# Patient Record
Sex: FEMALE | Race: OTHER | NOT HISPANIC OR LATINO | URBAN - METROPOLITAN AREA
[De-identification: names, ages, dates, MRNs, and addresses within clinical notes are randomized per-mention and may not be internally consistent; named-entity substitution may affect disease eponyms.]

---

## 2017-02-07 ENCOUNTER — OUTPATIENT (OUTPATIENT)
Dept: OUTPATIENT SERVICES | Facility: HOSPITAL | Age: 67
LOS: 1 days | End: 2017-02-07
Payer: COMMERCIAL

## 2017-02-07 VITALS
OXYGEN SATURATION: 98 % | HEART RATE: 61 BPM | SYSTOLIC BLOOD PRESSURE: 165 MMHG | TEMPERATURE: 98 F | DIASTOLIC BLOOD PRESSURE: 84 MMHG | RESPIRATION RATE: 18 BRPM | HEIGHT: 60 IN | WEIGHT: 115.96 LBS

## 2017-02-07 DIAGNOSIS — R94.39 ABNORMAL RESULT OF OTHER CARDIOVASCULAR FUNCTION STUDY: ICD-10-CM

## 2017-02-07 LAB
ALBUMIN SERPL ELPH-MCNC: 4.3 G/DL — SIGNIFICANT CHANGE UP (ref 3.3–5)
ALP SERPL-CCNC: 79 U/L — SIGNIFICANT CHANGE UP (ref 40–120)
ALT FLD-CCNC: 29 U/L RC — SIGNIFICANT CHANGE UP (ref 10–45)
ANION GAP SERPL CALC-SCNC: 13 MMOL/L — SIGNIFICANT CHANGE UP (ref 5–17)
AST SERPL-CCNC: 25 U/L — SIGNIFICANT CHANGE UP (ref 10–40)
BILIRUB SERPL-MCNC: 0.8 MG/DL — SIGNIFICANT CHANGE UP (ref 0.2–1.2)
BUN SERPL-MCNC: 19 MG/DL — SIGNIFICANT CHANGE UP (ref 7–23)
CALCIUM SERPL-MCNC: 9.6 MG/DL — SIGNIFICANT CHANGE UP (ref 8.4–10.5)
CHLORIDE SERPL-SCNC: 101 MMOL/L — SIGNIFICANT CHANGE UP (ref 96–108)
CO2 SERPL-SCNC: 23 MMOL/L — SIGNIFICANT CHANGE UP (ref 22–31)
CREAT SERPL-MCNC: 1.01 MG/DL — SIGNIFICANT CHANGE UP (ref 0.5–1.3)
GLUCOSE SERPL-MCNC: 299 MG/DL — HIGH (ref 70–99)
HCT VFR BLD CALC: 41.1 % — SIGNIFICANT CHANGE UP (ref 34.5–45)
HGB BLD-MCNC: 14.5 G/DL — SIGNIFICANT CHANGE UP (ref 11.5–15.5)
MCHC RBC-ENTMCNC: 31.2 PG — SIGNIFICANT CHANGE UP (ref 27–34)
MCHC RBC-ENTMCNC: 35.2 GM/DL — SIGNIFICANT CHANGE UP (ref 32–36)
MCV RBC AUTO: 88.7 FL — SIGNIFICANT CHANGE UP (ref 80–100)
PLATELET # BLD AUTO: 189 K/UL — SIGNIFICANT CHANGE UP (ref 150–400)
POTASSIUM SERPL-MCNC: 4.5 MMOL/L — SIGNIFICANT CHANGE UP (ref 3.5–5.3)
POTASSIUM SERPL-SCNC: 4.5 MMOL/L — SIGNIFICANT CHANGE UP (ref 3.5–5.3)
PROT SERPL-MCNC: 7.4 G/DL — SIGNIFICANT CHANGE UP (ref 6–8.3)
RBC # BLD: 4.64 M/UL — SIGNIFICANT CHANGE UP (ref 3.8–5.2)
RBC # FLD: 12.1 % — SIGNIFICANT CHANGE UP (ref 10.3–14.5)
SODIUM SERPL-SCNC: 137 MMOL/L — SIGNIFICANT CHANGE UP (ref 135–145)
WBC # BLD: 7.2 K/UL — SIGNIFICANT CHANGE UP (ref 3.8–10.5)
WBC # FLD AUTO: 7.2 K/UL — SIGNIFICANT CHANGE UP (ref 3.8–10.5)

## 2017-02-07 PROCEDURE — 80053 COMPREHEN METABOLIC PANEL: CPT

## 2017-02-07 PROCEDURE — 93458 L HRT ARTERY/VENTRICLE ANGIO: CPT | Mod: 26,GC

## 2017-02-07 PROCEDURE — C1894: CPT

## 2017-02-07 PROCEDURE — 93010 ELECTROCARDIOGRAM REPORT: CPT

## 2017-02-07 PROCEDURE — 93458 L HRT ARTERY/VENTRICLE ANGIO: CPT

## 2017-02-07 PROCEDURE — 93005 ELECTROCARDIOGRAM TRACING: CPT

## 2017-02-07 PROCEDURE — C1887: CPT

## 2017-02-07 PROCEDURE — 85027 COMPLETE CBC AUTOMATED: CPT

## 2017-02-07 PROCEDURE — C1769: CPT

## 2017-02-07 RX ORDER — SODIUM CHLORIDE 9 MG/ML
3 INJECTION INTRAMUSCULAR; INTRAVENOUS; SUBCUTANEOUS EVERY 8 HOURS
Qty: 0 | Refills: 0 | Status: DISCONTINUED | OUTPATIENT
Start: 2017-02-07 | End: 2017-02-22

## 2017-02-07 NOTE — H&P CARDIOLOGY - HISTORY OF PRESENT ILLNESS
66 years old female pt with PMHx of CAD with 3 stents(1998, 2006 at [facility]), HTN, HLD, DMT2 and family history of MI who presents for cardiac cath. Pt reports that she has been experiencing intermittent left sided chest pain radiate to neck with BECERRA, evaluated by Dr. Diaz, referred for cardiac cath. Currently denies chest pain or SOB. 66 years old female pt with PMHx of CAD with 3 stents(1998, 2006 at [facility]), HTN, HLD, DMT2 and family history of MI who presents for cardiac cath. Pt reports that she has been experiencing intermittent left sided chest pain radiate to neck with BECERRA, evaluated by Dr. Diaz, and had abnormal EKG and referred for cardiac cath. Currently denies chest pain or SOB.

## 2017-02-07 NOTE — H&P CARDIOLOGY - FAMILY HISTORY
Father  Still living? Unknown  Family history of heart attack, Age at diagnosis: Age Unknown     Mother  Still living? No  Family history of heart attack, Age at diagnosis: Age Unknown

## 2017-02-07 NOTE — H&P CARDIOLOGY - PMH
CAD (coronary artery disease)    DM (diabetes mellitus)    HLD (hyperlipidemia)    HTN (hypertension)    Stented coronary artery

## 2018-05-23 PROBLEM — Z95.5 PRESENCE OF CORONARY ANGIOPLASTY IMPLANT AND GRAFT: Chronic | Status: ACTIVE | Noted: 2017-02-07

## 2018-05-23 PROBLEM — I10 ESSENTIAL (PRIMARY) HYPERTENSION: Chronic | Status: ACTIVE | Noted: 2017-02-07

## 2018-05-23 PROBLEM — I25.10 ATHEROSCLEROTIC HEART DISEASE OF NATIVE CORONARY ARTERY WITHOUT ANGINA PECTORIS: Chronic | Status: ACTIVE | Noted: 2017-02-07

## 2018-05-23 PROBLEM — E11.9 TYPE 2 DIABETES MELLITUS WITHOUT COMPLICATIONS: Chronic | Status: ACTIVE | Noted: 2017-02-07

## 2018-05-23 PROBLEM — E78.5 HYPERLIPIDEMIA, UNSPECIFIED: Chronic | Status: ACTIVE | Noted: 2017-02-07

## 2018-05-23 PROBLEM — Z00.00 ENCOUNTER FOR PREVENTIVE HEALTH EXAMINATION: Status: ACTIVE | Noted: 2018-05-23

## 2018-07-03 ENCOUNTER — APPOINTMENT (OUTPATIENT)
Dept: GASTROENTEROLOGY | Facility: CLINIC | Age: 68
End: 2018-07-03
Payer: MEDICARE

## 2018-07-03 VITALS
HEART RATE: 72 BPM | WEIGHT: 115 LBS | SYSTOLIC BLOOD PRESSURE: 100 MMHG | OXYGEN SATURATION: 99 % | TEMPERATURE: 98 F | RESPIRATION RATE: 16 BRPM | HEIGHT: 60 IN | BODY MASS INDEX: 22.58 KG/M2 | DIASTOLIC BLOOD PRESSURE: 64 MMHG

## 2018-07-03 DIAGNOSIS — Z12.11 ENCOUNTER FOR SCREENING FOR MALIGNANT NEOPLASM OF COLON: ICD-10-CM

## 2018-07-03 PROCEDURE — 99204 OFFICE O/P NEW MOD 45 MIN: CPT

## 2018-10-03 ENCOUNTER — RESULT REVIEW (OUTPATIENT)
Age: 68
End: 2018-10-03

## 2018-10-03 ENCOUNTER — OUTPATIENT (OUTPATIENT)
Dept: OUTPATIENT SERVICES | Facility: HOSPITAL | Age: 68
LOS: 1 days | End: 2018-10-03
Payer: MEDICARE

## 2018-10-03 DIAGNOSIS — K21.9 GASTRO-ESOPHAGEAL REFLUX DISEASE WITHOUT ESOPHAGITIS: ICD-10-CM

## 2018-10-03 DIAGNOSIS — Z12.11 ENCOUNTER FOR SCREENING FOR MALIGNANT NEOPLASM OF COLON: ICD-10-CM

## 2018-10-03 PROCEDURE — 88305 TISSUE EXAM BY PATHOLOGIST: CPT

## 2018-10-03 PROCEDURE — 88312 SPECIAL STAINS GROUP 1: CPT | Mod: 26

## 2018-10-03 PROCEDURE — G0121: CPT

## 2018-10-03 PROCEDURE — 88312 SPECIAL STAINS GROUP 1: CPT

## 2018-10-03 PROCEDURE — 88305 TISSUE EXAM BY PATHOLOGIST: CPT | Mod: 26

## 2018-10-03 PROCEDURE — 43239 EGD BIOPSY SINGLE/MULTIPLE: CPT

## 2018-10-03 PROCEDURE — 82962 GLUCOSE BLOOD TEST: CPT

## 2018-10-03 PROCEDURE — 45378 DIAGNOSTIC COLONOSCOPY: CPT

## 2019-01-03 ENCOUNTER — APPOINTMENT (OUTPATIENT)
Dept: GASTROENTEROLOGY | Facility: CLINIC | Age: 69
End: 2019-01-03
Payer: MEDICARE

## 2019-01-03 VITALS
TEMPERATURE: 97.3 F | SYSTOLIC BLOOD PRESSURE: 148 MMHG | OXYGEN SATURATION: 98 % | DIASTOLIC BLOOD PRESSURE: 68 MMHG | RESPIRATION RATE: 14 BRPM | WEIGHT: 110 LBS | HEART RATE: 76 BPM | HEIGHT: 60 IN | BODY MASS INDEX: 21.6 KG/M2

## 2019-01-03 DIAGNOSIS — K21.9 GASTRO-ESOPHAGEAL REFLUX DISEASE W/OUT ESOPHAGITIS: ICD-10-CM

## 2019-01-03 PROCEDURE — 99214 OFFICE O/P EST MOD 30 MIN: CPT

## 2019-01-03 NOTE — PHYSICAL EXAM
[General Appearance - Alert] : alert [General Appearance - In No Acute Distress] : in no acute distress [Sclera] : the sclera and conjunctiva were normal [PERRL With Normal Accommodation] : pupils were equal in size, round, and reactive to light [Extraocular Movements] : extraocular movements were intact [Outer Ear] : the ears and nose were normal in appearance [Oropharynx] : the oropharynx was normal [Neck Appearance] : the appearance of the neck was normal [Neck Cervical Mass (___cm)] : no neck mass was observed [Jugular Venous Distention Increased] : there was no jugular-venous distention [Thyroid Diffuse Enlargement] : the thyroid was not enlarged [Thyroid Nodule] : there were no palpable thyroid nodules [Auscultation Breath Sounds / Voice Sounds] : lungs were clear to auscultation bilaterally [Heart Rate And Rhythm] : heart rate was normal and rhythm regular [Heart Sounds] : normal S1 and S2 [Heart Sounds Gallop] : no gallops [Murmurs] : no murmurs [Heart Sounds Pericardial Friction Rub] : no pericardial rub [Bowel Sounds] : normal bowel sounds [Abdomen Soft] : soft [Abdomen Tenderness] : non-tender [Abdomen Mass (___ Cm)] : no abdominal mass palpated [Cervical Lymph Nodes Enlarged Posterior Bilaterally] : posterior cervical [Cervical Lymph Nodes Enlarged Anterior Bilaterally] : anterior cervical [Supraclavicular Lymph Nodes Enlarged Bilaterally] : supraclavicular [No Spinal Tenderness] : no spinal tenderness [Nail Clubbing] : no clubbing  or cyanosis of the fingernails [] : no rash [Oriented To Time, Place, And Person] : oriented to person, place, and time [Impaired Insight] : insight and judgment were intact [Affect] : the affect was normal

## 2019-01-03 NOTE — HISTORY OF PRESENT ILLNESS
[de-identified] : 68  year old female with persistent reflux symptoms. SHe is on PPi daily with no real improvement. EGD showed a significant hiatus hernia.

## 2019-01-03 NOTE — ASSESSMENT
[FreeTextEntry1] : 68 year old female with persistent reflux symptoms. GIven her history of cardiac disease I suggested she follow up with Her cardiologist. I will also refer her to General   surgery to consider repair of the hiatus hernia. In the meantime I suggested increasing her pPi to bid

## 2019-01-23 ENCOUNTER — APPOINTMENT (OUTPATIENT)
Dept: SURGERY | Facility: CLINIC | Age: 69
End: 2019-01-23
Payer: MEDICARE

## 2019-01-23 VITALS
HEART RATE: 67 BPM | WEIGHT: 112 LBS | SYSTOLIC BLOOD PRESSURE: 153 MMHG | HEIGHT: 60 IN | TEMPERATURE: 97.9 F | BODY MASS INDEX: 21.99 KG/M2 | DIASTOLIC BLOOD PRESSURE: 79 MMHG

## 2019-01-23 DIAGNOSIS — Z83.3 FAMILY HISTORY OF DIABETES MELLITUS: ICD-10-CM

## 2019-01-23 DIAGNOSIS — Z82.3 FAMILY HISTORY OF STROKE: ICD-10-CM

## 2019-01-23 DIAGNOSIS — Z87.891 PERSONAL HISTORY OF NICOTINE DEPENDENCE: ICD-10-CM

## 2019-01-23 DIAGNOSIS — Z82.49 FAMILY HISTORY OF ISCHEMIC HEART DISEASE AND OTHER DISEASES OF THE CIRCULATORY SYSTEM: ICD-10-CM

## 2019-01-23 DIAGNOSIS — Z86.39 PERSONAL HISTORY OF OTHER ENDOCRINE, NUTRITIONAL AND METABOLIC DISEASE: ICD-10-CM

## 2019-01-23 DIAGNOSIS — Z86.69 PERSONAL HISTORY OF OTHER DISEASES OF THE NERVOUS SYSTEM AND SENSE ORGANS: ICD-10-CM

## 2019-01-23 DIAGNOSIS — K21.0 GASTRO-ESOPHAGEAL REFLUX DISEASE WITH ESOPHAGITIS: ICD-10-CM

## 2019-01-23 DIAGNOSIS — Z86.79 PERSONAL HISTORY OF OTHER DISEASES OF THE CIRCULATORY SYSTEM: ICD-10-CM

## 2019-01-23 DIAGNOSIS — Z87.39 PERSONAL HISTORY OF OTHER DISEASES OF THE MUSCULOSKELETAL SYSTEM AND CONNECTIVE TISSUE: ICD-10-CM

## 2019-01-23 DIAGNOSIS — Z87.19 PERSONAL HISTORY OF OTHER DISEASES OF THE DIGESTIVE SYSTEM: ICD-10-CM

## 2019-01-23 PROCEDURE — 99203 OFFICE O/P NEW LOW 30 MIN: CPT

## 2019-01-29 ENCOUNTER — OUTPATIENT (OUTPATIENT)
Dept: OUTPATIENT SERVICES | Facility: HOSPITAL | Age: 69
LOS: 1 days | End: 2019-01-29
Payer: MEDICARE

## 2019-01-29 ENCOUNTER — APPOINTMENT (OUTPATIENT)
Dept: RADIOLOGY | Facility: HOSPITAL | Age: 69
End: 2019-01-29
Payer: MEDICARE

## 2019-01-29 DIAGNOSIS — K21.9 GASTRO-ESOPHAGEAL REFLUX DISEASE WITHOUT ESOPHAGITIS: ICD-10-CM

## 2019-01-29 PROCEDURE — 74241: CPT

## 2019-01-29 PROCEDURE — 74241: CPT | Mod: 26

## 2019-02-12 PROBLEM — Z86.79 HISTORY OF HYPERTENSION: Status: RESOLVED | Noted: 2019-01-23 | Resolved: 2019-02-12

## 2019-02-12 PROBLEM — Z87.19 HISTORY OF GASTROESOPHAGEAL REFLUX (GERD): Status: RESOLVED | Noted: 2019-01-23 | Resolved: 2019-02-12

## 2019-02-12 PROBLEM — Z86.69 HISTORY OF GLAUCOMA: Status: RESOLVED | Noted: 2019-01-23 | Resolved: 2019-02-12

## 2019-02-12 PROBLEM — Z86.39 HISTORY OF HIGH CHOLESTEROL: Status: RESOLVED | Noted: 2019-01-23 | Resolved: 2019-02-12

## 2019-02-12 PROBLEM — Z82.3 FAMILY HISTORY OF CEREBROVASCULAR ACCIDENT (CVA): Status: ACTIVE | Noted: 2019-01-23

## 2019-02-12 PROBLEM — Z87.39 HISTORY OF ARTHRITIS: Status: RESOLVED | Noted: 2019-01-23 | Resolved: 2019-02-12

## 2019-02-12 PROBLEM — Z83.3 FAMILY HISTORY OF DIABETES MELLITUS: Status: ACTIVE | Noted: 2019-01-23

## 2019-02-12 PROBLEM — K21.0 GASTROESOPHAGEAL REFLUX DISEASE WITH ESOPHAGITIS: Status: ACTIVE | Noted: 2019-02-12

## 2019-02-12 PROBLEM — Z87.891 FORMER SMOKER: Status: ACTIVE | Noted: 2019-01-23

## 2019-02-12 PROBLEM — Z86.39 HISTORY OF DIABETES MELLITUS: Status: RESOLVED | Noted: 2019-01-23 | Resolved: 2019-02-12

## 2019-02-12 PROBLEM — Z82.49 FAMILY HISTORY OF CARDIAC DISORDER: Status: ACTIVE | Noted: 2019-01-23

## 2019-02-12 NOTE — ASSESSMENT
[FreeTextEntry1] : 68 y.o female with symptomatic chronic acid reflux.\par - will needing additional test prior to surgical intervention. \par - upper GI series ordered.\par - will need esophageal manometry and impedance testing.\par - with these evaluations, we will be able to decide which wrap the patient will benefit from: Toupet vs. Nissen. THis was explained in detail to the patient and spouse during the visit. THey understand and agree to proceed.

## 2019-02-12 NOTE — CONSULT LETTER
[Dear  ___] : Dear  [unfilled], [Consult Letter:] : I had the pleasure of evaluating your patient, [unfilled]. [Consult Closing:] : Thank you very much for allowing me to participate in the care of this patient.  If you have any questions, please do not hesitate to contact me. [DrEva  ___] : Dr. MELO

## 2019-02-12 NOTE — PHYSICAL EXAM
[Enlarged] : not enlarged [No Rash or Lesion] : No rash or lesion [Alert] : alert [Oriented to Person] : oriented to person [Oriented to Place] : oriented to place [Oriented to Time] : oriented to time [Calm] : calm [de-identified] : NAD [de-identified] : NCAT; sclerae anicteric; [de-identified] : bilateral symmetric excursions, breathing even and unlabored [de-identified] : nml S1, S2, no m/r/g [de-identified] : soft, NT, ND\par

## 2019-02-12 NOTE — HISTORY OF PRESENT ILLNESS
[de-identified] : 68 y.o female presents to office for consultation. Accompanied by . Has seen Dr. Moreno, who performed EGD in oct 2018 which demonstrated hiatal hernia. reports having chronic acid reflux not relieved with daily ppi and also reports nausea with occasional vomiting. There is waterbrash as well at night. THis has gotten worse over the years and her symptoms are not relived by dietary and lifestyle modifications, nor by PPI use. SHe is referred for evaluation for possible surgical options. \par \par

## 2019-11-04 ENCOUNTER — APPOINTMENT (OUTPATIENT)
Dept: INTERNAL MEDICINE | Facility: CLINIC | Age: 69
End: 2019-11-04

## 2020-07-31 DIAGNOSIS — Z01.818 ENCOUNTER FOR OTHER PREPROCEDURAL EXAMINATION: ICD-10-CM

## 2020-08-04 ENCOUNTER — APPOINTMENT (OUTPATIENT)
Dept: DISASTER EMERGENCY | Facility: CLINIC | Age: 70
End: 2020-08-04

## 2020-08-05 LAB — SARS-COV-2 N GENE NPH QL NAA+PROBE: DETECTED

## 2020-08-07 ENCOUNTER — OUTPATIENT (OUTPATIENT)
Dept: OUTPATIENT SERVICES | Facility: HOSPITAL | Age: 70
LOS: 1 days | Discharge: HOME | End: 2020-08-07
Payer: MEDICARE

## 2020-08-07 DIAGNOSIS — I25.10 ATHEROSCLEROTIC HEART DISEASE OF NATIVE CORONARY ARTERY WITHOUT ANGINA PECTORIS: ICD-10-CM

## 2020-08-07 LAB
ANION GAP SERPL CALC-SCNC: 14 MMO/L — SIGNIFICANT CHANGE UP (ref 7–14)
BUN SERPL-MCNC: 21 MG/DL — SIGNIFICANT CHANGE UP (ref 7–23)
CALCIUM SERPL-MCNC: 9.8 MG/DL — SIGNIFICANT CHANGE UP (ref 8.4–10.5)
CHLORIDE SERPL-SCNC: 104 MMOL/L — SIGNIFICANT CHANGE UP (ref 98–107)
CO2 SERPL-SCNC: 24 MMOL/L — SIGNIFICANT CHANGE UP (ref 22–31)
CREAT SERPL-MCNC: 0.96 MG/DL — SIGNIFICANT CHANGE UP (ref 0.5–1.3)
GLUCOSE SERPL-MCNC: 82 MG/DL — SIGNIFICANT CHANGE UP (ref 70–99)
HBA1C BLD-MCNC: 9.5 % — HIGH (ref 4–5.6)
HCT VFR BLD CALC: 41.9 % — SIGNIFICANT CHANGE UP (ref 34.5–45)
HGB BLD-MCNC: 13.1 G/DL — SIGNIFICANT CHANGE UP (ref 11.5–15.5)
MCHC RBC-ENTMCNC: 28.2 PG — SIGNIFICANT CHANGE UP (ref 27–34)
MCHC RBC-ENTMCNC: 31.3 % — LOW (ref 32–36)
MCV RBC AUTO: 90.3 FL — SIGNIFICANT CHANGE UP (ref 80–100)
NRBC # FLD: 0 K/UL — SIGNIFICANT CHANGE UP (ref 0–0)
PLATELET # BLD AUTO: 182 K/UL — SIGNIFICANT CHANGE UP (ref 150–400)
PMV BLD: 11.2 FL — SIGNIFICANT CHANGE UP (ref 7–13)
POTASSIUM SERPL-MCNC: 3.8 MMOL/L — SIGNIFICANT CHANGE UP (ref 3.5–5.3)
POTASSIUM SERPL-SCNC: 3.8 MMOL/L — SIGNIFICANT CHANGE UP (ref 3.5–5.3)
RBC # BLD: 4.64 M/UL — SIGNIFICANT CHANGE UP (ref 3.8–5.2)
RBC # FLD: 14.3 % — SIGNIFICANT CHANGE UP (ref 10.3–14.5)
SODIUM SERPL-SCNC: 142 MMOL/L — SIGNIFICANT CHANGE UP (ref 135–145)
WBC # BLD: 3.63 K/UL — LOW (ref 3.8–10.5)
WBC # FLD AUTO: 3.63 K/UL — LOW (ref 3.8–10.5)

## 2020-08-07 PROCEDURE — 93010 ELECTROCARDIOGRAM REPORT: CPT

## 2020-08-07 PROCEDURE — 93458 L HRT ARTERY/VENTRICLE ANGIO: CPT | Mod: 26,59

## 2020-08-07 PROCEDURE — ZZZZZ: CPT

## 2020-08-07 PROCEDURE — 99152 MOD SED SAME PHYS/QHP 5/>YRS: CPT

## 2020-08-07 PROCEDURE — 93571 IV DOP VEL&/PRESS C FLO 1ST: CPT | Mod: 26,RC

## 2020-08-07 RX ORDER — SODIUM CHLORIDE 9 MG/ML
3 INJECTION INTRAMUSCULAR; INTRAVENOUS; SUBCUTANEOUS EVERY 8 HOURS
Refills: 0 | Status: DISCONTINUED | OUTPATIENT
Start: 2020-08-07 | End: 2020-08-22

## 2020-08-07 RX ORDER — LOSARTAN POTASSIUM 100 MG/1
100 TABLET, FILM COATED ORAL ONCE
Refills: 0 | Status: DISCONTINUED | OUTPATIENT
Start: 2020-08-07 | End: 2020-08-07

## 2020-08-07 RX ORDER — GLIMEPIRIDE 1 MG
1 TABLET ORAL
Qty: 0 | Refills: 0 | DISCHARGE

## 2020-08-07 RX ORDER — OMEPRAZOLE 10 MG/1
1 CAPSULE, DELAYED RELEASE ORAL
Qty: 0 | Refills: 0 | DISCHARGE

## 2020-08-07 RX ORDER — LOSARTAN POTASSIUM 100 MG/1
1 TABLET, FILM COATED ORAL
Qty: 0 | Refills: 0 | DISCHARGE

## 2020-08-07 RX ORDER — AMLODIPINE BESYLATE 2.5 MG/1
5 TABLET ORAL ONCE
Refills: 0 | Status: COMPLETED | OUTPATIENT
Start: 2020-08-07 | End: 2020-08-07

## 2020-08-07 RX ORDER — PIOGLITAZONE HYDROCHLORIDE 15 MG/1
1 TABLET ORAL
Qty: 0 | Refills: 0 | DISCHARGE

## 2020-08-07 RX ORDER — SITAGLIPTIN AND METFORMIN HYDROCHLORIDE 500; 50 MG/1; MG/1
1 TABLET, FILM COATED ORAL
Qty: 0 | Refills: 0 | DISCHARGE

## 2020-08-07 RX ORDER — AMLODIPINE BESYLATE 2.5 MG/1
5 TABLET ORAL ONCE
Refills: 0 | Status: DISCONTINUED | OUTPATIENT
Start: 2020-08-07 | End: 2020-08-07

## 2020-08-07 RX ORDER — ROSUVASTATIN CALCIUM 5 MG/1
1 TABLET ORAL
Qty: 0 | Refills: 0 | DISCHARGE

## 2020-08-07 RX ORDER — METOPROLOL TARTRATE 50 MG
1 TABLET ORAL
Qty: 0 | Refills: 0 | DISCHARGE

## 2020-08-07 RX ORDER — LOSARTAN POTASSIUM 100 MG/1
50 TABLET, FILM COATED ORAL ONCE
Refills: 0 | Status: COMPLETED | OUTPATIENT
Start: 2020-08-07 | End: 2020-08-07

## 2020-08-07 RX ADMIN — AMLODIPINE BESYLATE 5 MILLIGRAM(S): 2.5 TABLET ORAL at 08:14

## 2020-08-07 RX ADMIN — LOSARTAN POTASSIUM 50 MILLIGRAM(S): 100 TABLET, FILM COATED ORAL at 11:23

## 2020-08-07 NOTE — H&P CARDIOLOGY - REVIEW OF SYSTEMS
Pt denies fever, chills, recent travel, headache, dizziness, visual deficit, chest pain, abd pain, n/v/d/, hematochezia, melena, dysuria, hematuria, PE, DVT, SHASHA, CVA, syncope, peripheral edema.

## 2020-08-07 NOTE — H&P CARDIOLOGY - HISTORY OF PRESENT ILLNESS
70 years old female pt with PMHx of CAD with 3 stents(1998, 2006 at [facility]), HTN, HLD, DMT2 and family history of MI who presents for elective cardiac cath. Pt reports that she has been experiencing intermittent left sided chest pain on and off. States that pain does not associate with any specific activities and also happens at rest. Denies SOB, palpitations, n/v/d.    ECHO 4/4/2017: LVEF 61%

## 2020-08-07 NOTE — H&P CARDIOLOGY - FAMILY HISTORY
Father  Still living? Unknown  Family history of heart attack, Age at diagnosis: Age Unknown     Mother  Still living? Unknown  Family history of heart attack, Age at diagnosis: Age Unknown  Family history of CVA, Age at diagnosis: Age Unknown

## 2020-08-07 NOTE — H&P CARDIOLOGY - PMH
CAD (coronary artery disease)    COVID-19    DM (diabetes mellitus)    HLD (hyperlipidemia)    HTN (hypertension)    Stented coronary artery

## 2020-09-03 ENCOUNTER — RESULT REVIEW (OUTPATIENT)
Age: 70
End: 2020-09-03

## 2020-12-16 PROBLEM — Z12.11 ENCOUNTER FOR SCREENING COLONOSCOPY: Status: RESOLVED | Noted: 2018-07-03 | Resolved: 2020-12-16

## 2021-03-18 ENCOUNTER — EMERGENCY (EMERGENCY)
Facility: HOSPITAL | Age: 71
LOS: 1 days | Discharge: HOME | End: 2021-03-18
Attending: EMERGENCY MEDICINE
Payer: COMMERCIAL

## 2021-03-18 VITALS
HEART RATE: 68 BPM | SYSTOLIC BLOOD PRESSURE: 157 MMHG | OXYGEN SATURATION: 99 % | RESPIRATION RATE: 16 BRPM | DIASTOLIC BLOOD PRESSURE: 84 MMHG | TEMPERATURE: 97 F

## 2021-03-18 VITALS
OXYGEN SATURATION: 99 % | HEIGHT: 61 IN | SYSTOLIC BLOOD PRESSURE: 187 MMHG | DIASTOLIC BLOOD PRESSURE: 104 MMHG | WEIGHT: 134.92 LBS | RESPIRATION RATE: 18 BRPM | HEART RATE: 68 BPM | TEMPERATURE: 98 F

## 2021-03-18 PROBLEM — U07.1 COVID-19: Chronic | Status: ACTIVE | Noted: 2020-08-07

## 2021-03-18 LAB
ALBUMIN SERPL ELPH-MCNC: 4.3 G/DL — SIGNIFICANT CHANGE UP (ref 3.3–5)
ALP SERPL-CCNC: 118 U/L — SIGNIFICANT CHANGE UP (ref 40–120)
ALT FLD-CCNC: 27 U/L — SIGNIFICANT CHANGE UP (ref 10–45)
ANION GAP SERPL CALC-SCNC: 13 MMOL/L — SIGNIFICANT CHANGE UP (ref 5–17)
APTT BLD: 25.7 SEC — LOW (ref 27.5–35.5)
AST SERPL-CCNC: 35 U/L — SIGNIFICANT CHANGE UP (ref 10–40)
BASOPHILS # BLD AUTO: 0.03 K/UL — SIGNIFICANT CHANGE UP (ref 0–0.2)
BASOPHILS NFR BLD AUTO: 0.4 % — SIGNIFICANT CHANGE UP (ref 0–2)
BILIRUB SERPL-MCNC: 0.5 MG/DL — SIGNIFICANT CHANGE UP (ref 0.2–1.2)
BUN SERPL-MCNC: 26 MG/DL — HIGH (ref 7–23)
CALCIUM SERPL-MCNC: 10.6 MG/DL — HIGH (ref 8.4–10.5)
CHLORIDE SERPL-SCNC: 101 MMOL/L — SIGNIFICANT CHANGE UP (ref 96–108)
CO2 SERPL-SCNC: 22 MMOL/L — SIGNIFICANT CHANGE UP (ref 22–31)
CREAT SERPL-MCNC: 1.02 MG/DL — SIGNIFICANT CHANGE UP (ref 0.5–1.3)
EOSINOPHIL # BLD AUTO: 0.36 K/UL — SIGNIFICANT CHANGE UP (ref 0–0.5)
EOSINOPHIL NFR BLD AUTO: 5.4 % — SIGNIFICANT CHANGE UP (ref 0–6)
GLUCOSE SERPL-MCNC: 165 MG/DL — HIGH (ref 70–99)
HCT VFR BLD CALC: 42.4 % — SIGNIFICANT CHANGE UP (ref 34.5–45)
HGB BLD-MCNC: 14.2 G/DL — SIGNIFICANT CHANGE UP (ref 11.5–15.5)
IMM GRANULOCYTES NFR BLD AUTO: 0.6 % — SIGNIFICANT CHANGE UP (ref 0–1.5)
INR BLD: 1.07 RATIO — SIGNIFICANT CHANGE UP (ref 0.88–1.16)
LYMPHOCYTES # BLD AUTO: 2.87 K/UL — SIGNIFICANT CHANGE UP (ref 1–3.3)
LYMPHOCYTES # BLD AUTO: 43 % — SIGNIFICANT CHANGE UP (ref 13–44)
MCHC RBC-ENTMCNC: 28.9 PG — SIGNIFICANT CHANGE UP (ref 27–34)
MCHC RBC-ENTMCNC: 33.5 GM/DL — SIGNIFICANT CHANGE UP (ref 32–36)
MCV RBC AUTO: 86.4 FL — SIGNIFICANT CHANGE UP (ref 80–100)
MONOCYTES # BLD AUTO: 0.32 K/UL — SIGNIFICANT CHANGE UP (ref 0–0.9)
MONOCYTES NFR BLD AUTO: 4.8 % — SIGNIFICANT CHANGE UP (ref 2–14)
NEUTROPHILS # BLD AUTO: 3.06 K/UL — SIGNIFICANT CHANGE UP (ref 1.8–7.4)
NEUTROPHILS NFR BLD AUTO: 45.8 % — SIGNIFICANT CHANGE UP (ref 43–77)
NRBC # BLD: 0 /100 WBCS — SIGNIFICANT CHANGE UP (ref 0–0)
NT-PROBNP SERPL-SCNC: 200 PG/ML — SIGNIFICANT CHANGE UP (ref 0–300)
PLATELET # BLD AUTO: 234 K/UL — SIGNIFICANT CHANGE UP (ref 150–400)
POTASSIUM SERPL-MCNC: 4.8 MMOL/L — SIGNIFICANT CHANGE UP (ref 3.5–5.3)
POTASSIUM SERPL-SCNC: 4.8 MMOL/L — SIGNIFICANT CHANGE UP (ref 3.5–5.3)
PROT SERPL-MCNC: 8.6 G/DL — HIGH (ref 6–8.3)
PROTHROM AB SERPL-ACNC: 12.8 SEC — SIGNIFICANT CHANGE UP (ref 10.6–13.6)
RBC # BLD: 4.91 M/UL — SIGNIFICANT CHANGE UP (ref 3.8–5.2)
RBC # FLD: 14.5 % — SIGNIFICANT CHANGE UP (ref 10.3–14.5)
SODIUM SERPL-SCNC: 136 MMOL/L — SIGNIFICANT CHANGE UP (ref 135–145)
TROPONIN T, HIGH SENSITIVITY RESULT: 14 NG/L — SIGNIFICANT CHANGE UP (ref 0–51)
TROPONIN T, HIGH SENSITIVITY RESULT: 15 NG/L — SIGNIFICANT CHANGE UP (ref 0–51)
TSH SERPL-MCNC: 1.33 UIU/ML — SIGNIFICANT CHANGE UP (ref 0.27–4.2)
WBC # BLD: 6.68 K/UL — SIGNIFICANT CHANGE UP (ref 3.8–10.5)
WBC # FLD AUTO: 6.68 K/UL — SIGNIFICANT CHANGE UP (ref 3.8–10.5)

## 2021-03-18 PROCEDURE — 93010 ELECTROCARDIOGRAM REPORT: CPT

## 2021-03-18 PROCEDURE — 71046 X-RAY EXAM CHEST 2 VIEWS: CPT | Mod: 26

## 2021-03-18 PROCEDURE — 85730 THROMBOPLASTIN TIME PARTIAL: CPT

## 2021-03-18 PROCEDURE — 85610 PROTHROMBIN TIME: CPT

## 2021-03-18 PROCEDURE — 93005 ELECTROCARDIOGRAM TRACING: CPT

## 2021-03-18 PROCEDURE — 99284 EMERGENCY DEPT VISIT MOD MDM: CPT

## 2021-03-18 PROCEDURE — 85025 COMPLETE CBC W/AUTO DIFF WBC: CPT

## 2021-03-18 PROCEDURE — 84443 ASSAY THYROID STIM HORMONE: CPT

## 2021-03-18 PROCEDURE — 70450 CT HEAD/BRAIN W/O DYE: CPT | Mod: 26,MA

## 2021-03-18 PROCEDURE — 83880 ASSAY OF NATRIURETIC PEPTIDE: CPT

## 2021-03-18 PROCEDURE — 71046 X-RAY EXAM CHEST 2 VIEWS: CPT

## 2021-03-18 PROCEDURE — 80053 COMPREHEN METABOLIC PANEL: CPT

## 2021-03-18 PROCEDURE — 84484 ASSAY OF TROPONIN QUANT: CPT

## 2021-03-18 PROCEDURE — 99284 EMERGENCY DEPT VISIT MOD MDM: CPT | Mod: 25

## 2021-03-18 PROCEDURE — 70450 CT HEAD/BRAIN W/O DYE: CPT

## 2021-03-18 RX ORDER — ACETAMINOPHEN 500 MG
650 TABLET ORAL ONCE
Refills: 0 | Status: COMPLETED | OUTPATIENT
Start: 2021-03-18 | End: 2021-03-18

## 2021-03-18 RX ORDER — LIDOCAINE 4 G/100G
1 CREAM TOPICAL ONCE
Refills: 0 | Status: COMPLETED | OUTPATIENT
Start: 2021-03-18 | End: 2021-03-18

## 2021-03-18 RX ADMIN — Medication 650 MILLIGRAM(S): at 17:22

## 2021-03-18 RX ADMIN — LIDOCAINE 1 PATCH: 4 CREAM TOPICAL at 17:23

## 2021-03-18 NOTE — ED PROVIDER NOTE - OBJECTIVE STATEMENT
71 F hx CAD s/p stent HLD T2DM HTN p/w intermittent bouts of a shooting type chest pain in the chest for the last 3 days, intermittent, not exertional, lasting for seconds to minutes at a time. She has had L lower back pain, worse with movement or lying down for several weeks as well. 1week ago she had an episode of light headedness and "drifted" to the floor did not hit her head; she thinks she might have soiled herself at that time. Denies associated incontinence or saddle numbness, cough sore throat, fevers, diaphoresis or shortness of breath. Had elective cath in August 2020 showing patent stents

## 2021-03-18 NOTE — ED PROVIDER NOTE - MUSCULOSKELETAL, MLM
L lower back pain reproduced when pt goes to lie down. Negative straight leg raise test. 5/5 strength knee flexion/extension and ankle platnar/dorsiflexion. Dp pulses intact b/l.

## 2021-03-18 NOTE — ED PROVIDER NOTE - PATIENT PORTAL LINK FT
You can access the FollowMyHealth Patient Portal offered by [facility] by registering at the following website: [email or web address]. By joining [employer]’s FollowMyHealth portal, you will also be able to view your health information using other applications (apps) compatible with our system.

## 2021-03-18 NOTE — ED PROVIDER NOTE - IV ALTEPLASE DOOR HIDDEN
Note received from pt when scheduling appt:    Hello,  I started getting a back ache around my left shoulder blade on Sunday evening, but over the course of Monday it progressed to stronger pain in my back, and sharp pains in my chest if I move around too quickly or take too deep of breath. It also felt worse when I tried laying down on my back so sleep was difficult. I took some ibuprofen but it does not seem to be helping much so Im thinking this is something I should get checked out.   Thanks!      Writer called pt to discuss symptoms as appt scheduled is not until 5/18/18, left message requesting a return call.     show

## 2021-03-18 NOTE — ED PROVIDER NOTE - ATTENDING CONTRIBUTION TO CARE
pt is a 72 y/o female with h/o cad, covid, dm, htn 2 stents presents with weakness for the past few weeks, lost her father in jan, element of greving, decrease po intake, here with weakness with l lumbar lower back pain with no motor deficits noted, does not radiate down both legs, with no change in bowel or bladder habits, no saddle anesthesia. pt able to ambulate with no red flags such as h/o cancer or concern for epidural abscess. pt also with chest pain, atypical lasting seconds for months, no ekg changes, cathed last year with no new blockages, cardiac work up, control back pain, check labs for weakness.

## 2021-03-18 NOTE — ED ADULT NURSE NOTE - OBJECTIVE STATEMENT
70 y/o female presents with multiple complaints. States she had a death in the family in January and since than has had decreased eating and drinking.  The past two weeks she states she has had low back pain and intermittent chest pain. Motrin helped her back pain. Denies sob, ha, n/v/d, abdominal pain, f/c, urinary symptoms, hematuria. Denies trauma to her back or injury. A&Ox4, hypertensive, skin warm dry and intact, MAEx4, lungs CTA, abd soft nondistended. Patient's bed in the lowest position, explained plan of care to patient and family members. Will continue to reassess.

## 2022-05-13 ENCOUNTER — NON-APPOINTMENT (OUTPATIENT)
Age: 72
End: 2022-05-13

## 2022-05-16 ENCOUNTER — APPOINTMENT (OUTPATIENT)
Dept: GASTROENTEROLOGY | Facility: CLINIC | Age: 72
End: 2022-05-16
Payer: MEDICARE

## 2022-05-16 ENCOUNTER — NON-APPOINTMENT (OUTPATIENT)
Age: 72
End: 2022-05-16

## 2022-05-16 DIAGNOSIS — K62.5 HEMORRHAGE OF ANUS AND RECTUM: ICD-10-CM

## 2022-05-16 PROCEDURE — 99442: CPT

## 2022-08-01 ENCOUNTER — APPOINTMENT (OUTPATIENT)
Dept: GASTROENTEROLOGY | Facility: CLINIC | Age: 72
End: 2022-08-01

## 2022-08-02 ENCOUNTER — APPOINTMENT (OUTPATIENT)
Dept: GASTROENTEROLOGY | Facility: HOSPITAL | Age: 72
End: 2022-08-02

## 2022-09-12 ENCOUNTER — NON-APPOINTMENT (OUTPATIENT)
Age: 72
End: 2022-09-12

## 2022-09-23 ENCOUNTER — APPOINTMENT (OUTPATIENT)
Dept: GASTROENTEROLOGY | Facility: HOSPITAL | Age: 72
End: 2022-09-23

## 2022-09-23 ENCOUNTER — RESULT REVIEW (OUTPATIENT)
Age: 72
End: 2022-09-23

## 2022-09-23 ENCOUNTER — OUTPATIENT (OUTPATIENT)
Dept: OUTPATIENT SERVICES | Facility: HOSPITAL | Age: 72
LOS: 1 days | Discharge: HOME | End: 2022-09-23

## 2022-09-23 VITALS
RESPIRATION RATE: 18 BRPM | WEIGHT: 132.94 LBS | OXYGEN SATURATION: 99 % | SYSTOLIC BLOOD PRESSURE: 149 MMHG | TEMPERATURE: 96 F | DIASTOLIC BLOOD PRESSURE: 68 MMHG | HEIGHT: 61 IN | HEART RATE: 69 BPM

## 2022-09-23 VITALS
HEART RATE: 68 BPM | OXYGEN SATURATION: 100 % | RESPIRATION RATE: 20 BRPM | SYSTOLIC BLOOD PRESSURE: 140 MMHG | DIASTOLIC BLOOD PRESSURE: 66 MMHG

## 2022-09-23 DIAGNOSIS — R15.9 FULL INCONTINENCE OF FECES: ICD-10-CM

## 2022-09-23 LAB — GLUCOSE BLDC GLUCOMTR-MCNC: 104 MG/DL — HIGH (ref 70–99)

## 2022-09-23 PROCEDURE — 45390 COLONOSCOPY W/RESECTION: CPT

## 2022-09-23 PROCEDURE — 88305 TISSUE EXAM BY PATHOLOGIST: CPT | Mod: 26

## 2022-09-23 PROCEDURE — 45381 COLONOSCOPY SUBMUCOUS NJX: CPT | Mod: 59

## 2022-09-23 DEVICE — SYR ORISE GEL TWIN 23G 10ML: Type: IMPLANTABLE DEVICE | Status: FUNCTIONAL

## 2022-09-23 DEVICE — CLIP RESOLUTION 360 235CM: Type: IMPLANTABLE DEVICE | Status: FUNCTIONAL

## 2022-09-23 NOTE — ASU PATIENT PROFILE, ADULT - FALL HARM RISK - UNIVERSAL INTERVENTIONS
Bed in lowest position, wheels locked, appropriate side rails in place/Call bell, personal items and telephone in reach/Instruct patient to call for assistance before getting out of bed or chair/Non-slip footwear when patient is out of bed/[place] to call system/Physically safe environment - no spills, clutter or unnecessary equipment/Purposeful Proactive Rounding/Room/bathroom lighting operational, light cord in reach

## 2022-09-23 NOTE — ASU PATIENT PROFILE, ADULT - NSICDXPASTMEDICALHX_GEN_ALL_CORE_FT
PAST MEDICAL HISTORY:  CAD (coronary artery disease)     COVID-19     DM (diabetes mellitus)     HLD (hyperlipidemia)     HTN (hypertension)     Stented coronary artery

## 2022-09-27 LAB — SURGICAL PATHOLOGY STUDY: SIGNIFICANT CHANGE UP

## 2022-10-12 NOTE — ED ADULT NURSE NOTE - PSH
No significant past surgical history     [FreeTextEntry2] : Patient is a 53 year old RHD female with complains of locking in the L Index and Middle Finger for the past several weeks. No injury or trauma. Patient states that she has radiating pain in the L Wrist. Patient feels numbness in the fingers.

## 2024-03-27 ENCOUNTER — NON-APPOINTMENT (OUTPATIENT)
Age: 74
End: 2024-03-27

## 2024-03-28 ENCOUNTER — APPOINTMENT (OUTPATIENT)
Dept: GASTROENTEROLOGY | Facility: CLINIC | Age: 74
End: 2024-03-28
Payer: MEDICARE

## 2024-03-28 DIAGNOSIS — R15.9 FULL INCONTINENCE OF FECES: ICD-10-CM

## 2024-03-28 PROCEDURE — 99442: CPT

## 2024-05-03 ENCOUNTER — INPATIENT (INPATIENT)
Facility: HOSPITAL | Age: 74
LOS: 0 days | Discharge: HOME | End: 2024-05-04
Attending: INTERNAL MEDICINE | Admitting: INTERNAL MEDICINE
Payer: MEDICARE

## 2024-05-03 VITALS
HEART RATE: 105 BPM | SYSTOLIC BLOOD PRESSURE: 162 MMHG | TEMPERATURE: 98 F | RESPIRATION RATE: 18 BRPM | OXYGEN SATURATION: 100 % | DIASTOLIC BLOOD PRESSURE: 89 MMHG

## 2024-05-03 DIAGNOSIS — Z95.5 PRESENCE OF CORONARY ANGIOPLASTY IMPLANT AND GRAFT: Chronic | ICD-10-CM

## 2024-05-03 DIAGNOSIS — I25.10 ATHEROSCLEROTIC HEART DISEASE OF NATIVE CORONARY ARTERY WITHOUT ANGINA PECTORIS: ICD-10-CM

## 2024-05-03 LAB
ANION GAP SERPL CALC-SCNC: 12 MMOL/L — SIGNIFICANT CHANGE UP (ref 7–14)
BUN SERPL-MCNC: 22 MG/DL — SIGNIFICANT CHANGE UP (ref 7–23)
CALCIUM SERPL-MCNC: 9.5 MG/DL — SIGNIFICANT CHANGE UP (ref 8.4–10.5)
CHLORIDE SERPL-SCNC: 101 MMOL/L — SIGNIFICANT CHANGE UP (ref 98–107)
CO2 SERPL-SCNC: 24 MMOL/L — SIGNIFICANT CHANGE UP (ref 22–31)
CREAT SERPL-MCNC: 1.02 MG/DL — SIGNIFICANT CHANGE UP (ref 0.5–1.3)
EGFR: 58 ML/MIN/1.73M2 — LOW
GLUCOSE SERPL-MCNC: 254 MG/DL — HIGH (ref 70–99)
HCT VFR BLD CALC: 40.6 % — SIGNIFICANT CHANGE UP (ref 34.5–45)
HGB BLD-MCNC: 14.2 G/DL — SIGNIFICANT CHANGE UP (ref 11.5–15.5)
MCHC RBC-ENTMCNC: 30.7 PG — SIGNIFICANT CHANGE UP (ref 27–34)
MCHC RBC-ENTMCNC: 35 GM/DL — SIGNIFICANT CHANGE UP (ref 32–36)
MCV RBC AUTO: 87.7 FL — SIGNIFICANT CHANGE UP (ref 80–100)
NRBC # BLD: 0 /100 WBCS — SIGNIFICANT CHANGE UP (ref 0–0)
NRBC # FLD: 0 K/UL — SIGNIFICANT CHANGE UP (ref 0–0)
PLATELET # BLD AUTO: 183 K/UL — SIGNIFICANT CHANGE UP (ref 150–400)
POTASSIUM SERPL-MCNC: 4.5 MMOL/L — SIGNIFICANT CHANGE UP (ref 3.5–5.3)
POTASSIUM SERPL-SCNC: 4.5 MMOL/L — SIGNIFICANT CHANGE UP (ref 3.5–5.3)
RBC # BLD: 4.63 M/UL — SIGNIFICANT CHANGE UP (ref 3.8–5.2)
RBC # FLD: 12 % — SIGNIFICANT CHANGE UP (ref 10.3–14.5)
SODIUM SERPL-SCNC: 137 MMOL/L — SIGNIFICANT CHANGE UP (ref 135–145)
WBC # BLD: 7.23 K/UL — SIGNIFICANT CHANGE UP (ref 3.8–10.5)
WBC # FLD AUTO: 7.23 K/UL — SIGNIFICANT CHANGE UP (ref 3.8–10.5)

## 2024-05-03 PROCEDURE — 93010 ELECTROCARDIOGRAM REPORT: CPT | Mod: 76

## 2024-05-03 PROCEDURE — 93571 IV DOP VEL&/PRESS C FLO 1ST: CPT | Mod: 26,59

## 2024-05-03 PROCEDURE — 92920 PRQ TRLUML C ANGIOP 1ART&/BR: CPT | Mod: RC

## 2024-05-03 PROCEDURE — 93454 CORONARY ARTERY ANGIO S&I: CPT | Mod: 26,59

## 2024-05-03 PROCEDURE — 99152 MOD SED SAME PHYS/QHP 5/>YRS: CPT

## 2024-05-03 RX ORDER — DEXTROSE 50 % IN WATER 50 %
25 SYRINGE (ML) INTRAVENOUS ONCE
Refills: 0 | Status: DISCONTINUED | OUTPATIENT
Start: 2024-05-03 | End: 2024-05-04

## 2024-05-03 RX ORDER — SODIUM CHLORIDE 9 MG/ML
1000 INJECTION, SOLUTION INTRAVENOUS
Refills: 0 | Status: DISCONTINUED | OUTPATIENT
Start: 2024-05-03 | End: 2024-05-04

## 2024-05-03 RX ORDER — LOSARTAN POTASSIUM 100 MG/1
1 TABLET, FILM COATED ORAL
Refills: 0 | DISCHARGE

## 2024-05-03 RX ORDER — METOPROLOL TARTRATE 50 MG
100 TABLET ORAL
Refills: 0 | Status: DISCONTINUED | OUTPATIENT
Start: 2024-05-03 | End: 2024-05-04

## 2024-05-03 RX ORDER — LATANOPROST 0.05 MG/ML
1 SOLUTION/ DROPS OPHTHALMIC; TOPICAL
Refills: 0 | DISCHARGE

## 2024-05-03 RX ORDER — ASPIRIN/CALCIUM CARB/MAGNESIUM 324 MG
81 TABLET ORAL DAILY
Refills: 0 | Status: DISCONTINUED | OUTPATIENT
Start: 2024-05-04 | End: 2024-05-04

## 2024-05-03 RX ORDER — INSULIN DETEMIR 100/ML (3)
25 INSULIN PEN (ML) SUBCUTANEOUS
Qty: 0 | Refills: 0 | DISCHARGE

## 2024-05-03 RX ORDER — LOSARTAN POTASSIUM 100 MG/1
2 TABLET, FILM COATED ORAL
Qty: 0 | Refills: 0 | DISCHARGE

## 2024-05-03 RX ORDER — AMLODIPINE BESYLATE 2.5 MG/1
1 TABLET ORAL
Qty: 0 | Refills: 0 | DISCHARGE

## 2024-05-03 RX ORDER — DEXTROSE 10 % IN WATER 10 %
125 INTRAVENOUS SOLUTION INTRAVENOUS ONCE
Refills: 0 | Status: DISCONTINUED | OUTPATIENT
Start: 2024-05-03 | End: 2024-05-04

## 2024-05-03 RX ORDER — SIMETHICONE 80 MG/1
80 TABLET, CHEWABLE ORAL ONCE
Refills: 0 | Status: COMPLETED | OUTPATIENT
Start: 2024-05-03 | End: 2024-05-03

## 2024-05-03 RX ORDER — INSULIN LISPRO 100/ML
VIAL (ML) SUBCUTANEOUS AT BEDTIME
Refills: 0 | Status: DISCONTINUED | OUTPATIENT
Start: 2024-05-03 | End: 2024-05-04

## 2024-05-03 RX ORDER — CLOPIDOGREL BISULFATE 75 MG/1
75 TABLET, FILM COATED ORAL DAILY
Refills: 0 | Status: DISCONTINUED | OUTPATIENT
Start: 2024-05-04 | End: 2024-05-04

## 2024-05-03 RX ORDER — SODIUM CHLORIDE 9 MG/ML
3 INJECTION INTRAMUSCULAR; INTRAVENOUS; SUBCUTANEOUS EVERY 8 HOURS
Refills: 0 | Status: DISCONTINUED | OUTPATIENT
Start: 2024-05-03 | End: 2024-05-04

## 2024-05-03 RX ORDER — DEXTROSE 50 % IN WATER 50 %
12.5 SYRINGE (ML) INTRAVENOUS ONCE
Refills: 0 | Status: DISCONTINUED | OUTPATIENT
Start: 2024-05-03 | End: 2024-05-04

## 2024-05-03 RX ORDER — GLUCAGON INJECTION, SOLUTION 0.5 MG/.1ML
1 INJECTION, SOLUTION SUBCUTANEOUS ONCE
Refills: 0 | Status: DISCONTINUED | OUTPATIENT
Start: 2024-05-03 | End: 2024-05-04

## 2024-05-03 RX ORDER — INSULIN GLARGINE 100 [IU]/ML
12 INJECTION, SOLUTION SUBCUTANEOUS AT BEDTIME
Refills: 0 | Status: DISCONTINUED | OUTPATIENT
Start: 2024-05-03 | End: 2024-05-04

## 2024-05-03 RX ORDER — ISOSORBIDE MONONITRATE 60 MG/1
1 TABLET, EXTENDED RELEASE ORAL
Refills: 0 | DISCHARGE

## 2024-05-03 RX ORDER — DEXTROSE 50 % IN WATER 50 %
15 SYRINGE (ML) INTRAVENOUS ONCE
Refills: 0 | Status: DISCONTINUED | OUTPATIENT
Start: 2024-05-03 | End: 2024-05-04

## 2024-05-03 RX ORDER — LATANOPROST 0.05 MG/ML
1 SOLUTION/ DROPS OPHTHALMIC; TOPICAL AT BEDTIME
Refills: 0 | Status: DISCONTINUED | OUTPATIENT
Start: 2024-05-03 | End: 2024-05-04

## 2024-05-03 RX ORDER — ROSUVASTATIN CALCIUM 5 MG/1
1 TABLET ORAL
Refills: 0 | DISCHARGE

## 2024-05-03 RX ORDER — EMPAGLIFLOZIN 10 MG/1
1 TABLET, FILM COATED ORAL
Refills: 0 | DISCHARGE

## 2024-05-03 RX ORDER — PIOGLITAZONE HYDROCHLORIDE 15 MG/1
1 TABLET ORAL
Qty: 0 | Refills: 0 | DISCHARGE

## 2024-05-03 RX ORDER — ACETAMINOPHEN 500 MG
1000 TABLET ORAL ONCE
Refills: 0 | Status: COMPLETED | OUTPATIENT
Start: 2024-05-03 | End: 2024-05-03

## 2024-05-03 RX ORDER — INSULIN ASPART 100 [IU]/ML
20 INJECTION, SOLUTION SUBCUTANEOUS
Refills: 0 | DISCHARGE

## 2024-05-03 RX ORDER — INSULIN DEGLUDEC 100 U/ML
16 INJECTION, SOLUTION SUBCUTANEOUS
Refills: 0 | DISCHARGE

## 2024-05-03 RX ORDER — INSULIN LISPRO 100/ML
14 VIAL (ML) SUBCUTANEOUS
Refills: 0 | Status: DISCONTINUED | OUTPATIENT
Start: 2024-05-03 | End: 2024-05-04

## 2024-05-03 RX ORDER — INSULIN LISPRO 100/ML
VIAL (ML) SUBCUTANEOUS
Refills: 0 | Status: DISCONTINUED | OUTPATIENT
Start: 2024-05-03 | End: 2024-05-04

## 2024-05-03 RX ORDER — ISOSORBIDE MONONITRATE 60 MG/1
60 TABLET, EXTENDED RELEASE ORAL DAILY
Refills: 0 | Status: DISCONTINUED | OUTPATIENT
Start: 2024-05-03 | End: 2024-05-04

## 2024-05-03 RX ORDER — FLUTICASONE PROPIONATE 50 MCG
1 SPRAY, SUSPENSION NASAL
Qty: 0 | Refills: 0 | DISCHARGE

## 2024-05-03 RX ORDER — ROSUVASTATIN CALCIUM 5 MG/1
40 TABLET ORAL AT BEDTIME
Refills: 0 | Status: DISCONTINUED | OUTPATIENT
Start: 2024-05-03 | End: 2024-05-04

## 2024-05-03 RX ORDER — METOPROLOL TARTRATE 50 MG
1 TABLET ORAL
Refills: 0 | DISCHARGE

## 2024-05-03 RX ORDER — ATORVASTATIN CALCIUM 80 MG/1
1 TABLET, FILM COATED ORAL
Qty: 0 | Refills: 0 | DISCHARGE

## 2024-05-03 RX ORDER — METOPROLOL TARTRATE 50 MG
2 TABLET ORAL
Qty: 0 | Refills: 0 | DISCHARGE

## 2024-05-03 RX ORDER — GLIMEPIRIDE 1 MG
1 TABLET ORAL
Qty: 0 | Refills: 0 | DISCHARGE

## 2024-05-03 RX ORDER — SODIUM CHLORIDE 9 MG/ML
500 INJECTION INTRAMUSCULAR; INTRAVENOUS; SUBCUTANEOUS
Refills: 0 | Status: DISCONTINUED | OUTPATIENT
Start: 2024-05-03 | End: 2024-05-04

## 2024-05-03 RX ORDER — LOSARTAN POTASSIUM 100 MG/1
100 TABLET, FILM COATED ORAL DAILY
Refills: 0 | Status: DISCONTINUED | OUTPATIENT
Start: 2024-05-04 | End: 2024-05-04

## 2024-05-03 RX ORDER — FOLIC ACID 0.8 MG
1 TABLET ORAL
Qty: 0 | Refills: 0 | DISCHARGE

## 2024-05-03 RX ORDER — INSULIN ASPART 100 [IU]/ML
6 INJECTION, SOLUTION SUBCUTANEOUS
Qty: 0 | Refills: 0 | DISCHARGE

## 2024-05-03 RX ORDER — NITROGLYCERIN 6.5 MG
0.4 CAPSULE, EXTENDED RELEASE ORAL ONCE
Refills: 0 | Status: COMPLETED | OUTPATIENT
Start: 2024-05-03 | End: 2024-05-03

## 2024-05-03 RX ORDER — ASPIRIN/CALCIUM CARB/MAGNESIUM 324 MG
1 TABLET ORAL
Qty: 0 | Refills: 0 | DISCHARGE

## 2024-05-03 RX ADMIN — Medication 3: at 22:23

## 2024-05-03 RX ADMIN — LATANOPROST 1 DROP(S): 0.05 SOLUTION/ DROPS OPHTHALMIC; TOPICAL at 21:56

## 2024-05-03 RX ADMIN — Medication 2: at 17:06

## 2024-05-03 RX ADMIN — Medication 1000 MILLIGRAM(S): at 16:50

## 2024-05-03 RX ADMIN — Medication 0.4 MILLIGRAM(S): at 16:20

## 2024-05-03 RX ADMIN — SODIUM CHLORIDE 75 MILLILITER(S): 9 INJECTION INTRAMUSCULAR; INTRAVENOUS; SUBCUTANEOUS at 16:20

## 2024-05-03 RX ADMIN — Medication 400 MILLIGRAM(S): at 16:20

## 2024-05-03 RX ADMIN — ROSUVASTATIN CALCIUM 40 MILLIGRAM(S): 5 TABLET ORAL at 21:56

## 2024-05-03 RX ADMIN — INSULIN GLARGINE 12 UNIT(S): 100 INJECTION, SOLUTION SUBCUTANEOUS at 22:23

## 2024-05-03 RX ADMIN — SODIUM CHLORIDE 3 MILLILITER(S): 9 INJECTION INTRAMUSCULAR; INTRAVENOUS; SUBCUTANEOUS at 16:39

## 2024-05-03 RX ADMIN — SIMETHICONE 80 MILLIGRAM(S): 80 TABLET, CHEWABLE ORAL at 21:56

## 2024-05-03 RX ADMIN — Medication 100 MILLIGRAM(S): at 21:56

## 2024-05-03 RX ADMIN — SODIUM CHLORIDE 3 MILLILITER(S): 9 INJECTION INTRAMUSCULAR; INTRAVENOUS; SUBCUTANEOUS at 22:06

## 2024-05-03 NOTE — CHART NOTE - NSCHARTNOTEFT_GEN_A_CORE
Status post Cath, Right femoral site without bleeding or hematoma.  Dressing is intact.  Positive Pulses.  Will continue to monitor.                                                                                                                                                  KAM Last, RPA-C [id number]

## 2024-05-03 NOTE — H&P CARDIOLOGY - GASTROINTESTINAL
SLEEP LAB (Nancy or Jaden) will contact you to schedulethe sleep study. Their number is [phone] (ext 2). Please call them if you do not hear from them in 2 weeks from now.  The [facility] Lab is located on 7th floor of the [facility]; [place] lab is located in [facility].    SLEEP CLINIC (my assistant) will call you when the sleep study results are ready - if you have not heard from us by 2 weeks from the date of the study, please call [phone] (ext 1) or you can use [facility] to contact me.    You are advised to abstain from driving should you feel sleepy or drowsy.    
detailed exam
Respiratory

## 2024-05-03 NOTE — H&P CARDIOLOGY - NSICDXFAMILYHX_GEN_ALL_CORE_FT
FAMILY HISTORY:  Father  Still living? Unknown  Family history of heart attack, Age at diagnosis: Age Unknown    Mother  Still living? Unknown  Family history of CVA, Age at diagnosis: Age Unknown  Family history of heart attack, Age at diagnosis: Age Unknown

## 2024-05-03 NOTE — H&P CARDIOLOGY - RS GEN PE MLT RESP DETAILS PC
Epidermoid Cyst: Care Instructions  Your Care Instructions  An epidermoid (say \"po-npu-NWB-rylan\") cyst is a lump just under the skin. These cysts can form when a hair follicle becomes blocked. They are common in acne and may occur on the face, neck, back, and genitals. However, they can form anywhere on the body. These cysts are not cancer and do not lead to cancer. They tend not to hurt, but they can sometimes become swollen and painful. They also may break open (rupture) and cause scarring. These cysts sometimes do not cause problems and may not need treatment. If you have a cyst that is swollen and hurts, your doctor may inject it with a medicine to help it heal. But it is more likely that a painful cyst will need to be removed. Your doctor will give you a shot of numbing medicine and cut into the cyst to drain it or remove it. This makes the symptoms go away. Follow-up care is a key part of your treatment and safety. Be sure to make and go to all appointments, and call your doctor if you are having problems. It's also a good idea to know your test results and keep a list of the medicines you take. How can you care for yourself at home? · Do not squeeze the cyst or poke it with a needle to open it. This can cause swelling, redness, and infection. · Always have a doctor look at any new lumps you get to make sure that they are not serious. When should you call for help? Watch closely for changes in your health, and be sure to contact your doctor if:    · You have a fever, redness, or swelling after you get a shot of medicine in the cyst.     · You see or feel a new lump on your skin. Where can you learn more? Go to http://www.gray.com/  Enter I240 in the search box to learn more about \"Epidermoid Cyst: Care Instructions. \"  Current as of: March 3, 2021               Content Version: 13.0  © 2267-0075 [employer]wise, [employer].    Care instructions adapted under license by [employer] (which disclaims liability or warranty for this information). If you have questions about a medical condition or this instruction, always ask your healthcare professional. [address] any warranty or liability for your use of this information. airway patent/breath sounds equal/good air movement/respirations non-labored/clear to auscultation bilaterally/no chest wall tenderness

## 2024-05-03 NOTE — H&P CARDIOLOGY - HISTORY OF PRESENT ILLNESS
71 y/o F w/ PMH of CAD with 3 stents(1998, 2006 at [facility]), HTN, HLD, DMT2 and family history of MI presents for cardiac catheretization. Pt complains of left sided exertional chest pain and shortness of breath for the past 2 months. Pt describes the pain as severe sharp and non-radiating in nature. Pt sometimes has to use her 's SL Nitro in order to relieve her symptoms. Pt denies N/V/D, fevers, chills, cough, palpitations, syncope, orthopnea, nocturnal paroxysmal dyspnea, edema, cyanosis, heart murmurs, varicosities, phlebitis, claudication. 69 y/o F w/ PMH of CAD with 3 stents(1998, 2006 at [facility]), HTN, HLD, DMT2 and family history of MI presents for cardiac catheretization. Pt complains of left sided exertional chest pain and shortness of breath for the past 2 months. Pt describes the pain as severe sharp and non-radiating in nature. Pt sometimes has to use her 's SL Nitro in order to relieve her symptoms. Pt denies N/V/D, fevers, chills, cough, palpitations, syncope, orthopnea, nocturnal paroxysmal dyspnea, edema, cyanosis, heart murmurs, varicosities, phlebitis, claudication.    Cardiologist: Dr. Garcia

## 2024-05-04 ENCOUNTER — TRANSCRIPTION ENCOUNTER (OUTPATIENT)
Age: 74
End: 2024-05-04

## 2024-05-04 VITALS
SYSTOLIC BLOOD PRESSURE: 110 MMHG | TEMPERATURE: 98 F | OXYGEN SATURATION: 99 % | DIASTOLIC BLOOD PRESSURE: 60 MMHG | RESPIRATION RATE: 18 BRPM | HEART RATE: 61 BPM

## 2024-05-04 LAB
A1C WITH ESTIMATED AVERAGE GLUCOSE RESULT: 8.7 % — HIGH (ref 4–5.6)
ALBUMIN SERPL ELPH-MCNC: 3.9 G/DL — SIGNIFICANT CHANGE UP (ref 3.3–5)
ALP SERPL-CCNC: 84 U/L — SIGNIFICANT CHANGE UP (ref 40–120)
ALT FLD-CCNC: 19 U/L — SIGNIFICANT CHANGE UP (ref 4–33)
ANION GAP SERPL CALC-SCNC: 13 MMOL/L — SIGNIFICANT CHANGE UP (ref 7–14)
AST SERPL-CCNC: 30 U/L — SIGNIFICANT CHANGE UP (ref 4–32)
BILIRUB SERPL-MCNC: 0.6 MG/DL — SIGNIFICANT CHANGE UP (ref 0.2–1.2)
BUN SERPL-MCNC: 24 MG/DL — HIGH (ref 7–23)
CALCIUM SERPL-MCNC: 9.3 MG/DL — SIGNIFICANT CHANGE UP (ref 8.4–10.5)
CHLORIDE SERPL-SCNC: 101 MMOL/L — SIGNIFICANT CHANGE UP (ref 98–107)
CHOLEST SERPL-MCNC: 143 MG/DL — SIGNIFICANT CHANGE UP
CO2 SERPL-SCNC: 22 MMOL/L — SIGNIFICANT CHANGE UP (ref 22–31)
CREAT SERPL-MCNC: 0.95 MG/DL — SIGNIFICANT CHANGE UP (ref 0.5–1.3)
EGFR: 63 ML/MIN/1.73M2 — SIGNIFICANT CHANGE UP
ESTIMATED AVERAGE GLUCOSE: 203 — SIGNIFICANT CHANGE UP
GLUCOSE SERPL-MCNC: 246 MG/DL — HIGH (ref 70–99)
HDLC SERPL-MCNC: 61 MG/DL — SIGNIFICANT CHANGE UP
LIPID PNL WITH DIRECT LDL SERPL: 56 MG/DL — SIGNIFICANT CHANGE UP
MAGNESIUM SERPL-MCNC: 2.1 MG/DL — SIGNIFICANT CHANGE UP (ref 1.6–2.6)
NON HDL CHOLESTEROL: 82 MG/DL — SIGNIFICANT CHANGE UP
PHOSPHATE SERPL-MCNC: 3 MG/DL — SIGNIFICANT CHANGE UP (ref 2.5–4.5)
POTASSIUM SERPL-MCNC: 4.6 MMOL/L — SIGNIFICANT CHANGE UP (ref 3.5–5.3)
POTASSIUM SERPL-SCNC: 4.6 MMOL/L — SIGNIFICANT CHANGE UP (ref 3.5–5.3)
PROT SERPL-MCNC: 7.3 G/DL — SIGNIFICANT CHANGE UP (ref 6–8.3)
SODIUM SERPL-SCNC: 136 MMOL/L — SIGNIFICANT CHANGE UP (ref 135–145)
TRIGL SERPL-MCNC: 131 MG/DL — SIGNIFICANT CHANGE UP

## 2024-05-04 RX ORDER — CLOPIDOGREL BISULFATE 75 MG/1
1 TABLET, FILM COATED ORAL
Qty: 30 | Refills: 0
Start: 2024-05-04 | End: 2024-06-02

## 2024-05-04 RX ADMIN — Medication 1: at 12:09

## 2024-05-04 RX ADMIN — SODIUM CHLORIDE 3 MILLILITER(S): 9 INJECTION INTRAMUSCULAR; INTRAVENOUS; SUBCUTANEOUS at 15:36

## 2024-05-04 RX ADMIN — LOSARTAN POTASSIUM 100 MILLIGRAM(S): 100 TABLET, FILM COATED ORAL at 05:37

## 2024-05-04 RX ADMIN — CLOPIDOGREL BISULFATE 75 MILLIGRAM(S): 75 TABLET, FILM COATED ORAL at 12:14

## 2024-05-04 RX ADMIN — Medication 2: at 08:45

## 2024-05-04 RX ADMIN — ISOSORBIDE MONONITRATE 60 MILLIGRAM(S): 60 TABLET, EXTENDED RELEASE ORAL at 12:14

## 2024-05-04 RX ADMIN — Medication 14 UNIT(S): at 08:45

## 2024-05-04 RX ADMIN — Medication 14 UNIT(S): at 12:09

## 2024-05-04 RX ADMIN — SODIUM CHLORIDE 3 MILLILITER(S): 9 INJECTION INTRAMUSCULAR; INTRAVENOUS; SUBCUTANEOUS at 06:05

## 2024-05-04 RX ADMIN — Medication 81 MILLIGRAM(S): at 12:14

## 2024-05-04 RX ADMIN — Medication 100 MILLIGRAM(S): at 05:37

## 2024-05-04 NOTE — DISCHARGE NOTE PROVIDER - NSDCCPCAREPLAN_GEN_ALL_CORE_FT
PRINCIPAL DISCHARGE DIAGNOSIS  Diagnosis: CAD (coronary artery disease)  Assessment and Plan of Treatment: You were admitted to the hospital for observation after elective cardiac catheretization. Your procedure was completed on 5/3/2024 which showed heart disease - a balloon angioplasty was performed with limited success. Continue your medications as prescribed.  Follow up with outpatient cardiology clinic.  Follow up with Interventional Cardiology, Dr. Beltran, in 3-4 weeks.      SECONDARY DISCHARGE DIAGNOSES  Diagnosis: Mild HTN  Assessment and Plan of Treatment: Please continue medications as currently prescribed.  Please continue low salt, low cholesterol (DASH) diet. Follow up with your primary care doctor for further management.    Diagnosis: HLD (hyperlipidemia)  Assessment and Plan of Treatment: Please continue medications as currently prescribed.  Please continue low salt, low cholesterol (DASH) diet. Follow up with your primary care doctor for further management.    Diagnosis: DM (diabetes mellitus)  Assessment and Plan of Treatment: Your A1C was 8.7. Continue your medication regimen and a consistent carbohydrate diet (Meaning eating the same amount of carbohydrates at the same time each day). Monitor blood glucose levels throughout the day before meals and at bedtime. Record blood sugars and bring to outpatient providers appointment in order to be reviewed by your doctor for management modifications. If your sugars are more than 400 or less than 70 you should contact your PCP immediately. Monitor for signs/symptoms of low blood glucose, such as, dizziness, altered mental status, or cool/clammy skin. In addition, monitor for signs/symptoms of high blood glucose, such as, feeling hot, dry, fatigued, or with increased thirst/urination. Make regular podiatry appointments in order to have feet checked for wounds and uncontrolled toe nail growth to prevent infections, as well as, appointments with an ophthalmologist to monitor your vision.

## 2024-05-04 NOTE — DISCHARGE NOTE PROVIDER - NSDCFUADDAPPT_GEN_ALL_CORE_FT
Follow up with your primary care doctor within one week of discharge. If you do not have one, you can call the medicine clinic at [phone] to make an appointment.    Follow up with your Cardiologist in 1-2 weeks, please call to schedule appointment.    Follow up with Interventional Cardiology, Dr. Malu Beltran, in 3-4 weeks.  Please call office ([phone]) to schedule appointment.

## 2024-05-04 NOTE — PROGRESS NOTE ADULT - SUBJECTIVE AND OBJECTIVE BOX
Patient is a 74y old  Female who presents with a chief complaint of     SUBJECTIVE / OVERNIGHT EVENTS:  No acute events overnight  Patient denies CP, palpitation, SOB, visual disturbance, dizziness, lightheadedness.  ambulated to bathroom without difficulty.        MEDICATIONS  (STANDING):  aspirin enteric coated 81 milliGRAM(s) Oral daily  clopidogrel Tablet 75 milliGRAM(s) Oral daily  dextrose 10% Bolus 125 milliLiter(s) IV Bolus once  dextrose 5%. 1000 milliLiter(s) (100 mL/Hr) IV Continuous <Continuous>  dextrose 5%. 1000 milliLiter(s) (50 mL/Hr) IV Continuous <Continuous>  dextrose 50% Injectable 25 Gram(s) IV Push once  dextrose 50% Injectable 12.5 Gram(s) IV Push once  glucagon  Injectable 1 milliGRAM(s) IntraMuscular once  insulin glargine Injectable (LANTUS) 12 Unit(s) SubCutaneous at bedtime  insulin lispro (ADMELOG) corrective regimen sliding scale   SubCutaneous at bedtime  insulin lispro (ADMELOG) corrective regimen sliding scale   SubCutaneous three times a day before meals  insulin lispro Injectable (ADMELOG) 14 Unit(s) SubCutaneous three times a day before meals  isosorbide   mononitrate ER Tablet (IMDUR) 60 milliGRAM(s) Oral daily  latanoprost 0.005% Ophthalmic Solution 1 Drop(s) Both EYES at bedtime  losartan 100 milliGRAM(s) Oral daily  metoprolol tartrate 100 milliGRAM(s) Oral two times a day  rosuvastatin 40 milliGRAM(s) Oral at bedtime  sodium chloride 0.9% lock flush 3 milliLiter(s) IV Push every 8 hours  sodium chloride 0.9%. 500 milliLiter(s) (75 mL/Hr) IV Continuous <Continuous>    MEDICATIONS  (PRN):  dextrose Oral Gel 15 Gram(s) Oral once PRN Blood Glucose LESS THAN 70 milliGRAM(s)/deciliter      T(C): 36.9 (05-04-24 @ 05:35), Max: 36.9 (05-04-24 @ 05:35)  HR: 76 (05-04-24 @ 05:35) (75 - 105)  BP: 156/75 (05-04-24 @ 05:35) (156/75 - 162/89)  RR: 21 (05-04-24 @ 05:35) (18 - 21)  SpO2: 100% (05-04-24 @ 05:35) (98% - 100%)  CAPILLARY BLOOD GLUCOSE      POCT Blood Glucose.: 184 mg/dL (04 May 2024 12:05)  POCT Blood Glucose.: 202 mg/dL (04 May 2024 08:23)  POCT Blood Glucose.: 380 mg/dL (03 May 2024 22:16)  POCT Blood Glucose.: 210 mg/dL (03 May 2024 16:54)  POCT Blood Glucose.: 241 mg/dL (03 May 2024 13:35)    I&O's Summary      PHYSICAL EXAM:  Constitutional: NAD.   HEENT: AT/NC, EOMI, Supple neck;  Respiratory: no wheezing or crackles. no increase in WOB  Cardiovascular: RRR, S1, S2, no M/R/G. 2+ distal pulses. no JVD, no LE edema.  Gastrointestinal: soft; NT/ND, +BS  Extremities: no cyanosis; non-tender to palpation, DP and Radial pulses intact.  Neurological: A&Ox 3;  Psychiatric: normal mood/affect.  Procedure site: R groin c/d/i, no hematoma, hemorrhage. distal motor and sensory function intact.      LABS:                        14.2   7.23  )-----------( 183      ( 03 May 2024 13:31 )             40.6     05-04    136  |  101  |  24<H>  ----------------------------<  246<H>  4.6   |  22  |  0.95    Ca    9.3      04 May 2024 06:01  Phos  3.0     05-04  Mg     2.10     05-04    TPro  7.3  /  Alb  3.9  /  TBili  0.6  /  DBili  x   /  AST  30  /  ALT  19  /  AlkPhos  84  05-04          Urinalysis Basic - ( 04 May 2024 06:01 )    Color: x / Appearance: x / SG: x / pH: x  Gluc: 246 mg/dL / Ketone: x  / Bili: x / Urobili: x   Blood: x / Protein: x / Nitrite: x   Leuk Esterase: x / RBC: x / WBC x   Sq Epi: x / Non Sq Epi: x / Bacteria: x        RADIOLOGY & ADDITIONAL TESTS:    Imaging Personally Reviewed: NA    Consultant(s) Notes Reviewed:  JOHN    Care Discussed with Consultants/Other Providers: JOHN

## 2024-05-04 NOTE — DISCHARGE NOTE NURSING/CASE MANAGEMENT/SOCIAL WORK - PATIENT PORTAL LINK FT
You can access the FollowMyHealth Patient Portal offered by [facility] by registering at the following website: [email or web address]. By joining [employer]’s FollowMyHealth portal, you will also be able to view your health information using other applications (apps) compatible with our system.

## 2024-05-04 NOTE — DISCHARGE NOTE NURSING/CASE MANAGEMENT/SOCIAL WORK - NSDCFUADDAPPT_GEN_ALL_CORE_FT
Follow up with your primary care doctor within one week of discharge. If you do not have one, you can call the medicine clinic at [phone] to make an appointment.    Follow up with your Cardiologist in 1-2 weeks, please call to schedule appointment.    Follow up with Interventional Cardiology, Dr. Malu Beltran, in 3-4 weeks.  Please call office ([phone]) to schedule appointment.

## 2024-05-04 NOTE — DISCHARGE NOTE PROVIDER - CARE PROVIDERS DIRECT ADDRESSES
[email or web address]

## 2024-05-04 NOTE — DISCHARGE NOTE PROVIDER - CARE PROVIDER_API CALL
Paul Garcia  Cardiovascular Disease  [address] [place] [address]  Phone: [phone]  Fax: [phone]  Follow Up Time:     Malu Beltran)  Interventional Cardiology  [address] [place] [address], [address]  [place] [address]  Phone: [phone]  Fax: [phone]  Follow Up Time:

## 2024-05-04 NOTE — PROGRESS NOTE ADULT - ASSESSMENT
69 y/o F w/ PMH of CAD with 3 stents(1998, 2006 at [facility]), HTN, HLD, DMT2 and family history of MI presents for cardiac catheretization. s/p LHC showing LAD 60% (iFR 0.73), RCA 99%. POBA w/ limited success. RFA closed with Perclose.    # s/p LHC for CAD  - R groin site benign, no hematoma or ecchymosis  - continue DAPT (Aspirin and Plavix/Brilinta). Monitor for signs and symptoms of bleeding  - continue high dose statin  - ok to discharge from interventional cardiology perspective.  - follow up with outpatient cardiology clinic.   - recommend following up with Dr. Beltran in 3-4 weeks

## 2024-05-04 NOTE — DISCHARGE NOTE PROVIDER - NSDCFUSCHEDAPPT_GEN_ALL_CORE_FT
Noelle Underwood  [facility]  [phone]  Scheduled Appointment: 05/07/2024    [facility]  [facility]  Scheduled Appointment: 05/07/2024

## 2024-05-04 NOTE — DISCHARGE NOTE PROVIDER - NSDCMRMEDTOKEN_GEN_ALL_CORE_FT
Aspirin Enteric Coated 81 mg oral delayed release tablet: 1 tab(s) orally once a day  clopidogrel 75 mg oral tablet: 1 tab(s) orally once a day  DISCHARGE INSTRUCTION: Follow up with Cardiologist in 1-2 weeks or early if needed. Continue current medication. Take Tylenol for site discomfort.  isosorbide mononitrate 60 mg oral tablet, extended release: 1 tab(s) orally once a day  Jardiance 25 mg oral tablet: 1 tab(s) orally once a day  latanoprost 0.005% ophthalmic solution: 1 drop(s) in each eye once a day (at bedtime)  losartan 100 mg oral tablet: 1 tab(s) orally once a day  metoprolol tartrate 100 mg oral tablet: 1 tab(s) orally 2 times a day  NovoLOG 100 units/mL injectable solution: 20 unit(s) injectable 3 times a day (before meals)  rosuvastatin 40 mg oral tablet: 1 tab(s) orally once a day (at bedtime)  Tresiba 100 units/mL subcutaneous solution: 16 unit(s) subcutaneous once a day (at bedtime)

## 2024-05-04 NOTE — DISCHARGE NOTE NURSING/CASE MANAGEMENT/SOCIAL WORK - NSDCPEFALRISK_GEN_ALL_CORE
For information on Fall & Injury Prevention, visit: [email or web address] OR  [email or web address] OR  https://www.cdc.gov/steadi/patient.html

## 2024-05-04 NOTE — DISCHARGE NOTE PROVIDER - HOSPITAL COURSE
70  F w/ PMH of CAD with 3 stents (1998, 2006 at [facility]), HTN, HLD, DMT2 and family history of MI presents for cardiac catheretization. Now s/p LHC on 5/3/2024 showing LAD 60% (iFR 0.73), RCA 99%. POBA w/ limited success. RFA closed with Perclose.  R groin site benign, no hematoma or ecchymosis. Continue DAPT (Aspirin and Plavix/Brilinta). Continue high dose statin, ok to discharge from interventional cardiology perspective.  Follow up with outpatient cardiology clinic, follow up with Dr. Beltran in 3-4 weeks.    Patient seen and evaluated. Reviewed discharge medications with patient and attending. Reviewed need for prescription for previous home medications, none requested. Medically cleared/stable for discharge as per Dr. Beltran on 5/4/2024 with appropriate follow up. Patient understands and agrees with plan of care.

## 2024-05-07 ENCOUNTER — OUTPATIENT (OUTPATIENT)
Dept: OUTPATIENT SERVICES | Facility: HOSPITAL | Age: 74
LOS: 1 days | Discharge: HOME | End: 2024-05-07
Payer: MEDICARE

## 2024-05-07 ENCOUNTER — APPOINTMENT (OUTPATIENT)
Dept: GASTROENTEROLOGY | Facility: HOSPITAL | Age: 74
End: 2024-05-07

## 2024-05-07 VITALS
HEART RATE: 52 BPM | TEMPERATURE: 97 F | OXYGEN SATURATION: 100 % | WEIGHT: 106.92 LBS | RESPIRATION RATE: 18 BRPM | HEIGHT: 61 IN | SYSTOLIC BLOOD PRESSURE: 121 MMHG | DIASTOLIC BLOOD PRESSURE: 55 MMHG

## 2024-05-07 DIAGNOSIS — Z95.5 PRESENCE OF CORONARY ANGIOPLASTY IMPLANT AND GRAFT: Chronic | ICD-10-CM

## 2024-05-07 DIAGNOSIS — R15.9 FULL INCONTINENCE OF FECES: ICD-10-CM

## 2024-05-07 PROCEDURE — 91122 ANORECTAL MANOMETRY: CPT | Mod: 26

## 2024-05-07 PROCEDURE — 91120: CPT | Mod: 26

## 2024-06-18 DIAGNOSIS — E11.9 TYPE 2 DIABETES MELLITUS W/OUT COMPLICATIONS: ICD-10-CM

## 2024-06-18 DIAGNOSIS — E78.5 HYPERLIPIDEMIA, UNSPECIFIED: ICD-10-CM

## 2024-06-18 DIAGNOSIS — I10 ESSENTIAL (PRIMARY) HYPERTENSION: ICD-10-CM

## 2024-06-18 DIAGNOSIS — Z95.5 PRESENCE OF CORONARY ANGIOPLASTY IMPLANT AND GRAFT: ICD-10-CM

## 2024-06-18 RX ORDER — CLOPIDOGREL BISULFATE 75 MG/1
75 TABLET, FILM COATED ORAL DAILY
Refills: 0 | Status: ACTIVE | COMMUNITY

## 2024-06-18 RX ORDER — EMPAGLIFLOZIN 25 MG/1
25 TABLET, FILM COATED ORAL DAILY
Refills: 0 | Status: ACTIVE | COMMUNITY

## 2024-06-18 RX ORDER — METFORMIN HYDROCHLORIDE 500 MG/1
500 TABLET, COATED ORAL
Refills: 0 | Status: DISCONTINUED | COMMUNITY
End: 2024-06-18

## 2024-06-18 RX ORDER — GLIMEPIRIDE 4 MG/1
4 TABLET ORAL
Refills: 0 | Status: DISCONTINUED | COMMUNITY
End: 2024-06-18

## 2024-06-18 RX ORDER — LOSARTAN POTASSIUM 100 MG/1
100 TABLET, FILM COATED ORAL DAILY
Refills: 0 | Status: ACTIVE | COMMUNITY

## 2024-06-18 RX ORDER — POLYETHYLENE GLYCOL 3350 17 G/17G
17 POWDER, FOR SOLUTION ORAL
Qty: 238 | Refills: 0 | Status: DISCONTINUED | COMMUNITY
Start: 2018-07-03 | End: 2024-06-18

## 2024-06-18 RX ORDER — POLYETHYLENE GLYCOL 3350 AND ELECTROLYTES WITH LEMON FLAVOR 236; 22.74; 6.74; 5.86; 2.97 G/4L; G/4L; G/4L; G/4L; G/4L
236 POWDER, FOR SOLUTION ORAL
Qty: 1 | Refills: 0 | Status: DISCONTINUED | COMMUNITY
Start: 2022-05-20 | End: 2024-06-18

## 2024-06-18 RX ORDER — PANTOPRAZOLE 40 MG/1
40 TABLET, DELAYED RELEASE ORAL
Qty: 30 | Refills: 4 | Status: DISCONTINUED | COMMUNITY
Start: 2018-07-03 | End: 2024-06-18

## 2024-06-18 RX ORDER — FOLIC ACID 1 MG/1
1 TABLET ORAL
Refills: 0 | Status: DISCONTINUED | COMMUNITY
End: 2024-06-18

## 2024-06-18 RX ORDER — METOPROLOL TARTRATE 100 MG/1
100 TABLET, FILM COATED ORAL TWICE DAILY
Refills: 0 | Status: ACTIVE | COMMUNITY

## 2024-06-18 RX ORDER — INSULIN ASPART 100 [IU]/ML
100 INJECTION, SOLUTION INTRAVENOUS; SUBCUTANEOUS 3 TIMES DAILY
Refills: 0 | Status: ACTIVE | COMMUNITY

## 2024-06-18 RX ORDER — PIOGLITAZONE HYDROCHLORIDE 30 MG/1
30 TABLET ORAL
Refills: 0 | Status: DISCONTINUED | COMMUNITY
End: 2024-06-18

## 2024-06-18 RX ORDER — METOPROLOL TARTRATE 50 MG/1
50 TABLET ORAL
Refills: 0 | Status: DISCONTINUED | COMMUNITY
End: 2024-06-18

## 2024-06-18 RX ORDER — ROSUVASTATIN CALCIUM 40 MG/1
40 TABLET, FILM COATED ORAL AT BEDTIME
Refills: 0 | Status: ACTIVE | COMMUNITY

## 2024-06-18 RX ORDER — LATANOPROST/PF 0.005 %
0.01 DROPS OPHTHALMIC (EYE)
Refills: 0 | Status: ACTIVE | COMMUNITY

## 2024-06-18 RX ORDER — INSULIN DEGLUDEC INJECTION 100 U/ML
100 INJECTION, SOLUTION SUBCUTANEOUS AT BEDTIME
Refills: 0 | Status: ACTIVE | COMMUNITY

## 2024-06-18 RX ORDER — LOSARTAN POTASSIUM 50 MG/1
50 TABLET, FILM COATED ORAL
Refills: 0 | Status: DISCONTINUED | COMMUNITY
End: 2024-06-18

## 2024-06-18 RX ORDER — ATORVASTATIN CALCIUM 80 MG/1
80 TABLET, FILM COATED ORAL
Refills: 0 | Status: DISCONTINUED | COMMUNITY
End: 2024-06-18

## 2024-06-18 RX ORDER — ISOSORBIDE MONONITRATE 60 MG/1
60 TABLET, EXTENDED RELEASE ORAL DAILY
Refills: 0 | Status: ACTIVE | COMMUNITY

## 2024-06-18 RX ORDER — ASPIRIN 81 MG
81 TABLET, DELAYED RELEASE (ENTERIC COATED) ORAL DAILY
Refills: 0 | Status: ACTIVE | COMMUNITY

## 2024-06-19 ENCOUNTER — APPOINTMENT (OUTPATIENT)
Dept: CARDIOLOGY | Facility: CLINIC | Age: 74
End: 2024-06-19
Payer: MEDICARE

## 2024-06-19 ENCOUNTER — NON-APPOINTMENT (OUTPATIENT)
Age: 74
End: 2024-06-19

## 2024-06-19 VITALS — BODY MASS INDEX: 22.38 KG/M2 | HEIGHT: 60 IN | WEIGHT: 114 LBS

## 2024-06-19 VITALS — SYSTOLIC BLOOD PRESSURE: 127 MMHG | OXYGEN SATURATION: 97 % | DIASTOLIC BLOOD PRESSURE: 75 MMHG | HEART RATE: 55 BPM

## 2024-06-19 DIAGNOSIS — R07.89 OTHER CHEST PAIN: ICD-10-CM

## 2024-06-19 DIAGNOSIS — I25.10 ATHEROSCLEROTIC HEART DISEASE OF NATIVE CORONARY ARTERY W/OUT ANGINA PECTORIS: ICD-10-CM

## 2024-06-19 DIAGNOSIS — I20.9 ANGINA PECTORIS, UNSPECIFIED: ICD-10-CM

## 2024-06-19 PROCEDURE — 99204 OFFICE O/P NEW MOD 45 MIN: CPT

## 2024-06-19 PROCEDURE — 99214 OFFICE O/P EST MOD 30 MIN: CPT

## 2024-06-19 RX ORDER — RANOLAZINE 500 MG/1
500 TABLET, EXTENDED RELEASE ORAL
Qty: 60 | Refills: 1 | Status: ACTIVE | COMMUNITY
Start: 2024-06-19 | End: 1900-01-01

## 2024-06-19 NOTE — HISTORY OF PRESENT ILLNESS
[FreeTextEntry1] : 74-year-old female with CAD she continues to have anginal symptoms recently we did a cardiac cath on her she had severe in-stent restenosis of the proximal RCA along with restenosis of the mid LAD stent.  Attempt was made to PCI of the RCA but because of the ostial location of the lesion with severe stenosis and aggressive neointimal hyperplasia it was extremely difficult to dilate the lesion.  Because of the risk versus benefit of continuing with the procedure we elected to treat her medically after unsuccessful dilatation of the lesion.  On today's visit she reports that she is a little better but continues to have angina walking 1-2 blocks at other times even less than that.

## 2024-06-19 NOTE — REVIEW OF SYSTEMS
[Fever] : no fever [Blurry Vision] : no blurred vision [Earache] : no earache [SOB] : shortness of breath [Chest Discomfort] : chest discomfort [Cough] : no cough

## 2024-06-19 NOTE — ASSESSMENT
[FreeTextEntry1] : CAD with severe two-vessel disease with persistent angina, I recommend we add Ranexa to see if this will help her symptoms in addition patient would like to pursue PCI or CABG.  She understands the complexity of the lesions, in discussion with the patient we will plan for PCI to RCA and LAD to be performed at [facility] in the next 1 to 2 weeks.

## 2024-06-19 NOTE — PHYSICAL EXAM
[Well Developed] : well developed [Normal Venous Pressure] : normal venous pressure [Normal S1, S2] : normal S1, S2 [Clear Lung Fields] : clear lung fields

## 2024-08-16 ENCOUNTER — TRANSCRIPTION ENCOUNTER (OUTPATIENT)
Age: 74
End: 2024-08-16

## 2024-08-28 RX ORDER — GLIMEPIRIDE 1 MG/1
1 TABLET ORAL DAILY
Refills: 0 | Status: ACTIVE | COMMUNITY

## 2024-08-28 RX ORDER — LOSARTAN POTASSIUM 25 MG/1
25 TABLET, FILM COATED ORAL DAILY
Refills: 0 | Status: ACTIVE | COMMUNITY

## 2024-09-03 ENCOUNTER — NON-APPOINTMENT (OUTPATIENT)
Age: 74
End: 2024-09-03

## 2024-09-03 ENCOUNTER — APPOINTMENT (OUTPATIENT)
Dept: CARDIOLOGY | Facility: CLINIC | Age: 74
End: 2024-09-03

## 2024-09-03 VITALS
HEIGHT: 60 IN | WEIGHT: 114 LBS | DIASTOLIC BLOOD PRESSURE: 73 MMHG | SYSTOLIC BLOOD PRESSURE: 137 MMHG | BODY MASS INDEX: 22.38 KG/M2 | OXYGEN SATURATION: 97 %

## 2024-09-03 PROCEDURE — 93000 ELECTROCARDIOGRAM COMPLETE: CPT

## 2024-09-03 PROCEDURE — 99214 OFFICE O/P EST MOD 30 MIN: CPT

## 2024-11-19 ENCOUNTER — INPATIENT (INPATIENT)
Facility: HOSPITAL | Age: 74
LOS: 12 days | Discharge: HOME HEALTH | DRG: 313 | End: 2024-12-02
Attending: INTERNAL MEDICINE | Admitting: STUDENT IN AN ORGANIZED HEALTH CARE EDUCATION/TRAINING PROGRAM
Payer: MEDICARE

## 2024-11-19 VITALS
OXYGEN SATURATION: 95 % | DIASTOLIC BLOOD PRESSURE: 77 MMHG | RESPIRATION RATE: 20 BRPM | TEMPERATURE: 98 F | WEIGHT: 119.93 LBS | HEART RATE: 65 BPM | SYSTOLIC BLOOD PRESSURE: 155 MMHG

## 2024-11-19 DIAGNOSIS — Z95.5 PRESENCE OF CORONARY ANGIOPLASTY IMPLANT AND GRAFT: Chronic | ICD-10-CM

## 2024-11-19 LAB
ADD ON TEST-SPECIMEN IN LAB: SIGNIFICANT CHANGE UP
ALBUMIN SERPL ELPH-MCNC: 4.1 G/DL — SIGNIFICANT CHANGE UP (ref 3.3–5)
ALP SERPL-CCNC: 68 U/L — SIGNIFICANT CHANGE UP (ref 40–120)
ALT FLD-CCNC: 22 U/L — SIGNIFICANT CHANGE UP (ref 10–45)
ANION GAP SERPL CALC-SCNC: 12 MMOL/L — SIGNIFICANT CHANGE UP (ref 5–17)
APPEARANCE UR: CLEAR — SIGNIFICANT CHANGE UP
APTT BLD: 27.7 SEC — SIGNIFICANT CHANGE UP (ref 24.5–35.6)
AST SERPL-CCNC: 36 U/L — SIGNIFICANT CHANGE UP (ref 10–40)
BASOPHILS # BLD AUTO: 0.02 K/UL — SIGNIFICANT CHANGE UP (ref 0–0.2)
BASOPHILS NFR BLD AUTO: 0.3 % — SIGNIFICANT CHANGE UP (ref 0–2)
BILIRUB SERPL-MCNC: 1 MG/DL — SIGNIFICANT CHANGE UP (ref 0.2–1.2)
BILIRUB UR-MCNC: NEGATIVE — SIGNIFICANT CHANGE UP
BUN SERPL-MCNC: 27 MG/DL — HIGH (ref 7–23)
CALCIUM SERPL-MCNC: 9.9 MG/DL — SIGNIFICANT CHANGE UP (ref 8.4–10.5)
CHLORIDE SERPL-SCNC: 99 MMOL/L — SIGNIFICANT CHANGE UP (ref 96–108)
CK MB CFR SERPL CALC: 2.4 NG/ML — SIGNIFICANT CHANGE UP (ref 0–3.8)
CO2 SERPL-SCNC: 24 MMOL/L — SIGNIFICANT CHANGE UP (ref 22–31)
COLOR SPEC: YELLOW — SIGNIFICANT CHANGE UP
CREAT SERPL-MCNC: 1.26 MG/DL — SIGNIFICANT CHANGE UP (ref 0.5–1.3)
DIFF PNL FLD: NEGATIVE — SIGNIFICANT CHANGE UP
EGFR: 45 ML/MIN/1.73M2 — LOW
EOSINOPHIL # BLD AUTO: 0.1 K/UL — SIGNIFICANT CHANGE UP (ref 0–0.5)
EOSINOPHIL NFR BLD AUTO: 1.6 % — SIGNIFICANT CHANGE UP (ref 0–6)
FLUAV AG NPH QL: SIGNIFICANT CHANGE UP
FLUBV AG NPH QL: SIGNIFICANT CHANGE UP
GLUCOSE SERPL-MCNC: 283 MG/DL — HIGH (ref 70–99)
GLUCOSE UR QL: >=1000 MG/DL
HCT VFR BLD CALC: 44.6 % — SIGNIFICANT CHANGE UP (ref 34.5–45)
HGB BLD-MCNC: 15.1 G/DL — SIGNIFICANT CHANGE UP (ref 11.5–15.5)
IMM GRANULOCYTES NFR BLD AUTO: 0.3 % — SIGNIFICANT CHANGE UP (ref 0–0.9)
INR BLD: 1.09 RATIO — SIGNIFICANT CHANGE UP (ref 0.85–1.16)
KETONES UR-MCNC: NEGATIVE MG/DL — SIGNIFICANT CHANGE UP
LEUKOCYTE ESTERASE UR-ACNC: NEGATIVE — SIGNIFICANT CHANGE UP
LYMPHOCYTES # BLD AUTO: 1.47 K/UL — SIGNIFICANT CHANGE UP (ref 1–3.3)
LYMPHOCYTES # BLD AUTO: 22.9 % — SIGNIFICANT CHANGE UP (ref 13–44)
MCHC RBC-ENTMCNC: 30.9 PG — SIGNIFICANT CHANGE UP (ref 27–34)
MCHC RBC-ENTMCNC: 33.9 G/DL — SIGNIFICANT CHANGE UP (ref 32–36)
MCV RBC AUTO: 91.4 FL — SIGNIFICANT CHANGE UP (ref 80–100)
MONOCYTES # BLD AUTO: 0.37 K/UL — SIGNIFICANT CHANGE UP (ref 0–0.9)
MONOCYTES NFR BLD AUTO: 5.8 % — SIGNIFICANT CHANGE UP (ref 2–14)
NEUTROPHILS # BLD AUTO: 4.45 K/UL — SIGNIFICANT CHANGE UP (ref 1.8–7.4)
NEUTROPHILS NFR BLD AUTO: 69.1 % — SIGNIFICANT CHANGE UP (ref 43–77)
NITRITE UR-MCNC: NEGATIVE — SIGNIFICANT CHANGE UP
NRBC # BLD: 0 /100 WBCS — SIGNIFICANT CHANGE UP (ref 0–0)
PH UR: 7.5 — SIGNIFICANT CHANGE UP (ref 5–8)
PLATELET # BLD AUTO: 190 K/UL — SIGNIFICANT CHANGE UP (ref 150–400)
POTASSIUM SERPL-MCNC: 4.8 MMOL/L — SIGNIFICANT CHANGE UP (ref 3.5–5.3)
POTASSIUM SERPL-SCNC: 4.8 MMOL/L — SIGNIFICANT CHANGE UP (ref 3.5–5.3)
PROT SERPL-MCNC: 7.2 G/DL — SIGNIFICANT CHANGE UP (ref 6–8.3)
PROT UR-MCNC: NEGATIVE MG/DL — SIGNIFICANT CHANGE UP
PROTHROM AB SERPL-ACNC: 12.5 SEC — SIGNIFICANT CHANGE UP (ref 9.9–13.4)
RBC # BLD: 4.88 M/UL — SIGNIFICANT CHANGE UP (ref 3.8–5.2)
RBC # FLD: 12.8 % — SIGNIFICANT CHANGE UP (ref 10.3–14.5)
RSV RNA NPH QL NAA+NON-PROBE: SIGNIFICANT CHANGE UP
SARS-COV-2 RNA SPEC QL NAA+PROBE: SIGNIFICANT CHANGE UP
SODIUM SERPL-SCNC: 135 MMOL/L — SIGNIFICANT CHANGE UP (ref 135–145)
SP GR SPEC: 1.02 — SIGNIFICANT CHANGE UP (ref 1–1.03)
TROPONIN T, HIGH SENSITIVITY RESULT: 27 NG/L — SIGNIFICANT CHANGE UP (ref 0–51)
TROPONIN T, HIGH SENSITIVITY RESULT: 29 NG/L — SIGNIFICANT CHANGE UP (ref 0–51)
UROBILINOGEN FLD QL: 0.2 MG/DL — SIGNIFICANT CHANGE UP (ref 0.2–1)
WBC # BLD: 6.43 K/UL — SIGNIFICANT CHANGE UP (ref 3.8–10.5)
WBC # FLD AUTO: 6.43 K/UL — SIGNIFICANT CHANGE UP (ref 3.8–10.5)

## 2024-11-19 PROCEDURE — 71045 X-RAY EXAM CHEST 1 VIEW: CPT | Mod: 26

## 2024-11-19 PROCEDURE — 99285 EMERGENCY DEPT VISIT HI MDM: CPT

## 2024-11-19 RX ORDER — NITROGLYCERIN 0.4MG/HR
0.4 PATCH, TRANSDERMAL 24 HOURS TRANSDERMAL ONCE
Refills: 0 | Status: COMPLETED | OUTPATIENT
Start: 2024-11-19 | End: 2024-11-19

## 2024-11-19 RX ADMIN — Medication 324 MILLIGRAM(S): at 13:29

## 2024-11-19 RX ADMIN — Medication 0.4 MILLIGRAM(S): at 23:10

## 2024-11-19 NOTE — ED ADULT NURSE NOTE - NSFALLUNIVINTERV_ED_ALL_ED
Bed/Stretcher in lowest position, wheels locked, appropriate side rails in place/Call bell, personal items and telephone in reach/Instruct patient to call for assistance before getting out of bed/chair/stretcher/Non-slip footwear applied when patient is off stretcher/[place] to call system/Physically safe environment - no spills, clutter or unnecessary equipment/Purposeful proactive rounding/Room/bathroom lighting operational, light cord in reach

## 2024-11-19 NOTE — ED ADULT NURSE NOTE - OBJECTIVE STATEMENT
74 y F PMH CAD, 34 cardiac stents, HTN presented to ED with chest pain x1 day. Pt endorses sharp chest pain 8/10 that radiates to the jaw and upper left back. Pt states "this feels the same as last time when I got a stent". Pt denies sob, headahce, dizziness, fevers, chills, NVD, urinary symptoms. Pt A&O x3 breathing spontaneous and unlabored, on cardiac monitor NSR. Pt neurovascular strength 5/5 x4 extremities. pulses palpable trilaterally. Pt abdomen soft, nontender, nondistended. Pt skin dry warm and intact. PT ambulating with steady gate. PT in gown stretcher lowered and locked, made aware of care plan

## 2024-11-19 NOTE — ED ADULT NURSE REASSESSMENT NOTE - NS ED NURSE REASSESS COMMENT FT1
Pt c/o of chest pain again. MD Gomez and MD Mcmillan made aware, repeat EKG done and repeat troponin ordered. Sublingual nitro ordered by MD and administered. Pt well appearing. TBA

## 2024-11-19 NOTE — ED ADULT TRIAGE NOTE - CHIEF COMPLAINT QUOTE
c/o 1 day of chest pressure, with SOB worsens with exertion. pt with hx CAD/stents last placed 08/2024.

## 2024-11-19 NOTE — ED PROVIDER NOTE - CARE PLAN
Principal Discharge DX:	Chest pressure   1 Complex Repair And Graft Additional Text (Will Appearing After The Standard Complex Repair Text): The complex repair was not sufficient to completely close the primary defect. The remaining additional defect was repaired with the graft mentioned below.

## 2024-11-19 NOTE — ED PROVIDER NOTE - PROGRESS NOTE DETAILS
Lester GIRARD), PGY-2: Called to the bedside by CDU PA, PA was evaluating her for possible observation however patient with recurrent episode of severe chest pain and shortness of breath, patient hypertensive 190s systolic, EKG repeated at this time showing no change from previous EKG earlier today, sublingual nitro glycerin and reassess, patient to be admitted to hospitalist.

## 2024-11-19 NOTE — CONSULT NOTE ADULT - SUBJECTIVE AND OBJECTIVE BOX
Cardiology Consult Note   [Please check [email or web address] password: [email or web address]" for cardiology service schedule and contact information]    HPI:  74F with HTN, HLD, T2DM presenting with chest pain.        PAST MEDICAL & SURGICAL HISTORY:  HTN (hypertension)  HLD (hyperlipidemia)  DM (diabetes mellitus)  CAD (coronary artery disease)  Stented coronary artery  COVID-19  S/P coronary artery stent placement      FAMILY HISTORY:  Family history of heart attack (Father, Mother)    Family history of CVA (Mother)      SOCIAL HISTORY:  unchanged    MEDICATIONS:  Clopidogrel 75 mg daily  Losartan 25 mg daily  Jardiance 25 mg daily  Isosorbide mononitrate 60 mg daily  Rosuvastatin 40 mg daily  Metoprolol tartrate 100 mg BID  Ranexa 500 mg BID    -------------------------------------------------------------------------------------------  PHYSICAL EXAM:  T(C): 36.6 (11-19-24 @ 20:24), Max: 36.8 (11-19-24 @ 11:39)  HR: 67 (11-19-24 @ 20:24) (63 - 67)  BP: 195/89 (11-19-24 @ 20:24) (155/77 - 195/89)  RR: 16 (11-19-24 @ 20:24) (16 - 20)  SpO2: 98% (11-19-24 @ 20:24) (95% - 99%)  Wt(kg): --  I&O's Summary      GENERAL: NAD  HEAD: Atraumatic, Normocephalic.  CHEST/LUNG: Clear to auscultation bilaterally; No rales, rhonchi, wheezing, or rubs. Unlabored respirations.  HEART: Regular rate and rhythm; No murmurs, rubs, or gallops.  ABDOMEN: Soft, Nontender, Nondistended.   EXTREMITIES: No clubbing, cyanosis, or edema.    -------------------------------------------------------------------------------------------  LABS:                          15.1   6.43  )-----------( 190      ( 19 Nov 2024 12:33 )             44.6     11-19    135  |  99  |  27[H]  ----------------------------<  283[H]  4.8   |  24  |  1.26    Ca    9.9      19 Nov 2024 12:33    TPro  7.2  /  Alb  4.1  /  TBili  1.0  /  DBili  x   /  AST  36  /  ALT  22  /  AlkPhos  68  11-19    PT/INR - ( 19 Nov 2024 12:33 )   PT: 12.5 sec;   INR: 1.09 ratio         PTT - ( 19 Nov 2024 12:33 )  PTT:27.7 sec  CARDIAC MARKERS ( 19 Nov 2024 17:11 )  23 ng/L / x     / x     / x     / x     / 2.4 ng/mL  CARDIAC MARKERS ( 19 Nov 2024 12:33 )  27 ng/L / x     / x     / x     / x     / 3.3 ng/mL    -------------------------------------------------------------------------------------------  Cardiovascular Diagnostic Testing:    ECG (11/19/24): non-ischemic     Cath (8/18/24):  Conclusions:   70% in-stent restenosis of the pLAD,  now successfully treated with  intravascular lithotripsy and ERI x1.    RCA is , well collateralized   Recommendations:     DAPT for 6 months and continued risk factor optimization as per most  recent ACC/AHA guidelines.  Medical therapy for  of RCA     Cath (5/3/24):  Conclusions:   1. Severe ISR of the pRCA s/p POBA but due to severe ISR with  significant neointimal hyperplasia, unable to dilate the lesion.    2. Severe ISR of the pLAD stent (iFR 0.73)   3. Moderate ISR of the mLCx stent     Recommendations:   Continue DAPT    Aggressive antinanginal therapy    PCI to with Laser atherectomy to RCA and PCI to LAD can be considered  if patient has refractory angina.      -------------------------------------------------------------------------------------------                 Cardiology Consult Note   [Please check [email or web address]: [email or web address]" for cardiology service schedule and contact information]    HPI:  74F with CAD (s/p PCI 1998, 2006, 8/2024), HTN, HLD, T2DM, and GERD presenting with chest pain.    Symptoms have been going on for the past day, intermittently. Describes as pressure that lasts for a few seconds and is an 8/10 in intensity. Pain radiates up the neck and jaw but has no pleuritic chest pain. She has some mild nausea without vomiting. States that this is not like her heartburn symptoms.    In the ED, she is uncomfortable appearing but HDS. States the pain has gone away. EKG is non-ischemic. Given  mg x1 in the ED.      PAST MEDICAL & SURGICAL HISTORY:  HTN (hypertension)  HLD (hyperlipidemia)  DM (diabetes mellitus)  CAD (coronary artery disease)  Stented coronary artery  COVID-19  S/P coronary artery stent placement      FAMILY HISTORY:  Family history of heart attack (Father, Mother)    Family history of CVA (Mother)    SOCIAL HISTORY:  unchanged    MEDICATIONS:  Clopidogrel 75 mg daily  Losartan 25 mg daily  Jardiance 25 mg daily  Isosorbide mononitrate 60 mg daily  Rosuvastatin 40 mg daily  Metoprolol tartrate 100 mg BID  Ranexa 500 mg BID    -------------------------------------------------------------------------------------------  PHYSICAL EXAM:  T(C): 36.6 (11-19-24 @ 20:24), Max: 36.8 (11-19-24 @ 11:39)  HR: 67 (11-19-24 @ 20:24) (63 - 67)  BP: 195/89 (11-19-24 @ 20:24) (155/77 - 195/89)  RR: 16 (11-19-24 @ 20:24) (16 - 20)  SpO2: 98% (11-19-24 @ 20:24) (95% - 99%)  Wt(kg): --  I&O's Summary      GENERAL: NAD  HEAD: Atraumatic, Normocephalic.  CHEST/LUNG: Clear to auscultation bilaterally; No rales, rhonchi, wheezing, or rubs. Unlabored respirations.  HEART: Regular rate and rhythm; No murmurs, rubs, or gallops.  ABDOMEN: Soft, Nontender, Nondistended.   EXTREMITIES: No clubbing, cyanosis, or edema.    -------------------------------------------------------------------------------------------  LABS:                          15.1   6.43  )-----------( 190      ( 19 Nov 2024 12:33 )             44.6     11-19    135  |  99  |  27[H]  ----------------------------<  283[H]  4.8   |  24  |  1.26    Ca    9.9      19 Nov 2024 12:33    TPro  7.2  /  Alb  4.1  /  TBili  1.0  /  DBili  x   /  AST  36  /  ALT  22  /  AlkPhos  68  11-19    PT/INR - ( 19 Nov 2024 12:33 )   PT: 12.5 sec;   INR: 1.09 ratio         PTT - ( 19 Nov 2024 12:33 )  PTT:27.7 sec  CARDIAC MARKERS ( 19 Nov 2024 17:11 )  23 ng/L / x     / x     / x     / x     / 2.4 ng/mL  CARDIAC MARKERS ( 19 Nov 2024 12:33 )  27 ng/L / x     / x     / x     / x     / 3.3 ng/mL    -------------------------------------------------------------------------------------------  Cardiovascular Diagnostic Testing:    ECG (11/19/24): non-ischemic     Cath (8/18/24):  Conclusions:   70% in-stent restenosis of the pLAD,  now successfully treated with  intravascular lithotripsy and ERI x1.    RCA is , well collateralized   Recommendations:     DAPT for 6 months and continued risk factor optimization as per most  recent ACC/AHA guidelines.  Medical therapy for  of RCA     Cath (5/3/24):  Conclusions:   1. Severe ISR of the pRCA s/p POBA but due to severe ISR with  significant neointimal hyperplasia, unable to dilate the lesion.    2. Severe ISR of the pLAD stent (iFR 0.73)   3. Moderate ISR of the mLCx stent     Recommendations:   Continue DAPT    Aggressive antinanginal therapy    PCI to with Laser atherectomy to RCA and PCI to LAD can be considered  if patient has refractory angina.      -------------------------------------------------------------------------------------------

## 2024-11-19 NOTE — ED PROVIDER NOTE - ATTENDING CONTRIBUTION TO CARE
74 year old F with PMHx GERD, DM2, HTN, HLD, CAD s/p MI w/ stents (1998, 2006), s/p cath w/ shockwave and LAD stent on ASA/plavix, presents to the emergency department with 1 day of intermittent, substernal, nonpleuritic chest pain with associated shortness of breath, worse on exertion. Patient reports similar pain to prior MI. Pain is not provoked or resolved by anything specifically. No recent travel, recent surgeries, no known sick contacts. Denies fever, chills, abdominal pain, v/d, numbness, weakness, tingling, headache.     Physical Exam:  Gen: NAD, awake and alert, non-toxic appearing  HEENT: Atraumatic, oropharynx clear, moist mucous membranes, normal conjunctiva  Cardio: RRR, no murmurs, rubs or gallops. No reproducible chest wall tenderness to palpation.  2+ radial pulses.  No JVD  Lung: CTAB, no respiratory distress, no wheezes/rhonchi/rales B/L  Abd: soft, NT, ND, no guarding, no rigidity, no rebound tenderness  MSK: no visible deformities, ROMx4   Neuro: No focal sensory or motor deficits  Skin: Warm, well perfused, no rash, no leg swelling     Patient presents with typical chest pain highly concerning for a cardiac etiology (risk factors: HTN, DM, family hx of CAD). Story is concerning for ACS given multiple risk factors and history. Seems unlikely PE or dissection given the history and exam. No recent infectious symptoms concerning for pneumonia.  CBC, CMP, CXR, EKG, ASA.   Patient is at increased risk for major adverse cardiac event and should be admitted for continued cardiac monitoring and trending of troponins.

## 2024-11-19 NOTE — ED PROVIDER NOTE - CLINICAL SUMMARY MEDICAL DECISION MAKING FREE TEXT BOX
EM PGY-2/Joel, DO: 75 yo F PMHx GERD, DM2, HTN, HLD, CAD s/p MI w/ stents (1998, 2006), s/p LHC 5/2024 with failed PCI, s/p cath w/ shockwave and LAD stent on ASA/plavix presents to ED cc chest pressure x1 day EM PGY-2/Joel, DO: 73 yo F PMHx GERD, DM2, HTN, HLD, CAD s/p MI w/ stents (1998, 2006), s/p C 5/2024 with failed PCI, s/p cath w/ shockwave and LAD stent on ASA/plavix presents to ED cc chest pressure x1 day that is intermittent in nature, occurs both when she is laying down and when up and walking, currently endorsing radiation to her jaw.  Unsure if this feels like her last MI.  She is endorsing mild SOB however denies any pleuritic pain.  Denies any numbness, tingling, extremity swelling. On ASA/plavix. Did not take her aspirin today yet.  Also endorsing mild nausea without any vomiting.  No recent cough, congestion, fevers.  Further denies any abdominal pain, headache, vision changes, lightheadedness, dizziness.  No other complaints.  VSS.  EKG NSR 76 with no EMIL, STD or T wave inversions.  Intervals WNL.  On exam patient is awake alert, nontoxic-appearing, appears uncomfortable but speaks in full sentences with appropriate phonation.  CV RRR, no MGR JVD appreciated.  Lungs CTAB, no increased WOB, no audible wheezes or crackles.  Abdomen soft, nontender.  Moving all extremities, no focal neurological deficits.  Lower extremities equal in size bilaterally, nonedematous.  UE/LE pulses 2+ bilaterally.  No visible lesions or bruising.  Mood and affect congruent.  DDx high risk ACS. Less likely PE as on plavix, will dimer. Less likely but possibly pnuemonia, ptx. Orders reflect ddx. Given asa. Anticipate admission.

## 2024-11-19 NOTE — CONSULT NOTE ADULT - ASSESSMENT
74F with HTN, HLD, T2DM presenting with chest pain.     74F with HTN, HLD, T2DM presenting with chest pain. Troponin negative x2 without ischemic EKG changes. Given significant CAD history, cannot exclude cardiac chest pain.    Recommendations:  - Telemetry  - Full TTE in the AM  - Continue DAPT (aspirin 81 mg daily and clopidogrel 75 mg daily)  - Continue losartan 25 mg daily and ISDN 60 mg daily  - Continue rosuvastatin 40 mg daily and Jardiance 25 mg daily  - Continue metoprolol tartrate 100 mg BID  - Increase ranolazine to 1000 mg BID  - K>4, Mg>2  - Will discuss with interventionalist in the AM plan for ischemic eval    Patient to be staffed with consult attending Dr. De Anda.  Recommendations not final until signed by attending.    Mayra Her MD PGY-4  Cardiology Fellow   74F with HTN, HLD, T2DM presenting with chest pain. Troponin negative x2 without ischemic EKG changes. Given significant CAD history, cannot exclude cardiac chest pain. However, does not meet criteria for ACS, so no indication to start A/C at this time.    Recommendations:  - Telemetry  - Full TTE in the AM  - Continue DAPT (aspirin 81 mg daily and clopidogrel 75 mg daily)  - Continue losartan 25 mg daily and ISDN 60 mg daily  - Continue rosuvastatin 40 mg daily and Jardiance 25 mg daily  - Continue metoprolol tartrate 100 mg BID  - Increase ranolazine to 1000 mg BID  - K>4, Mg>2  - Will discuss with interventionalist in the AM plan for ischemic eval    Patient to be staffed with consult attending Dr. De Anda.  Recommendations not final until signed by attending.    Mayra Her MD PGY-4  Cardiology Fellow   74F with HTN, HLD, T2DM presenting with chest pain. Troponin negative x2 without ischemic EKG changes. Given significant CAD history, cannot exclude cardiac chest pain. However, does not meet criteria for ACS, so no indication to start A/C at this time. Suspect symptoms also with contributions from hypertension.    Recommendations:  - Telemetry  - Full TTE in the AM  - Continue DAPT (aspirin 81 mg daily and clopidogrel 75 mg daily)  - Continue losartan 25 mg daily and ISDN 60 mg daily  - Continue rosuvastatin 40 mg daily and Jardiance 25 mg daily  - Continue metoprolol tartrate 100 mg BID  - Increase ranolazine to 1000 mg BID  - Evaluate for other causes of chest pain, including GERD  - K>4, Mg>2  - Will discuss with interventionalist in the AM plan for ischemic eval    Patient to be staffed with consult attending Dr. De Anda.  Recommendations not final until signed by attending.    Mayra Her MD PGY-4  Cardiology Fellow

## 2024-11-19 NOTE — ED PROVIDER NOTE - PHYSICAL EXAMINATION
awake alert, nontoxic-appearing, appears uncomfortable but speaks in full sentences with appropriate phonation.    CV RRR, no MGR JVD appreciated.    Lungs CTAB, no increased WOB, no audible wheezes or crackles.    Abdomen soft, nontender.  No palpable masses.  Moving all extremities, no focal neurological deficits.    Lower extremities equal in size bilaterally, nonedematous.  UE/LE pulses 2+ bilaterally.    No visible lesions or bruising.    Mood and affect congruent.

## 2024-11-20 ENCOUNTER — RESULT REVIEW (OUTPATIENT)
Age: 74
End: 2024-11-20

## 2024-11-20 ENCOUNTER — TRANSCRIPTION ENCOUNTER (OUTPATIENT)
Age: 74
End: 2024-11-20

## 2024-11-20 DIAGNOSIS — K21.9 GASTRO-ESOPHAGEAL REFLUX DISEASE WITHOUT ESOPHAGITIS: ICD-10-CM

## 2024-11-20 DIAGNOSIS — E78.5 HYPERLIPIDEMIA, UNSPECIFIED: ICD-10-CM

## 2024-11-20 DIAGNOSIS — R07.89 OTHER CHEST PAIN: ICD-10-CM

## 2024-11-20 DIAGNOSIS — I10 ESSENTIAL (PRIMARY) HYPERTENSION: ICD-10-CM

## 2024-11-20 DIAGNOSIS — I20.0 UNSTABLE ANGINA: ICD-10-CM

## 2024-11-20 DIAGNOSIS — E11.9 TYPE 2 DIABETES MELLITUS WITHOUT COMPLICATIONS: ICD-10-CM

## 2024-11-20 DIAGNOSIS — Z29.9 ENCOUNTER FOR PROPHYLACTIC MEASURES, UNSPECIFIED: ICD-10-CM

## 2024-11-20 LAB
ADD ON TEST-SPECIMEN IN LAB: SIGNIFICANT CHANGE UP
ADD ON TEST-SPECIMEN IN LAB: SIGNIFICANT CHANGE UP
ALBUMIN SERPL ELPH-MCNC: 4.2 G/DL — SIGNIFICANT CHANGE UP (ref 3.3–5)
ALP SERPL-CCNC: 79 U/L — SIGNIFICANT CHANGE UP (ref 40–120)
ALT FLD-CCNC: 28 U/L — SIGNIFICANT CHANGE UP (ref 10–45)
ANION GAP SERPL CALC-SCNC: 24 MMOL/L — HIGH (ref 5–17)
AST SERPL-CCNC: 76 U/L — HIGH (ref 10–40)
B-OH-BUTYR SERPL-SCNC: 3.9 MMOL/L — HIGH
BILIRUB SERPL-MCNC: 1 MG/DL — SIGNIFICANT CHANGE UP (ref 0.2–1.2)
BUN SERPL-MCNC: 27 MG/DL — HIGH (ref 7–23)
CALCIUM SERPL-MCNC: 9.6 MG/DL — SIGNIFICANT CHANGE UP (ref 8.4–10.5)
CHLORIDE SERPL-SCNC: 97 MMOL/L — SIGNIFICANT CHANGE UP (ref 96–108)
CK MB BLD-MCNC: 7.3 % — HIGH (ref 0–3.5)
CK MB CFR SERPL CALC: 33.6 NG/ML — HIGH (ref 0–3.8)
CK SERPL-CCNC: 460 U/L — HIGH (ref 25–170)
CO2 SERPL-SCNC: 14 MMOL/L — LOW (ref 22–31)
CREAT SERPL-MCNC: 1.16 MG/DL — SIGNIFICANT CHANGE UP (ref 0.5–1.3)
EGFR: 49 ML/MIN/1.73M2 — LOW
GLUCOSE BLDC GLUCOMTR-MCNC: 127 MG/DL — HIGH (ref 70–99)
GLUCOSE BLDC GLUCOMTR-MCNC: 140 MG/DL — HIGH (ref 70–99)
GLUCOSE BLDC GLUCOMTR-MCNC: 152 MG/DL — HIGH (ref 70–99)
GLUCOSE BLDC GLUCOMTR-MCNC: 200 MG/DL — HIGH (ref 70–99)
GLUCOSE BLDC GLUCOMTR-MCNC: 203 MG/DL — HIGH (ref 70–99)
GLUCOSE SERPL-MCNC: 224 MG/DL — HIGH (ref 70–99)
HCT VFR BLD CALC: 46.3 % — HIGH (ref 34.5–45)
HGB BLD-MCNC: 15.7 G/DL — HIGH (ref 11.5–15.5)
LACTATE SERPL-SCNC: 3.9 MMOL/L — HIGH (ref 0.5–2)
MAGNESIUM SERPL-MCNC: 2.2 MG/DL — SIGNIFICANT CHANGE UP (ref 1.6–2.6)
MCHC RBC-ENTMCNC: 30.7 PG — SIGNIFICANT CHANGE UP (ref 27–34)
MCHC RBC-ENTMCNC: 33.9 G/DL — SIGNIFICANT CHANGE UP (ref 32–36)
MCV RBC AUTO: 90.6 FL — SIGNIFICANT CHANGE UP (ref 80–100)
NRBC # BLD: 0 /100 WBCS — SIGNIFICANT CHANGE UP (ref 0–0)
PHOSPHATE SERPL-MCNC: 3.9 MG/DL — SIGNIFICANT CHANGE UP (ref 2.5–4.5)
PLATELET # BLD AUTO: 195 K/UL — SIGNIFICANT CHANGE UP (ref 150–400)
POTASSIUM SERPL-MCNC: 4.7 MMOL/L — SIGNIFICANT CHANGE UP (ref 3.5–5.3)
POTASSIUM SERPL-SCNC: 4.7 MMOL/L — SIGNIFICANT CHANGE UP (ref 3.5–5.3)
PROT SERPL-MCNC: 7.9 G/DL — SIGNIFICANT CHANGE UP (ref 6–8.3)
RBC # BLD: 5.11 M/UL — SIGNIFICANT CHANGE UP (ref 3.8–5.2)
RBC # FLD: 12.7 % — SIGNIFICANT CHANGE UP (ref 10.3–14.5)
SODIUM SERPL-SCNC: 135 MMOL/L — SIGNIFICANT CHANGE UP (ref 135–145)
TROPONIN T, HIGH SENSITIVITY RESULT: 433 NG/L — HIGH (ref 0–51)
WBC # BLD: 8.98 K/UL — SIGNIFICANT CHANGE UP (ref 3.8–10.5)
WBC # FLD AUTO: 8.98 K/UL — SIGNIFICANT CHANGE UP (ref 3.8–10.5)

## 2024-11-20 PROCEDURE — 93308 TTE F-UP OR LMTD: CPT | Mod: 26

## 2024-11-20 PROCEDURE — 93010 ELECTROCARDIOGRAM REPORT: CPT

## 2024-11-20 PROCEDURE — 93454 CORONARY ARTERY ANGIO S&I: CPT | Mod: 26,78

## 2024-11-20 PROCEDURE — 99223 1ST HOSP IP/OBS HIGH 75: CPT

## 2024-11-20 PROCEDURE — 93306 TTE W/DOPPLER COMPLETE: CPT | Mod: 26

## 2024-11-20 PROCEDURE — 92920 PRQ TRLUML C ANGIOP 1ART&/BR: CPT | Mod: LD

## 2024-11-20 PROCEDURE — 99233 SBSQ HOSP IP/OBS HIGH 50: CPT

## 2024-11-20 PROCEDURE — 99152 MOD SED SAME PHYS/QHP 5/>YRS: CPT | Mod: 78

## 2024-11-20 RX ORDER — ISOSORBIDE MONONITRATE 10 MG
60 TABLET ORAL DAILY
Refills: 0 | Status: DISCONTINUED | OUTPATIENT
Start: 2024-11-20 | End: 2024-11-20

## 2024-11-20 RX ORDER — ONDANSETRON HYDROCHLORIDE 4 MG/1
4 TABLET, FILM COATED ORAL ONCE
Refills: 0 | Status: COMPLETED | OUTPATIENT
Start: 2024-11-20 | End: 2024-11-20

## 2024-11-20 RX ORDER — ATROPINE 2 MG/.7ML
0.6 INJECTION INTRAMUSCULAR ONCE
Refills: 0 | Status: COMPLETED | OUTPATIENT
Start: 2024-11-20 | End: 2024-11-20

## 2024-11-20 RX ORDER — 0.9 % SODIUM CHLORIDE 0.9 %
1000 INTRAVENOUS SOLUTION INTRAVENOUS
Refills: 0 | Status: DISCONTINUED | OUTPATIENT
Start: 2024-11-20 | End: 2024-11-22

## 2024-11-20 RX ORDER — LOSARTAN POTASSIUM 100 MG/1
25 TABLET, FILM COATED ORAL DAILY
Refills: 0 | Status: DISCONTINUED | OUTPATIENT
Start: 2024-11-20 | End: 2024-11-21

## 2024-11-20 RX ORDER — ACETAMINOPHEN 500MG 500 MG/1
1000 TABLET, COATED ORAL ONCE
Refills: 0 | Status: COMPLETED | OUTPATIENT
Start: 2024-11-20 | End: 2024-11-20

## 2024-11-20 RX ORDER — INSULIN GLARGINE 100 [IU]/ML
10 INJECTION, SOLUTION SUBCUTANEOUS AT BEDTIME
Refills: 0 | Status: DISCONTINUED | OUTPATIENT
Start: 2024-11-20 | End: 2024-11-21

## 2024-11-20 RX ORDER — NITROGLYCERIN 0.4MG/HR
0.5 PATCH, TRANSDERMAL 24 HOURS TRANSDERMAL ONCE
Refills: 0 | Status: COMPLETED | OUTPATIENT
Start: 2024-11-20 | End: 2024-11-20

## 2024-11-20 RX ORDER — FENTANYL 12 UG/H
25 PATCH, EXTENDED RELEASE TRANSDERMAL ONCE
Refills: 0 | Status: DISCONTINUED | OUTPATIENT
Start: 2024-11-20 | End: 2024-11-20

## 2024-11-20 RX ORDER — MAGNESIUM, ALUMINUM HYDROXIDE 200-225/5
30 SUSPENSION, ORAL (FINAL DOSE FORM) ORAL EVERY 4 HOURS
Refills: 0 | Status: DISCONTINUED | OUTPATIENT
Start: 2024-11-20 | End: 2024-11-23

## 2024-11-20 RX ORDER — GLUCOSAMINE SULFATE DIPOT CHLR 500 MG
1 CAPSULE ORAL DAILY
Refills: 0 | Status: DISCONTINUED | OUTPATIENT
Start: 2024-11-20 | End: 2024-12-02

## 2024-11-20 RX ORDER — COLCHICINE 0.6 MG
0.6 TABLET ORAL DAILY
Refills: 0 | Status: DISCONTINUED | OUTPATIENT
Start: 2024-11-20 | End: 2024-11-21

## 2024-11-20 RX ORDER — ISOSORBIDE MONONITRATE 10 MG
120 TABLET ORAL AT BEDTIME
Refills: 0 | Status: DISCONTINUED | OUTPATIENT
Start: 2024-11-20 | End: 2024-11-22

## 2024-11-20 RX ORDER — NITROGLYCERIN 0.4MG/HR
0.4 PATCH, TRANSDERMAL 24 HOURS TRANSDERMAL
Refills: 0 | Status: DISCONTINUED | OUTPATIENT
Start: 2024-11-20 | End: 2024-11-21

## 2024-11-20 RX ORDER — SODIUM CHLORIDE 9 MG/ML
250 INJECTION, SOLUTION INTRAMUSCULAR; INTRAVENOUS; SUBCUTANEOUS ONCE
Refills: 0 | Status: DISCONTINUED | OUTPATIENT
Start: 2024-11-20 | End: 2024-11-21

## 2024-11-20 RX ORDER — EZETIMIBE 10 MG
1 TABLET ORAL
Refills: 0 | DISCHARGE

## 2024-11-20 RX ORDER — METOCLOPRAMIDE HYDROCHLORIDE 10 MG/1
10 TABLET ORAL ONCE
Refills: 0 | Status: COMPLETED | OUTPATIENT
Start: 2024-11-20 | End: 2024-11-20

## 2024-11-20 RX ORDER — ROSUVASTATIN CALCIUM 5 MG/1
40 TABLET, FILM COATED ORAL AT BEDTIME
Refills: 0 | Status: DISCONTINUED | OUTPATIENT
Start: 2024-11-20 | End: 2024-12-02

## 2024-11-20 RX ORDER — NITROGLYCERIN 0.4MG/HR
0.4 PATCH, TRANSDERMAL 24 HOURS TRANSDERMAL
Refills: 0 | Status: DISCONTINUED | OUTPATIENT
Start: 2024-11-20 | End: 2024-11-20

## 2024-11-20 RX ORDER — RANOLAZINE 1000 MG/1
1000 TABLET, FILM COATED, EXTENDED RELEASE ORAL
Refills: 0 | Status: DISCONTINUED | OUTPATIENT
Start: 2024-11-20 | End: 2024-11-22

## 2024-11-20 RX ORDER — ENOXAPARIN SODIUM 30 MG/.3ML
40 INJECTION SUBCUTANEOUS EVERY 24 HOURS
Refills: 0 | Status: DISCONTINUED | OUTPATIENT
Start: 2024-11-20 | End: 2024-11-21

## 2024-11-20 RX ORDER — METOPROLOL TARTRATE 100 MG/1
100 TABLET, FILM COATED ORAL
Refills: 0 | Status: DISCONTINUED | OUTPATIENT
Start: 2024-11-20 | End: 2024-11-21

## 2024-11-20 RX ORDER — METOCLOPRAMIDE HYDROCHLORIDE 10 MG/1
10 TABLET ORAL ONCE
Refills: 0 | Status: DISCONTINUED | OUTPATIENT
Start: 2024-11-20 | End: 2024-11-20

## 2024-11-20 RX ORDER — SODIUM CHLORIDE 9 MG/ML
1000 INJECTION, SOLUTION INTRAMUSCULAR; INTRAVENOUS; SUBCUTANEOUS
Refills: 0 | Status: COMPLETED | OUTPATIENT
Start: 2024-11-20 | End: 2024-11-20

## 2024-11-20 RX ORDER — GLUCAGON INJECTION, SOLUTION 0.5 MG/.1ML
1 INJECTION, SOLUTION SUBCUTANEOUS ONCE
Refills: 0 | Status: DISCONTINUED | OUTPATIENT
Start: 2024-11-20 | End: 2024-11-21

## 2024-11-20 RX ORDER — EZETIMIBE 10 MG
10 TABLET ORAL DAILY
Refills: 0 | Status: DISCONTINUED | OUTPATIENT
Start: 2024-11-20 | End: 2024-12-02

## 2024-11-20 RX ORDER — CLOPIDOGREL 75 MG/1
75 TABLET, FILM COATED ORAL DAILY
Refills: 0 | Status: DISCONTINUED | OUTPATIENT
Start: 2024-11-20 | End: 2024-12-02

## 2024-11-20 RX ORDER — HYDRALAZINE HYDROCHLORIDE 10 MG/1
5 TABLET ORAL ONCE
Refills: 0 | Status: COMPLETED | OUTPATIENT
Start: 2024-11-20 | End: 2024-11-20

## 2024-11-20 RX ADMIN — Medication 2: at 08:37

## 2024-11-20 RX ADMIN — FENTANYL 25 MICROGRAM(S): 12 PATCH, EXTENDED RELEASE TRANSDERMAL at 16:22

## 2024-11-20 RX ADMIN — ACETAMINOPHEN 500MG 1000 MILLIGRAM(S): 500 TABLET, COATED ORAL at 16:53

## 2024-11-20 RX ADMIN — Medication 30 MILLILITER(S): at 21:47

## 2024-11-20 RX ADMIN — LOSARTAN POTASSIUM 25 MILLIGRAM(S): 100 TABLET, FILM COATED ORAL at 09:16

## 2024-11-20 RX ADMIN — ACETAMINOPHEN 500MG 400 MILLIGRAM(S): 500 TABLET, COATED ORAL at 16:23

## 2024-11-20 RX ADMIN — SODIUM CHLORIDE 75 MILLILITER(S): 9 INJECTION, SOLUTION INTRAMUSCULAR; INTRAVENOUS; SUBCUTANEOUS at 16:34

## 2024-11-20 RX ADMIN — ONDANSETRON HYDROCHLORIDE 4 MILLIGRAM(S): 4 TABLET, FILM COATED ORAL at 18:25

## 2024-11-20 RX ADMIN — HYDRALAZINE HYDROCHLORIDE 5 MILLIGRAM(S): 10 TABLET ORAL at 17:30

## 2024-11-20 RX ADMIN — Medication 60 MILLIGRAM(S): at 03:59

## 2024-11-20 RX ADMIN — Medication 0.4 MILLIGRAM(S): at 22:42

## 2024-11-20 RX ADMIN — Medication 81 MILLIGRAM(S): at 13:25

## 2024-11-20 RX ADMIN — Medication 0.4 MILLIGRAM(S): at 16:23

## 2024-11-20 RX ADMIN — FENTANYL 25 MICROGRAM(S): 12 PATCH, EXTENDED RELEASE TRANSDERMAL at 16:52

## 2024-11-20 RX ADMIN — Medication 0.6 MILLIGRAM(S): at 21:28

## 2024-11-20 RX ADMIN — ATROPINE 0.6 MILLIGRAM(S): 2 INJECTION INTRAMUSCULAR at 18:00

## 2024-11-20 RX ADMIN — Medication 120 MILLIGRAM(S): at 21:02

## 2024-11-20 RX ADMIN — RANOLAZINE 1000 MILLIGRAM(S): 1000 TABLET, FILM COATED, EXTENDED RELEASE ORAL at 21:02

## 2024-11-20 RX ADMIN — Medication 0.5 INCH(S): at 16:33

## 2024-11-20 RX ADMIN — Medication 20 UNIT(S): at 08:38

## 2024-11-20 RX ADMIN — CLOPIDOGREL 75 MILLIGRAM(S): 75 TABLET, FILM COATED ORAL at 13:24

## 2024-11-20 RX ADMIN — Medication 0.4 MILLIGRAM(S): at 04:18

## 2024-11-20 RX ADMIN — ROSUVASTATIN CALCIUM 40 MILLIGRAM(S): 5 TABLET, FILM COATED ORAL at 21:47

## 2024-11-20 RX ADMIN — INSULIN GLARGINE 10 UNIT(S): 100 INJECTION, SOLUTION SUBCUTANEOUS at 22:33

## 2024-11-20 NOTE — CHART NOTE - NSCHARTNOTEFT_GEN_A_CORE
Pt s/p PCI/POBA to her pLAD 99%     Cardiac Rehab (Post PCI):              *Education on benefits of Cardiac Rehab provided to patient: Yes         *Referral and Prescription Given for Cardiac Rehab : Yes         *Pt given list of locations & instructed to contact their insurance company to review list of participating providers         *Pt instructed to bring Cardiac Rehab prescription with them to Cardiology Follow up appointment for assistance with enrollment: Yes         *Pt discharged with copies detail cardiovascular history, medications, testing/treatments

## 2024-11-20 NOTE — PROGRESS NOTE ADULT - PROBLEM SELECTOR PLAN 1
- Cath (5/3/24): Severe ISR of the pRCA s/p POBA but due to severe ISR with significant neointimal hyperplasia, unable to dilate the lesion.  Severe ISR of the pLAD stent (iFR 0.73). Moderate ISR of the mLCx stent  - Cath (8/18/24): 70% in-stent restenosis of the pLAD,  treated with intravascular lithotripsy and ERI x1, Medical therapy for  of RCA   - Cardiology consult appreciated - plan for [facility] today   - Tele monitor for arrhythmia, Troponin 27, 23, 29, EKG without ischemia  - NTG SL prn for chest pain  - will continue ASA 81mg, Plavix 75mg, Imdur 120mg, Metoprolol 100mg bid, Crestor 40mg and Zetia 10mg  - will increase Ranexa to 1000mg bid  - will check TSH, A1C and FLP

## 2024-11-20 NOTE — PROGRESS NOTE ADULT - SUBJECTIVE AND OBJECTIVE BOX
[facility] Division of Hospital Medicine  Latoya Varela MD  Pager (M-F, 6O-5Z): [phone]  Other Times:  [phone]    Patient is a 74y old  Female who presents with a chief complaint of chest pain (2024 10:58)      SUBJECTIVE / OVERNIGHT EVENTS:  no chest pain at the time of interview. overnight chest pain episode reported .   afebrile.  no other new symptoms.     ADDITIONAL REVIEW OF SYSTEMS: otherwise neg    MEDICATIONS  (STANDING):  aspirin enteric coated 81 milliGRAM(s) Oral daily  clopidogrel Tablet 75 milliGRAM(s) Oral daily  dextrose 5%. 1000 milliLiter(s) (100 mL/Hr) IV Continuous <Continuous>  dextrose 5%. 1000 milliLiter(s) (50 mL/Hr) IV Continuous <Continuous>  dextrose 50% Injectable 25 Gram(s) IV Push once  dextrose 50% Injectable 12.5 Gram(s) IV Push once  dextrose 50% Injectable 25 Gram(s) IV Push once  enoxaparin Injectable 40 milliGRAM(s) SubCutaneous every 24 hours  ezetimibe 10 milliGRAM(s) Oral daily  folic acid 1 milliGRAM(s) Oral daily  glucagon  Injectable 1 milliGRAM(s) IntraMuscular once  insulin glargine Injectable (LANTUS) 10 Unit(s) SubCutaneous at bedtime  insulin lispro (ADMELOG) corrective regimen sliding scale   SubCutaneous three times a day before meals  insulin lispro (ADMELOG) corrective regimen sliding scale   SubCutaneous at bedtime  insulin lispro Injectable (ADMELOG) 20 Unit(s) SubCutaneous three times a day before meals  isosorbide   mononitrate ER Tablet (IMDUR) 120 milliGRAM(s) Oral at bedtime  losartan 25 milliGRAM(s) Oral daily  metoprolol tartrate 100 milliGRAM(s) Oral two times a day  ranolazine 1000 milliGRAM(s) Oral two times a day  rosuvastatin 40 milliGRAM(s) Oral at bedtime  sodium chloride 0.9% Bolus 250 milliLiter(s) IV Bolus once  sodium chloride 0.9%. 1000 milliLiter(s) (75 mL/Hr) IV Continuous <Continuous>    MEDICATIONS  (PRN):  dextrose Oral Gel 15 Gram(s) Oral once PRN Blood Glucose LESS THAN 70 milliGRAM(s)/deciliter  nitroglycerin     SubLingual 0.4 milliGRAM(s) SubLingual every 5 minutes PRN Chest Pain      CAPILLARY BLOOD GLUCOSE      POCT Blood Glucose.: 152 mg/dL (2024 08:03)    I&O's Summary    2024 07:01  -  2024 13:27  --------------------------------------------------------  IN: 150 mL / OUT: 0 mL / NET: 150 mL        PHYSICAL EXAM:  Vital Signs Last 24 Hrs  T(C): 36.9 (2024 07:20), Max: 37.1 (2024 02:24)  T(F): 98.5 (2024 07:20), Max: 98.7 (2024 02:24)  HR: 94 (2024 07:20) (63 - 94)  BP: 118/72 (2024 07:20) (118/72 - 195/89)  BP(mean): 119 (2024 20:24) (119 - 119)  RR: 18 (2024 07:20) (15 - 18)  SpO2: 98% (2024 07:20) (95% - 100%)    Parameters below as of 2024 07:20  Patient On (Oxygen Delivery Method): room air        CONSTITUTIONAL: NAD, well-groomed  EYES:  conjunctiva and sclera clear  ENMT: Moist oral mucosa  NECK: Supple, no palpable masses; no JVD  RESPIRATORY: Normal respiratory effort; lungs are clear to auscultation bilaterally  CARDIOVASCULAR: Regular rate and rhythm, systolic murmur; No lower extremity edema  ABDOMEN: Nontender to palpation, normoactive bowel sounds, no rebound/guarding  MUSCULOSKELETAL:  no clubbing or cyanosis of digits; no joint swelling or tenderness to palpation  PSYCH: A+O to person, place, and time; affect appropriate  SKIN: No rashes; no palpable lesions    LABS:                        15.1   6.43  )-----------( 190      ( 2024 12:33 )             44.6     11-19    135  |  99  |  27[H]  ----------------------------<  283[H]  4.8   |  24  |  1.26    Ca    9.9      2024 12:33    TPro  7.2  /  Alb  4.1  /  TBili  1.0  /  DBili  x   /  AST  36  /  ALT  22  /  AlkPhos  68  11-19    PT/INR - ( 2024 12:33 )   PT: 12.5 sec;   INR: 1.09 ratio         PTT - ( 2024 12:33 )  PTT:27.7 sec  CARDIAC MARKERS ( 2024 17:11 )  x     / x     / x     / x     / 2.4 ng/mL  CARDIAC MARKERS ( 2024 12:33 )  x     / x     / x     / x     / 3.3 ng/mL      Urinalysis Basic - ( 2024 13:23 )    Color: Yellow / Appearance: Clear / S.023 / pH: x  Gluc: x / Ketone: Negative mg/dL  / Bili: Negative / Urobili: 0.2 mg/dL   Blood: x / Protein: Negative mg/dL / Nitrite: Negative   Leuk Esterase: Negative / RBC: x / WBC x   Sq Epi: x / Non Sq Epi: x / Bacteria: x          RADIOLOGY & ADDITIONAL TESTS:  Results Reviewed:   Imaging Personally Reviewed:  Electrocardiogram Personally Reviewed:    COORDINATION OF CARE:  Care Discussed with Consultants/Other Providers [Y/N]:  Prior or Outpatient Records Reviewed [Y/N]:

## 2024-11-20 NOTE — PROVIDER CONTACT NOTE (OTHER) - ACTION/TREATMENT ORDERED:
Provider made aware. Placed on O2 @ 2 lpm via nasal cannula. EKG done. Provider will see patient for further evaluation. Endorsed to Assigned RN.

## 2024-11-20 NOTE — H&P ADULT - PROBLEM SELECTOR PLAN 1
- Cath (5/3/24): Severe ISR of the pRCA s/p POBA but due to severe ISR with significant neointimal hyperplasia, unable to dilate the lesion.  Severe ISR of the pLAD stent (iFR 0.73). Moderate ISR of the mLCx stent  - Cath (8/18/24): 70% in-stent restenosis of the pLAD,  treated with intravascular lithotripsy and ERI x1, Medical therapy for  of RCA   - Cardiology consult appreciated  - Tele monitor for arrhythmia, Troponin 27, 23, 29, EKG without ischemia  - NTG SL prn for chest pain  - will continue ASA 81mg, Plavix 75mg, Imdur 120mg, Metoprolol 100mg bid, Crestor 40mg and Zetia 10mg  - will increase Ranexa to 1000mg bid  - will likely need [facility]  - will check TSH, A1C and FLP

## 2024-11-20 NOTE — DISCHARGE NOTE PROVIDER - NSDCMRMEDTOKEN_GEN_ALL_CORE_FT
Aspirin Enteric Coated 81 mg oral delayed release tablet: 1 tab(s) orally once a day  Cardiac Rehab: 2-3 times a week for 12 weeks  clopidogrel 75 mg oral tablet: 1 tab(s) orally once a day  folic acid: 1 milligram(s) orally once a day  isosorbide mononitrate 60 mg oral tablet, extended release: 2 tab(s) orally once a day  Jardiance 25 mg oral tablet: 1 tab(s) orally once a day  losartan 25 mg oral tablet: 1 tab(s) orally once a day hold for sbp&lt;90  metoprolol tartrate 100 mg oral tablet: 1 tab(s) orally 2 times a day hold for sbp&lt;90, HR &lt;55  NovoLOG FlexPen 100 units/mL injectable solution: 20 international unit(s) injectable 3 times a day  Ranexa 500 mg oral tablet, extended release: 1 tab(s) orally 2 times a day  rosuvastatin 40 mg oral tablet: 1 tab(s) orally once a day (at bedtime)  Tresiba 100 units/mL subcutaneous solution: 10 unit(s) subcutaneous once a day (at bedtime)  Zetia 10 mg oral tablet: 1 tab(s) orally once a day   Aspirin Enteric Coated 81 mg oral delayed release tablet: 1 tab(s) orally once a day DC ON 12/1/24 AND START eLIQUIS ON 12/2/24  clopidogrel 75 mg oral tablet: 1 tab(s) orally once a day  Eliquis 5 mg oral tablet: 1 tab(s) orally 2 times a day START ON 12/2/24 - STOP aspirin on 12/1/24  folic acid: 1 milligram(s) orally once a day  Jardiance 25 mg oral tablet: 1 tab(s) orally once a day  losartan 25 mg oral tablet: 1 tab(s) orally once a day hold for sbp&lt;90  rosuvastatin 40 mg oral tablet: 1 tab(s) orally once a day (at bedtime)  senna leaf extract oral tablet: 2 tab(s) orally once a day (at bedtime)  Tresiba 100 units/mL subcutaneous solution: 18 unit(s) subcutaneous once a day (at bedtime)  Zetia 10 mg oral tablet: 1 tab(s) orally once a day   Aspirin Enteric Coated 81 mg oral delayed release tablet: 1 tab(s) orally once a day DC ON 12/1/24 AND START eLIQUIS ON 12/2/24  clopidogrel 75 mg oral tablet: 1 tab(s) orally once a day  Eliquis 5 mg oral tablet: 1 tab(s) orally 2 times a day START ON 12/2/24 - STOP aspirin on 12/1/24  folic acid: 1 milligram(s) orally once a day  Jardiance 25 mg oral tablet: 1 tab(s) orally once a day  losartan 25 mg oral tablet: 1 tab(s) orally once a day hold for sbp&lt;90  rosuvastatin 40 mg oral tablet: 1 tab(s) orally once a day (at bedtime)  senna leaf extract oral tablet: 2 tab(s) orally once a day (at bedtime)  Tresiba 100 units/mL subcutaneous solution: 22 unit(s) subcutaneous once a day (at bedtime)  Zetia 10 mg oral tablet: 1 tab(s) orally once a day   Aspirin Enteric Coated 81 mg oral delayed release tablet: 1 tab(s) orally once a day DC ON 12/1/24 AND START eLIQUIS ON 12/2/24  clopidogrel 75 mg oral tablet: 1 tab(s) orally once a day  Eliquis 5 mg oral tablet: 1 tab(s) orally 2 times a day START ON 12/2/24 - STOP aspirin on 12/1/24  folic acid: 1 milligram(s) orally once a day  Jardiance 25 mg oral tablet: 1 tab(s) orally once a day  losartan 25 mg oral tablet: 1 tab(s) orally once a day hold for sbp&lt;90  metoprolol tartrate 100 mg oral tablet: 1 tab(s) orally 2 times a day  rosuvastatin 40 mg oral tablet: 1 tab(s) orally once a day (at bedtime)  senna leaf extract oral tablet: 2 tab(s) orally once a day (at bedtime)  Tresiba 100 units/mL subcutaneous solution: 22 unit(s) subcutaneous once a day (at bedtime)  Zetia 10 mg oral tablet: 1 tab(s) orally once a day   Aspirin Enteric Coated 81 mg oral delayed release tablet: 1 tab(s) orally once a day DC ON 12/1/24 AND START eLIQUIS ON 12/2/24  clopidogrel 75 mg oral tablet: 1 tab(s) orally once a day  Eliquis 5 mg oral tablet: 1 tab(s) orally 2 times a day START ON 12/2/24 - STOP aspirin on 12/1/24  folic acid: 1 milligram(s) orally once a day  insulin lispro 100 units/mL injectable solution: 2 unit(s) injectable 3 times a day (with meals) 2 Unit(s) if Glucose 151 - 200  4 Unit(s) if Glucose 201 - 250  6 Unit(s) if Glucose 251 - 300  8 Unit(s) if Glucose 301 - 350  10 Unit(s) if Glucose 351 - 400  12 Unit(s) if Glucose Greater Than 400  losartan 25 mg oral tablet: 1 tab(s) orally once a day hold for sbp&lt;90  metoprolol tartrate 100 mg oral tablet: 1 tab(s) orally 2 times a day  NovoLOG FlexPen 100 units/mL injectable solution: 8 unit(s) injectable 3 times a day (with meals)  rosuvastatin 40 mg oral tablet: 1 tab(s) orally once a day (at bedtime)  senna leaf extract oral tablet: 2 tab(s) orally once a day (at bedtime)  Tresiba 100 units/mL subcutaneous solution: 24 unit(s) subcutaneous once a day (at bedtime)  Zetia 10 mg oral tablet: 1 tab(s) orally once a day   alcohol swabs: Apply topically to affected area 4 times a day  clopidogrel 75 mg oral tablet: 1 tab(s) orally once a day  Eliquis 5 mg oral tablet: 1 tab(s) orally 2 times a day  folic acid: 1 milligram(s) orally once a day  Freestyle Lon 3 [place]: Dispense 1 [place]  Freestyle Lon 3 Sensors: Please change every 14 days  Insulin Pen Needles, 4mm: 1 application subcutaneously 4 times a day. ** Use with insulin pen **  losartan 25 mg oral tablet: 1 tab(s) orally once a day hold for sbp&lt;90  metoprolol tartrate 100 mg oral tablet: 1 tab(s) orally 2 times a day  NovoLOG FlexPen 100 units/mL injectable solution: 8 unit(s) injectable 3 times a day (with meals)  NovoLOG FlexPen 100 units/mL injectable solution: 12 unit(s) injectable 3 times a day (before meals)  rosuvastatin 40 mg oral tablet: 1 tab(s) orally once a day (at bedtime)  senna leaf extract oral tablet: 2 tab(s) orally once a day (at bedtime)  Tresiba 100 units/mL subcutaneous solution: 24 unit(s) subcutaneous once a day (at bedtime)  Trulicity Pen 0.75 mg/0.5 mL subcutaneous solution: 0.75 milligram(s) subcutaneously once a week  Zetia 10 mg oral tablet: 1 tab(s) orally once a day   alcohol swabs: Apply topically to affected area 4 times a day  clopidogrel 75 mg oral tablet: 1 tab(s) orally once a day  Eliquis 5 mg oral tablet: 1 tab(s) orally 2 times a day  folic acid: 1 milligram(s) orally once a day  Freestyle Lon 3 [place]: Dispense 1 [place]  Freestyle Lon 3 Sensors: Please change every 14 days  Insulin Pen Needles, 4mm: 1 application subcutaneously 4 times a day. ** Use with insulin pen **  losartan 25 mg oral tablet: 1 tab(s) orally once a day hold for sbp&lt;90  metFORMIN 1000 mg oral tablet: 1 tab(s) orally 2 times a day  metoprolol tartrate 100 mg oral tablet: 1 tab(s) orally 2 times a day  NovoLOG FlexPen 100 units/mL injectable solution: 12 unit(s) injectable 3 times a day (before meals)  rosuvastatin 40 mg oral tablet: 1 tab(s) orally once a day (at bedtime)  senna leaf extract oral tablet: 2 tab(s) orally once a day (at bedtime)  Tresiba 100 units/mL subcutaneous solution: 24 unit(s) subcutaneous once a day (at bedtime)  Trulicity Pen 0.75 mg/0.5 mL subcutaneous solution: 0.75 milligram(s) subcutaneously once a week  Zetia 10 mg oral tablet: 1 tab(s) orally once a day

## 2024-11-20 NOTE — H&P ADULT - NSICDXFAMILYHX_GEN_ALL_CORE_FT
FAMILY HISTORY:  Father  Still living? Unknown  Family history of heart attack, Age at diagnosis: Age Unknown    Mother  Still living? Unknown  Family history of CVA, Age at diagnosis: Age Unknown  Family history of heart attack, Age at diagnosis: Age Unknown     FAMILY HISTORY:  Father  Still living? Unknown  Family history of heart attack, Age at diagnosis: Age Unknown    Mother  Still living? Unknown  Family history of CVA, Age at diagnosis: Age Unknown  Family history of heart attack, Age at diagnosis: Age Unknown    Sibling  Still living? Unknown  FH: diabetes mellitus, Age at diagnosis: Age Unknown

## 2024-11-20 NOTE — DISCHARGE NOTE PROVIDER - NSDCFUADDAPPT_GEN_ALL_CORE_FT
Followup with your cardiologist 2 weeks post procedure APPTS ARE READY TO BE MADE: [X] YES    Best Family or Patient Contact (if needed):    Additional Information about above appointments (if needed):    1: Cards   2: PMD   3:     Other comments or requests:    APPTS ARE READY TO BE MADE: [X] YES    Best Family or Patient Contact (if needed):    Additional Information about above appointments (if needed):    1: Cards   2: PMD   3: Endo    Other comments or requests:

## 2024-11-20 NOTE — CHART NOTE - NSCHARTNOTEFT_GEN_A_CORE
CC: Chest pain    HPI:   Informed by RN pt stated she complains of chest pain. Pt states she was experiencing chest pain that radiated to the neck and mouth. Pt states the pain is intermittent and rates the pain 8/10. Pt stated she feels mildly nauseous but denies sob, vomiting, abdominal pain, and lightheadedness. EKG performed NSR at 76 BPM, shows no acute ischemic changes.   Pt takes multiple BP meds at home including metoprolol, losartan, and isosorbide but states she missed her nightly dose of isosorbide.     Vital Signs Last 24 Hrs  T(C): 36.7 (20 Nov 2024 03:15), Max: 37.1 (20 Nov 2024 02:24)  T(F): 98 (20 Nov 2024 03:15), Max: 98.7 (20 Nov 2024 02:24)  HR: 76 (20 Nov 2024 03:15) (63 - 78)  BP: 172/83 (20 Nov 2024 03:15) (153/81 - 195/89)  BP(mean): 119 (19 Nov 2024 20:24) (119 - 119)  RR: 17 (20 Nov 2024 03:15) (15 - 20)  SpO2: 100% (20 Nov 2024 03:15) (95% - 100%)    Parameters below as of 20 Nov 2024 03:15  Patient On (Oxygen Delivery Method): room air          Labs:                          15.1   6.43  )-----------( 190      ( 19 Nov 2024 12:33 )             44.6     11-19    135  |  99  |  27[H]  ----------------------------<  283[H]  4.8   |  24  |  1.26    Ca    9.9      19 Nov 2024 12:33    TPro  7.2  /  Alb  4.1  /  TBili  1.0  /  DBili  x   /  AST  36  /  ALT  22  /  AlkPhos  68  11-19    CARDIAC MARKERS ( 19 Nov 2024 17:11 )  x     / x     / x     / x     / 2.4 ng/mL  CARDIAC MARKERS ( 19 Nov 2024 12:33 )  x     / x     / x     / x     / 3.3 ng/mL              Radiology:        Physical Exam:  General: WN/WD, NAD, nontoxic appearing  Neurology: A&Ox3  Respiratory: CTA B/L  CV: RRR, S1S2, no murmur  Abdominal: Soft, NT, ND no palpable mass  MSK: No edema, + peripheral pulses, FROM all 4 extremity    Assessment & Plan:  HPI:   74 year old F with PMHx GERD, DM2, HTN, HLD, CAD s/p MI w/ stents (1998, 2006), s/p cath w/ shockwave and LAD stent on ASA/plavix, presents with intermittent, substernal, nonpleuritic chest pain with associated shortness of breath, worse on exertion.     Plan:  >Will resume home dose of isosorbide to be given now   >Continue to monitor and observe patient  >Will endorse to primary team in AM    Alexander Davidson PA-C CC: Chest pain    HPI:   Informed by RN pt stated she complains of chest pain. Pt states she was experiencing chest pain that radiated to the neck and mouth. Pt states the pain is intermittent and rates the pain 8/10. Pt stated she feels mildly nauseous but denies sob, vomiting, abdominal pain, and lightheadedness. EKG performed NSR at 76 BPM, shows no acute ischemic changes.   Pt takes multiple BP meds at home including metoprolol, losartan, and isosorbide but states she missed her nightly dose of isosorbide.     Vital Signs Last 24 Hrs  T(C): 36.7 (20 Nov 2024 03:15), Max: 37.1 (20 Nov 2024 02:24)  T(F): 98 (20 Nov 2024 03:15), Max: 98.7 (20 Nov 2024 02:24)  HR: 76 (20 Nov 2024 03:15) (63 - 78)  BP: 172/83 (20 Nov 2024 03:15) (153/81 - 195/89)  BP(mean): 119 (19 Nov 2024 20:24) (119 - 119)  RR: 17 (20 Nov 2024 03:15) (15 - 20)  SpO2: 100% (20 Nov 2024 03:15) (95% - 100%)    Parameters below as of 20 Nov 2024 03:15  Patient On (Oxygen Delivery Method): room air          Labs:                          15.1   6.43  )-----------( 190      ( 19 Nov 2024 12:33 )             44.6     11-19    135  |  99  |  27[H]  ----------------------------<  283[H]  4.8   |  24  |  1.26    Ca    9.9      19 Nov 2024 12:33    TPro  7.2  /  Alb  4.1  /  TBili  1.0  /  DBili  x   /  AST  36  /  ALT  22  /  AlkPhos  68  11-19    CARDIAC MARKERS ( 19 Nov 2024 17:11 )  x     / x     / x     / x     / 2.4 ng/mL  CARDIAC MARKERS ( 19 Nov 2024 12:33 )  x     / x     / x     / x     / 3.3 ng/mL              Radiology:        Physical Exam:  General: WN/WD, NAD, nontoxic appearing  Neurology: A&Ox3  Respiratory: CTA B/L  CV: RRR, S1S2, no murmur  Abdominal: Soft, NT, ND no palpable mass  MSK: No edema, + peripheral pulses, FROM all 4 extremity    Assessment & Plan:  HPI:   74 year old F with PMHx GERD, DM2, HTN, HLD, CAD s/p MI w/ stents (1998, 2006), s/p cath w/ shockwave and LAD stent on ASA/plavix, presents with intermittent, substernal, nonpleuritic chest pain with associated shortness of breath, worse on exertion.     Plan:  >Will resume home dose of isosorbide to be given now   >Attending made aware by Alison French PA-C   >Continue to monitor and observe patient  >Will endorse to primary team in AM    Alexander Davidson PA-C

## 2024-11-20 NOTE — CHART NOTE - NSCHARTNOTEFT_GEN_A_CORE
Received pt in [facility] from [facility]. Pt c/o persistent, severe midsternal chest pain radiating to throat and b/l jaws as well as nausea and eructation. Pt noted to be tachycardic to 110-120s w/ SBP in 160s. Was given nitropaste in [facility] w/ mild improvement. STAT EKG w/o ischemic changes. Pt given Imdur 120mg PO x1 and Ranexa 500mg PO x1 w/ no improvement of pain. Cardiology fellow, Dr. Richardson, called and at bedside. Trialed Colchicine 0.6mg PO x1 for suspected pericarditis; however no improvement of symptoms. Maalox and Reglan given for nausea. SL Nitro given w/ mild improvement of chest pain. STAT labs performed revealing HCO3 14, anion gap 24, glucose 224, BHB 3.9, lactate 3.9, troponin 433, , CKMB 33.6, CPK 33.6. Pt likely in euglycemic DKA (on Jardiance at home). Made NPO, transported to CICU for likely nitro gtt and insulin gtt. Received pt in [facility] from [facility]. Pt c/o persistent, severe midsternal chest pain radiating to throat and b/l jaws as well as nausea and eructation. Limited TTE w/ newly reduced LVEF ~40% w/ new WMA in mid-basal anteroseptal and inferoseptal walls (compared to TTE from 11/20AM). Pt noted to be tachycardic to 110-120s w/ SBP in 160s. Was given nitropaste in [facility] w/ mild improvement. STAT EKG w/o ischemic changes. Pt given Imdur 120mg PO x1 and Ranexa 500mg PO x1 w/ no improvement of pain. Cardiology fellow, Dr. Richardson, called and at bedside. Trialed Colchicine 0.6mg PO x1 for suspected pericarditis; however no improvement of symptoms. Maalox and Reglan given for nausea. SL Nitro given w/ mild improvement of chest pain. STAT labs performed revealing HCO3 14, anion gap 24, glucose 224, BHB 3.9, lactate 3.9, troponin 433, , CKMB 33.6, CPK 33.6. Pt likely in euglycemic DKA (on Jardiance at home). Made NPO, transported to CICU for likely nitro gtt and insulin gtt.      Marilee Mathew PA-C  Invasive cardiology  [phone] Received pt in [facility] from [facility]. Pt c/o persistent, severe midsternal chest pain radiating to throat and b/l jaws as well as nausea and eructation. Limited TTE w/ newly reduced LVEF ~40% w/ new WMA in mid-basal anteroseptal and inferoseptal walls (compared to TTE from 11/20AM). Pt noted to be tachycardic to 110-120s w/ SBP in 160s. Was given nitropaste in [facility] w/ mild improvement. STAT EKG w/o ischemic changes. Pt given Imdur 120mg PO x1 and Ranexa 500mg PO x1 w/ no improvement of pain. Cardiology fellow, Dr. Richardson, called and at bedside. Trialed Colchicine 0.6mg PO x1 for suspected pericarditis; however no improvement of symptoms. Maalox and Reglan given for nausea. SL Nitro given w/ mild improvement of chest pain. STAT labs performed revealing HCO3 14, anion gap 24, glucose 224, BHB 3.9, lactate 3.9, troponin 433, , CKMB 33.6, CPK 33.6. Pt likely in euglycemic DKA (on Jardiance at home). Made NPO, transported to CICU for likely nitro gtt and insulin gtt. Dr. Dimas made aware.      Marilee Mathew PA-C  Invasive cardiology  [phone]

## 2024-11-20 NOTE — DISCHARGE NOTE PROVIDER - NSDCFUADDINST_GEN_ALL_CORE_FT
Please see preprinted discharge instruction sheet.    We have provided you with a prescription for cardiac rehab which is medically supervised exercise program for your heart and has been shown to improve the quantity and quality of life of people with heart disease like yours. You should attend cardiac rehab 3 times per week for 12 weeks. We have provided you with a list of nearby facilities. Please call your insurance carrier to determine which of these facilities are covered under your plan. Please bring this prescription with you to your follow up appointment with your cardiologist who can then further assist you to enroll into a cardiac rehab program.

## 2024-11-20 NOTE — H&P ADULT - HISTORY OF PRESENT ILLNESS
74 year-old man with history of  74 year-old female with history of CAD (s/p PCI 1998, 2006, 8/2024), HTN, HLD, T2DM, and GERD presenting with chest pain.    Symptoms have been going on for the past day, intermittently. Describes as pressure that lasts for a few seconds and is an 8/10 in intensity. Pain radiates up the neck and jaw but has no pleuritic chest pain. She has some mild nausea without vomiting. States that this is not like her heartburn symptoms.    In the ED, she is uncomfortable appearing but HDS. States the pain has gone away. EKG is non-ischemic. Given  mg x1 in the ED. 74 year-old [place] female with history of CAD (s/p PCI 1998, 2006, 8/2024), HTN, HLD, T2DM, and GERD presents with intermittent chest pain at rest and on exertion for the past 3 days. Symptoms started on 11/17, pressure-like, 8/10 in intensity with radiation up the neck and jaw associated with mild nausea without vomiting, similar to her prior angina symptoms.  She took her 's NTG SL and symptoms improved within 5 minutes.  She came to ED for further evaluation as these episodes of intermittent chest pain persisted for the past couple days.  Currently, chest pain free s/p NTG SL twice since arrived

## 2024-11-20 NOTE — DISCHARGE NOTE PROVIDER - HOSPITAL COURSE
HPI:  74 year-old Malagasy female with history of CAD (s/p PCI 1998, 2006, 8/2024), HTN, HLD, T2DM, and GERD presents with intermittent chest pain at rest and on exertion for the past 3 days. Symptoms started on 11/17, pressure-like, 8/10 in intensity with radiation up the neck and jaw associated with mild nausea without vomiting, similar to her prior angina symptoms.  She took her 's NTG SL and symptoms improved within 5 minutes.  She came to ED for further evaluation as these episodes of intermittent chest pain persisted for the past couple days.  Currently, chest pain free s/p NTG SL twice since arrived    11/20: s/p PCI/POBA to pLAD 99% via RRA due off 1700 by Dr. Dimas with Chest pain 10/10 with ecg changes r/w Dr. Dimas s/p Fentanyl 25mcg IV x 1 no relief added Nitro 0.5mcg paste to ACW will reevaluate with ECG 30 min post administration.  Pt with no improvement with new LBBB on ECG with EMIL in V1-V3 and 2 mm ST depressions in leads V4-V6 with reciprocal changes in lateral leads..  Pt brought back to cath lab with Dr. Dimas. HPI:  74 year-old Solomon Islander female with history of CAD (s/p PCI 1998, 2006, 8/2024), HTN, HLD, T2DM, and GERD presents with intermittent chest pain at rest and on exertion for the past 3 days. Symptoms started on 11/17, pressure-like, 8/10 in intensity with radiation up the neck and jaw associated with mild nausea without vomiting, similar to her prior angina symptoms.  She took her 's NTG SL and symptoms improved within 5 minutes.  She came to ED for further evaluation as these episodes of intermittent chest pain persisted for the past couple days.  Currently, chest pain free s/p NTG SL twice since arrived    11/20: s/p PCI/POBA to pLAD 99% via RRA due off 1700 by Dr. Dimas with Chest pain 10/10 with ecg changes r/w Dr. Dimas s/p Fentanyl 25mcg IV x 1 no relief added Nitro 0.5mcg paste to ACW will reevaluate with ECG 30 min post administration.  Pt with no improvement with new LBBB on ECG with EMIL in V1-V3 and 2 mm ST depressions in leads V4-V6 with reciprocal changes in lateral leads..  Pt brought back to cath lab with Dr. Dimas. - [facility] via RFA w/pLAD open treated with Labetalol 20mg in lab for  with ECG post with resolved changes.  Pt medicated with Versed 1mg and Fent 25mg IV unable to follow commands for a few minutes but quickly resolved.  Pt appropriate with HR 85 down from .  Pain has improved.        HPI:  74 year-old Dutch female with history of CAD (s/p PCI 1998, 2006, 8/2024), HTN, HLD, T2DM, and GERD presents with intermittent chest pain at rest and on exertion for the past 3 days. Symptoms started on 11/17, pressure-like, 8/10 in intensity with radiation up the neck and jaw associated with mild nausea without vomiting, similar to her prior angina symptoms.  She took her 's NTG SL and symptoms improved within 5 minutes.  She came to ED for further evaluation as these episodes of intermittent chest pain persisted for the past couple days.  Currently, chest pain free s/p NTG SL twice since arrived (20 Nov 2024 04:16)    Hospital Course: 74 year-old Dutch female with history of CAD (s/p PCI 1998, 2006, 8/2024), HTN, HLD, T2DM, and GERD presents with intermittent chest pain at rest and on exertion for the past 3 days. Symptoms started on 11/17, pressure-like, 8/10 in intensity with radiation up the neck and jaw associated with mild nausea without vomiting, similar to her prior angina symptoms.  She took her 's NTG SL and symptoms improved within 5 minutes.  She came to ED for further evaluation as these episodes of intermittent chest pain persisted for the past couple days.  Currently, chest pain free s/p NTG SL twice since arrived  s/p PCI/POBA to pLAD 99% via RRA due off 1700 by Dr. Dimas with Chest pain 10/10 with ecg changes r/w Dr. Dimas s/p Fentanyl 25mcg IV x 1 no relief added Nitro 0.5mcg paste to ACW will reevaluate with ECG 30 min post administration.  Pt with no improvement with new LBBB on ECG with EMIL in V1-V3 and 2 mm ST depressions in leads V4-V6 with reciprocal changes in lateral leads..  Pt brought back to cath lab with Dr. Dimas. - [facility] via RFA w/pLAD open treated with Labetalol 20mg in lab for  with ECG post with resolved changes.  Pt medicated with Versed 1mg and Fent 25mg IV unable to follow commands for a few minutes but quickly resolved.  Pt appropriate with HR 85 down from .  Pain has improved. Unstable angina. s/p [facility] 11/20 Severe Proximal LAD in-stent restenosis s/p successful balloon angioplasty no chest pain TTE 45-50%, not in heart failure ASA/plavix x 12 months continue losartan 25 mg daily to d/w with EP when safe to resume BB  c/ statin EP note appreciated continue to hold DOAC.Complete heart block. s/p TVP and now PPM 11/27.  HTN (hypertension). moderately controlled resume home losartan.Type 2 diabetes mellitus. euglycemic DKA c/w lantus 18 u bedtime c/w ISS and monitor FS holding Jardiance inpt.      Important Medication Changes and Reason:    Active or Pending Issues Requiring Follow-up:    Advanced Directives:   [ x] Full code  [ ] DNR  [ ] Hospice    Discharge Diagnoses:                                   HPI:  74 year-old Ghanaian female with history of CAD (s/p PCI 1998, 2006, 8/2024), HTN, HLD, T2DM, and GERD presents with intermittent chest pain at rest and on exertion for the past 3 days. Symptoms started on 11/17, pressure-like, 8/10 in intensity with radiation up the neck and jaw associated with mild nausea without vomiting, similar to her prior angina symptoms.  She took her 's NTG SL and symptoms improved within 5 minutes.  She came to ED for further evaluation as these episodes of intermittent chest pain persisted for the past couple days.  Currently, chest pain free s/p NTG SL twice since arrived (20 Nov 2024 04:16)    Hospital Course:   s/p PCI/POBA to pLAD 99% via RRA due off 1700 by Dr. Dimas with Chest pain 10/10 with ecg changes r/w Dr. Dimas s/p Fentanyl 25mcg IV x 1 no relief added Nitro 0.5mcg paste to ACW will reevaluate with ECG 30 min post administration.  Pt with no improvement with new LBBB on ECG with EMIL in V1-V3 and 2 mm ST depressions in leads V4-V6 with reciprocal changes in lateral leads. Pt brought back to cath lab with Dr. Dimas. - [facility] via RFA w/pLAD open treated with Labetalol 20mg in lab for  with ECG post with resolved changes.  Pt medicated with Versed 1mg and Fent 25mg IV unable to follow commands for a few minutes but quickly resolved.  Pt appropriate with HR 85 down from .   Unstable angina. s/p [facility] 11/20 Severe Proximal LAD in-stent restenosis s/p successful balloon angioplasty no chest pain TTE 45-50%, not in heart failure ASA/plavix continue losartan 25 mg daily.  Complete heart block. s/p code blue and TVP and now PPM 11/27.  noted small hematoma at site - NOAC on hold.   HTN (hypertension). moderately controlled resume home losartan  Type 2 diabetes mellitus. euglycemic DKA resolved s/p insulin.    Important Medication Changes and Reason:    Active or Pending Issues Requiring Follow-up:  f/u cardiology and EP    Advanced Directives:   [ x] Full code  [ ] DNR  [ ] Hospice    Discharge Diagnoses:  unstable angina  euglycemic DKA  symptomatic bradycardia requiring transvenous pacing and s/p PPM  PPM site hematoma  in stent restenosis s/p POBA                                   HPI:  74 year-old [place] female with history of CAD (s/p PCI 1998, 2006, 8/2024), HTN, HLD, T2DM, and GERD presents with intermittent chest pain at rest and on exertion for the past 3 days. Symptoms started on 11/17, pressure-like, 8/10 in intensity with radiation up the neck and jaw associated with mild nausea without vomiting, similar to her prior angina symptoms.  She took her 's NTG SL and symptoms improved within 5 minutes.  She came to ED for further evaluation as these episodes of intermittent chest pain persisted for the past couple days.  Currently, chest pain free s/p NTG SL twice since arrived (20 Nov 2024 04:16)    Hospital Course:   s/p PCI/POBA to pLAD 99% via RRA due off 1700 by Dr. Dimas with Chest pain 10/10 with ecg changes r/w Dr. Dimas s/p Fentanyl 25mcg IV x 1 no relief added Nitro 0.5mcg paste to ACW will reevaluate with ECG 30 min post administration.  Pt with no improvement with new LBBB on ECG with EMIL in V1-V3 and 2 mm ST depressions in leads V4-V6 with reciprocal changes in lateral leads. Pt brought back to cath lab with Dr. Dimas. - [facility] via RFA w/pLAD open treated with Labetalol 20mg in lab for  with ECG post with resolved changes.  Pt medicated with Versed 1mg and Fent 25mg IV unable to follow commands for a few minutes but quickly resolved.  Pt appropriate with HR 85 down from .   Unstable angina. s/p [facility] 11/20 Severe Proximal LAD in-stent restenosis s/p successful balloon angioplasty no chest pain TTE 45-50%, not in heart failure ASA/plavix continue losartan 25 mg daily.  Complete heart block. s/p code blue and TVP and now PPM 11/27.  noted small hematoma at site - NOAC on hold.   HTN (hypertension). moderately controlled resume home losartan  Type 2 diabetes mellitus. euglycemic DKA resolved s/p insulin.    Active or Pending Issues Requiring Follow-up:  f/u cardiology and EP    Advanced Directives:   [ x] Full code  [ ] DNR  [ ] Hospice    Discharge Diagnoses:  unstable angina  euglycemic DKA  symptomatic bradycardia requiring transvenous pacing and s/p PPM  PPM site hematoma  in stent restenosis s/p POBA

## 2024-11-20 NOTE — H&P ADULT - ASSESSMENT
SOB
SOB
74 year-old Northern Irish female with history of CAD (s/p PCI 1998, 2006, 8/2024), HTN, HLD, T2DM, and GERD presents with intermittent typical chest pain relieved with NTG SL admitted with concern for unstable angina

## 2024-11-20 NOTE — DISCHARGE NOTE PROVIDER - NSFOLLOWUPCLINICS_GEN_ALL_ED_FT
[facility]  Endocrinology  [address] [place] [address]  Phone: [phone]  Fax:

## 2024-11-20 NOTE — DISCHARGE NOTE PROVIDER - CARE PROVIDERS DIRECT ADDRESSES
[email or web address]

## 2024-11-20 NOTE — H&P ADULT - NSICDXPASTSURGICALHX_GEN_ALL_CORE_FT
PAST SURGICAL HISTORY:  S/P coronary artery stent placement      PAST SURGICAL HISTORY:  No significant past surgical history

## 2024-11-20 NOTE — H&P ADULT - MENTAL STATUS
Service to Pain Management order converted to Service to Spine on 10/18/22. Duplicate order canceled  
A&Ox3

## 2024-11-20 NOTE — H&P ADULT - NSHPSOCIALHISTORY_GEN_ALL_CORE
, lives with  and son's family lives upstairs  ambulates without assist device  no smoking, rare social wine  retired [employer]

## 2024-11-20 NOTE — CHART NOTE - NSCHARTNOTEFT_GEN_A_CORE
Pt s/p PCI/POBA to pLAD 99% via RRA due off 1700 by Dr. Dimas     She came out of lab chest pain 10/10 with ecg changes r/w Dr. Dimas s/p Fentanyl 25mcg IV x 1 no relief added Nitro 0.5mcg paste to ACW will reevaluate with ECG 30 min post administration. Pt with no improvement with new LBBB on ECG with EMIL in V1-V3 and 2 mm ST depressions in leads V4-V6 with reciprocal changes in lateral leads.      Vital Signs Last 24 Hrs  T(C): 36.9 (20 Nov 2024 07:20), Max: 37.1 (20 Nov 2024 02:24)  T(F): 98.5 (20 Nov 2024 07:20), Max: 98.7 (20 Nov 2024 02:24)  HR: 104 (20 Nov 2024 14:40) (63 - 105)  BP: 149/97 (20 Nov 2024 14:40) (118/72 - 195/89)  BP(mean): 119 (19 Nov 2024 20:24) (119 - 119)  RR: 14 (20 Nov 2024 14:40) (14 - 18)  SpO2: 97% (20 Nov 2024 14:40) (95% - 100%)    Parameters below as of 20 Nov 2024 07:20  Patient On (Oxygen Delivery Method): room air    Plan:    Pt brought back to cath lab with Dr. Dimas.   continue DAPT with ASA and Plavix    Amara Villalobos, NP-C  Invasive Cardiology Pt s/p PCI/POBA to pLAD 99% via RRA due off 1700 by Dr. Dimas     She came out of lab chest pain 10/10 with ecg changes r/w Dr. Dimas s/p Fentanyl 25mcg IV x 1 no relief added Nitro 0.5mcg paste to ACW will reevaluate with ECG 30 min post administration. Pt with no improvement with new LBBB on ECG with EMIL in V1-V3 and 2 mm ST depressions in leads V4-V6 with reciprocal changes in lateral leads.      Vital Signs Last 24 Hrs  T(C): 36.9 (20 Nov 2024 07:20), Max: 37.1 (20 Nov 2024 02:24)  T(F): 98.5 (20 Nov 2024 07:20), Max: 98.7 (20 Nov 2024 02:24)  HR: 104 (20 Nov 2024 14:40) (63 - 105)  BP: 149/97 (20 Nov 2024 14:40) (118/72 - 195/89)  BP(mean): 119 (19 Nov 2024 20:24) (119 - 119)  RR: 14 (20 Nov 2024 14:40) (14 - 18)  SpO2: 97% (20 Nov 2024 14:40) (95% - 100%)    Parameters below as of 20 Nov 2024 07:20  Patient On (Oxygen Delivery Method): room air    Plan:    Pt brought back to cath lab with Dr. Dimas.   continue DAPT with ASA and Plavix    Amara Villalobos, NP-C  Invasive Cardiology    Addendum:    [facility] via RFA w/pLAD open treated with Labetalol 20mg in lab for  with ECG post with resolved changes.  Pt medicated with Versed 1mg and Fent 25mg IV unable to follow commands for a few minutes but quickly resolved.  Pt appropriate with HR 85 down from .  Pain has improved.

## 2024-11-20 NOTE — PROGRESS NOTE ADULT - SUBJECTIVE AND OBJECTIVE BOX
SUBJ: chest pain    Home Medications:  Aspirin Enteric Coated 81 mg oral delayed release tablet: 1 tab(s) orally once a day (20 Nov 2024 06:04)  folic acid: 1 milligram(s) orally once a day (20 Nov 2024 06:04)  isosorbide mononitrate 60 mg oral tablet, extended release: 2 tab(s) orally once a day (20 Nov 2024 06:04)  Jardiance 25 mg oral tablet: 1 tab(s) orally once a day (20 Nov 2024 06:04)  losartan 25 mg oral tablet: 1 tab(s) orally once a day hold for sbp&lt;90 (20 Nov 2024 06:04)  metoprolol tartrate 100 mg oral tablet: 1 tab(s) orally 2 times a day hold for sbp&lt;90, HR &lt;55 (20 Nov 2024 06:04)  NovoLOG FlexPen 100 units/mL injectable solution: 20 international unit(s) injectable 3 times a day (20 Nov 2024 06:04)  Ranexa 500 mg oral tablet, extended release: 1 tab(s) orally 2 times a day (20 Nov 2024 06:04)  rosuvastatin 40 mg oral tablet: 1 tab(s) orally once a day (at bedtime) (20 Nov 2024 06:04)  Tresiba 100 units/mL subcutaneous solution: 10 unit(s) subcutaneous once a day (at bedtime) (20 Nov 2024 06:04)  Zetia 10 mg oral tablet: 1 tab(s) orally once a day (20 Nov 2024 06:04)      MEDICATIONS  (STANDING):  aspirin enteric coated 81 milliGRAM(s) Oral daily  clopidogrel Tablet 75 milliGRAM(s) Oral daily  dextrose 5%. 1000 milliLiter(s) (100 mL/Hr) IV Continuous <Continuous>  dextrose 5%. 1000 milliLiter(s) (50 mL/Hr) IV Continuous <Continuous>  dextrose 50% Injectable 25 Gram(s) IV Push once  dextrose 50% Injectable 12.5 Gram(s) IV Push once  dextrose 50% Injectable 25 Gram(s) IV Push once  enoxaparin Injectable 40 milliGRAM(s) SubCutaneous every 24 hours  ezetimibe 10 milliGRAM(s) Oral daily  folic acid 1 milliGRAM(s) Oral daily  glucagon  Injectable 1 milliGRAM(s) IntraMuscular once  insulin glargine Injectable (LANTUS) 10 Unit(s) SubCutaneous at bedtime  insulin lispro (ADMELOG) corrective regimen sliding scale   SubCutaneous three times a day before meals  insulin lispro (ADMELOG) corrective regimen sliding scale   SubCutaneous at bedtime  insulin lispro Injectable (ADMELOG) 20 Unit(s) SubCutaneous three times a day before meals  isosorbide   mononitrate ER Tablet (IMDUR) 120 milliGRAM(s) Oral at bedtime  losartan 25 milliGRAM(s) Oral daily  metoprolol tartrate 100 milliGRAM(s) Oral two times a day  ranolazine 1000 milliGRAM(s) Oral two times a day  rosuvastatin 40 milliGRAM(s) Oral at bedtime    MEDICATIONS  (PRN):  dextrose Oral Gel 15 Gram(s) Oral once PRN Blood Glucose LESS THAN 70 milliGRAM(s)/deciliter  nitroglycerin     SubLingual 0.4 milliGRAM(s) SubLingual every 5 minutes PRN Chest Pain      Vital Signs Last 24 Hrs  T(C): 36.9 (20 Nov 2024 07:20), Max: 37.1 (20 Nov 2024 02:24)  T(F): 98.5 (20 Nov 2024 07:20), Max: 98.7 (20 Nov 2024 02:24)  HR: 94 (20 Nov 2024 07:20) (63 - 94)  BP: 118/72 (20 Nov 2024 07:20) (118/72 - 195/89)  BP(mean): 119 (19 Nov 2024 20:24) (119 - 119)  RR: 18 (20 Nov 2024 07:20) (15 - 20)  SpO2: 98% (20 Nov 2024 07:20) (95% - 100%)    Parameters below as of 20 Nov 2024 07:20  Patient On (Oxygen Delivery Method): room air        REVIEW OF SYSTEMS:  As per HPI, otherwise unremarkable.     PHYSICAL EXAM:  Constitutional/Appearance: Normal, Well-developed  HEENT:   Normal oral mucosa, no drainage or redness, supple neck  Lymphatic: No lymphadenopathy  Cardiovascular: Normal S1 S2, No edema, RRR  Respiratory: Lungs clear to auscultation, respirations non-labored  Psychiatry: A & O x 3, appropriate affect.   Gastrointestinal:  Soft, Non-tender, no distention  Skin: No rashes, No ecchymoses, No cyanosis	  Neurologic: Non-focal, Alert and oriented x 3  Extremities: Normal range of motion  Vascular: Peripheral pulses palpable 2+ bilaterally (radial)    LABS:  CBC Full  -  ( 19 Nov 2024 12:33 )  WBC Count : 6.43 K/uL  RBC Count : 4.88 M/uL  Hemoglobin : 15.1 g/dL  Hematocrit : 44.6 %  Platelet Count - Automated : 190 K/uL  Mean Cell Volume : 91.4 fl  Mean Cell Hemoglobin : 30.9 pg  Mean Cell Hemoglobin Concentration : 33.9 g/dL  Auto Neutrophil # : 4.45 K/uL  Auto Lymphocyte # : 1.47 K/uL  Auto Monocyte # : 0.37 K/uL  Auto Eosinophil # : 0.10 K/uL  Auto Basophil # : 0.02 K/uL  Auto Neutrophil % : 69.1 %  Auto Lymphocyte % : 22.9 %  Auto Monocyte % : 5.8 %  Auto Eosinophil % : 1.6 %  Auto Basophil % : 0.3 %      11-19    135  |  99  |  27[H]  ----------------------------<  283[H]  4.8   |  24  |  1.26    Ca    9.9      19 Nov 2024 12:33    TPro  7.2  /  Alb  4.1  /  TBili  1.0  /  DBili  x   /  AST  36  /  ALT  22  /  AlkPhos  68  11-19      CARDIAC MARKERS ( 19 Nov 2024 17:11 )  x     / x     / x     / x     / 2.4 ng/mL  CARDIAC MARKERS ( 19 Nov 2024 12:33 )  x     / x     / x     / x     / 3.3 ng/mL        RADIOLOGY & ADDITIONAL STUDIES:  TELEMETRY:  ECG:nsr @77 anterosept MI, inferior MI..  TTE:    IMPRESSION AND PLAN: multivessel CAD with breakthrough pain.  plan was for repeat cath with poss laser .. discussed with interventional card.  pt in agreement.    ***      For all [facility] Cardiology and Cardiovascular Surgery on-service contact/call information, go to [email or web address] and use "[employer]" to login.  For outpatient Cardiology appointments, call  [phone] to arrange with a colleague; I do not have outpatient Cardiology clinic.

## 2024-11-20 NOTE — DISCHARGE NOTE PROVIDER - NSDCFUSCHEDAPPT_GEN_ALL_CORE_FT
[facility]  ELECTROPH [address]  Scheduled Appointment: 12/09/2024

## 2024-11-20 NOTE — H&P ADULT - ADDITIONAL PE
T(F): 98 (20 Nov 2024 03:15), Max: 98.7 (20 Nov 2024 02:24)  HR: 76 (20 Nov 2024 03:15) (63 - 78)  BP: 172/83 (20 Nov 2024 03:15) (153/81 - 195/89)-  RR: 17 (20 Nov 2024 03:15) (15 - 20)  SpO2: 100% (20 Nov 2024 03:15) (95% - 100%): room air

## 2024-11-20 NOTE — DISCHARGE NOTE PROVIDER - PROVIDER TOKENS
PROVIDER:[TOKEN:[[id number]],FOLLOWUP:[2 weeks]],PROVIDER:[TOKEN:[[id number]],FOLLOWUP:[1 week]]

## 2024-11-20 NOTE — PATIENT PROFILE ADULT - FALL HARM RISK - HARM RISK INTERVENTIONS

## 2024-11-20 NOTE — DISCHARGE NOTE PROVIDER - CARE PROVIDER_API CALL
Richard Madera  Cardiovascular Disease  [address] [place] [address]  Phone: [phone]  Fax: [phone]  Follow Up Time: 2 weeks    Verónica Montalvo  Internal Medicine  [address] [place] [id number]  Phone: [phone]  Fax: [phone]  Follow Up Time: 1 week

## 2024-11-20 NOTE — CHART NOTE - NSCHARTNOTEFT_GEN_A_CORE
Removal of Femoral Sheath    Pulses in the (right lower extremity are (palpable). The patient was placed in the supine position. The insertion site was identified and the sutures were removed per protocol.  The 6____ Pashto femoral sheath was then removed. Direct pressure was applied for  __20____ minutes by PO Orellana .     Monitoring of the (right) groin and both lower extremities including neuro-vascular checks and vital signs every 15 minutes x 4, then every 30 minutes x 2, then every 1 hour x 2 then q4 x 72 hours was ordered.      Complications: None    Comments:     Patient educated on post sheath removal precautions and importance of DAPT, verbalized understanding  Good hemostasis achieved after sheath pull. Maintain bedrest. Patient may sit up 2 hours post sheath pull if site stable. May get OOB 4 hours post sheath pull if site stable.

## 2024-11-20 NOTE — H&P ADULT - PROBLEM SELECTOR PLAN 4
She was instructed to take Tresiba 20u, Novolog 30u tid, but only taking Tresiba 10u and Novolog 20u tid due to hypoglycemia  - will hold Jardiance for now, and will treat with Lantus, Admelog TID with meals ans sliding scales  - will follow up A1C

## 2024-11-20 NOTE — CHART NOTE - NSCHARTNOTEFT_GEN_A_CORE
Pt s/p PCI/POBA to pLAD and brought back to lab 2/2 ECG changes and continued worsening chest pain.  Her pLAD was open but pt still continued with nausea and chest pain throughout recovery.  Her sheath was removed after being treated with Hydralazine 5mg IVP 2/2 elevated SBP to 190s.  Sheath removal uneventful site stable with no hematoma/bleeding.  Pt remains nauseas and mild chest pain with HR ST 100s with LBBB.  BP at this time dropped 40 points.  Dr. Dimas at bedside Atropine 0.6 mg IV given.  Pt initially became tachycardic to 120s then tachy-rodriguez 50s to 120s.  Pt intermittently with nausea and dry throat from the atropine.  Pt now tachy to 115 but feeling better intermittently belching and is hungry.  Pt will transfer to [facility] overnight to be watched closely.  If stable will transfer back to her telemetry bed [id number].

## 2024-11-20 NOTE — PROGRESS NOTE ADULT - ASSESSMENT
74 year-old Colombian female with history of CAD (s/p PCI 1998, 2006, 8/2024), HTN, HLD, T2DM, and GERD presents with intermittent typical chest pain relieved with NTG SL admitted with concern for unstable angina

## 2024-11-20 NOTE — DISCHARGE NOTE PROVIDER - DETAILS OF MALNUTRITION DIAGNOSIS/DIAGNOSES
This patient has been assessed with a concern for Malnutrition and was treated during this hospitalization for the following Nutrition diagnosis/diagnoses:     -  11/26/2024: Moderate protein-calorie malnutrition

## 2024-11-21 LAB
A1C WITH ESTIMATED AVERAGE GLUCOSE RESULT: 7.2 % — HIGH (ref 4–5.6)
ALBUMIN SERPL ELPH-MCNC: 3.8 G/DL — SIGNIFICANT CHANGE UP (ref 3.3–5)
ALBUMIN SERPL ELPH-MCNC: 3.8 G/DL — SIGNIFICANT CHANGE UP (ref 3.3–5)
ALBUMIN SERPL ELPH-MCNC: 3.9 G/DL — SIGNIFICANT CHANGE UP (ref 3.3–5)
ALBUMIN SERPL ELPH-MCNC: 4.3 G/DL — SIGNIFICANT CHANGE UP (ref 3.3–5)
ALP SERPL-CCNC: 64 U/L — SIGNIFICANT CHANGE UP (ref 40–120)
ALP SERPL-CCNC: 65 U/L — SIGNIFICANT CHANGE UP (ref 40–120)
ALP SERPL-CCNC: 70 U/L — SIGNIFICANT CHANGE UP (ref 40–120)
ALP SERPL-CCNC: 78 U/L — SIGNIFICANT CHANGE UP (ref 40–120)
ALT FLD-CCNC: 27 U/L — SIGNIFICANT CHANGE UP (ref 10–45)
ALT FLD-CCNC: 28 U/L — SIGNIFICANT CHANGE UP (ref 10–45)
ALT FLD-CCNC: 30 U/L — SIGNIFICANT CHANGE UP (ref 10–45)
ALT FLD-CCNC: 30 U/L — SIGNIFICANT CHANGE UP (ref 10–45)
ANION GAP SERPL CALC-SCNC: 12 MMOL/L — SIGNIFICANT CHANGE UP (ref 5–17)
ANION GAP SERPL CALC-SCNC: 15 MMOL/L — SIGNIFICANT CHANGE UP (ref 5–17)
ANION GAP SERPL CALC-SCNC: 18 MMOL/L — HIGH (ref 5–17)
ANION GAP SERPL CALC-SCNC: 26 MMOL/L — HIGH (ref 5–17)
APTT BLD: 25.8 SEC — SIGNIFICANT CHANGE UP (ref 24.5–35.6)
AST SERPL-CCNC: 103 U/L — HIGH (ref 10–40)
AST SERPL-CCNC: 115 U/L — HIGH (ref 10–40)
AST SERPL-CCNC: 122 U/L — HIGH (ref 10–40)
AST SERPL-CCNC: 94 U/L — HIGH (ref 10–40)
B-OH-BUTYR SERPL-SCNC: 0.1 MMOL/L — SIGNIFICANT CHANGE UP
BASOPHILS # BLD AUTO: 0.03 K/UL — SIGNIFICANT CHANGE UP (ref 0–0.2)
BASOPHILS NFR BLD AUTO: 0.4 % — SIGNIFICANT CHANGE UP (ref 0–2)
BILIRUB SERPL-MCNC: 0.7 MG/DL — SIGNIFICANT CHANGE UP (ref 0.2–1.2)
BILIRUB SERPL-MCNC: 0.8 MG/DL — SIGNIFICANT CHANGE UP (ref 0.2–1.2)
BILIRUB SERPL-MCNC: 0.8 MG/DL — SIGNIFICANT CHANGE UP (ref 0.2–1.2)
BILIRUB SERPL-MCNC: 0.9 MG/DL — SIGNIFICANT CHANGE UP (ref 0.2–1.2)
BLD GP AB SCN SERPL QL: NEGATIVE — SIGNIFICANT CHANGE UP
BUN SERPL-MCNC: 26 MG/DL — HIGH (ref 7–23)
BUN SERPL-MCNC: 27 MG/DL — HIGH (ref 7–23)
BUN SERPL-MCNC: 29 MG/DL — HIGH (ref 7–23)
BUN SERPL-MCNC: 29 MG/DL — HIGH (ref 7–23)
CALCIUM SERPL-MCNC: 8.7 MG/DL — SIGNIFICANT CHANGE UP (ref 8.4–10.5)
CALCIUM SERPL-MCNC: 9.2 MG/DL — SIGNIFICANT CHANGE UP (ref 8.4–10.5)
CALCIUM SERPL-MCNC: 9.4 MG/DL — SIGNIFICANT CHANGE UP (ref 8.4–10.5)
CALCIUM SERPL-MCNC: 9.5 MG/DL — SIGNIFICANT CHANGE UP (ref 8.4–10.5)
CHLORIDE SERPL-SCNC: 102 MMOL/L — SIGNIFICANT CHANGE UP (ref 96–108)
CHLORIDE SERPL-SCNC: 103 MMOL/L — SIGNIFICANT CHANGE UP (ref 96–108)
CHLORIDE SERPL-SCNC: 103 MMOL/L — SIGNIFICANT CHANGE UP (ref 96–108)
CHLORIDE SERPL-SCNC: 98 MMOL/L — SIGNIFICANT CHANGE UP (ref 96–108)
CHOLEST SERPL-MCNC: 182 MG/DL — SIGNIFICANT CHANGE UP
CK MB BLD-MCNC: 7.8 % — HIGH (ref 0–3.5)
CK MB BLD-MCNC: 7.9 % — HIGH (ref 0–3.5)
CK MB CFR SERPL CALC: 47.3 NG/ML — HIGH (ref 0–3.8)
CK MB CFR SERPL CALC: 48.7 NG/ML — HIGH (ref 0–3.8)
CK SERPL-CCNC: 610 U/L — HIGH (ref 25–170)
CK SERPL-CCNC: 617 U/L — HIGH (ref 25–170)
CO2 SERPL-SCNC: 12 MMOL/L — LOW (ref 22–31)
CO2 SERPL-SCNC: 17 MMOL/L — LOW (ref 22–31)
CO2 SERPL-SCNC: 19 MMOL/L — LOW (ref 22–31)
CO2 SERPL-SCNC: 21 MMOL/L — LOW (ref 22–31)
CREAT SERPL-MCNC: 1.12 MG/DL — SIGNIFICANT CHANGE UP (ref 0.5–1.3)
CREAT SERPL-MCNC: 1.23 MG/DL — SIGNIFICANT CHANGE UP (ref 0.5–1.3)
CREAT SERPL-MCNC: 1.28 MG/DL — SIGNIFICANT CHANGE UP (ref 0.5–1.3)
CREAT SERPL-MCNC: 1.32 MG/DL — HIGH (ref 0.5–1.3)
EGFR: 42 ML/MIN/1.73M2 — LOW
EGFR: 44 ML/MIN/1.73M2 — LOW
EGFR: 46 ML/MIN/1.73M2 — LOW
EGFR: 52 ML/MIN/1.73M2 — LOW
EOSINOPHIL # BLD AUTO: 0 K/UL — SIGNIFICANT CHANGE UP (ref 0–0.5)
EOSINOPHIL NFR BLD AUTO: 0 % — SIGNIFICANT CHANGE UP (ref 0–6)
ESTIMATED AVERAGE GLUCOSE: 160 MG/DL — HIGH (ref 68–114)
GAS PNL BLDA: SIGNIFICANT CHANGE UP
GLUCOSE BLDC GLUCOMTR-MCNC: 122 MG/DL — HIGH (ref 70–99)
GLUCOSE BLDC GLUCOMTR-MCNC: 140 MG/DL — HIGH (ref 70–99)
GLUCOSE BLDC GLUCOMTR-MCNC: 149 MG/DL — HIGH (ref 70–99)
GLUCOSE BLDC GLUCOMTR-MCNC: 151 MG/DL — HIGH (ref 70–99)
GLUCOSE BLDC GLUCOMTR-MCNC: 154 MG/DL — HIGH (ref 70–99)
GLUCOSE BLDC GLUCOMTR-MCNC: 154 MG/DL — HIGH (ref 70–99)
GLUCOSE BLDC GLUCOMTR-MCNC: 157 MG/DL — HIGH (ref 70–99)
GLUCOSE BLDC GLUCOMTR-MCNC: 157 MG/DL — HIGH (ref 70–99)
GLUCOSE BLDC GLUCOMTR-MCNC: 168 MG/DL — HIGH (ref 70–99)
GLUCOSE BLDC GLUCOMTR-MCNC: 194 MG/DL — HIGH (ref 70–99)
GLUCOSE BLDC GLUCOMTR-MCNC: 198 MG/DL — HIGH (ref 70–99)
GLUCOSE BLDC GLUCOMTR-MCNC: 221 MG/DL — HIGH (ref 70–99)
GLUCOSE BLDC GLUCOMTR-MCNC: 237 MG/DL — HIGH (ref 70–99)
GLUCOSE BLDC GLUCOMTR-MCNC: 250 MG/DL — HIGH (ref 70–99)
GLUCOSE BLDC GLUCOMTR-MCNC: 285 MG/DL — HIGH (ref 70–99)
GLUCOSE SERPL-MCNC: 154 MG/DL — HIGH (ref 70–99)
GLUCOSE SERPL-MCNC: 168 MG/DL — HIGH (ref 70–99)
GLUCOSE SERPL-MCNC: 213 MG/DL — HIGH (ref 70–99)
GLUCOSE SERPL-MCNC: 251 MG/DL — HIGH (ref 70–99)
HCT VFR BLD CALC: 46.3 % — HIGH (ref 34.5–45)
HDLC SERPL-MCNC: 80 MG/DL — SIGNIFICANT CHANGE UP
HGB BLD-MCNC: 15.6 G/DL — HIGH (ref 11.5–15.5)
IMM GRANULOCYTES NFR BLD AUTO: 0.2 % — SIGNIFICANT CHANGE UP (ref 0–0.9)
INR BLD: 1.06 RATIO — SIGNIFICANT CHANGE UP (ref 0.85–1.16)
LIPID PNL WITH DIRECT LDL SERPL: 83 MG/DL — SIGNIFICANT CHANGE UP
LYMPHOCYTES # BLD AUTO: 0.78 K/UL — LOW (ref 1–3.3)
LYMPHOCYTES # BLD AUTO: 9.6 % — LOW (ref 13–44)
MAGNESIUM SERPL-MCNC: 2.3 MG/DL — SIGNIFICANT CHANGE UP (ref 1.6–2.6)
MAGNESIUM SERPL-MCNC: 2.3 MG/DL — SIGNIFICANT CHANGE UP (ref 1.6–2.6)
MAGNESIUM SERPL-MCNC: 2.4 MG/DL — SIGNIFICANT CHANGE UP (ref 1.6–2.6)
MCHC RBC-ENTMCNC: 31.1 PG — SIGNIFICANT CHANGE UP (ref 27–34)
MCHC RBC-ENTMCNC: 33.7 G/DL — SIGNIFICANT CHANGE UP (ref 32–36)
MCV RBC AUTO: 92.2 FL — SIGNIFICANT CHANGE UP (ref 80–100)
MONOCYTES # BLD AUTO: 0.35 K/UL — SIGNIFICANT CHANGE UP (ref 0–0.9)
MONOCYTES NFR BLD AUTO: 4.3 % — SIGNIFICANT CHANGE UP (ref 2–14)
NEUTROPHILS # BLD AUTO: 6.97 K/UL — SIGNIFICANT CHANGE UP (ref 1.8–7.4)
NEUTROPHILS NFR BLD AUTO: 85.5 % — HIGH (ref 43–77)
NON HDL CHOLESTEROL: 102 MG/DL — SIGNIFICANT CHANGE UP
NRBC # BLD: 0 /100 WBCS — SIGNIFICANT CHANGE UP (ref 0–0)
NT-PROBNP SERPL-SCNC: 2193 PG/ML — HIGH (ref 0–300)
PHOSPHATE SERPL-MCNC: 3 MG/DL — SIGNIFICANT CHANGE UP (ref 2.5–4.5)
PHOSPHATE SERPL-MCNC: 3.2 MG/DL — SIGNIFICANT CHANGE UP (ref 2.5–4.5)
PHOSPHATE SERPL-MCNC: 4.5 MG/DL — SIGNIFICANT CHANGE UP (ref 2.5–4.5)
PLATELET # BLD AUTO: 201 K/UL — SIGNIFICANT CHANGE UP (ref 150–400)
POTASSIUM SERPL-MCNC: 3.9 MMOL/L — SIGNIFICANT CHANGE UP (ref 3.5–5.3)
POTASSIUM SERPL-MCNC: 3.9 MMOL/L — SIGNIFICANT CHANGE UP (ref 3.5–5.3)
POTASSIUM SERPL-MCNC: 4.1 MMOL/L — SIGNIFICANT CHANGE UP (ref 3.5–5.3)
POTASSIUM SERPL-MCNC: 5.1 MMOL/L — SIGNIFICANT CHANGE UP (ref 3.5–5.3)
POTASSIUM SERPL-SCNC: 3.9 MMOL/L — SIGNIFICANT CHANGE UP (ref 3.5–5.3)
POTASSIUM SERPL-SCNC: 3.9 MMOL/L — SIGNIFICANT CHANGE UP (ref 3.5–5.3)
POTASSIUM SERPL-SCNC: 4.1 MMOL/L — SIGNIFICANT CHANGE UP (ref 3.5–5.3)
POTASSIUM SERPL-SCNC: 5.1 MMOL/L — SIGNIFICANT CHANGE UP (ref 3.5–5.3)
PROT SERPL-MCNC: 6.5 G/DL — SIGNIFICANT CHANGE UP (ref 6–8.3)
PROT SERPL-MCNC: 7 G/DL — SIGNIFICANT CHANGE UP (ref 6–8.3)
PROT SERPL-MCNC: 7.3 G/DL — SIGNIFICANT CHANGE UP (ref 6–8.3)
PROT SERPL-MCNC: 7.6 G/DL — SIGNIFICANT CHANGE UP (ref 6–8.3)
PROTHROM AB SERPL-ACNC: 12.1 SEC — SIGNIFICANT CHANGE UP (ref 9.9–13.4)
RBC # BLD: 5.02 M/UL — SIGNIFICANT CHANGE UP (ref 3.8–5.2)
RBC # FLD: 13.1 % — SIGNIFICANT CHANGE UP (ref 10.3–14.5)
RH IG SCN BLD-IMP: POSITIVE — SIGNIFICANT CHANGE UP
SODIUM SERPL-SCNC: 136 MMOL/L — SIGNIFICANT CHANGE UP (ref 135–145)
SODIUM SERPL-SCNC: 136 MMOL/L — SIGNIFICANT CHANGE UP (ref 135–145)
SODIUM SERPL-SCNC: 137 MMOL/L — SIGNIFICANT CHANGE UP (ref 135–145)
SODIUM SERPL-SCNC: 137 MMOL/L — SIGNIFICANT CHANGE UP (ref 135–145)
TRIGL SERPL-MCNC: 111 MG/DL — SIGNIFICANT CHANGE UP
TROPONIN T, HIGH SENSITIVITY RESULT: 1035 NG/L — HIGH (ref 0–51)
TROPONIN T, HIGH SENSITIVITY RESULT: 714 NG/L — HIGH (ref 0–51)
TSH SERPL-MCNC: 0.79 UIU/ML — SIGNIFICANT CHANGE UP (ref 0.27–4.2)
UFH PPP CHRO-ACNC: 0.06 IU/ML — LOW (ref 0.3–0.7)
WBC # BLD: 8.15 K/UL — SIGNIFICANT CHANGE UP (ref 3.8–10.5)
WBC # FLD AUTO: 8.15 K/UL — SIGNIFICANT CHANGE UP (ref 3.8–10.5)

## 2024-11-21 PROCEDURE — 36620 INSERTION CATHETER ARTERY: CPT

## 2024-11-21 PROCEDURE — 93010 ELECTROCARDIOGRAM REPORT: CPT

## 2024-11-21 PROCEDURE — 99291 CRITICAL CARE FIRST HOUR: CPT

## 2024-11-21 PROCEDURE — 99232 SBSQ HOSP IP/OBS MODERATE 35: CPT | Mod: 25

## 2024-11-21 RX ORDER — INSULIN REG, HUM S-S BUFF 100/ML
2 VIAL (ML) INJECTION ONCE
Refills: 0 | Status: COMPLETED | OUTPATIENT
Start: 2024-11-21 | End: 2024-11-21

## 2024-11-21 RX ORDER — HYDRALAZINE HYDROCHLORIDE 10 MG/1
10 TABLET ORAL THREE TIMES A DAY
Refills: 0 | Status: DISCONTINUED | OUTPATIENT
Start: 2024-11-21 | End: 2024-11-22

## 2024-11-21 RX ORDER — NITROGLYCERIN 0.4MG/HR
10 PATCH, TRANSDERMAL 24 HOURS TRANSDERMAL
Qty: 50 | Refills: 0 | Status: DISCONTINUED | OUTPATIENT
Start: 2024-11-21 | End: 2024-11-21

## 2024-11-21 RX ORDER — INSULIN REG, HUM S-S BUFF 100/ML
3 VIAL (ML) INJECTION ONCE
Refills: 0 | Status: DISCONTINUED | OUTPATIENT
Start: 2024-11-21 | End: 2024-11-21

## 2024-11-21 RX ORDER — DEXTROSE 70 % IN WATER 70 %
1000 INTRAVENOUS SOLUTION INTRAVENOUS
Refills: 0 | Status: DISCONTINUED | OUTPATIENT
Start: 2024-11-21 | End: 2024-11-21

## 2024-11-21 RX ORDER — 0.9 % SODIUM CHLORIDE 0.9 %
1000 INTRAVENOUS SOLUTION INTRAVENOUS
Refills: 0 | Status: DISCONTINUED | OUTPATIENT
Start: 2024-11-21 | End: 2024-11-21

## 2024-11-21 RX ORDER — GLUCAGON INJECTION, SOLUTION 0.5 MG/.1ML
1 INJECTION, SOLUTION SUBCUTANEOUS ONCE
Refills: 0 | Status: DISCONTINUED | OUTPATIENT
Start: 2024-11-21 | End: 2024-11-22

## 2024-11-21 RX ORDER — CHLORHEXIDINE GLUCONATE 1.2 MG/ML
1 RINSE ORAL
Refills: 0 | Status: DISCONTINUED | OUTPATIENT
Start: 2024-11-21 | End: 2024-11-26

## 2024-11-21 RX ORDER — INSULIN GLARGINE 100 [IU]/ML
10 INJECTION, SOLUTION SUBCUTANEOUS AT BEDTIME
Refills: 0 | Status: DISCONTINUED | OUTPATIENT
Start: 2024-11-21 | End: 2024-11-22

## 2024-11-21 RX ORDER — HEPARIN SODIUM,PORCINE 1000/ML
5000 VIAL (ML) INJECTION EVERY 8 HOURS
Refills: 0 | Status: DISCONTINUED | OUTPATIENT
Start: 2024-11-21 | End: 2024-11-23

## 2024-11-21 RX ORDER — INSULIN GLARGINE 100 [IU]/ML
15 INJECTION, SOLUTION SUBCUTANEOUS AT BEDTIME
Refills: 0 | Status: DISCONTINUED | OUTPATIENT
Start: 2024-11-21 | End: 2024-11-21

## 2024-11-21 RX ORDER — METOPROLOL TARTRATE 100 MG/1
25 TABLET, FILM COATED ORAL
Refills: 0 | Status: DISCONTINUED | OUTPATIENT
Start: 2024-11-21 | End: 2024-11-22

## 2024-11-21 RX ORDER — 0.9 % SODIUM CHLORIDE 0.9 %
1000 INTRAVENOUS SOLUTION INTRAVENOUS
Refills: 0 | Status: DISCONTINUED | OUTPATIENT
Start: 2024-11-21 | End: 2024-11-22

## 2024-11-21 RX ORDER — INSULIN REG, HUM S-S BUFF 100/ML
3 VIAL (ML) INJECTION
Qty: 100 | Refills: 0 | Status: DISCONTINUED | OUTPATIENT
Start: 2024-11-21 | End: 2024-11-21

## 2024-11-21 RX ORDER — INSULIN REG, HUM S-S BUFF 100/ML
4 VIAL (ML) INJECTION
Qty: 100 | Refills: 0 | Status: DISCONTINUED | OUTPATIENT
Start: 2024-11-21 | End: 2024-11-21

## 2024-11-21 RX ADMIN — METOPROLOL TARTRATE 25 MILLIGRAM(S): 100 TABLET, FILM COATED ORAL at 17:12

## 2024-11-21 RX ADMIN — Medication 3 MICROGRAM(S)/MIN: at 02:00

## 2024-11-21 RX ADMIN — Medication 70 MILLILITER(S): at 13:08

## 2024-11-21 RX ADMIN — CLOPIDOGREL 75 MILLIGRAM(S): 75 TABLET, FILM COATED ORAL at 12:02

## 2024-11-21 RX ADMIN — Medication 3 UNIT(S)/HR: at 13:08

## 2024-11-21 RX ADMIN — CHLORHEXIDINE GLUCONATE 1 APPLICATION(S): 1.2 RINSE ORAL at 06:41

## 2024-11-21 RX ADMIN — Medication 120 MILLIGRAM(S): at 22:32

## 2024-11-21 RX ADMIN — Medication 35 MILLILITER(S): at 03:15

## 2024-11-21 RX ADMIN — ROSUVASTATIN CALCIUM 40 MILLIGRAM(S): 5 TABLET, FILM COATED ORAL at 21:57

## 2024-11-21 RX ADMIN — Medication 2 UNIT(S)/HR: at 03:28

## 2024-11-21 RX ADMIN — METOPROLOL TARTRATE 25 MILLIGRAM(S): 100 TABLET, FILM COATED ORAL at 12:02

## 2024-11-21 RX ADMIN — Medication 5000 UNIT(S): at 13:07

## 2024-11-21 RX ADMIN — Medication 10 MILLIGRAM(S): at 12:02

## 2024-11-21 RX ADMIN — RANOLAZINE 1000 MILLIGRAM(S): 1000 TABLET, FILM COATED, EXTENDED RELEASE ORAL at 06:40

## 2024-11-21 RX ADMIN — Medication 81 MILLIGRAM(S): at 12:02

## 2024-11-21 RX ADMIN — HYDRALAZINE HYDROCHLORIDE 10 MILLIGRAM(S): 10 TABLET ORAL at 21:57

## 2024-11-21 RX ADMIN — HYDRALAZINE HYDROCHLORIDE 10 MILLIGRAM(S): 10 TABLET ORAL at 14:01

## 2024-11-21 RX ADMIN — INSULIN GLARGINE 10 UNIT(S): 100 INJECTION, SOLUTION SUBCUTANEOUS at 21:56

## 2024-11-21 RX ADMIN — RANOLAZINE 1000 MILLIGRAM(S): 1000 TABLET, FILM COATED, EXTENDED RELEASE ORAL at 17:03

## 2024-11-21 RX ADMIN — Medication 2 UNIT(S): at 03:30

## 2024-11-21 RX ADMIN — Medication 5000 UNIT(S): at 21:57

## 2024-11-21 RX ADMIN — Medication 1 MILLIGRAM(S): at 12:02

## 2024-11-21 NOTE — CHART NOTE - NSCHARTNOTEFT_GEN_A_CORE
Asked to evaluate patient for persistent chest pain, nausea, and belching as well as tachycardia to 120s and newly reduced EF to ~40% with new WMA in mid-basal anteroseptal and inferoseptal walls this afternoon (after a normal TTE this AM). Please see multiple chart notes for events that have transpired today but in summation, patient had persistent chest pain and demonstrated new LBBB following her initial LHC with POBA to pLAD stent where she had ISR. Due to her chest pain, she went back to cath lab and repeat angiogram showed patent pLAD and otherwise no new lesions when compared to angiogram a couple hours prior.    She then went back to [facility] where she started to become tachycardiac and that is when TTE was done with above findings. I went to evaluate patient and noted her to be tachycardiac in 120s (in the absence of her BID metoprolol tartrate 100 mg PO, however she did receive IV labetalol 20 mg in [facility] recently). Given patient's pain preceded the repeat angiogram and that showed patent remaining vessels (has  of RCA) and that she has newly reduced EF, wanted to be cautious and decided against further beta blockade but opted to give patient her home ranolazine and imdur, as her SBP was in the 160s. Also gave her colchicine, although chest pain was non-pleuritic, non-positional, and there were no friction rubs heard on auscultation.     She did not improve with medications and so decided to have patient come up to CICU for further monitoring and possible Nitro gtt to help her chest pain with a plan to also try and reduce her afterload. Recommended obtaining some blood work which noted a HCO3 of 14, lactate was 3.9, a beta hydroxy butyrate that was elevated to 3.9, and trop 433, , CPK % 7.3, and elevated AST to 76. Glucose 224.     Patient is on Jardiance and so immediate concern is that this may be euglycemic DKA. Possible reduction in EF is due to atypical stress induced cardiomyopathy? Which may have occurred in setting of DKA development or angiography. If DKA is not the cause, then she possibly she had coronary vasospasms after the first angiogram and MINOCA was the cause of her new WMA and reduced EF.    Will bring patient up to CICU and place a line, start DKA management, and possibly place her on nitro gtt/start afterload reduction. Asked to evaluate patient for persistent chest pain, nausea, and belching as well as tachycardia to 120s and newly reduced EF to ~40% with new WMA in mid-basal anteroseptal and inferoseptal walls this afternoon (after a normal TTE this AM). Please see multiple chart notes for events that have transpired today but in summation, patient had persistent chest pain and demonstrated new LBBB following her initial LHC with POBA to pLAD stent where she had ISR. Due to her chest pain, she went back to cath lab and repeat angiogram showed patent pLAD and otherwise no new lesions when compared to angiogram a couple hours prior.    She then went back to [facility] where she started to become tachycardiac and that is when TTE was done with above findings. I went to evaluate patient and noted her to be tachycardiac in 120s (in the absence of her BID metoprolol tartrate 100 mg PO, however she did receive IV labetalol 20 mg in [facility] recently). Given patient's pain preceded the repeat angiogram and that showed patent remaining vessels (has  of RCA) and that she has newly reduced EF, wanted to be cautious and decided against further beta blockade but opted to give patient her home ranolazine and imdur, as her SBP was in the 160s. Also gave her colchicine to help with possible inflammation if this was pericardial in nature, although chest pain was non-pleuritic, non-positional, and there were no friction rubs heard on auscultation.     She did not improve with medications and so decided to have patient come up to CICU for further monitoring and possible Nitro gtt to help her chest pain with a plan to also try and reduce her afterload. Recommended obtaining some blood work which noted a HCO3 of 14, lactate was 3.9, a beta hydroxy butyrate that was elevated to 3.9, and trop 433, , CPK % 7.3, and elevated AST to 76. Glucose 224.     Patient is on Jardiance and so immediate concern is that this may be euglycemic DKA. Possible reduction in EF is due to atypical stress induced cardiomyopathy? Which may have occurred in setting of DKA development or coronary spasms during/after angiography, possible that MINOCA was the cause of her new WMA and reduced EF.    Will bring patient up to CICU and place a line, start DKA management, and possibly place her on nitro gtt/start afterload reduction.

## 2024-11-21 NOTE — PROGRESS NOTE ADULT - ASSESSMENT
74 year-old [place] female with history of CAD (s/p PCI 1998, 2006, 8/2024), HTN, HLD, T2DM, and GERD presents with intermittent chest pain at rest and on exertion for the past 3 days s/p C x2 today with POBA pLAD ERI ISR c/b DKA and refractory angina admitted to CICU for further treatment.     PLAN:   ====NEURO====  #No active issues  - A&O x4  - Monitor per ICU protocol    ====RESPIRATORY====  #No active issues  - Sat >94% on room air    ====CARDIOVASCULAR====  #Refractory angina c/b ADHF  - [facility] 11/20 #1: POBA to previous pLAD ERI ISR  - Returned to cath lab for persistent CP with dynamic EKG changes, no acute changes in coronary arteries seen  - Chest pain improved with SL nitro  - Start nitro gtt for chest pain and HTN--will wean  - limited TTE showing acutely decreased EF (60 to 43%) in between [facility]  - Repeat full TTE in am  - Continue DAPT  - Monitor EKG for further dynamic changes  - Takes imdur 60 at home for chest pain  - Beta blocker 25mg metoprolol     #HTN  - Hold home anti-HTN Losartan 25 and lopressor 100 BID iso SCAI B shock  - Nitro gtt goal -140    ====GI====  #NPO  - NPO iso DKA  - Monitor for BM  - Nausea while in [facility], possibly 2/2 DKA vs refractory angina    #GERD  - Continue PPI    ====ENDO====  #DKA possible euglycemic DKA  - Anion gap 26, BHB 3.7, lactate 3.9 now improved and gap closed x1  - Start insulin gtt  - Start maintenance d10 half NS   - Trend anion gap  - q1h POCT  - F/U A1c, tsh, lipid panel  -Will transition to basal bolus once out of euglycemic DKA    ====RENAL====  #No active issues  - Monitor SCr  - Replete lytes as needed to maintain K>4, Mag >2  - Strict I&O's    ====HEME-ONC====  #No active issues  - No AC  - HSQ for DVT PPX  - H/H and platelets stable    ====ID====  #No active issues  - Afebrile, no leukocytosis  - Monitor off abx    ==LINES==  L Radial Ester 11/21    ======================= DISPOSITION  =====================  [X] Critical   [ ] Guarded    [ ] Stable    [X] Maintain in CICU  [ ] Downgrade to Telemetry  [ ] Discharge Home

## 2024-11-21 NOTE — CHART NOTE - NSCHARTNOTEFT_GEN_A_CORE
CICU DOWN GRADE NOTE    Transfer from: CICU    Transfer to: (  ) Medicine    ( X ) Telemetry    (   ) RCU     (    ) Palliative    (   ) Stroke Unit    (   ) __________________    Accepting Physician:   Signout given to:       Patient is a 74y old  Female who presents with a chief complaint of chest pain (21 Nov 2024 13:50)      HPI:HPI: 74 year-old [place] female with history of CAD (s/p PCI 1998, 2006, 8/2024), HTN, HLD, T2DM, and GERD presents with intermittent chest pain at rest and on exertion for the past 3 days. Symptoms started on 11/17, pressure-like, 8/10 in intensity with radiation up the neck and jaw associated with mild nausea without vomiting, similar to her prior angina symptoms.  She took her 's NTG SL and symptoms improved within 5 minutes.  She came to ED for further evaluation as these episodes of intermittent chest pain persisted for the past couple days.  Currently, chest pain free s/p NTG SL twice since arrived.     Today she underwent a LHC with a POBA to her pLAD stent which had ISR. While recovering, the patient developed persistent chest pain radiating to her jaw and demonstrated new LBBB following her initial LHC. Due to her chest pain, she went back to cath lab and repeat angiogram showed patent pLAD and otherwise no new lesions when compared to angiogram a couple hours prior.    She then went back to [facility] where she started to become tachycardiac and that is when TTE was done with above findings. I went to evaluate patient and noted her to be tachycardiac in 120s (in the absence of her BID metoprolol tartrate 100 mg PO, however she did receive IV labetalol 20 mg in [facility] recently). Given patient's pain preceded the repeat angiogram and that showed patent remaining vessels (has  of RCA) and that she has newly reduced EF, wanted to be cautious and decided against further beta blockade but opted to give patient her home ranolazine and imdur, as her SBP was in the 160s. Also gave her colchicine to help with possible inflammation if this was pericardial in nature, although chest pain was non-pleuritic, non-positional, and there were no friction rubs heard on auscultation.     Patient was admitted to the CICU for further management. Labs drawn in [facility] demonstrate DKA with an anion gap of 24, lactate 3.9, BHB 3.7, and glucose in the 200s. She was immediately started on a nitroglycerin gtt for chest pain, which of note has improved and is now "mild" and HTN (SBP 180s) and plan to start insulin gtt for DKA.       CICU Course: Patient started on insulin gtt during the day time; patient CP also improved on nitroglycerin gtt. Patient transitioned to oral meals and premeal/lantus once AG closed x2 and bHb negative. Colchicine discontinued.      To Do:  []Start GDMT if pressures tolerate  []Monitor Sugars  []Monitor CP CICU DOWN GRADE NOTE    Transfer from: CICU    Transfer to: (  ) Medicine    ( X ) Telemetry    (   ) RCU     (    ) Palliative    (   ) Stroke Unit    (   ) __________________    Accepting Physician:   Signout given to: Dr Andrew Manrique      Patient is a 74y old  Female who presents with a chief complaint of chest pain (21 Nov 2024 13:50)      HPI:HPI: 74 year-old [place] female with history of CAD (s/p PCI 1998, 2006, 8/2024), HTN, HLD, T2DM, and GERD presents with intermittent chest pain at rest and on exertion for the past 3 days. Symptoms started on 11/17, pressure-like, 8/10 in intensity with radiation up the neck and jaw associated with mild nausea without vomiting, similar to her prior angina symptoms.  She took her 's NTG SL and symptoms improved within 5 minutes.  She came to ED for further evaluation as these episodes of intermittent chest pain persisted for the past couple days.  Currently, chest pain free s/p NTG SL twice since arrived.     Today she underwent a LHC with a POBA to her pLAD stent which had ISR. While recovering, the patient developed persistent chest pain radiating to her jaw and demonstrated new LBBB following her initial LHC. Due to her chest pain, she went back to cath lab and repeat angiogram showed patent pLAD and otherwise no new lesions when compared to angiogram a couple hours prior.    She then went back to [facility] where she started to become tachycardiac and that is when TTE was done with above findings. I went to evaluate patient and noted her to be tachycardiac in 120s (in the absence of her BID metoprolol tartrate 100 mg PO, however she did receive IV labetalol 20 mg in [facility] recently). Given patient's pain preceded the repeat angiogram and that showed patent remaining vessels (has  of RCA) and that she has newly reduced EF, wanted to be cautious and decided against further beta blockade but opted to give patient her home ranolazine and imdur, as her SBP was in the 160s. Also gave her colchicine to help with possible inflammation if this was pericardial in nature, although chest pain was non-pleuritic, non-positional, and there were no friction rubs heard on auscultation.     Patient was admitted to the CICU for further management. Labs drawn in [facility] demonstrate DKA with an anion gap of 24, lactate 3.9, BHB 3.7, and glucose in the 200s. She was immediately started on a nitroglycerin gtt for chest pain, which of note has improved and is now "mild" and HTN (SBP 180s) and plan to start insulin gtt for DKA.       CICU Course: Patient started on insulin gtt during the day time; patient CP also improved on nitroglycerin gtt. Patient transitioned to oral meals and premeal/lantus once AG closed x2 and bHb negative. Colchicine discontinued.      To Do:  []Start GDMT if pressures tolerate  []Monitor Sugars  []Monitor CP

## 2024-11-21 NOTE — PROGRESS NOTE ADULT - SUBJECTIVE AND OBJECTIVE BOX
Interventional Cardiology Post Cath Progress Note:                Subjective:   Patient reports "I feel much better" denies chest pain, shortness of breath. Denies pain, numbness, tingling or swelling around R wrist or groin access site- currently on insulin and nitro gtt        MEDICATIONS  (STANDING):  aspirin enteric coated 81 milliGRAM(s) Oral daily  chlorhexidine 2% Cloths 1 Application(s) Topical <User Schedule>  clopidogrel Tablet 75 milliGRAM(s) Oral daily  colchicine 0.6 milliGRAM(s) Oral daily  dextrose 10%. 1000 milliLiter(s) (60 mL/Hr) IV Continuous <Continuous>  dextrose 5%. 1000 milliLiter(s) (100 mL/Hr) IV Continuous <Continuous>  dextrose 5%. 1000 milliLiter(s) (50 mL/Hr) IV Continuous <Continuous>  dextrose 50% Injectable 25 Gram(s) IV Push once  dextrose 50% Injectable 12.5 Gram(s) IV Push once  dextrose 50% Injectable 25 Gram(s) IV Push once  ezetimibe 10 milliGRAM(s) Oral daily  folic acid 1 milliGRAM(s) Oral daily  glucagon  Injectable 1 milliGRAM(s) IntraMuscular once  heparin   Injectable 5000 Unit(s) SubCutaneous every 8 hours  insulin regular Infusion 4 Unit(s)/Hr (4 mL/Hr) IV Continuous <Continuous>  isosorbide   mononitrate ER Tablet (IMDUR) 120 milliGRAM(s) Oral at bedtime  nitroglycerin  Infusion 10 MICROgram(s)/Min (3 mL/Hr) IV Continuous <Continuous>  ranolazine 1000 milliGRAM(s) Oral two times a day  rosuvastatin 40 milliGRAM(s) Oral at bedtime    MEDICATIONS  (PRN):  aluminum hydroxide/magnesium hydroxide/simethicone Suspension 30 milliLiter(s) Oral every 4 hours PRN Dyspepsia  dextrose Oral Gel 15 Gram(s) Oral once PRN Blood Glucose LESS THAN 70 milliGRAM(s)/deciliter  nitroglycerin     SubLingual 0.4 milliGRAM(s) SubLingual every 5 minutes PRN Chest Pain      Objective:  Vital Signs Last 24 Hrs  T(C): 36.8 (21 Nov 2024 07:00), Max: 37 (21 Nov 2024 01:13)  T(F): 98.3 (21 Nov 2024 07:00), Max: 98.6 (21 Nov 2024 01:13)  HR: 105 (21 Nov 2024 09:00) (54 - 116)  BP: 134/73 (21 Nov 2024 01:13) (119/59 - 199/98)  BP(mean): 99 (21 Nov 2024 01:13) (83 - 120)  RR: 22 (21 Nov 2024 09:00) (12 - 41)  SpO2: 95% (21 Nov 2024 09:00) (91% - 100%)    Parameters below as of 21 Nov 2024 07:00  Patient On (Oxygen Delivery Method): room air        11-20-24 @ 07:01  -  11-21-24 @ 07:00  --------------------------------------------------------  IN: 647 mL / OUT: 200 mL / NET: 447 mL    11-21-24 @ 07:01  -  11-21-24 @ 10:42  --------------------------------------------------------  IN: 77.5 mL / OUT: 0 mL / NET: 77.5 mL                              15.6   8.15  )-----------( 201      ( 21 Nov 2024 01:56 )             46.3     11-21    137  |  102  |  29[H]  ----------------------------<  213[H]  3.9   |  17[L]  |  1.32[H]    Ca    9.4      21 Nov 2024 06:27  Phos  3.2     11-21  Mg     2.3     11-21    TPro  7.3  /  Alb  3.9  /  TBili  0.7  /  DBili  x   /  AST  103[H]  /  ALT  30  /  AlkPhos  70  11-21    PT/INR - ( 21 Nov 2024 01:56 )   PT: 12.1 sec;   INR: 1.06 ratio         PTT - ( 21 Nov 2024 01:56 )  PTT:25.8 sec  Urinalysis Basic - ( 21 Nov 2024 06:27 )    Color: x / Appearance: x / SG: x / pH: x  Gluc: 213 mg/dL / Ketone: x  / Bili: x / Urobili: x   Blood: x / Protein: x / Nitrite: x   Leuk Esterase: x / RBC: x / WBC x   Sq Epi: x / Non Sq Epi: x / Bacteria: x    TTE Limited W or WO Ultrasound Enhancing Agent (11.20.24 @ 17:06)   CONCLUSIONS:      1. Left ventricular systolic function is moderately decreased with an ejection fraction of 43 % by Davila's method of disks. Regional wall motion abnormalities present.   2. Basal and mid anterior septum, basal inferoseptal segment, and basal inferior segment are abnormal.   3. Compared to the transthoracic echocardiogram performed on 11/20/2024, the areas of hypokinesis are new.          Physical Exam:  No apparent distress, alert and oriented times three, appropriate affect  JVD is not elevated, supple  Clear to auscultation with no wheezing, rhonchi or crackles  Regular rate and rhythm with no murmur, rub or gallop  Soft, non-tender, non-distended, no masses/guarding or rebound tenderness  Right Upper Extremity: Soft, non tender, no bleeding or hematoma, clean/dry/intact- RUE +2 palpable radial pulse  R groin: Soft, non tender, no bleeding or hematoma, clean/dry/intact- RLE/LLE +2 palpable DP/PT pulse, no clubbing, cyanosis or edema                                                       Interventional Cardiology Post Cath Progress Note:                Subjective:   Patient reports "I feel much better" denies chest pain, shortness of breath. Denies pain, numbness, tingling or swelling around R wrist or groin access site- currently on insulin and nitro gtt        MEDICATIONS  (STANDING):  aspirin enteric coated 81 milliGRAM(s) Oral daily  chlorhexidine 2% Cloths 1 Application(s) Topical <User Schedule>  clopidogrel Tablet 75 milliGRAM(s) Oral daily  colchicine 0.6 milliGRAM(s) Oral daily  dextrose 10%. 1000 milliLiter(s) (60 mL/Hr) IV Continuous <Continuous>  dextrose 5%. 1000 milliLiter(s) (100 mL/Hr) IV Continuous <Continuous>  dextrose 5%. 1000 milliLiter(s) (50 mL/Hr) IV Continuous <Continuous>  dextrose 50% Injectable 25 Gram(s) IV Push once  dextrose 50% Injectable 12.5 Gram(s) IV Push once  dextrose 50% Injectable 25 Gram(s) IV Push once  ezetimibe 10 milliGRAM(s) Oral daily  folic acid 1 milliGRAM(s) Oral daily  glucagon  Injectable 1 milliGRAM(s) IntraMuscular once  heparin   Injectable 5000 Unit(s) SubCutaneous every 8 hours  insulin regular Infusion 4 Unit(s)/Hr (4 mL/Hr) IV Continuous <Continuous>  isosorbide   mononitrate ER Tablet (IMDUR) 120 milliGRAM(s) Oral at bedtime  nitroglycerin  Infusion 10 MICROgram(s)/Min (3 mL/Hr) IV Continuous <Continuous>  ranolazine 1000 milliGRAM(s) Oral two times a day  rosuvastatin 40 milliGRAM(s) Oral at bedtime    MEDICATIONS  (PRN):  aluminum hydroxide/magnesium hydroxide/simethicone Suspension 30 milliLiter(s) Oral every 4 hours PRN Dyspepsia  dextrose Oral Gel 15 Gram(s) Oral once PRN Blood Glucose LESS THAN 70 milliGRAM(s)/deciliter  nitroglycerin     SubLingual 0.4 milliGRAM(s) SubLingual every 5 minutes PRN Chest Pain      Objective:  Vital Signs Last 24 Hrs  T(C): 36.8 (21 Nov 2024 07:00), Max: 37 (21 Nov 2024 01:13)  T(F): 98.3 (21 Nov 2024 07:00), Max: 98.6 (21 Nov 2024 01:13)  HR: 105 (21 Nov 2024 09:00) (54 - 116)  BP: 134/73 (21 Nov 2024 01:13) (119/59 - 199/98)  BP(mean): 99 (21 Nov 2024 01:13) (83 - 120)  RR: 22 (21 Nov 2024 09:00) (12 - 41)  SpO2: 95% (21 Nov 2024 09:00) (91% - 100%)    Parameters below as of 21 Nov 2024 07:00  Patient On (Oxygen Delivery Method): room air        11-20-24 @ 07:01  -  11-21-24 @ 07:00  --------------------------------------------------------  IN: 647 mL / OUT: 200 mL / NET: 447 mL    11-21-24 @ 07:01  -  11-21-24 @ 10:42  --------------------------------------------------------  IN: 77.5 mL / OUT: 0 mL / NET: 77.5 mL                              15.6   8.15  )-----------( 201      ( 21 Nov 2024 01:56 )             46.3     11-21    137  |  102  |  29[H]  ----------------------------<  213[H]  3.9   |  17[L]  |  1.32[H]    Ca    9.4      21 Nov 2024 06:27  Phos  3.2     11-21  Mg     2.3     11-21    TPro  7.3  /  Alb  3.9  /  TBili  0.7  /  DBili  x   /  AST  103[H]  /  ALT  30  /  AlkPhos  70  11-21    PT/INR - ( 21 Nov 2024 01:56 )   PT: 12.1 sec;   INR: 1.06 ratio         PTT - ( 21 Nov 2024 01:56 )  PTT:25.8 sec  Urinalysis Basic - ( 21 Nov 2024 06:27 )    Color: x / Appearance: x / SG: x / pH: x  Gluc: 213 mg/dL / Ketone: x  / Bili: x / Urobili: x   Blood: x / Protein: x / Nitrite: x   Leuk Esterase: x / RBC: x / WBC x   Sq Epi: x / Non Sq Epi: x / Bacteria: x    TTE Limited W or WO Ultrasound Enhancing Agent (11.20.24 @ 17:06)   CONCLUSIONS:      1. Left ventricular systolic function is moderately decreased with an ejection fraction of 43 % by Davila's method of disks. Regional wall motion abnormalities present.   2. Basal and mid anterior septum, basal inferoseptal segment, and basal inferior segment are abnormal.   3. Compared to the transthoracic echocardiogram performed on 11/20/2024, the areas of hypokinesis are new.          Physical Exam:  No apparent distress, alert and oriented times three, appropriate affect  JVD is not elevated, supple  Clear to auscultation with no wheezing, rhonchi or crackles  Regular rate and rhythm with no murmur, rub or gallop  Soft, non-tender, non-distended, no masses/guarding or rebound tenderness  Right Upper Extremity: Soft, non tender, no bleeding or hematoma, clean/dry/intact- RUE +2 palpable radial pulse  R groin: Soft, non tender, no bleeding or hematoma, clean/dry/intact- RLE/LLE +2 palpable DP/PT pulse, no clubbing, cyanosis or edema

## 2024-11-21 NOTE — PROGRESS NOTE ADULT - ASSESSMENT
73yo female PMH CAD (s/p PCI 1998, 2006, 8/2024), HTN, HLD, DM presented with chest pain s/p C which revealed severe proximal LAD in-stent restenosis s/p successful balloon angioplasty via RRA 11/20, patient had persistent chest pain post procedure, returned to lab angiogram via RFA showed patent pLAD and otherwise no new lesions. Post procedure TTE showed a newly reduced EF to ~40% with new WMA in mid-basal anteroseptal and inferoseptal walls and labs revealed anion gap, elevated beta hydroxy butyrate, and lactic acidosis with concern for euglycemia DKA. Patient was started on insulin gtt and nitro gtt transferred to CICU for further monitoring.       - R groin and wrist site stable- site care discussed with patient and instructions given  - Continue DAPT (aspirin 81mg and clopidogrel 75mg) x 12 months  - Continue rosuvastatin 40mg daily  - TTE with EF 43% and new regional wall motion abnormalities --GDMT per CICU  - Medication adherence stressed to patient with discussion of risks of non-compliance  - Recommend a heart healthy diet which includes a variety of fruits and vegetables, whole grains, low fat dairy products, legumes and skinless poultry and fish; food prepared with little or no salt and minimize processed foods  - Avoid using NSAIDs (Aleve, Motrin, ibuprofen, naproxen) while on DAPT, please utilize Tylenol for pain control (not to exceed 4gm in 24 hours)  - continue telemetry  - wean insulin gtt per CICU  - wean nitro gtt per CICU  - cardiac rehab referral provided to patient, instructed to follow up with outpatient cardiologist  - Appreciate CICU care     73yo female PMH CAD (s/p PCI 1998, 2006, 8/2024), HTN, HLD, DM presented with chest pain s/p C which revealed severe proximal LAD in-stent restenosis s/p successful balloon angioplasty via RRA 11/20, patient had persistent chest pain post procedure, returned to lab angiogram via RFA showed patent pLAD and otherwise no new lesions. Post procedure TTE showed a newly reduced EF to ~40% with new WMA in mid-basal anteroseptal and inferoseptal walls and labs revealed anion gap, elevated beta hydroxy butyrate, and lactic acidosis with concern for euglycemia DKA. Patient was started on insulin gtt and nitro gtt transferred to CICU for further monitoring.       - R groin and wrist site stable- site care discussed with patient and instructions given  - Continue DAPT (aspirin 81mg and clopidogrel 75mg) x 12 months  - Continue rosuvastatin 40mg daily  - TTE with EF 43% and new regional wall motion abnormalities --GDMT per CICU  - Medication adherence stressed to patient with discussion of risks of non-compliance  - Recommend a heart healthy diet which includes a variety of fruits and vegetables, whole grains, low fat dairy products, legumes and skinless poultry and fish; food prepared with little or no salt and minimize processed foods  - Avoid using NSAIDs (Aleve, Motrin, ibuprofen, naproxen) while on DAPT, please utilize Tylenol for pain control (not to exceed 4gm in 24 hours)  - continue telemetry  - wean insulin gtt per CICU  - wean nitro gtt per CICU  - cardiac rehab referral provided to patient, instructed to follow up with outpatient cardiologist from [facility]  - eventual outpatient follow up in 2 weeks upon discharge from the hospital with patient's private cardiologist  - Appreciate CICU care

## 2024-11-21 NOTE — CHART NOTE - NSCHARTNOTEFT_GEN_A_CORE
CICU Accept Note  Transfer from: [facility]  Accepting physician: Dr. Dao    HPI: 74 year-old Maur[place] female with history of CAD (s/p PCI 1998, 2006, 8/2024), HTN, HLD, T2DM, and GERD presents with intermittent chest pain at rest and on exertion for the past 3 days. Symptoms started on 11/17, pressure-like, 8/10 in intensity with radiation up the neck and jaw associated with mild nausea without vomiting, similar to her prior angina symptoms.  She took her 's NTG SL and symptoms improved within 5 minutes.  She came to ED for further evaluation as these episodes of intermittent chest pain persisted for the past couple days.  Currently, chest pain free s/p NTG SL twice since arrived.     Today she underwent a LHC with a POBA to her pLAD stent which had ISR. While recovering, the patient developed persistent chest pain radiating to her jaw and demonstrated new LBBB following her initial LHC. Due to her chest pain, she went back to cath lab and repeat angiogram showed patent pLAD and otherwise no new lesions when compared to angiogram a couple hours prior.    She then went back to CRS where she started to become tachycardiac and that is when TTE was done with above findings. I went to evaluate patient and noted her to be tachycardiac in 120s (in the absence of her BID metoprolol tartrate 100 mg PO, however she did receive IV labetalol 20 mg in LHC recently). Given patient's pain preceded the repeat angiogram and that showed patent remaining vessels (has  of RCA) and that she has newly reduced EF, wanted to be cautious and decided against further beta blockade but opted to give patient her home ranolazine and imdur, as her SBP was in the 160s. Also gave her colchicine to help with possible inflammation if this was pericardial in nature, although chest pain was non-pleuritic, non-positional, and there were no friction rubs heard on auscultation.     Patient was admitted to the CICU for further management. Labs drawn in [facility] demonstrate DKA with an anion gap of 24, lactate 3.9, BHB 3.7, and glucose in the 200s. She was immediately started on a nitroglycerin gtt for chest pain, which of note has improved and is now "mild" and HTN (SBP 180s) and plan to start insulin gtt for DKA.     SUBJECTIVE DATA:    OBJECTIVE DATA:    Vital Signs Last 24 Hrs  T(C): 37 (21 Nov 2024 01:13), Max: 37.1 (20 Nov 2024 02:24)  T(F): 98.6 (21 Nov 2024 01:13), Max: 98.7 (20 Nov 2024 02:24)  HR: 109 (21 Nov 2024 01:13) (54 - 115)  BP: 134/73 (21 Nov 2024 01:13) (118/72 - 199/98)  BP(mean): 99 (21 Nov 2024 01:13) (83 - 120)  RR: 12 (21 Nov 2024 01:13) (12 - 22)  SpO2: 98% (21 Nov 2024 01:13) (93% - 100%)    Parameters below as of 21 Nov 2024 01:13  Patient On (Oxygen Delivery Method): room air      I&O's Summary    20 Nov 2024 07:01  -  21 Nov 2024 02:12  --------------------------------------------------------  IN: 400 mL / OUT: 0 mL / NET: 400 mL        Allergies:      MEDICATIONS  (STANDING):  aspirin enteric coated 81 milliGRAM(s) Oral daily  chlorhexidine 2% Cloths 1 Application(s) Topical <User Schedule>  clopidogrel Tablet 75 milliGRAM(s) Oral daily  colchicine 0.6 milliGRAM(s) Oral daily  dextrose 5% + lactated ringers. 1000 milliLiter(s) (35 mL/Hr) IV Continuous <Continuous>  dextrose 5%. 1000 milliLiter(s) (100 mL/Hr) IV Continuous <Continuous>  dextrose 5%. 1000 milliLiter(s) (50 mL/Hr) IV Continuous <Continuous>  dextrose 50% Injectable 25 Gram(s) IV Push once  dextrose 50% Injectable 12.5 Gram(s) IV Push once  dextrose 50% Injectable 25 Gram(s) IV Push once  enoxaparin Injectable 40 milliGRAM(s) SubCutaneous every 24 hours  ezetimibe 10 milliGRAM(s) Oral daily  folic acid 1 milliGRAM(s) Oral daily  glucagon  Injectable 1 milliGRAM(s) IntraMuscular once  insulin glargine Injectable (LANTUS) 10 Unit(s) SubCutaneous at bedtime  insulin lispro (ADMELOG) corrective regimen sliding scale   SubCutaneous three times a day before meals  insulin lispro (ADMELOG) corrective regimen sliding scale   SubCutaneous at bedtime  insulin lispro Injectable (ADMELOG) 20 Unit(s) SubCutaneous three times a day before meals  insulin regular  human recombinant. 3 Unit(s) IV Push once  insulin regular Infusion 3 Unit(s)/Hr (3 mL/Hr) IV Continuous <Continuous>  isosorbide   mononitrate ER Tablet (IMDUR) 120 milliGRAM(s) Oral at bedtime  losartan 25 milliGRAM(s) Oral daily  metoprolol tartrate 100 milliGRAM(s) Oral two times a day  nitroglycerin  Infusion 10 MICROgram(s)/Min (3 mL/Hr) IV Continuous <Continuous>  ranolazine 1000 milliGRAM(s) Oral two times a day  rosuvastatin 40 milliGRAM(s) Oral at bedtime  sodium chloride 0.9% Bolus 250 milliLiter(s) IV Bolus once    MEDICATIONS  (PRN):  aluminum hydroxide/magnesium hydroxide/simethicone Suspension 30 milliLiter(s) Oral every 4 hours PRN Dyspepsia  dextrose Oral Gel 15 Gram(s) Oral once PRN Blood Glucose LESS THAN 70 milliGRAM(s)/deciliter  nitroglycerin     SubLingual 0.4 milliGRAM(s) SubLingual every 5 minutes PRN Chest Pain      LABS                                            15.6                  Neurophils% (auto):   85.5   (11-21 @ 01:56):    8.15 )-----------(201          Lymphocytes% (auto):  9.6                                           46.3                   Eosinphils% (auto):   0.0      Manual%: Neutrophils x    ; Lymphocytes x    ; Eosinophils x    ; Bands%: x    ; Blasts x                                    135    |  97     |  27                  Calcium: 9.6   / iCa: x      (11-20 @ 22:19)    ----------------------------<  224       Magnesium: 2.2                              4.7     |  14     |  1.16             Phosphorous: 3.9      TPro  7.9    /  Alb  4.2    /  TBili  1.0    /  DBili  x      /  AST  76     /  ALT  28     /  AlkPhos  79     20 Nov 2024 22:19        EKG:  Telemetry:  Echo:  Cardiac Cath:  Stress Test:  Imaging    ASSESSMENT & PLAN: CICU Accept Note  Transfer from: [facility]  Accepting physician: Dr. Dao    HPI: 74 year-old Gabonese female with history of CAD (s/p PCI 1998, 2006, 8/2024), HTN, HLD, T2DM, and GERD presents with intermittent chest pain at rest and on exertion for the past 3 days. Symptoms started on 11/17, pressure-like, 8/10 in intensity with radiation up the neck and jaw associated with mild nausea without vomiting, similar to her prior angina symptoms.  She took her 's NTG SL and symptoms improved within 5 minutes.  She came to ED for further evaluation as these episodes of intermittent chest pain persisted for the past couple days.  Currently, chest pain free s/p NTG SL twice since arrived.     Today she underwent a LHC with a POBA to her pLAD stent which had ISR. While recovering, the patient developed persistent chest pain radiating to her jaw and demonstrated new LBBB following her initial LHC. Due to her chest pain, she went back to cath lab and repeat angiogram showed patent pLAD and otherwise no new lesions when compared to angiogram a couple hours prior.    She then went back to CRS where she started to become tachycardiac and that is when TTE was done with above findings. I went to evaluate patient and noted her to be tachycardiac in 120s (in the absence of her BID metoprolol tartrate 100 mg PO, however she did receive IV labetalol 20 mg in LHC recently). Given patient's pain preceded the repeat angiogram and that showed patent remaining vessels (has  of RCA) and that she has newly reduced EF, wanted to be cautious and decided against further beta blockade but opted to give patient her home ranolazine and imdur, as her SBP was in the 160s. Also gave her colchicine to help with possible inflammation if this was pericardial in nature, although chest pain was non-pleuritic, non-positional, and there were no friction rubs heard on auscultation.     Patient was admitted to the CICU for further management. Labs drawn in [facility] demonstrate DKA with an anion gap of 24, lactate 3.9, BHB 3.7, and glucose in the 200s. She was immediately started on a nitroglycerin gtt for chest pain, which of note has improved and is now "mild" and HTN (SBP 180s) and plan to start insulin gtt for DKA.     SUBJECTIVE DATA:  REVIEW OF SYSTEMS:  CONSTITUTIONAL: No weakness, fevers or chills  EYES/ENT: No visual changes;  No vertigo or throat pain   NECK: No pain or stiffness  RESPIRATORY: No cough, wheezing, hemoptysis; No shortness of breath  CARDIOVASCULAR: (+) Chest pain. No palpitations  GASTROINTESTINAL: No abdominal or epigastric pain. No nausea, vomiting, or hematemesis; No diarrhea or constipation. No melena or hematochezia.  GENITOURINARY: No dysuria, frequency or hematuria  NEUROLOGICAL: No numbness or weakness  SKIN: No itching, rashes      OBJECTIVE DATA:  Vital Signs Last 24 Hrs  T(C): 37 (21 Nov 2024 01:13), Max: 37.1 (20 Nov 2024 02:24)  T(F): 98.6 (21 Nov 2024 01:13), Max: 98.7 (20 Nov 2024 02:24)  HR: 109 (21 Nov 2024 01:13) (54 - 115)  BP: 134/73 (21 Nov 2024 01:13) (118/72 - 199/98)  BP(mean): 99 (21 Nov 2024 01:13) (83 - 120)  RR: 12 (21 Nov 2024 01:13) (12 - 22)  SpO2: 98% (21 Nov 2024 01:13) (93% - 100%)    Parameters below as of 21 Nov 2024 01:13  Patient On (Oxygen Delivery Method): room air      I&O's Summary    20 Nov 2024 07:01  -  21 Nov 2024 02:12  --------------------------------------------------------  IN: 400 mL / OUT: 0 mL / NET: 400 mL        Allergies:      MEDICATIONS  (STANDING):  aspirin enteric coated 81 milliGRAM(s) Oral daily  chlorhexidine 2% Cloths 1 Application(s) Topical <User Schedule>  clopidogrel Tablet 75 milliGRAM(s) Oral daily  colchicine 0.6 milliGRAM(s) Oral daily  dextrose 5% + lactated ringers. 1000 milliLiter(s) (35 mL/Hr) IV Continuous <Continuous>  dextrose 5%. 1000 milliLiter(s) (100 mL/Hr) IV Continuous <Continuous>  dextrose 5%. 1000 milliLiter(s) (50 mL/Hr) IV Continuous <Continuous>  dextrose 50% Injectable 25 Gram(s) IV Push once  dextrose 50% Injectable 12.5 Gram(s) IV Push once  dextrose 50% Injectable 25 Gram(s) IV Push once  enoxaparin Injectable 40 milliGRAM(s) SubCutaneous every 24 hours  ezetimibe 10 milliGRAM(s) Oral daily  folic acid 1 milliGRAM(s) Oral daily  glucagon  Injectable 1 milliGRAM(s) IntraMuscular once  insulin glargine Injectable (LANTUS) 10 Unit(s) SubCutaneous at bedtime  insulin lispro (ADMELOG) corrective regimen sliding scale   SubCutaneous three times a day before meals  insulin lispro (ADMELOG) corrective regimen sliding scale   SubCutaneous at bedtime  insulin lispro Injectable (ADMELOG) 20 Unit(s) SubCutaneous three times a day before meals  insulin regular  human recombinant. 3 Unit(s) IV Push once  insulin regular Infusion 3 Unit(s)/Hr (3 mL/Hr) IV Continuous <Continuous>  isosorbide   mononitrate ER Tablet (IMDUR) 120 milliGRAM(s) Oral at bedtime  losartan 25 milliGRAM(s) Oral daily  metoprolol tartrate 100 milliGRAM(s) Oral two times a day  nitroglycerin  Infusion 10 MICROgram(s)/Min (3 mL/Hr) IV Continuous <Continuous>  ranolazine 1000 milliGRAM(s) Oral two times a day  rosuvastatin 40 milliGRAM(s) Oral at bedtime  sodium chloride 0.9% Bolus 250 milliLiter(s) IV Bolus once    MEDICATIONS  (PRN):  aluminum hydroxide/magnesium hydroxide/simethicone Suspension 30 milliLiter(s) Oral every 4 hours PRN Dyspepsia  dextrose Oral Gel 15 Gram(s) Oral once PRN Blood Glucose LESS THAN 70 milliGRAM(s)/deciliter  nitroglycerin     SubLingual 0.4 milliGRAM(s) SubLingual every 5 minutes PRN Chest Pain      LABS                                            15.6                  Neurophils% (auto):   85.5   (11-21 @ 01:56):    8.15 )-----------(201          Lymphocytes% (auto):  9.6                                           46.3                   Eosinphils% (auto):   0.0      Manual%: Neutrophils x    ; Lymphocytes x    ; Eosinophils x    ; Bands%: x    ; Blasts x                                    135    |  97     |  27                  Calcium: 9.6   / iCa: x      (11-20 @ 22:19)    ----------------------------<  224       Magnesium: 2.2                              4.7     |  14     |  1.16             Phosphorous: 3.9      TPro  7.9    /  Alb  4.2    /  TBili  1.0    /  DBili  x      /  AST  76     /  ALT  28     /  AlkPhos  79     20 Nov 2024 22:19        EKG:  Telemetry:  Echo:  Cardiac Cath:  Stress Test:  Imaging    ASSESSMENT & PLAN: 4 year-old Gabonese female with history of CAD (s/p PCI 1998, 2006, 8/2024), HTN, HLD, T2DM, and GERD presents with intermittent chest pain at rest and on exertion for the past 3 days s/p C x2 today with POBA pLAD ERI ISR c/b DKA and refractory angina admitted to CICU for further treatment.     PLAN:   ====NEURO====  #No active issues  - A&O x4  - Monitor per ICU protocol    ====RESPIRATORY====  #No active issues  - Sat >94% on room air    ====CARDIOVASCULAR====  #Refractory angina c/b ADHF  - [facility] 11/20 #1: POBA to previous pLAD ERI ISR  - Returned to cath lab for persistent CP with dynamic EKG changes, no acute changes in coronary arteries seen  - Chest pain improved with SL nitro  - Start nitro gtt for chest pain and HTN  - limited TTE showing acutely decreased EF (60 to 43%) in between [facility]  - Repeat full TTE in am  - Continue DAPT  - Trend CE to peak  - Monitor EKG for further dynamic changes  - Takes imdur 60 at home for chest pain    #HTN  - Hold home anti-HTN Losartan 25 and lopressor 100 BID iso SCAI B shock  - Nitro gtt goal -140    ====GI====  #NPO  - NPO iso DKA  - Monitor for BM  - Nausea while in [facility], possibly 2/2 DKA vs refractory angina    #GERD  - Continue PPI    ====ENDO====  #DKA  - Anion gap 26, BHB 3.7, lactate 3.9  - Start insulin gtt  - Start maintenance D5LR at 35 cc/hr and uptitrate as needed  - Trend anion gap  - q1h POCT  - F/U A1c, tsh, lipid panel    ====RENAL====  #No active issues  - Monitor SCr  - Replete lytes as needed to maintain K>4, Mag >2  - Strict I&O's    ====HEME-ONC====  #No active issues  - No AC  - HSQ for DVT PPX  - H/H and platelets stable    ====ID====  #No active issues  - Afebrile, no leukocytosis  - Monitor off abx    ==LINES==  L Radial Ester 11/21    ======================= DISPOSITION  =====================  [X] Critical   [ ] Guarded    [ ] Stable    [X] Maintain in CICU  [ ] Downgrade to Telemetry  [ ] Discharge Home    Patient requires continuous monitoring with bedside rhythm monitoring, pulse ox monitoring, and intermittent blood gas analysis. Care plan discussed with ICU care team. Patient remained critical and at risk for life threatening decompensation.  Patient seen, examined and plan discussed with CCU team during rounds.     Dianne Thompson PA-C

## 2024-11-21 NOTE — PROGRESS NOTE ADULT - SUBJECTIVE AND OBJECTIVE BOX
Roslyn Dyer PGY3  pager [phone] or check resident tab for coverage    Patient is a 74y old  Female who presents with a chief complaint of chest pain (21 Nov 2024 10:41)      SUBJECTIVE / OVERNIGHT EVENTS: Pt admitted to CCU ON. reports improvement in chest pain this morning. Endorses cough. Denies fevers/chills.    MEDICATIONS  (STANDING):  aspirin enteric coated 81 milliGRAM(s) Oral daily  chlorhexidine 2% Cloths 1 Application(s) Topical <User Schedule>  clopidogrel Tablet 75 milliGRAM(s) Oral daily  dextrose 10% + sodium chloride 0.45%. 1000 milliLiter(s) (60 mL/Hr) IV Continuous <Continuous>  dextrose 10%. 1000 milliLiter(s) (80 mL/Hr) IV Continuous <Continuous>  dextrose 5%. 1000 milliLiter(s) (100 mL/Hr) IV Continuous <Continuous>  dextrose 5%. 1000 milliLiter(s) (50 mL/Hr) IV Continuous <Continuous>  dextrose 50% Injectable 25 Gram(s) IV Push once  dextrose 50% Injectable 12.5 Gram(s) IV Push once  dextrose 50% Injectable 25 Gram(s) IV Push once  ezetimibe 10 milliGRAM(s) Oral daily  folic acid 1 milliGRAM(s) Oral daily  heparin   Injectable 5000 Unit(s) SubCutaneous every 8 hours  hydrALAZINE 10 milliGRAM(s) Oral three times a day  insulin regular Infusion 3 Unit(s)/Hr (3 mL/Hr) IV Continuous <Continuous>  isosorbide   mononitrate ER Tablet (IMDUR) 120 milliGRAM(s) Oral at bedtime  metoprolol tartrate 25 milliGRAM(s) Oral two times a day  ranolazine 1000 milliGRAM(s) Oral two times a day  rosuvastatin 40 milliGRAM(s) Oral at bedtime    MEDICATIONS  (PRN):  aluminum hydroxide/magnesium hydroxide/simethicone Suspension 30 milliLiter(s) Oral every 4 hours PRN Dyspepsia  dextrose Oral Gel 15 Gram(s) Oral once PRN Blood Glucose LESS THAN 70 milliGRAM(s)/deciliter      CAPILLARY BLOOD GLUCOSE      POCT Blood Glucose.: 154 mg/dL (21 Nov 2024 13:04)  POCT Blood Glucose.: 154 mg/dL (21 Nov 2024 11:59)  POCT Blood Glucose.: 157 mg/dL (21 Nov 2024 10:58)  POCT Blood Glucose.: 157 mg/dL (21 Nov 2024 09:51)  POCT Blood Glucose.: 151 mg/dL (21 Nov 2024 08:53)  POCT Blood Glucose.: 168 mg/dL (21 Nov 2024 07:33)  POCT Blood Glucose.: 198 mg/dL (21 Nov 2024 06:18)  POCT Blood Glucose.: 221 mg/dL (21 Nov 2024 05:19)  POCT Blood Glucose.: 285 mg/dL (21 Nov 2024 04:07)  POCT Blood Glucose.: 237 mg/dL (21 Nov 2024 03:16)  POCT Blood Glucose.: 250 mg/dL (21 Nov 2024 01:45)  POCT Blood Glucose.: 200 mg/dL (20 Nov 2024 22:31)  POCT Blood Glucose.: 203 mg/dL (20 Nov 2024 21:01)  POCT Blood Glucose.: 140 mg/dL (20 Nov 2024 15:33)  POCT Blood Glucose.: 127 mg/dL (20 Nov 2024 15:05)    I&O's Summary    20 Nov 2024 07:01  -  21 Nov 2024 07:00  --------------------------------------------------------  IN: 647 mL / OUT: 200 mL / NET: 447 mL    21 Nov 2024 07:01  -  21 Nov 2024 13:50  --------------------------------------------------------  IN: 347.5 mL / OUT: 0 mL / NET: 347.5 mL        Vital Signs Last 24 Hrs  T(C): 36.9 (21 Nov 2024 11:00), Max: 37 (21 Nov 2024 01:13)  T(F): 98.5 (21 Nov 2024 11:00), Max: 98.6 (21 Nov 2024 01:13)  HR: 95 (21 Nov 2024 12:30) (54 - 116)  BP: 134/73 (21 Nov 2024 01:13) (119/59 - 199/98)  BP(mean): 99 (21 Nov 2024 01:13) (83 - 120)  RR: 27 (21 Nov 2024 12:30) (12 - 42)  SpO2: 95% (21 Nov 2024 12:30) (91% - 100%)    Parameters below as of 21 Nov 2024 11:00  Patient On (Oxygen Delivery Method): room air        PHYSICAL EXAM:  GENERAL: no distress  PSYCH: A&O x3  HEAD: Atraumatic, Normocephalic  NECK: Supple, No JVD  CHEST/LUNG: clear to auscultation bilaterally  HEART: regular rate and rhythm, no murmurs  ABDOMEN: nontender to palpation, no rebound tenderness/guarding  EXTREMITIES: no edema on bilateral LE  NEUROLOGY: no focal neurologic deficit  SKIN: No rashes or lesions    LABS:                        15.6   8.15  )-----------( 201      ( 21 Nov 2024 01:56 )             46.3      11-21    137  |  103  |  29[H]  ----------------------------<  168[H]  4.1   |  19[L]  |  1.28    Ca    9.5      21 Nov 2024 11:08  Phos  3.0     11-21  Mg     2.4     11-21    TPro  7.0  /  Alb  3.8  /  TBili  0.8  /  DBili  x   /  AST  115[H]  /  ALT  30  /  AlkPhos  65  11-21    PT/INR - ( 21 Nov 2024 01:56 )   PT: 12.1 sec;   INR: 1.06 ratio         PTT - ( 21 Nov 2024 01:56 )  PTT:25.8 sec  CARDIAC MARKERS ( 21 Nov 2024 06:27 )  x     / x     / x     / x     / 48.7 ng/mL  CARDIAC MARKERS ( 21 Nov 2024 01:56 )  x     / x     / x     / x     / 47.3 ng/mL  CARDIAC MARKERS ( 20 Nov 2024 22:19 )  x     / x     / x     / x     / 33.6 ng/mL  CARDIAC MARKERS ( 19 Nov 2024 17:11 )  x     / x     / x     / x     / 2.4 ng/mL      Urinalysis Basic - ( 21 Nov 2024 11:08 )    Color: x / Appearance: x / SG: x / pH: x  Gluc: 168 mg/dL / Ketone: x  / Bili: x / Urobili: x   Blood: x / Protein: x / Nitrite: x   Leuk Esterase: x / RBC: x / WBC x   Sq Epi: x / Non Sq Epi: x / Bacteria: x        RADIOLOGY & ADDITIONAL TESTS:    Imaging Personally Reviewed:    Consultant(s) Notes Reviewed:      Care Discussed with Consultants/Other Providers:

## 2024-11-22 LAB
ADD ON TEST-SPECIMEN IN LAB: SIGNIFICANT CHANGE UP
ADD ON TEST-SPECIMEN IN LAB: SIGNIFICANT CHANGE UP
ALBUMIN SERPL ELPH-MCNC: 3.4 G/DL — SIGNIFICANT CHANGE UP (ref 3.3–5)
ALP SERPL-CCNC: 65 U/L — SIGNIFICANT CHANGE UP (ref 40–120)
ALT FLD-CCNC: 31 U/L — SIGNIFICANT CHANGE UP (ref 10–45)
ANION GAP SERPL CALC-SCNC: 14 MMOL/L — SIGNIFICANT CHANGE UP (ref 5–17)
APPEARANCE UR: CLEAR — SIGNIFICANT CHANGE UP
APTT BLD: 33 SEC — SIGNIFICANT CHANGE UP (ref 24.5–35.6)
AST SERPL-CCNC: 95 U/L — HIGH (ref 10–40)
B-OH-BUTYR SERPL-SCNC: 0.3 MMOL/L — SIGNIFICANT CHANGE UP
BACTERIA # UR AUTO: NEGATIVE /HPF — SIGNIFICANT CHANGE UP
BASOPHILS # BLD AUTO: 0.03 K/UL — SIGNIFICANT CHANGE UP (ref 0–0.2)
BASOPHILS NFR BLD AUTO: 0.3 % — SIGNIFICANT CHANGE UP (ref 0–2)
BILIRUB SERPL-MCNC: 0.9 MG/DL — SIGNIFICANT CHANGE UP (ref 0.2–1.2)
BILIRUB UR-MCNC: NEGATIVE — SIGNIFICANT CHANGE UP
BUN SERPL-MCNC: 26 MG/DL — HIGH (ref 7–23)
CALCIUM SERPL-MCNC: 9.2 MG/DL — SIGNIFICANT CHANGE UP (ref 8.4–10.5)
CAST: 0 /LPF — SIGNIFICANT CHANGE UP (ref 0–4)
CHLORIDE SERPL-SCNC: 101 MMOL/L — SIGNIFICANT CHANGE UP (ref 96–108)
CK MB CFR SERPL CALC: 21.6 NG/ML — HIGH (ref 0–3.8)
CO2 SERPL-SCNC: 16 MMOL/L — LOW (ref 22–31)
COLOR SPEC: YELLOW — SIGNIFICANT CHANGE UP
CREAT SERPL-MCNC: 1.25 MG/DL — SIGNIFICANT CHANGE UP (ref 0.5–1.3)
DIFF PNL FLD: NEGATIVE — SIGNIFICANT CHANGE UP
EGFR: 45 ML/MIN/1.73M2 — LOW
EOSINOPHIL # BLD AUTO: 0.15 K/UL — SIGNIFICANT CHANGE UP (ref 0–0.5)
EOSINOPHIL NFR BLD AUTO: 1.5 % — SIGNIFICANT CHANGE UP (ref 0–6)
GAS PNL BLDA: SIGNIFICANT CHANGE UP
GAS PNL BLDV: SIGNIFICANT CHANGE UP
GLUCOSE BLDC GLUCOMTR-MCNC: 144 MG/DL — HIGH (ref 70–99)
GLUCOSE BLDC GLUCOMTR-MCNC: 179 MG/DL — HIGH (ref 70–99)
GLUCOSE BLDC GLUCOMTR-MCNC: 248 MG/DL — HIGH (ref 70–99)
GLUCOSE BLDC GLUCOMTR-MCNC: 302 MG/DL — HIGH (ref 70–99)
GLUCOSE SERPL-MCNC: 343 MG/DL — HIGH (ref 70–99)
GLUCOSE UR QL: >=1000 MG/DL
HCT VFR BLD CALC: 39.7 % — SIGNIFICANT CHANGE UP (ref 34.5–45)
HGB BLD-MCNC: 13.5 G/DL — SIGNIFICANT CHANGE UP (ref 11.5–15.5)
IMM GRANULOCYTES NFR BLD AUTO: 0.3 % — SIGNIFICANT CHANGE UP (ref 0–0.9)
INR BLD: 1.14 RATIO — SIGNIFICANT CHANGE UP (ref 0.85–1.16)
KETONES UR-MCNC: 15 MG/DL
LEUKOCYTE ESTERASE UR-ACNC: NEGATIVE — SIGNIFICANT CHANGE UP
LYMPHOCYTES # BLD AUTO: 4.33 K/UL — HIGH (ref 1–3.3)
LYMPHOCYTES # BLD AUTO: 44.7 % — HIGH (ref 13–44)
MAGNESIUM SERPL-MCNC: 2.5 MG/DL — SIGNIFICANT CHANGE UP (ref 1.6–2.6)
MCHC RBC-ENTMCNC: 31.3 PG — SIGNIFICANT CHANGE UP (ref 27–34)
MCHC RBC-ENTMCNC: 34 G/DL — SIGNIFICANT CHANGE UP (ref 32–36)
MCV RBC AUTO: 91.9 FL — SIGNIFICANT CHANGE UP (ref 80–100)
MONOCYTES # BLD AUTO: 0.45 K/UL — SIGNIFICANT CHANGE UP (ref 0–0.9)
MONOCYTES NFR BLD AUTO: 4.6 % — SIGNIFICANT CHANGE UP (ref 2–14)
NEUTROPHILS # BLD AUTO: 4.7 K/UL — SIGNIFICANT CHANGE UP (ref 1.8–7.4)
NEUTROPHILS NFR BLD AUTO: 48.6 % — SIGNIFICANT CHANGE UP (ref 43–77)
NITRITE UR-MCNC: NEGATIVE — SIGNIFICANT CHANGE UP
NRBC # BLD: 0 /100 WBCS — SIGNIFICANT CHANGE UP (ref 0–0)
PH UR: 5.5 — SIGNIFICANT CHANGE UP (ref 5–8)
PHOSPHATE SERPL-MCNC: 3.4 MG/DL — SIGNIFICANT CHANGE UP (ref 2.5–4.5)
PLATELET # BLD AUTO: 176 K/UL — SIGNIFICANT CHANGE UP (ref 150–400)
POTASSIUM SERPL-MCNC: 4.6 MMOL/L — SIGNIFICANT CHANGE UP (ref 3.5–5.3)
POTASSIUM SERPL-SCNC: 4.6 MMOL/L — SIGNIFICANT CHANGE UP (ref 3.5–5.3)
PROT SERPL-MCNC: 6.6 G/DL — SIGNIFICANT CHANGE UP (ref 6–8.3)
PROT UR-MCNC: NEGATIVE MG/DL — SIGNIFICANT CHANGE UP
PROTHROM AB SERPL-ACNC: 13 SEC — SIGNIFICANT CHANGE UP (ref 9.9–13.4)
RBC # BLD: 4.32 M/UL — SIGNIFICANT CHANGE UP (ref 3.8–5.2)
RBC # FLD: 13.3 % — SIGNIFICANT CHANGE UP (ref 10.3–14.5)
RBC CASTS # UR COMP ASSIST: 1 /HPF — SIGNIFICANT CHANGE UP (ref 0–4)
SODIUM SERPL-SCNC: 131 MMOL/L — LOW (ref 135–145)
SP GR SPEC: >1.03 — HIGH (ref 1–1.03)
SQUAMOUS # UR AUTO: 1 /HPF — SIGNIFICANT CHANGE UP (ref 0–5)
TROPONIN T, HIGH SENSITIVITY RESULT: 1628 NG/L — HIGH (ref 0–51)
UROBILINOGEN FLD QL: 0.2 MG/DL — SIGNIFICANT CHANGE UP (ref 0.2–1)
WBC # BLD: 9.69 K/UL — SIGNIFICANT CHANGE UP (ref 3.8–10.5)
WBC # FLD AUTO: 9.69 K/UL — SIGNIFICANT CHANGE UP (ref 3.8–10.5)
WBC UR QL: 0 /HPF — SIGNIFICANT CHANGE UP (ref 0–5)

## 2024-11-22 PROCEDURE — 36620 INSERTION CATHETER ARTERY: CPT

## 2024-11-22 PROCEDURE — 99222 1ST HOSP IP/OBS MODERATE 55: CPT

## 2024-11-22 PROCEDURE — 76937 US GUIDE VASCULAR ACCESS: CPT | Mod: 26,59

## 2024-11-22 PROCEDURE — 99291 CRITICAL CARE FIRST HOUR: CPT

## 2024-11-22 PROCEDURE — 99292 CRITICAL CARE ADDL 30 MIN: CPT

## 2024-11-22 PROCEDURE — 71045 X-RAY EXAM CHEST 1 VIEW: CPT | Mod: 26

## 2024-11-22 PROCEDURE — 33210 INSERT ELECTRD/PM CATH SNGL: CPT

## 2024-11-22 PROCEDURE — 93010 ELECTROCARDIOGRAM REPORT: CPT

## 2024-11-22 RX ORDER — FENTANYL 12 UG/H
50 PATCH, EXTENDED RELEASE TRANSDERMAL ONCE
Refills: 0 | Status: COMPLETED | OUTPATIENT
Start: 2024-11-22 | End: 2024-11-22

## 2024-11-22 RX ORDER — DOPAMINE HYDROCHLORIDE 0.8 MG/ML
5 INJECTION, SOLUTION INTRAVENOUS
Qty: 400 | Refills: 0 | Status: DISCONTINUED | OUTPATIENT
Start: 2024-11-22 | End: 2024-11-22

## 2024-11-22 RX ORDER — EPINEPHRINE 1 MG/ML(1)
2 AMPUL (ML) INJECTION
Qty: 4 | Refills: 0 | Status: DISCONTINUED | OUTPATIENT
Start: 2024-11-22 | End: 2024-11-22

## 2024-11-22 RX ORDER — CHLORHEXIDINE GLUCONATE 1.2 MG/ML
15 RINSE ORAL EVERY 12 HOURS
Refills: 0 | Status: DISCONTINUED | OUTPATIENT
Start: 2024-11-22 | End: 2024-11-23

## 2024-11-22 RX ORDER — SODIUM CHLORIDE 9 MG/ML
10 INJECTION, SOLUTION INTRAMUSCULAR; INTRAVENOUS; SUBCUTANEOUS
Refills: 0 | Status: DISCONTINUED | OUTPATIENT
Start: 2024-11-22 | End: 2024-11-23

## 2024-11-22 RX ORDER — PROPOFOL 10 MG/ML
5 INJECTION, EMULSION INTRAVENOUS
Qty: 500 | Refills: 0 | Status: DISCONTINUED | OUTPATIENT
Start: 2024-11-22 | End: 2024-11-22

## 2024-11-22 RX ORDER — CHLORHEXIDINE GLUCONATE 1.2 MG/ML
1 RINSE ORAL
Refills: 0 | Status: DISCONTINUED | OUTPATIENT
Start: 2024-11-22 | End: 2024-11-26

## 2024-11-22 RX ORDER — PROPOFOL 10 MG/ML
30 INJECTION, EMULSION INTRAVENOUS
Qty: 1000 | Refills: 0 | Status: DISCONTINUED | OUTPATIENT
Start: 2024-11-22 | End: 2024-11-23

## 2024-11-22 RX ORDER — NOREPINEPHRINE BITARTRATE 1 MG/ML
0.05 INJECTION, SOLUTION, CONCENTRATE INTRAVENOUS
Qty: 8 | Refills: 0 | Status: DISCONTINUED | OUTPATIENT
Start: 2024-11-22 | End: 2024-11-22

## 2024-11-22 RX ADMIN — PROPOFOL 9.79 MICROGRAM(S)/KG/MIN: 10 INJECTION, EMULSION INTRAVENOUS at 14:06

## 2024-11-22 RX ADMIN — CHLORHEXIDINE GLUCONATE 15 MILLILITER(S): 1.2 RINSE ORAL at 18:00

## 2024-11-22 RX ADMIN — Medication 5000 UNIT(S): at 21:12

## 2024-11-22 RX ADMIN — PROPOFOL 1.63 MICROGRAM(S)/KG/MIN: 10 INJECTION, EMULSION INTRAVENOUS at 13:13

## 2024-11-22 RX ADMIN — CHLORHEXIDINE GLUCONATE 1 APPLICATION(S): 1.2 RINSE ORAL at 05:17

## 2024-11-22 RX ADMIN — NOREPINEPHRINE BITARTRATE 5.1 MICROGRAM(S)/KG/MIN: 1 INJECTION, SOLUTION, CONCENTRATE INTRAVENOUS at 13:14

## 2024-11-22 RX ADMIN — METOPROLOL TARTRATE 25 MILLIGRAM(S): 100 TABLET, FILM COATED ORAL at 05:17

## 2024-11-22 RX ADMIN — ROSUVASTATIN CALCIUM 40 MILLIGRAM(S): 5 TABLET, FILM COATED ORAL at 21:11

## 2024-11-22 RX ADMIN — Medication 81 MILLIGRAM(S): at 14:30

## 2024-11-22 RX ADMIN — PROPOFOL 9.79 MICROGRAM(S)/KG/MIN: 10 INJECTION, EMULSION INTRAVENOUS at 20:21

## 2024-11-22 RX ADMIN — HYDRALAZINE HYDROCHLORIDE 10 MILLIGRAM(S): 10 TABLET ORAL at 05:17

## 2024-11-22 RX ADMIN — Medication 1 MILLIGRAM(S): at 14:30

## 2024-11-22 RX ADMIN — CLOPIDOGREL 75 MILLIGRAM(S): 75 TABLET, FILM COATED ORAL at 14:30

## 2024-11-22 RX ADMIN — Medication 2: at 17:59

## 2024-11-22 RX ADMIN — Medication 7 UNIT(S): at 08:31

## 2024-11-22 RX ADMIN — Medication 5000 UNIT(S): at 14:30

## 2024-11-22 RX ADMIN — RANOLAZINE 1000 MILLIGRAM(S): 1000 TABLET, FILM COATED, EXTENDED RELEASE ORAL at 05:17

## 2024-11-22 RX ADMIN — Medication 5000 UNIT(S): at 05:16

## 2024-11-22 RX ADMIN — Medication 10 MILLIGRAM(S): at 14:30

## 2024-11-22 NOTE — PROCEDURE NOTE - ADDITIONAL PROCEDURE DETAILS
Threshold 0.6 Threshold 0.6    Initial TVP placed on 6 Sivlio during RRT by EP team. Upon arrival to CICU, it was noted that sterile covering was not attached to cordis, leaving pacing wire exposed to environment. Wire was replaced under sterile conditions and re-floated. Threshold 0.6    Initial TVP placed on 6 Silvio during RRT by EP team. Upon arrival to CICU, it was noted that sterile covering was not attached to cordis, leaving pacing wire exposed to environment. Wire was replaced under sterile conditions and re-floated through cordis.

## 2024-11-22 NOTE — RAPID RESPONSE TEAM SUMMARY - NSSITUATIONBACKGROUNDRRT_GEN_ALL_CORE
74 year-old [place] female with history of CAD (s/p PCI 1998, 2006, 8/2024), HTN, HLD, T2DM, and GERD presents with intermittent chest pain. s/p CICU admission for LHC with stent to LAD. Course C/b DKA. RRT called for symptomatic bradycardia.

## 2024-11-22 NOTE — RAPID RESPONSE TEAM SUMMARY - NSMEDICATIONSRRT_GEN_ALL_CORE
Atropine 1 mg for bradycardia  Epinephrine gtt for hypotension + bradycardia  Dopamine gtt for bradycardia  Fent 50 mcg  Versed 2 mg   Etomidate for RSI  Levophed for hypotension   Propofol for sedation

## 2024-11-22 NOTE — PROCEDURE NOTE - NSINDICATIONS_GEN_A_CORE
critical illness
CHB
arterial puncture to obtain ABG's/cannulation purposes/critical patient
arterial puncture to obtain ABG's/critical patient

## 2024-11-22 NOTE — PROVIDER CONTACT NOTE (CHANGE IN STATUS NOTIFICATION) - BACKGROUND
Pt downgraded from cicu to 6monti after stabilizing. Pt has recent history of LAD, PCI, LHC, and stent placement. Pt was also in DKA and hypertensive.

## 2024-11-22 NOTE — RAPID RESPONSE TEAM SUMMARY - NSADDTLFINDINGSRRT_GEN_ALL_CORE
VS: T 98.7, , O2 100% on RA, HR 30, BP 80/40. Patient lethargic and vomiting, but awake and alert. On exam, rhythm regular but bradycardic.

## 2024-11-22 NOTE — PROGRESS NOTE ADULT - ASSESSMENT
74 year-old Citizen of [place] female with history of CAD (s/p PCI 1998, 2006, 8/2024), HTN, HLD, T2DM, and GERD presents with intermittent chest pain at rest and on exertion for the past 3 days s/p [facility] x2 today with POBA pLAD ERI ISR c/b DKA and refractory angina admitted to CICU for further treatment. Patient started on insulin gtt during the day time; patient CP also improved on nitroglycerin gtt. Patient transitioned to oral meals and premeal/lantus once AG closed x2 and bHb negative. Colchicine discontinued and patient was downgraded to medicine telemetry room    #Refractory angina c/b ADHF  - [facility] 11/20 #1: POBA to previous pLAD ERI ISR  - Returned to cath lab for persistent CP with dynamic EKG changes, no acute changes in coronary arteries seen  - Chest pain improved with SL nitro  - Start nitro gtt for chest pain and HTN  - limited TTE showing acutely decreased EF (60 to 43%) in between LH  - Repeat full TTE in am  - Continue DAPT  - Trend CE to peak  - Monitor EKG for further dynamic changes  - Takes imdur 60 at home for chest pain    #HTN  - Hold home anti-HTN Losartan 25 and lopressor 100 BID iso SCAI B shock  - Nitro gtt goal -140    #GERD  - Continue PPI    #DKA  - Anion gap 26, BHB 3.7, lactate 3.9  - s/p insulin gtt. transition to basal bolus  - anion gap closed  - F/U A1c, tsh, lipid panel

## 2024-11-22 NOTE — PROVIDER CONTACT NOTE (CHANGE IN STATUS NOTIFICATION) - ASSESSMENT
Pt was shaking for two seconds and then became unresponsive, AOx0. HR on tele in the 30s, BP difficult to obtain.

## 2024-11-22 NOTE — CONSULT NOTE ADULT - SUBJECTIVE AND OBJECTIVE BOX
Cardiology Consult Note   [Please check [email or web address]: [email or web address] for cardiology service schedule and contact information]    HPI:  74 year-old [place] female with history of CAD (s/p PCI 1998, 2006, 8/2024), HTN, HLD, T2DM, and GERD presents with intermittent chest pain at rest and on exertion for the past 3 days. Symptoms started on 11/17, pressure-like, 8/10 in intensity with radiation up the neck and jaw associated with mild nausea without vomiting, similar to her prior angina symptoms.  She took her 's NTG SL and symptoms improved within 5 minutes.  She came to ED for further evaluation as these episodes of intermittent chest pain persisted for the past couple days. Currently, chest pain free s/p NTG SL twice since arrived (20 Nov 2024 04:16)    She underwent a LHC with a POBA to her pLAD stent which had ISR. While recovering, the patient developed persistent chest pain radiating to her jaw and demonstrated new LBBB following her initial LHC. Due to her chest pain, she went back to cath lab and repeat angiogram showed patent pLAD and otherwise no new lesions when compared to angiogram a couple hours prior.    She then went back to CRS where she started to become tachycardiac and that is when TTE showed EF reduced from 60% to 40% with new basal and mi anterior septum, basal inferoseptal, and basal inferior WMAs. I went to evaluate patient and noted her to be tachycardiac in 120s (in the absence of her BID metoprolol tartrate 100 mg PO, however she did receive IV labetalol 20 mg in LHC recently). Given patient's pain preceded the repeat angiogram and that showed patent remaining vessels (has  of RCA) and that she has newly reduced EF, wanted to be cautious and decided against further beta blockade but opted to give patient her home ranolazine and imdur, as her SBP was in the 160s. Also gave her colchicine to help with possible inflammation if this was pericardial in nature, although chest pain was non-pleuritic, non-positional, and there were no friction rubs heard on auscultation.     Patient was admitted to the CICU for further management. Labs drawn in [facility] demonstrate DKA with an anion gap of 24, lactate 3.9, BHB 3.7, and glucose in the 200s. She was immediately started on a nitroglycerin gtt for chest pain, which of note has improved and is now "mild" and HTN (SBP 180s) and plan to start insulin gtt for DKA.     CICU Course: Patient started on insulin gtt during the day time; patient CP also improved on nitroglycerin gtt. Patient transitioned to oral meals and premeal/lantus once AG closed x2 and bHb negative. Colchicine discontinued.    RRT called today for lethargy and vomiting with CHB on EKG.    PAST MEDICAL & SURGICAL HISTORY:  HTN (hypertension)      HLD (hyperlipidemia)      DM (diabetes mellitus)      CAD (coronary artery disease)      Stented coronary artery      COVID-19      No significant past surgical history        FAMILY HISTORY:  Family history of heart attack (Father, Mother)    Family history of CVA (Mother)    FH: diabetes mellitus (Sibling)      SOCIAL HISTORY:  unchanged    MEDICATIONS:  aspirin enteric coated 81 milliGRAM(s) Oral daily  clopidogrel Tablet 75 milliGRAM(s) Oral daily  DOPamine Infusion 5 MICROgram(s)/kG/Min IV Continuous <Continuous>  EPINEPHrine    Infusion 2 MICROgram(s)/kG/Min IV Continuous <Continuous>  heparin   Injectable 5000 Unit(s) SubCutaneous every 8 hours  norepinephrine Infusion 0.05 MICROgram(s)/kG/Min IV Continuous <Continuous>        propofol Infusion 5 MICROgram(s)/kG/Min IV Continuous <Continuous>    aluminum hydroxide/magnesium hydroxide/simethicone Suspension 30 milliLiter(s) Oral every 4 hours PRN    ezetimibe 10 milliGRAM(s) Oral daily  rosuvastatin 40 milliGRAM(s) Oral at bedtime    chlorhexidine 0.12% Liquid 15 milliLiter(s) Oral Mucosa every 12 hours  chlorhexidine 2% Cloths 1 Application(s) Topical <User Schedule>  folic acid 1 milliGRAM(s) Oral daily        -------------------------------------------------------------------------------------------  PHYSICAL EXAM:  T(C): 36.6 (11-22-24 @ 05:27), Max: 36.9 (11-21-24 @ 20:25)  HR: 114 (11-22-24 @ 12:15) (70 - 118)  BP: 178/118 (11-22-24 @ 12:15) (110/63 - 178/118)  RR: 11 (11-22-24 @ 12:15) (11 - 32)  SpO2: 100% (11-22-24 @ 12:15) (94% - 100%)  Wt(kg): --  I&O's Summary    21 Nov 2024 07:01  -  22 Nov 2024 07:00  --------------------------------------------------------  IN: 1009.5 mL / OUT: 300 mL / NET: 709.5 mL    GENERAL: intubated and sedated  HEART: sinus tachycardia; No murmurs, rubs, or gallops.  ABDOMEN: soft  EXTREMITIES:  no edema    -------------------------------------------------------------------------------------------  LABS:                          13.5   9.69  )-----------( 176      ( 22 Nov 2024 10:04 )             39.7     11-22    131[L]  |  101  |  26[H]  ----------------------------<  343[H]  4.6   |  16[L]  |  1.25    Ca    9.2      22 Nov 2024 10:04  Phos  3.4     11-22  Mg     2.5     11-22    TPro  6.6  /  Alb  3.4  /  TBili  0.9  /  DBili  x   /  AST  95[H]  /  ALT  31  /  AlkPhos  65  11-22    PT/INR - ( 22 Nov 2024 10:04 )   PT: 13.0 sec;   INR: 1.14 ratio         PTT - ( 22 Nov 2024 10:04 )  PTT:33.0 sec  CARDIAC MARKERS ( 22 Nov 2024 10:04 )  1628 ng/L / x     / x     / x     / x     / 21.6 ng/mL  CARDIAC MARKERS ( 21 Nov 2024 06:27 )  1035 ng/L / x     / x     / x     / x     / 48.7 ng/mL  CARDIAC MARKERS ( 21 Nov 2024 01:56 )  714 ng/L / x     / x     / x     / x     / 47.3 ng/mL  CARDIAC MARKERS ( 20 Nov 2024 22:19 )  433 ng/L / x     / x     / x     / x     / 33.6 ng/mL  CARDIAC MARKERS ( 19 Nov 2024 23:19 )  29 ng/L / x     / x     / x     / x     / x              propofol Infusion 5 MICROgram(s)/kG/Min IV Continuous <Continuous>      -------------------------------------------------------------------------------------------  Cardiovascular Diagnostic Testing:    ECG:     Echo:     Stress Testing:    Cath:    -------------------------------------------------------------------------------------------

## 2024-11-22 NOTE — CONSULT NOTE ADULT - ATTENDING COMMENTS
RPM Notification: Graduate and Patient Communication  Actions: None    Total time (minutes) spent communicating with patient: 1 min    Miranda messaged via text that she graduated.   I got conformation from RN.    I messaged via text    I thanked her for allowing RPM to monitor her. I let her know to please continue with 1L at night. She has a follow up appointment 12/2. I told her she can toss the device and go ahead and log out of [employer] harish. To remove harish off of device as it can drain the battery.  
Likely embolization to septal perforators  Continue supportive care   will nadege need pacing

## 2024-11-22 NOTE — CONSULT NOTE ADULT - ASSESSMENT
74 year-old [place] female with history of CAD (s/p PCI 1998, 2006, 8/2024), HTN, HLD, T2DM, and GERD presents with intermittent chest pain at rest and on exertion for the past 3 days. Underwent POBA for pLAD ISR. EP consulted for transient symptomatic CHB.    Somewhat convoluted course. Presented with CP responsive to NTG but trop negative, concern for unstable angina. Had cath on 11/20 showing pLAD ISR for which POBA was performed. 100% stenosis of RCA was also noted with L to R collaterals. Following her first cath, she had recurrent worsening CP with new , went back for second cath which was unchanged. She became tachycardic to 120s, was found to be in euglycemic DKA and went to CCU. She was treated with insulin ggt and nitro ggt was started for CP. DKA resolved and CP was well managed with home antianginals (imdur 60 mg qD and metoprolol tartrate 25 mg BID). She was transferred to floor.    This morning, patient had abrupt decline in HR eventually resulting in CHB. She was symptomatic with lethargy and n/v with BP drop to 80/40. She received atropine, dopamine ggt, and epinephrine gtt with no effect on HR but stabilization of BP. She became increasingly somnelent and ultimately was intubated and sedated. TVP was placed at bedside though by the time wire was placed she was in sinus tachycardia at 110 bpm.    Unclear etiology of high degree AVB that has now spontaneously resolved. Lytes normal. May be necessary to bring back to cath lab again. TSH previously wnl. COVID neg. May consider obtaining lyme Ab. Repeat TTE. Hold metoprolol. QTc prolonged to 506 ms on tele.    Recommendations:  - ctm Tele  - TVP in place, pace as necessary  - repeat TTE  - consider repeat cath  - Lyme Ab  - hold Metoprolol  - hold QTc prolonging agents  - K > 4.0, Mg > 2.0

## 2024-11-22 NOTE — PROCEDURE NOTE - NSPROCNAME_GEN_A_CORE
Central Line Insertion
Transvenous Pacemaker
Arterial Puncture/Cannulation
Transvenous Pacemaker
Arterial Puncture/Cannulation

## 2024-11-22 NOTE — PROCEDURE NOTE - NSPROCDETAILS_GEN_ALL_CORE
guidewire recovered/lumen(s) aspirated and flushed/sterile dressing applied/sterile technique, catheter placed/ultrasound guidance with use of sterile gel and probe cove
location identified, draped/prepped, sterile technique used, needle inserted/introduced/positive blood return obtained via catheter/connected to a pressurized flush line/sutured in place/hemostasis with direct pressure, dressing applied/Seldinger technique/all materials/supplies accounted for at end of procedure
positive blood return obtained via catheter/connected to a pressurized flush line/sutured in place/hemostasis with direct pressure, dressing applied/Seldinger technique/all materials/supplies accounted for at end of procedure

## 2024-11-22 NOTE — PROGRESS NOTE ADULT - SUBJECTIVE AND OBJECTIVE BOX
VIKTORIA HERNÁNDEZ  MRN-[id number]  Patient is a 74y old  Female who presents with a chief complaint of chest pain (22 Nov 2024 14:22)    HPI:  74 year-old [place] female with history of CAD (s/p PCI 1998, 2006, 8/2024), HTN, HLD, T2DM, and GERD presents with intermittent chest pain at rest and on exertion for the past 3 days. Symptoms started on 11/17, pressure-like, 8/10 in intensity with radiation up the neck and jaw associated with mild nausea without vomiting, similar to her prior angina symptoms.  She took her 's NTG SL and symptoms improved within 5 minutes.  She came to ED for further evaluation as these episodes of intermittent chest pain persisted for the past couple days.  Currently, chest pain free s/p NTG SL twice since arrived (20 Nov 2024 04:16)      Hospital Course:    24 HOUR EVENTS:    REVIEW OF SYSTEMS:    CONSTITUTIONAL: No weakness, fevers or chills  EYES/ENT: No visual changes;  No vertigo or throat pain   NECK: No pain or stiffness  RESPIRATORY: No cough, wheezing, hemoptysis; No shortness of breath  CARDIOVASCULAR: No chest pain or palpitations  GASTROINTESTINAL: No abdominal or epigastric pain. No nausea, vomiting, or hematemesis; No diarrhea or constipation. No melena or hematochezia.  GENITOURINARY: No dysuria, frequency or hematuria  NEUROLOGICAL: No numbness or weakness  SKIN: No itching, rashes      ICU Vital Signs Last 24 Hrs  T(C): 37.5 (22 Nov 2024 19:00), Max: 37.5 (22 Nov 2024 19:00)  T(F): 99.5 (22 Nov 2024 19:00), Max: 99.5 (22 Nov 2024 19:00)  HR: 94 (22 Nov 2024 21:00) (70 - 118)  BP: 120/59 (22 Nov 2024 16:15) (114/64 - 178/118)  BP(mean): 85 (22 Nov 2024 16:15) (85 - 141)  ABP: 111/86 (22 Nov 2024 21:00) (80/46 - 162/64)  ABP(mean): 97 (22 Nov 2024 21:00) (58 - 156)  RR: 17 (22 Nov 2024 21:00) (4 - 24)  SpO2: 100% (22 Nov 2024 21:00) (96% - 100%)    O2 Parameters below as of 22 Nov 2024 21:00  Patient On (Oxygen Delivery Method): ventilator    O2 Concentration (%): 40      Mode: AC/ CMV (Assist Control/ Continuous Mandatory Ventilation), RR (machine): 16, TV (machine): 400, FiO2: 40, PEEP: 5, ITime: 1  CVP(mm Hg): --  CO: --  CI: --  PA: --  PA(mean): --  PA(direct): --  PCWP: --  LA: --  RA: --  SVR: --  SVRI: --  PVR: --  PVRI: --  I&O's Summary    21 Nov 2024 07:01  -  22 Nov 2024 07:00  --------------------------------------------------------  IN: 1009.5 mL / OUT: 300 mL / NET: 709.5 mL    22 Nov 2024 07:01  -  22 Nov 2024 21:42  --------------------------------------------------------  IN: 423.7 mL / OUT: 1325 mL / NET: -901.3 mL        CAPILLARY BLOOD GLUCOSE    CAPILLARY BLOOD GLUCOSE      POCT Blood Glucose.: 179 mg/dL (22 Nov 2024 17:56)      PHYSICAL EXAM:  GENERAL: No acute distress, well-developed  HEAD:  Atraumatic, Normocephalic  EYES: EOMI, PERRLA, conjunctiva and sclera clear  NECK: Supple, no lymphadenopathy, no JVD  CHEST/LUNG: CTAB; No wheezes, rales, or rhonchi  HEART: Regular rate and rhythm. Normal S1/S2. No murmurs, rubs, or gallops  ABDOMEN: Soft, non-tender, non-distended; normal bowel sounds, no organomegaly  EXTREMITIES:  2+ peripheral pulses b/l, No clubbing, cyanosis, or edema  NEUROLOGY: A&O x 3, no focal deficits  SKIN: No rashes or lesions    ============================I/O===========================   I&O's Detail    21 Nov 2024 07:01  -  22 Nov 2024 07:00  --------------------------------------------------------  IN:    dextrose 10%: 85 mL    dextrose 10%: 340 mL    dextrose 10% + sodium chloride 0.45%: 160 mL    Insulin: 27 mL    Nitroglycerin: 7.5 mL    Oral Fluid: 390 mL  Total IN: 1009.5 mL    OUT:    Voided (mL): 300 mL  Total OUT: 300 mL    Total NET: 709.5 mL      22 Nov 2024 07:01  -  22 Nov 2024 21:42  --------------------------------------------------------  IN:    Enteral Tube Flush: 135 mL    Norepinephrine: 43.7 mL    Propofol: 245 mL  Total IN: 423.7 mL    OUT:    Indwelling Catheter - Urethral (mL): 1025 mL    Voided (mL): 300 mL  Total OUT: 1325 mL    Total NET: -901.3 mL        ============================ LABS =========================                        13.5   9.69  )-----------( 176      ( 22 Nov 2024 10:04 )             39.7     11-22    131[L]  |  101  |  26[H]  ----------------------------<  343[H]  4.6   |  16[L]  |  1.25    Ca    9.2      22 Nov 2024 10:04  Phos  3.4     11-22  Mg     2.5     11-22    TPro  6.6  /  Alb  3.4  /  TBili  0.9  /  DBili  x   /  AST  95[H]  /  ALT  31  /  AlkPhos  65  11-22    Troponin T, High Sensitivity Result: 1628 ng/L (11-22-24 @ 10:04)  Troponin T, High Sensitivity Result: 1035 ng/L (11-21-24 @ 06:27)  Troponin T, High Sensitivity Result: 714 ng/L (11-21-24 @ 01:56)  Troponin T, High Sensitivity Result: 433 ng/L (11-20-24 @ 22:19)  Troponin T, High Sensitivity Result: 29 ng/L (11-19-24 @ 23:19)    CKMB Units: 21.6 ng/mL (11-22-24 @ 10:04)  CKMB Units: 48.7 ng/mL (11-21-24 @ 06:27)  CKMB Units: 47.3 ng/mL (11-21-24 @ 01:56)  CKMB Units: 33.6 ng/mL (11-20-24 @ 22:19)      CPK Mass Assay %: 7.9 % (11-21-24 @ 06:27)  CPK Mass Assay %: 7.8 % (11-21-24 @ 01:56)  CPK Mass Assay %: 7.3 % (11-20-24 @ 22:19)        LIVER FUNCTIONS - ( 22 Nov 2024 10:04 )  Alb: 3.4 g/dL / Pro: 6.6 g/dL / ALK PHOS: 65 U/L / ALT: 31 U/L / AST: 95 U/L / GGT: x           PT/INR - ( 22 Nov 2024 10:04 )   PT: 13.0 sec;   INR: 1.14 ratio         PTT - ( 22 Nov 2024 10:04 )  PTT:33.0 sec  ABG - ( 22 Nov 2024 14:50 )  pH, Arterial: 7.41  pH, Blood: x     /  pCO2: 34    /  pO2: 194   / HCO3: 22    / Base Excess: -2.4  /  SaO2: 99.6              Blood Gas Arterial, Lactate: 1.1 mmol/L (11-22-24 @ 14:50)  Blood Gas Venous - Lactate: 4.0 mmol/L (11-22-24 @ 10:15)  Blood Gas Arterial, Lactate: 0.9 mmol/L (11-21-24 @ 15:30)  Blood Gas Arterial, Lactate: 1.5 mmol/L (11-21-24 @ 11:00)  Blood Gas Arterial, Lactate: 1.8 mmol/L (11-21-24 @ 06:21)  Blood Gas Arterial, Lactate: 2.2 mmol/L (11-21-24 @ 01:50)  Lactate, Blood: 3.9 mmol/L (11-20-24 @ 22:19)    Urinalysis Basic - ( 22 Nov 2024 16:25 )    Color: Yellow / Appearance: Clear / SG: >1.030 / pH: x  Gluc: x / Ketone: 15 mg/dL  / Bili: Negative / Urobili: 0.2 mg/dL   Blood: x / Protein: Negative mg/dL / Nitrite: Negative   Leuk Esterase: Negative / RBC: 1 /HPF / WBC 0 /HPF   Sq Epi: x / Non Sq Epi: 1 /HPF / Bacteria: Negative /HPF      ======================Micro/Rad/Cardio=================  Telemtry: Reviewed   EKG: Reviewed  CXR: Reviewed  Culture: Reviewed   ======================================================  PAST MEDICAL & SURGICAL HISTORY:  HTN (hypertension)      HLD (hyperlipidemia)      DM (diabetes mellitus)      CAD (coronary artery disease)      Stented coronary artery      COVID-19      No significant past surgical history        ====================ASSESSMENT ==============  74F CAD (s/p PCI ‘98, ‘06, 8/2024), HTN, HLD, GERD p/w CP, f/w NSTEMI. LHC w/ ISR LAD s/p POBA. c/b persistent CP & euglycemic DKA req insulin & nitro in CICU. On floor, [facility] req TVP & intubation.     Plan:  ====================== NEUROLOGY=====================  #Sedation regimen  - continue propofol  - continue to monitor mental status as per protocol     propofol Infusion 30 MICROgram(s)/kG/Min (9.79 mL/Hr) IV Continuous <Continuous>    ==================== RESPIRATORY======================  #Acute respiratory failure  - intubated for airway protection during RRT  - continue to monitor SpO2 with goal >94%    Mechanical Ventilation:  Mode: AC/ CMV (Assist Control/ Continuous Mandatory Ventilation)  RR (machine): 16  TV (machine): 400  FiO2: 40  PEEP: 5  ITime: 1  MAP: 9  PIP: 19      ====================CARDIOVASCULAR==================  #NSTEMI  - 11/22 LHC: 100 pLAD ISR x1 POBA  - DAPT: ASA/Plavix  - holding GDMT while on pressors    #CHB  - RRT called for AMS & CHB  - intubated for airway protection  - TVP placed emergently on 6MONTI  - started on dopamine & epi    ===================HEMATOLOGIC/ONC ===================  - Monitor H/H and plts  - DVT PPX: HSQ    aspirin enteric coated 81 milliGRAM(s) Oral daily  clopidogrel Tablet 75 milliGRAM(s) Oral daily  heparin   Injectable 5000 Unit(s) SubCutaneous every 8 hours    ===================== RENAL =========================  No acute issues  - Continue monitoring urine output, lytes, SCr/ BUN  - replete lytes prn with goal K >4 and Mg >2    ==================== GASTROINTESTINAL===================  NPO    aluminum hydroxide/magnesium hydroxide/simethicone Suspension 30 milliLiter(s) Oral every 4 hours PRN Dyspepsia  folic acid 1 milliGRAM(s) Oral daily  sodium chloride 0.9% lock flush 10 milliLiter(s) IV Push every 1 hour PRN Pre/post blood products, medications, blood draw, and to maintain line patency    =======================    ENDOCRINE  =====================  #Hyperglycemia  - check BHB  - glucose 300s  - gap 14    ezetimibe 10 milliGRAM(s) Oral daily  insulin lispro (ADMELOG) corrective regimen sliding scale   SubCutaneous every 6 hours  rosuvastatin 40 milliGRAM(s) Oral at bedtime    ========================INFECTIOUS DISEASE================  #Afebrile  - no leukocytosis  - monitor and trend WBC and temperature curve     Patient requires continuous monitoring with bedside rhythm monitoring, pulse ox monitoring, and intermittent blood gas analysis. Care plan discussed with ICU care team. Patient remained critical and at risk for life threatening decompensation.  Patient seen, examined and plan discussed with CCU team during rounds.     I have personally provided ____ minutes of critical care time excluding time spent on separate procedures, in addition to initial critical care time provided by the CICU Attending, Dr. Dao.     By signing my name below, I, Adolph Staples, attest that this documentation has been prepared under the direction and in the presence of Haylie Pathak NP  Electronically signed: Lindsay High, 11-22-24 @ 21:42    I, Haylie Pathak NP, personally performed the services described in this documentation. all medical record entries made by the namibcheyenne were at my direction and in my presence. I have reviewed the chart and agree that the record reflects my personal performance and is accurate and complete  Electronically signed: Haylie Pathak NP       VIKTORIA HERNÁNDEZ  MRN-[id number]  Patient is a 74y old  Female who presents with a chief complaint of chest pain (22 Nov 2024 14:22)    HPI:  74 year-old [place] female with history of CAD (s/p PCI 1998, 2006, 8/2024), HTN, HLD, T2DM, and GERD presents with intermittent chest pain at rest and on exertion for the past 3 days. Symptoms started on 11/17, pressure-like, 8/10 in intensity with radiation up the neck and jaw associated with mild nausea without vomiting, similar to her prior angina symptoms.  She took her 's NTG SL and symptoms improved within 5 minutes.  She came to ED for further evaluation as these episodes of intermittent chest pain persisted for the past couple days.  Currently, chest pain free s/p NTG SL twice since arrived (20 Nov 2024 04:16)      Hospital Course:    24 HOUR EVENTS:    REVIEW OF SYSTEMS:  Intubated and sedated       ICU Vital Signs Last 24 Hrs  T(C): 37.5 (22 Nov 2024 19:00), Max: 37.5 (22 Nov 2024 19:00)  T(F): 99.5 (22 Nov 2024 19:00), Max: 99.5 (22 Nov 2024 19:00)  HR: 94 (22 Nov 2024 21:00) (70 - 118)  BP: 120/59 (22 Nov 2024 16:15) (114/64 - 178/118)  BP(mean): 85 (22 Nov 2024 16:15) (85 - 141)  ABP: 111/86 (22 Nov 2024 21:00) (80/46 - 162/64)  ABP(mean): 97 (22 Nov 2024 21:00) (58 - 156)  RR: 17 (22 Nov 2024 21:00) (4 - 24)  SpO2: 100% (22 Nov 2024 21:00) (96% - 100%)    O2 Parameters below as of 22 Nov 2024 21:00  Patient On (Oxygen Delivery Method): ventilator    O2 Concentration (%): 40      Mode: AC/ CMV (Assist Control/ Continuous Mandatory Ventilation), RR (machine): 16, TV (machine): 400, FiO2: 40, PEEP: 5, ITime: 1  CVP(mm Hg): --  CO: --  CI: --  PA: --  PA(mean): --  PA(direct): --  PCWP: --  LA: --  RA: --  SVR: --  SVRI: --  PVR: --  PVRI: --  I&O's Summary    21 Nov 2024 07:01  -  22 Nov 2024 07:00  --------------------------------------------------------  IN: 1009.5 mL / OUT: 300 mL / NET: 709.5 mL    22 Nov 2024 07:01  -  22 Nov 2024 21:42  --------------------------------------------------------  IN: 423.7 mL / OUT: 1325 mL / NET: -901.3 mL        CAPILLARY BLOOD GLUCOSE    CAPILLARY BLOOD GLUCOSE      POCT Blood Glucose.: 179 mg/dL (22 Nov 2024 17:56)      PHYSICAL EXAM:  GENERAL: No acute distress, well-developed  HEAD:  Atraumatic, Normocephalic  EYES: EOMI, PERRLA, conjunctiva and sclera clear  NECK: Supple, no lymphadenopathy, no JVD  CHEST/LUNG: CTAB; No wheezes, rales, or rhonchi  HEART: Regular rate and rhythm. Normal S1/S2. No murmurs, rubs, or gallops  ABDOMEN: Soft, non-tender, non-distended; normal bowel sounds, no organomegaly  EXTREMITIES:  2+ peripheral pulses b/l, No clubbing, cyanosis, or edema  NEUROLOGY: sedated   SKIN: No rashes or lesions    ============================I/O===========================   I&O's Detail    21 Nov 2024 07:01  -  22 Nov 2024 07:00  --------------------------------------------------------  IN:    dextrose 10%: 85 mL    dextrose 10%: 340 mL    dextrose 10% + sodium chloride 0.45%: 160 mL    Insulin: 27 mL    Nitroglycerin: 7.5 mL    Oral Fluid: 390 mL  Total IN: 1009.5 mL    OUT:    Voided (mL): 300 mL  Total OUT: 300 mL    Total NET: 709.5 mL      22 Nov 2024 07:01  -  22 Nov 2024 21:42  --------------------------------------------------------  IN:    Enteral Tube Flush: 135 mL    Norepinephrine: 43.7 mL    Propofol: 245 mL  Total IN: 423.7 mL    OUT:    Indwelling Catheter - Urethral (mL): 1025 mL    Voided (mL): 300 mL  Total OUT: 1325 mL    Total NET: -901.3 mL        ============================ LABS =========================                        13.5   9.69  )-----------( 176      ( 22 Nov 2024 10:04 )             39.7     11-22    131[L]  |  101  |  26[H]  ----------------------------<  343[H]  4.6   |  16[L]  |  1.25    Ca    9.2      22 Nov 2024 10:04  Phos  3.4     11-22  Mg     2.5     11-22    TPro  6.6  /  Alb  3.4  /  TBili  0.9  /  DBili  x   /  AST  95[H]  /  ALT  31  /  AlkPhos  65  11-22    Troponin T, High Sensitivity Result: 1628 ng/L (11-22-24 @ 10:04)  Troponin T, High Sensitivity Result: 1035 ng/L (11-21-24 @ 06:27)  Troponin T, High Sensitivity Result: 714 ng/L (11-21-24 @ 01:56)  Troponin T, High Sensitivity Result: 433 ng/L (11-20-24 @ 22:19)  Troponin T, High Sensitivity Result: 29 ng/L (11-19-24 @ 23:19)    CKMB Units: 21.6 ng/mL (11-22-24 @ 10:04)  CKMB Units: 48.7 ng/mL (11-21-24 @ 06:27)  CKMB Units: 47.3 ng/mL (11-21-24 @ 01:56)  CKMB Units: 33.6 ng/mL (11-20-24 @ 22:19)      CPK Mass Assay %: 7.9 % (11-21-24 @ 06:27)  CPK Mass Assay %: 7.8 % (11-21-24 @ 01:56)  CPK Mass Assay %: 7.3 % (11-20-24 @ 22:19)        LIVER FUNCTIONS - ( 22 Nov 2024 10:04 )  Alb: 3.4 g/dL / Pro: 6.6 g/dL / ALK PHOS: 65 U/L / ALT: 31 U/L / AST: 95 U/L / GGT: x           PT/INR - ( 22 Nov 2024 10:04 )   PT: 13.0 sec;   INR: 1.14 ratio         PTT - ( 22 Nov 2024 10:04 )  PTT:33.0 sec  ABG - ( 22 Nov 2024 14:50 )  pH, Arterial: 7.41  pH, Blood: x     /  pCO2: 34    /  pO2: 194   / HCO3: 22    / Base Excess: -2.4  /  SaO2: 99.6              Blood Gas Arterial, Lactate: 1.1 mmol/L (11-22-24 @ 14:50)  Blood Gas Venous - Lactate: 4.0 mmol/L (11-22-24 @ 10:15)  Blood Gas Arterial, Lactate: 0.9 mmol/L (11-21-24 @ 15:30)  Blood Gas Arterial, Lactate: 1.5 mmol/L (11-21-24 @ 11:00)  Blood Gas Arterial, Lactate: 1.8 mmol/L (11-21-24 @ 06:21)  Blood Gas Arterial, Lactate: 2.2 mmol/L (11-21-24 @ 01:50)  Lactate, Blood: 3.9 mmol/L (11-20-24 @ 22:19)    Urinalysis Basic - ( 22 Nov 2024 16:25 )    Color: Yellow / Appearance: Clear / SG: >1.030 / pH: x  Gluc: x / Ketone: 15 mg/dL  / Bili: Negative / Urobili: 0.2 mg/dL   Blood: x / Protein: Negative mg/dL / Nitrite: Negative   Leuk Esterase: Negative / RBC: 1 /HPF / WBC 0 /HPF   Sq Epi: x / Non Sq Epi: 1 /HPF / Bacteria: Negative /HPF      ======================Micro/Rad/Cardio=================  Telemtry: Reviewed   EKG: Reviewed  CXR: Reviewed  Culture: Reviewed   ======================================================  PAST MEDICAL & SURGICAL HISTORY:  HTN (hypertension)      HLD (hyperlipidemia)      DM (diabetes mellitus)      CAD (coronary artery disease)      Stented coronary artery      COVID-19      No significant past surgical history        ====================ASSESSMENT ==============  74F CAD (s/p PCI ‘98, ‘06, 8/2024), HTN, HLD, GERD p/w CP, f/w NSTEMI. LHC w/ ISR LAD s/p POBA. c/b persistent CP & euglycemic DKA req insulin & nitro in CICU. On floor, [facility] req TVP & intubation.     Plan:  ====================== NEUROLOGY=====================  #Sedation regimen  - continue propofol  - continue to monitor mental status as per protocol   -will wean propofol in the AM     propofol Infusion 30 MICROgram(s)/kG/Min (9.79 mL/Hr) IV Continuous <Continuous>    ==================== RESPIRATORY======================  #Acute respiratory failure  - intubated for airway protection during RRT  - continue to monitor SpO2 with goal >94%  -SBT in the AM     Mechanical Ventilation:  Mode: AC/ CMV (Assist Control/ Continuous Mandatory Ventilation)  RR (machine): 16  TV (machine): 400  FiO2: 40  PEEP: 5  ITime: 1  MAP: 9  PIP: 19      ====================CARDIOVASCULAR==================  #NSTEMI  - 11/22 LHC: 100 pLAD ISR x1 POBA  - DAPT: ASA/Plavix  - holding GDMT while on pressors    #CHB  - RRT called for AMS & CHB  - intubated for airway protection  - TVP placed emergently on 6MONTI  -Holding AV archie blockers   - f/u EP recs     ===================HEMATOLOGIC/ONC ===================  - Monitor H/H and plts  - DVT PPX: HSQ    aspirin enteric coated 81 milliGRAM(s) Oral daily  clopidogrel Tablet 75 milliGRAM(s) Oral daily  heparin   Injectable 5000 Unit(s) SubCutaneous every 8 hours    ===================== RENAL =========================  No acute issues  - Continue monitoring urine output, lytes, SCr/ BUN  - replete lytes prn with goal K >4 and Mg >2    ==================== GASTROINTESTINAL===================  NPO    aluminum hydroxide/magnesium hydroxide/simethicone Suspension 30 milliLiter(s) Oral every 4 hours PRN Dyspepsia  folic acid 1 milliGRAM(s) Oral daily  sodium chloride 0.9% lock flush 10 milliLiter(s) IV Push every 1 hour PRN Pre/post blood products, medications, blood draw, and to maintain line patency    =======================    ENDOCRINE  =====================  #Hyperglycemia  - check BHB  - glucose 300s  - gap 14    ezetimibe 10 milliGRAM(s) Oral daily  insulin lispro (ADMELOG) corrective regimen sliding scale   SubCutaneous every 6 hours  rosuvastatin 40 milliGRAM(s) Oral at bedtime    ========================INFECTIOUS DISEASE================  #Afebrile  - no leukocytosis  - monitor and trend WBC and temperature curve     Patient requires continuous monitoring with bedside rhythm monitoring, pulse ox monitoring, and intermittent blood gas analysis. Care plan discussed with ICU care team. Patient remained critical and at risk for life threatening decompensation.  Patient seen, examined and plan discussed with CCU team during rounds.     I have personally provided __35__ minutes of critical care time excluding time spent on separate procedures, in addition to initial critical care time provided by the CICU Attending, Dr. Dao.     By signing my name below, I, Adolph Staples, attest that this documentation has been prepared under the direction and in the presence of Haylie Pathak NP  Electronically signed: Lindsay High, 11-22-24 @ 21:42    I, Haylie Pathak NP, personally performed the services described in this documentation. all medical record entries made by the namibe were at my direction and in my presence. I have reviewed the chart and agree that the record reflects my personal performance and is accurate and complete  Electronically signed: Haylie Pathak NP

## 2024-11-22 NOTE — PROGRESS NOTE ADULT - SUBJECTIVE AND OBJECTIVE BOX
SUBJECTIVE / OVERNIGHT EVENTS:  Today is hospital day 2d. Patient RRT . Code blue today morning. Downgraded from [facility] yesterday    HPI:  74 year-old [place] female with history of CAD (s/p PCI 1998, 2006, 8/2024), HTN, HLD, T2DM, and GERD presents with intermittent chest pain at rest and on exertion for the past 3 days. Symptoms started on 11/17, pressure-like, 8/10 in intensity with radiation up the neck and jaw associated with mild nausea without vomiting, similar to her prior angina symptoms.  She took her 's NTG SL and symptoms improved within 5 minutes.  She came to ED for further evaluation as these episodes of intermittent chest pain persisted for the past couple days.  Currently, chest pain free s/p NTG SL twice since arrived (20 Nov 2024 04:16)    MEDICATIONS  (STANDING):  aspirin enteric coated 81 milliGRAM(s) Oral daily  chlorhexidine 0.12% Liquid 15 milliLiter(s) Oral Mucosa every 12 hours  chlorhexidine 2% Cloths 1 Application(s) Topical <User Schedule>  clopidogrel Tablet 75 milliGRAM(s) Oral daily  DOPamine Infusion 5 MICROgram(s)/kG/Min (10.2 mL/Hr) IV Continuous <Continuous>  ezetimibe 10 milliGRAM(s) Oral daily  folic acid 1 milliGRAM(s) Oral daily  heparin   Injectable 5000 Unit(s) SubCutaneous every 8 hours  insulin lispro (ADMELOG) corrective regimen sliding scale   SubCutaneous every 6 hours  norepinephrine Infusion 0.05 MICROgram(s)/kG/Min (5.1 mL/Hr) IV Continuous <Continuous>  propofol Infusion 30 MICROgram(s)/kG/Min (9.79 mL/Hr) IV Continuous <Continuous>  rosuvastatin 40 milliGRAM(s) Oral at bedtime    MEDICATIONS  (PRN):  aluminum hydroxide/magnesium hydroxide/simethicone Suspension 30 milliLiter(s) Oral every 4 hours PRN Dyspepsia    HOME MEDICATIONS:  Aspirin Enteric Coated 81 mg oral delayed release tablet: 1 tab(s) orally once a day  Cardiac Rehab: 2-3 times a week for 12 weeks  clopidogrel 75 mg oral tablet: 1 tab(s) orally once a day  folic acid: 1 milligram(s) orally once a day  isosorbide mononitrate 60 mg oral tablet, extended release: 2 tab(s) orally once a day  Jardiance 25 mg oral tablet: 1 tab(s) orally once a day  losartan 25 mg oral tablet: 1 tab(s) orally once a day hold for sbp&lt;90  metoprolol tartrate 100 mg oral tablet: 1 tab(s) orally 2 times a day hold for sbp&lt;90, HR &lt;55  NovoLOG FlexPen 100 units/mL injectable solution: 20 international unit(s) injectable 3 times a day  Ranexa 500 mg oral tablet, extended release: 1 tab(s) orally 2 times a day  rosuvastatin 40 mg oral tablet: 1 tab(s) orally once a day (at bedtime)  Tresiba 100 units/mL subcutaneous solution: 10 unit(s) subcutaneous once a day (at bedtime)  Zetia 10 mg oral tablet: 1 tab(s) orally once a day    PHYSICAL EXAM  Vital Signs Last 24 Hrs  T(C): 36.6 (22 Nov 2024 05:27), Max: 36.9 (21 Nov 2024 20:25)  T(F): 97.9 (22 Nov 2024 05:27), Max: 98.4 (21 Nov 2024 20:25)  HR: 95 (22 Nov 2024 13:45) (70 - 118)  BP: 115/66 (22 Nov 2024 13:45) (110/63 - 178/118)  BP(mean): 85 (22 Nov 2024 13:45) (76 - 141)  RR: 15 (22 Nov 2024 13:45) (11 - 32)  SpO2: 100% (22 Nov 2024 13:45) (94% - 100%)    Parameters below as of 22 Nov 2024 13:00  Patient On (Oxygen Delivery Method): ventilator    O2 Concentration (%): 40    11-21-24 @ 07:01  -  11-22-24 @ 07:00  --------------------------------------------------------  IN: 1009.5 mL / OUT: 300 mL / NET: 709.5 mL      LABS:                        13.5   9.69  )-----------( 176      ( 22 Nov 2024 10:04 )             39.7     11-22    131[L]  |  101  |  26[H]  ----------------------------<  343[H]  4.6   |  16[L]  |  1.25    Ca    9.2      22 Nov 2024 10:04  Phos  3.4     11-22  Mg     2.5     11-22    TPro  6.6  /  Alb  3.4  /  TBili  0.9  /  DBili  x   /  AST  95[H]  /  ALT  31  /  AlkPhos  65  11-22    PT/INR - ( 22 Nov 2024 10:04 )   PT: 13.0 sec;   INR: 1.14 ratio         PTT - ( 22 Nov 2024 10:04 )  PTT:33.0 sec  CARDIAC MARKERS ( 22 Nov 2024 10:04 )  x     / x     / x     / x     / 21.6 ng/mL  CARDIAC MARKERS ( 21 Nov 2024 06:27 )  x     / x     / x     / x     / 48.7 ng/mL  CARDIAC MARKERS ( 21 Nov 2024 01:56 )  x     / x     / x     / x     / 47.3 ng/mL  CARDIAC MARKERS ( 20 Nov 2024 22:19 )  x     / x     / x     / x     / 33.6 ng/mL      Urinalysis Basic - ( 22 Nov 2024 10:04 )    Color: x / Appearance: x / SG: x / pH: x  Gluc: 343 mg/dL / Ketone: x  / Bili: x / Urobili: x   Blood: x / Protein: x / Nitrite: x   Leuk Esterase: x / RBC: x / WBC x   Sq Epi: x / Non Sq Epi: x / Bacteria: x            RADIOLOGY & ADDITIONAL TESTS:  EKG  12 Lead ECG:   Systolic  mmHg    Diastolic BP 52 mmHg    Ventricular Rate 112 BPM    Atrial Rate 112 BPM    P-R Interval 174 ms    QRS Duration 92 ms    Q-T Interval 346 ms    QTC Calculation(Bazett) 472 ms    P Axis 52 degrees    R Axis 6 degrees    T [id number] degrees    Diagnosis Line SINUS TACHYCARDIA  LEFT VENTRICULAR HYPERTROPHY WITH REPOLARIZATION ABNORMALITY  ABNORMAL ECG  Confirmed by DILLAN HERNANDEZ SHAHRYAR G ([id number]) on 11/22/2024 10:05:23 AM (11-21-24 @ 06:42)  12 Lead ECG:   Systolic  mmHg    Diastolic BP 73 mmHg    Ventricular Rate 107 BPM    Atrial Rate 107 BPM    P-R Interval 164 ms    QRS Duration 126 ms    Q-T Interval 382 ms    QTC Calculation(Bazett) 509 ms    P Axis 69 degrees    R Axis -11 degrees    T Axis 102 degrees    Diagnosis Line SINUS TACHYCARDIA  LEFT BUNDLE BRANCH BLOCK  ABNORMAL ECG  WHEN COMPARED WITH ECG OF 11/20/24 18:05  INCREASED RATE  Confirmed by DILLAN HERNANDEZ SHAHRYAR G ([id number]) on 11/22/2024 10:35:22 AM (11-21-24 @ 01:16)    Xray Chest 1 View- PORTABLE-Urgent:   ACC: [id number] EXAM:  XR CHEST PORTABLE URGENT 1V   ORDERED BY:  YANDEL HANSEN     PROCEDURE DATE:  11/19/2024          INTERPRETATION:  HISTORY: Chest pain    TECHNIQUE: A single AP view of the chest was obtained.    COMPARISON: 3/18/2021    FINDINGS:  The cardiac silhouette is normal in size. There are no focal   consolidations or pleural effusions. The hilar and mediastinal structures   appear unremarkable. The osseous structures are intact.    IMPRESSION: Clear lungs.    --- End of Report ---            MARIAH HERNANDEZ MD; Attending Radiologist  This document has been electronically signed. Nov 19 2024  2:10PM (11-19-24 @ 13:04)

## 2024-11-22 NOTE — CHART NOTE - NSCHARTNOTEFT_GEN_A_CORE
Notified by RN that patient noted on telemetry to be sustaining sinus bradycardia 50s bpm, no complaints at that time. Daily labs ordered STAT, then patient observed with shaking and unresponsiveness, coinciding with HR 30s on telemetry. RRT called, Cardiology called, CICU called. Patient complaining of nausea and vomiting, denied chest pain, shortness of breath, abdominal pain, syncope, but reports "not feeling so great". 12 lead showed complete heart block. Patient intubated and paced, pending transfer to CICU.       D/w attending Dr. Plata via teams and at bedside during rapid    Vern Akhtar PA-C  [facility] Department of Medicine  Reachable on MS Teams Notified by RN that patient noted on telemetry to be sustaining sinus bradycardia 50s bpm, no complaints at that time. Daily labs ordered STAT, then patient observed with shaking and unresponsiveness, coinciding with HR 30s on telemetry. RRT called, Cardiology called, CICU called. Patient complaining of nausea and vomiting, denied chest pain, shortness of breath, abdominal pain, syncope, but reports "not feeling so great". 12 lead showed complete heart block. Patient intubated and paced, pending transfer to CICU.       D/w attending Dr. Plata via teams and at bedside during rapid. Daughter/emergency contact Alexei ([phone]) updated via phone call, at Alexei's request also updated patient's granddaughter ([phone])    Vern Akhtar PA-C  [facility] Department of Medicine  Reachable on MS Teams

## 2024-11-22 NOTE — PROGRESS NOTE ADULT - CRITICAL CARE ATTENDING COMMENT
patient noted on telemetry to be sustaining sinus bradycardia 50s bpm, no complaints at that time. Daily labs ordered STAT, then patient observed with shaking and unresponsiveness, coinciding with HR 30s on telemetry. RRT called. VS: T 98.7, , O2 100% on RA, HR 30, BP 80/40. Patient lethargic and vomiting, but awake and alert. On exam, rhythm regular but bradycardic. Atropine 1 mg given initially. Improvement in BP transiently however subsequently hypotensive again. Epinephrine gtt started. EKG demonstrating complete heart block. Cardiology/CCU consulted. Dopamine gtt initiated. Patient continued to have symptomatic bradycardia. Fent 50mcg, versed 2mg IVP given prior to transcutaneous pacing. Pacer set to 54 mAmp and 70 bpm for adequate capture. Patient lethargic, not protecting airway to safely place TVP. Briefly unable to palpate pulse. Code Blue called. Patient awoken by compressions immediately, able to palpate pulse and obtain BP. Patient intubated for safety fo placing TVP. Patient hypotensive post intubation, Levophed and propofol initiated. TVP placed by Cardiology. Patient weaned off epi gtt and dopamine, transferred to CCU. Daughter/emergency contact Alexei ([phone]) updated via phone call, at Alexei's request also updated patient's granddaughter ([phone])

## 2024-11-22 NOTE — CHART NOTE - NSCHARTNOTEFT_GEN_A_CORE
CICU Accept Note    Transfer from: [address] [place] [address]    Accepting physician: Dr. Kris Dao    HPI: 74F [place] PMHx CAD (s/p PCI 1998, 2006, 8/2024), HTN, HLD, DM, GERD who presented with chest pain. Symptoms started on 11/17, pressure-like, 8/10 in intensity with radiation up the neck and jaw a/w nausea.  She took her 's NTG SL, with improvement in symptoms. Admitted 11/19 for NSTEMI.   11/21 LHC s/p POBA to previous pLAD ERI which had ISR. While recovering in [facility], patient developed persistent chest pain radiating to jaw with new LBBB. She went back to cath lab, repeat LHC showing patent pLAD and otherwise no new lesions   In [facility], she became tachycardic to 120s with SBP 160s. Urgent TTE showing newly reduced EF, no beta blockers given, received ranolazine and imdur. She also received colchicine. Labs drawn demonstrated DKA with an anion gap of 24, lactate 3.9, BHB 3.7, and glucose in the 200s. She was started on Nitro gtt for chest pain and HTN.     CICU COURSE: Patient was admitted to the CICU 11/21 for further management post cath & for DKA. (Takes SGLT2 inhibitors at home, likely euglycemic DKA). Started on Insulin gtt for DKA protocol. Nitro gtt was ultimately weaned off. Started on beta blockers which she tolerated. She was successfully transitioned off insulin gtt, s/p gap closure x2. Patient was transferred to [facility] on 11/21 in the evening under medicine & private cardiology (Adilson).     HOSPITAL COURSE: RRT called in the morning on 11/22 for bradycardia. On telemetry, patient became rodriguez from 80s to 50s to 30s. Patient was found to be in altered and vomiting. Cardiology & CICU re-consulted for further management. RRT escalated to Code Blue, however patient never lost a pulse. She was subsequently intubated during the RRT for airway protection given AMS & vomiting, was being transcutaneously paced, RIJ TVP was placed emergently at bedside on [facility], and she was transferred back to CICU for further management.      ICU Vital Signs Last 24 Hrs  T(C): 36.6 (22 Nov 2024 05:27), Max: 36.9 (21 Nov 2024 20:25)  T(F): 97.9 (22 Nov 2024 05:27), Max: 98.4 (21 Nov 2024 20:25)  HR: 118 (22 Nov 2024 12:08) (70 - 118)  BP: 142/104 (22 Nov 2024 12:08) (110/63 - 144/65)  BP(mean): 118 (22 Nov 2024 12:08) (76 - 118)  ABP: 121/79 (21 Nov 2024 15:00) (102/79 - 137/64)  ABP(mean): 100 (21 Nov 2024 15:00) (82 - 100)  RR: 20 (22 Nov 2024 12:08) (18 - 32)  SpO2: 99% (22 Nov 2024 12:08) (94% - 100%)    O2 Parameters below as of 22 Nov 2024 12:08  Patient On (Oxygen Delivery Method): ventilator    O2 Concentration (%): 40      ASSESSMENT & PLAN:   Assessment:     Plan:  NEURO  - continue to monitor mental status as per protocol   [id number]    Injectable 50 MICROGram(s) IV Push once  midazolam Injectable 2 milliGRAM(s) IV Push once  propofol Infusion 5 MICROgram(s)/kG/Min (1.63 mL/Hr) IV Continuous <Continuous>    RESPIRATORY  - continue to monitor SpO2 with goal >94%    Mechanical Vent: Mode: AC/ CMV (Assist Control/ Continuous Mandatory Ventilation)  RR (machine): 15  TV (machine): 400  FiO2: 100  PEEP: 5  ITime: 1  MAP: 9  PIP: 24      CARDIO  DOPamine Infusion 5 MICROgram(s)/kG/Min (10.2 mL/Hr) IV Continuous <Continuous>  EPINEPHrine    Infusion 2 MICROgram(s)/kG/Min (408 mL/Hr) IV Continuous <Continuous>  norepinephrine Infusion 0.05 MICROgram(s)/kG/Min (5.1 mL/Hr) IV Continuous <Continuous>    HEMATO  - Monitor H/H and plts  - DVT PPX:   aspirin enteric coated 81 milliGRAM(s) Oral daily  clopidogrel Tablet 75 milliGRAM(s) Oral daily  heparin   Injectable 5000 Unit(s) SubCutaneous every 8 hours    RENAL/  - Continue monitoring urine output, lytes, SCr/ BUN  - replete lytes prn with goal K >4 and Mg >2    GI  aluminum hydroxide/magnesium hydroxide/simethicone Suspension 30 milliLiter(s) Oral every 4 hours PRN Dyspepsia  folic acid 1 milliGRAM(s) Oral daily    ENDO  ezetimibe 10 milliGRAM(s) Oral daily  rosuvastatin 40 milliGRAM(s) Oral at bedtime    ID    - monitor and trend WBC and temperature curve     Dispo: Maintain in ICU.    Patient requires continuous monitoring with bedside rhythm monitoring, arterial line, pulse oximetry, ventilator monitoring and intermittent blood gas analysis.  Care plan discussed with ICU care team.  Patient remained critical; required more than usual post op care; I have spent 35 minutes providing non-routine post op care, in addition to initial critical time provided by CICU attending _____ , re-evaluated multiple times during the day.    Peace Mckay PA-C CICU Accept Note    Transfer from: [address] [place] [address]    Accepting physician: Dr. Kris Dao    HPI: 74F [place] PMHx CAD (s/p PCI 1998, 2006, 8/2024), HTN, HLD, DM, GERD who presented with chest pain. Symptoms started on 11/17, pressure-like, 8/10 in intensity with radiation up the neck and jaw a/w nausea.  She took her 's NTG SL, with improvement in symptoms. Admitted 11/19 for NSTEMI.   11/21 LHC s/p POBA to previous pLAD ERI which had ISR. While recovering in [facility], patient developed persistent chest pain radiating to jaw with new LBBB. She went back to cath lab, repeat LHC showing patent pLAD and otherwise no new lesions   In [facility], she became tachycardic to 120s with SBP 160s. Urgent TTE showing newly reduced EF, no beta blockers given, received ranolazine and imdur. She also received colchicine. Labs drawn demonstrated DKA with an anion gap of 24, lactate 3.9, BHB 3.7, and glucose in the 200s. She was started on Nitro gtt for chest pain and HTN.     CICU COURSE: Patient was admitted to the CICU 11/21 for further management post cath & for DKA. (Takes SGLT2 inhibitors at home, likely euglycemic DKA). Started on Insulin gtt for DKA protocol. Nitro gtt was ultimately weaned off. Started on beta blockers which she tolerated. She was successfully transitioned off insulin gtt, s/p gap closure x2. Patient was transferred to [facility] on 11/21 in the evening under medicine & private cardiology ([facility]ilson).     HOSPITAL COURSE: RRT called in the morning on 11/22 for bradycardia. On telemetry, patient became rodriguez from 80s to 50s to 30s. Patient was found to be in altered and vomiting. Cardiology & CICU re-consulted for further management. RRT escalated to Code Blue, however patient never lost a pulse. She was subsequently intubated during the RRT for airway protection given AMS & vomiting, was being transcutaneously paced, RIJ TVP was placed emergently at bedside on [facility], and she was transferred back to CICU for further management.      ICU Vital Signs Last 24 Hrs  T(C): 36.6 (22 Nov 2024 05:27), Max: 36.9 (21 Nov 2024 20:25)  T(F): 97.9 (22 Nov 2024 05:27), Max: 98.4 (21 Nov 2024 20:25)  HR: 118 (22 Nov 2024 12:08) (70 - 118)  BP: 142/104 (22 Nov 2024 12:08) (110/63 - 144/65)  BP(mean): 118 (22 Nov 2024 12:08) (76 - 118)  ABP: 121/79 (21 Nov 2024 15:00) (102/79 - 137/64)  ABP(mean): 100 (21 Nov 2024 15:00) (82 - 100)  RR: 20 (22 Nov 2024 12:08) (18 - 32)  SpO2: 99% (22 Nov 2024 12:08) (94% - 100%)    O2 Parameters below as of 22 Nov 2024 12:08  Patient On (Oxygen Delivery Method): ventilator    O2 Concentration (%): 40    ASSESSMENT & PLAN:   Assessment:  74F CAD (s/p PCI ‘98, ‘06, 8/2024), HTN, HLD, GERD p/w CP, f/w NSTEMI. LHC w/ ISR LAD s/p POBA. c/b persistent CP & euglycemic DKA req insulin & nitro in CICU. On floor, [facility] req TVP & intubation.     Plan:  NEURO  #Sedation regimen  - continue propofol  - continue to monitor mental status as per protocol     RESPIRATORY  #Acute respiratory failure  - intubated for airway protection during RRT  - continue to monitor SpO2 with goal >94%    Mechanical Vent: Mode: AC/ CMV (Assist Control/ Continuous Mandatory Ventilation)  RR (machine): 15  TV (machine): 400  FiO2: 100  PEEP: 5  ITime: 1  MAP: 9  PIP: 24    CARDIO  #NSTEMI  - 11/22 LHC: 100 pLAD ISR x1 POBA  - DAPT: ASA/Plavix  - holding GDMT while on pressors    #CHB  - RRT called for AMS & [facility]  - intubated for airway protection  - TVP placed emergently on [facility]  - started on dopamine & epi    HEMATO  - Monitor H/H and plts  - DVT PPX: HSQ    RENAL/  No acute issues  - Continue monitoring urine output, lytes, SCr/ BUN  - replete lytes prn with goal K >4 and Mg >2    GI  NPO    ENDO  #Hyperglycemia  - check BHB  - glucose 300s  - gap 14    ID  #Afebrile  - no leukocytosis  - monitor and trend WBC and temperature curve     Dispo: Maintain in ICU.    Patient requires continuous monitoring with bedside rhythm monitoring, arterial line, pulse oximetry, ventilator monitoring and intermittent blood gas analysis.  Care plan discussed with ICU care team.  Patient remained critical; required more than usual post op care; I have spent 35 minutes providing non-routine post op care, in addition to initial critical time provided by CICU attending _____ , re-evaluated multiple times during the day.    Peace Mckay PA-C

## 2024-11-22 NOTE — RAPID RESPONSE TEAM SUMMARY - NSOTHERINTERVENTIONSRRT_GEN_ALL_CORE
Atropine 1 mg given initially. Improvement in BP transiently however subsequently hypotensive again. Epinephrine gtt started. Cardiology/CCU consulted. Dopamine gtt initiated. Patient continued to have symptomatic bradycardia. Fent 50mcg, versed 2mg IVP given prior to transcutaneous pacing. Pacer set to 54 mAmp and 70 bpm for adequate capture. Patient lethargic, not protecting airway to safely place TVP. Briefly unable to palpate pulse. Code Blue called. Patient awoken by compressions immediately, able to palpate pulse and obtain BP. Patient intubated for safety fo placing TVP. Patient hypotensive post intubation, Levophed and propofol initiated. Patient weaned off epi gtt and dopamine, transferred to CCU. Family called by primary team.  Atropine 1 mg given initially. Improvement in BP transiently however subsequently hypotensive again. Epinephrine gtt started. EKG demonstrating complete heart block. Cardiology/CCU consulted. Dopamine gtt initiated. Patient continued to have symptomatic bradycardia. Fent 50mcg, versed 2mg IVP given prior to transcutaneous pacing. Pacer set to 54 mAmp and 70 bpm for adequate capture. Patient lethargic, not protecting airway to safely place TVP. Briefly unable to palpate pulse. Code Blue called. Patient awoken by compressions immediately, able to palpate pulse and obtain BP. Patient intubated for safety fo placing TVP. Patient hypotensive post intubation, Levophed and propofol initiated. Patient weaned off epi gtt and dopamine, transferred to CCU. Family called by primary team.  Atropine 1 mg given initially. Improvement in BP transiently however subsequently hypotensive again. Epinephrine gtt started. EKG demonstrating complete heart block. Cardiology/CCU consulted. Dopamine gtt initiated. Patient continued to have symptomatic bradycardia. Fent 50mcg, versed 2mg IVP given prior to transcutaneous pacing. Pacer set to 54 mAmp and 70 bpm for adequate capture. Patient lethargic, not protecting airway to safely place TVP. Briefly unable to palpate pulse. Code Blue called. Patient awoken by compressions immediately, able to palpate pulse and obtain BP. Patient intubated for safety fo placing TVP. Patient hypotensive post intubation, Levophed and propofol initiated. TVP placed by Cardiology. Patient weaned off epi gtt and dopamine, transferred to CCU. Family called by primary team.

## 2024-11-23 ENCOUNTER — RESULT REVIEW (OUTPATIENT)
Age: 74
End: 2024-11-23

## 2024-11-23 LAB
ALBUMIN SERPL ELPH-MCNC: 3.5 G/DL — SIGNIFICANT CHANGE UP (ref 3.3–5)
ALP SERPL-CCNC: 66 U/L — SIGNIFICANT CHANGE UP (ref 40–120)
ALT FLD-CCNC: 30 U/L — SIGNIFICANT CHANGE UP (ref 10–45)
ANION GAP SERPL CALC-SCNC: 16 MMOL/L — SIGNIFICANT CHANGE UP (ref 5–17)
APTT BLD: 135.9 SEC — CRITICAL HIGH (ref 24.5–35.6)
APTT BLD: 136.6 SEC — CRITICAL HIGH (ref 24.5–35.6)
AST SERPL-CCNC: 69 U/L — HIGH (ref 10–40)
BASOPHILS # BLD AUTO: 0.02 K/UL — SIGNIFICANT CHANGE UP (ref 0–0.2)
BASOPHILS NFR BLD AUTO: 0.3 % — SIGNIFICANT CHANGE UP (ref 0–2)
BILIRUB SERPL-MCNC: 0.6 MG/DL — SIGNIFICANT CHANGE UP (ref 0.2–1.2)
BUN SERPL-MCNC: 26 MG/DL — HIGH (ref 7–23)
CALCIUM SERPL-MCNC: 9.3 MG/DL — SIGNIFICANT CHANGE UP (ref 8.4–10.5)
CHLORIDE SERPL-SCNC: 102 MMOL/L — SIGNIFICANT CHANGE UP (ref 96–108)
CO2 SERPL-SCNC: 17 MMOL/L — LOW (ref 22–31)
CREAT SERPL-MCNC: 1.33 MG/DL — HIGH (ref 0.5–1.3)
EGFR: 42 ML/MIN/1.73M2 — LOW
EOSINOPHIL # BLD AUTO: 0.12 K/UL — SIGNIFICANT CHANGE UP (ref 0–0.5)
EOSINOPHIL NFR BLD AUTO: 1.5 % — SIGNIFICANT CHANGE UP (ref 0–6)
GAS PNL BLDA: SIGNIFICANT CHANGE UP
GLUCOSE BLDC GLUCOMTR-MCNC: 118 MG/DL — HIGH (ref 70–99)
GLUCOSE BLDC GLUCOMTR-MCNC: 123 MG/DL — HIGH (ref 70–99)
GLUCOSE BLDC GLUCOMTR-MCNC: 136 MG/DL — HIGH (ref 70–99)
GLUCOSE BLDC GLUCOMTR-MCNC: 156 MG/DL — HIGH (ref 70–99)
GLUCOSE SERPL-MCNC: 184 MG/DL — HIGH (ref 70–99)
HCT VFR BLD CALC: 39.9 % — SIGNIFICANT CHANGE UP (ref 34.5–45)
HGB BLD-MCNC: 13.7 G/DL — SIGNIFICANT CHANGE UP (ref 11.5–15.5)
IMM GRANULOCYTES NFR BLD AUTO: 0.3 % — SIGNIFICANT CHANGE UP (ref 0–0.9)
INR BLD: 1.21 RATIO — HIGH (ref 0.85–1.16)
INR BLD: 1.22 RATIO — HIGH (ref 0.85–1.16)
LYMPHOCYTES # BLD AUTO: 1.46 K/UL — SIGNIFICANT CHANGE UP (ref 1–3.3)
LYMPHOCYTES # BLD AUTO: 18.8 % — SIGNIFICANT CHANGE UP (ref 13–44)
MAGNESIUM SERPL-MCNC: 2.3 MG/DL — SIGNIFICANT CHANGE UP (ref 1.6–2.6)
MCHC RBC-ENTMCNC: 31.1 PG — SIGNIFICANT CHANGE UP (ref 27–34)
MCHC RBC-ENTMCNC: 34.3 G/DL — SIGNIFICANT CHANGE UP (ref 32–36)
MCV RBC AUTO: 90.5 FL — SIGNIFICANT CHANGE UP (ref 80–100)
MONOCYTES # BLD AUTO: 0.59 K/UL — SIGNIFICANT CHANGE UP (ref 0–0.9)
MONOCYTES NFR BLD AUTO: 7.6 % — SIGNIFICANT CHANGE UP (ref 2–14)
NEUTROPHILS # BLD AUTO: 5.55 K/UL — SIGNIFICANT CHANGE UP (ref 1.8–7.4)
NEUTROPHILS NFR BLD AUTO: 71.5 % — SIGNIFICANT CHANGE UP (ref 43–77)
NRBC # BLD: 0 /100 WBCS — SIGNIFICANT CHANGE UP (ref 0–0)
PHOSPHATE SERPL-MCNC: 3.6 MG/DL — SIGNIFICANT CHANGE UP (ref 2.5–4.5)
PLATELET # BLD AUTO: 162 K/UL — SIGNIFICANT CHANGE UP (ref 150–400)
POTASSIUM SERPL-MCNC: 4.2 MMOL/L — SIGNIFICANT CHANGE UP (ref 3.5–5.3)
POTASSIUM SERPL-SCNC: 4.2 MMOL/L — SIGNIFICANT CHANGE UP (ref 3.5–5.3)
PROT SERPL-MCNC: 6.7 G/DL — SIGNIFICANT CHANGE UP (ref 6–8.3)
PROTHROM AB SERPL-ACNC: 13.9 SEC — HIGH (ref 9.9–13.4)
PROTHROM AB SERPL-ACNC: 14 SEC — HIGH (ref 9.9–13.4)
RBC # BLD: 4.41 M/UL — SIGNIFICANT CHANGE UP (ref 3.8–5.2)
RBC # FLD: 13.1 % — SIGNIFICANT CHANGE UP (ref 10.3–14.5)
SODIUM SERPL-SCNC: 135 MMOL/L — SIGNIFICANT CHANGE UP (ref 135–145)
WBC # BLD: 7.76 K/UL — SIGNIFICANT CHANGE UP (ref 3.8–10.5)
WBC # FLD AUTO: 7.76 K/UL — SIGNIFICANT CHANGE UP (ref 3.8–10.5)

## 2024-11-23 PROCEDURE — 71045 X-RAY EXAM CHEST 1 VIEW: CPT | Mod: 26

## 2024-11-23 PROCEDURE — 99291 CRITICAL CARE FIRST HOUR: CPT

## 2024-11-23 PROCEDURE — 93325 DOPPLER ECHO COLOR FLOW MAPG: CPT | Mod: 26

## 2024-11-23 PROCEDURE — 93010 ELECTROCARDIOGRAM REPORT: CPT

## 2024-11-23 PROCEDURE — 99292 CRITICAL CARE ADDL 30 MIN: CPT

## 2024-11-23 PROCEDURE — 93308 TTE F-UP OR LMTD: CPT | Mod: 26

## 2024-11-23 RX ORDER — HYDRALAZINE HYDROCHLORIDE 10 MG/1
50 TABLET ORAL THREE TIMES A DAY
Refills: 0 | Status: DISCONTINUED | OUTPATIENT
Start: 2024-11-23 | End: 2024-11-23

## 2024-11-23 RX ORDER — SODIUM CHLORIDE 9 MG/ML
500 INJECTION, SOLUTION INTRAMUSCULAR; INTRAVENOUS; SUBCUTANEOUS ONCE
Refills: 0 | Status: COMPLETED | OUTPATIENT
Start: 2024-11-23 | End: 2024-11-23

## 2024-11-23 RX ORDER — HYDRALAZINE HYDROCHLORIDE 10 MG/1
25 TABLET ORAL EVERY 8 HOURS
Refills: 0 | Status: DISCONTINUED | OUTPATIENT
Start: 2024-11-23 | End: 2024-11-23

## 2024-11-23 RX ORDER — DEXMEDETOMIDINE HYDROCHLORIDE 4 UG/ML
0.5 INJECTION, SOLUTION INTRAVENOUS
Qty: 200 | Refills: 0 | Status: DISCONTINUED | OUTPATIENT
Start: 2024-11-23 | End: 2024-11-23

## 2024-11-23 RX ORDER — HEPARIN SODIUM,PORCINE 1000/ML
600 VIAL (ML) INJECTION
Qty: 25000 | Refills: 0 | Status: DISCONTINUED | OUTPATIENT
Start: 2024-11-23 | End: 2024-11-26

## 2024-11-23 RX ORDER — ACETAMINOPHEN 500MG 500 MG/1
1000 TABLET, COATED ORAL ONCE
Refills: 0 | Status: COMPLETED | OUTPATIENT
Start: 2024-11-23 | End: 2024-11-23

## 2024-11-23 RX ORDER — NOREPINEPHRINE BITARTRATE 1 MG/ML
0.05 INJECTION, SOLUTION, CONCENTRATE INTRAVENOUS
Qty: 8 | Refills: 0 | Status: DISCONTINUED | OUTPATIENT
Start: 2024-11-23 | End: 2024-11-23

## 2024-11-23 RX ORDER — HYDRALAZINE HYDROCHLORIDE 10 MG/1
10 TABLET ORAL ONCE
Refills: 0 | Status: COMPLETED | OUTPATIENT
Start: 2024-11-23 | End: 2024-11-23

## 2024-11-23 RX ORDER — METOPROLOL TARTRATE 100 MG/1
5 TABLET, FILM COATED ORAL ONCE
Refills: 0 | Status: COMPLETED | OUTPATIENT
Start: 2024-11-23 | End: 2024-11-23

## 2024-11-23 RX ORDER — AMIODARONE HYDROCHLORIDE 200 MG/1
150 TABLET ORAL ONCE
Refills: 0 | Status: COMPLETED | OUTPATIENT
Start: 2024-11-23 | End: 2024-11-23

## 2024-11-23 RX ADMIN — CLOPIDOGREL 75 MILLIGRAM(S): 75 TABLET, FILM COATED ORAL at 12:05

## 2024-11-23 RX ADMIN — ACETAMINOPHEN 500MG 1000 MILLIGRAM(S): 500 TABLET, COATED ORAL at 12:00

## 2024-11-23 RX ADMIN — METOPROLOL TARTRATE 5 MILLIGRAM(S): 100 TABLET, FILM COATED ORAL at 13:39

## 2024-11-23 RX ADMIN — PROPOFOL 9.79 MICROGRAM(S)/KG/MIN: 10 INJECTION, EMULSION INTRAVENOUS at 07:37

## 2024-11-23 RX ADMIN — Medication 5000 UNIT(S): at 05:39

## 2024-11-23 RX ADMIN — Medication 2: at 00:34

## 2024-11-23 RX ADMIN — HYDRALAZINE HYDROCHLORIDE 10 MILLIGRAM(S): 10 TABLET ORAL at 04:23

## 2024-11-23 RX ADMIN — CHLORHEXIDINE GLUCONATE 15 MILLILITER(S): 1.2 RINSE ORAL at 05:39

## 2024-11-23 RX ADMIN — Medication 2: at 12:19

## 2024-11-23 RX ADMIN — CHLORHEXIDINE GLUCONATE 1 APPLICATION(S): 1.2 RINSE ORAL at 05:39

## 2024-11-23 RX ADMIN — SODIUM CHLORIDE 500 MILLILITER(S): 9 INJECTION, SOLUTION INTRAMUSCULAR; INTRAVENOUS; SUBCUTANEOUS at 04:33

## 2024-11-23 RX ADMIN — ACETAMINOPHEN 500MG 400 MILLIGRAM(S): 500 TABLET, COATED ORAL at 11:37

## 2024-11-23 RX ADMIN — DEXMEDETOMIDINE HYDROCHLORIDE 6.8 MICROGRAM(S)/KG/HR: 4 INJECTION, SOLUTION INTRAVENOUS at 08:21

## 2024-11-23 RX ADMIN — Medication 6 UNIT(S)/HR: at 14:08

## 2024-11-23 RX ADMIN — ROSUVASTATIN CALCIUM 40 MILLIGRAM(S): 5 TABLET, FILM COATED ORAL at 22:04

## 2024-11-23 RX ADMIN — Medication 10 MILLIGRAM(S): at 12:04

## 2024-11-23 RX ADMIN — HYDRALAZINE HYDROCHLORIDE 25 MILLIGRAM(S): 10 TABLET ORAL at 06:51

## 2024-11-23 RX ADMIN — Medication 5000 UNIT(S): at 13:34

## 2024-11-23 RX ADMIN — Medication 5 UNIT(S)/HR: at 23:01

## 2024-11-23 RX ADMIN — Medication 81 MILLIGRAM(S): at 12:04

## 2024-11-23 RX ADMIN — AMIODARONE HYDROCHLORIDE 600 MILLIGRAM(S): 200 TABLET ORAL at 13:27

## 2024-11-23 RX ADMIN — Medication 1 MILLIGRAM(S): at 12:04

## 2024-11-23 NOTE — PROGRESS NOTE ADULT - SUBJECTIVE AND OBJECTIVE BOX
Patient is a 74y old  Female who presents with a chief complaint of chest pain (22 Nov 2024 21:42).    INTERVAL HISTORY:  - transferred back to CICU yesterday, s/p RRT for symptomatic bradycardia, intubated & TVP placed    SUBJECTIVE  - Patient seen and evaluated at bedside.     MEDICATIONS:  MEDICATIONS  (STANDING):  aspirin enteric coated 81 milliGRAM(s) Oral daily  chlorhexidine 0.12% Liquid 15 milliLiter(s) Oral Mucosa every 12 hours  chlorhexidine 2% Cloths 1 Application(s) Topical <User Schedule>  chlorhexidine 4% Liquid 1 Application(s) Topical <User Schedule>  clopidogrel Tablet 75 milliGRAM(s) Oral daily  ezetimibe 10 milliGRAM(s) Oral daily  folic acid 1 milliGRAM(s) Oral daily  heparin   Injectable 5000 Unit(s) SubCutaneous every 8 hours  hydrALAZINE 25 milliGRAM(s) Oral every 8 hours  insulin lispro (ADMELOG) corrective regimen sliding scale   SubCutaneous every 6 hours  propofol Infusion 30 MICROgram(s)/kG/Min (9.79 mL/Hr) IV Continuous <Continuous>  rosuvastatin 40 milliGRAM(s) Oral at bedtime    MEDICATIONS  (PRN):  aluminum hydroxide/magnesium hydroxide/simethicone Suspension 30 milliLiter(s) Oral every 4 hours PRN Dyspepsia  sodium chloride 0.9% lock flush 10 milliLiter(s) IV Push every 1 hour PRN Pre/post blood products, medications, blood draw, and to maintain line patency    OBJECTIVE:  ICU Vital Signs Last 24 Hrs  T(C): 37.2 (23 Nov 2024 03:00), Max: 37.5 (22 Nov 2024 19:00)  T(F): 99 (23 Nov 2024 03:00), Max: 99.5 (22 Nov 2024 19:00)  HR: 116 (23 Nov 2024 06:00) (70 - 118)  BP: 120/59 (22 Nov 2024 16:15) (115/66 - 178/118)  BP(mean): 85 (22 Nov 2024 16:15) (85 - 141)  ABP: 162/65 (23 Nov 2024 06:00) (80/46 - 180/72)  ABP(mean): 104 (23 Nov 2024 06:00) (58 - 156)  RR: 20 (23 Nov 2024 06:00) (4 - 24)  SpO2: 100% (23 Nov 2024 06:00) (99% - 100%)    O2 Parameters below as of 23 Nov 2024 06:00  Patient On (Oxygen Delivery Method): BiPAP/CPAP    Mode: CPAP with PS  FiO2: 40  PEEP: 5  PS: 10  MAP: 10  PIP: 16    CAPILLARY BLOOD GLUCOSE  POCT Blood Glucose.: 136 mg/dL (23 Nov 2024 05:37)  POCT Blood Glucose.: 179 mg/dL (22 Nov 2024 17:56)  POCT Blood Glucose.: 248 mg/dL (22 Nov 2024 14:22)  POCT Blood Glucose.: 302 mg/dL (22 Nov 2024 09:34)  POCT Blood Glucose.: 144 mg/dL (22 Nov 2024 07:40)    CAPILLARY BLOOD GLUCOSE  POCT Blood Glucose.: 136 mg/dL (23 Nov 2024 05:37)    I&O's Summary  21 Nov 2024 07:01  -  22 Nov 2024 07:00  --------------------------------------------------------  IN: 1009.5 mL / OUT: 300 mL / NET: 709.5 mL    22 Nov 2024 07:01  -  23 Nov 2024 06:44  --------------------------------------------------------  IN: 611.5 mL / OUT: 1605 mL / NET: -993.5 mL    PHYSICAL EXAM:  General: NAD, intubated & sedated  Chest: Clear to auscultation bilaterally  Heart: Regular rate and rhythm; normal S1 and S2  Abd: Soft, nontender, nondistended  Nervous System: sedated on precedex  Ext: no peripheral LE edema bilaterally    LABS:  ABG - ( 23 Nov 2024 00:05 )  pH, Arterial: 7.41  pH, Blood: x     /  pCO2: 29    /  pO2: 184   / HCO3: 18    / Base Excess: -5.0  /  SaO2: 98.4                     13.7   7.76  )-----------( 162      ( 23 Nov 2024 00:13 )             39.9     11-23  135  |  102  |  26[H]  ----------------------------<  184[H]  4.2   |  17[L]  |  1.33[H]    Ca    9.3      23 Nov 2024 00:13  Phos  3.6     11-23  Mg     2.3     11-23    TPro  6.7  /  Alb  3.5  /  TBili  0.6  /  DBili  x   /  AST  69[H]  /  ALT  30  /  AlkPhos  66  11-23  LIVER FUNCTIONS - ( 23 Nov 2024 00:13 )  Alb: 3.5 g/dL / Pro: 6.7 g/dL / ALK PHOS: 66 U/L / ALT: 30 U/L / AST: 69 U/L / GGT: x         PT/INR - ( 22 Nov 2024 10:04 )   PT: 13.0 sec;   INR: 1.14 ratio    PTT - ( 22 Nov 2024 10:04 )  PTT:33.0 sec  CKMB Units: 21.6 ng/mL (11-22 @ 10:04)  Urinalysis Basic - ( 23 Nov 2024 00:13 )  Color: x / Appearance: x / SG: x / pH: x  Gluc: 184 mg/dL / Ketone: x  / Bili: x / Urobili: x   Blood: x / Protein: x / Nitrite: x   Leuk Esterase: x / RBC: x / WBC x   Sq Epi: x / Non Sq Epi: x / Bacteria: x      ASSESSMENT & PLAN:   Assessment:  74F CAD (s/p PCI ‘98, ‘06, 8/2024), HTN, HLD, GERD p/w CP, f/w NSTEMI. LHC w/ ISR LAD s/p POBA. c/b persistent CP & euglycemic DKA req insulin & nitro in CICU. On floor, [facility]B req TVP & intubation.     Plan:  NEURO  #Sedation regimen  - wean propofol for SAT/SBT this AM  - continue to monitor mental status as per protocol     RESPIRATORY  #Acute respiratory failure  - intubated for airway protection during RRT  - on minimal vent settings, wean sedation for SBT this AM  - continue to monitor SpO2 with goal >94%    Mechanical Vent: Mode: AC/ CMV (Assist Control/ Continuous Mandatory Ventilation)  RR (machine): 15  TV (machine): 400  FiO2: 100  PEEP: 5  ITime: 1  MAP: 9  PIP: 24    CARDIO  #NSTEMI  - 11/22 LHC: 100 pLAD ISR x1 POBA  - DAPT: ASA/Plavix  - holding GDMT while on pressors  - on hydralazine 25 for afterload reduction    #CHB  - RRT called for AMS & CHB/symptomatic bradycardia  - intubated for airway protection  - TVP placed emergently on 6MONTI, replaced in CICU  - started on dopamine & epi, both currently off  - currently sinus tachy in 110s on telemetry  - follow up EP reccs    HEME  - Monitor H/H and plts  - DVT PPX: HSQ    RENAL/  #BK  - Cr uptrending, likely i/s/o hypoperfusion during CHB episode  - Continue monitoring urine output, lytes, SCr/ BUN  - replete lytes prn with goal K >4 and Mg >2    GI  NPO    ENDO  #Hyperglycemia  - BHB neg  - glucose 300s  - gap 14  - continue ISS q6    ID  #Afebrile  - no leukocytosis  - monitor and trend WBC and temperature curve     Dispo: Maintain in ICU.    Patient requires continuous monitoring with bedside rhythm monitoring, arterial line, pulse oximetry, ventilator monitoring and intermittent blood gas analysis.  Care plan discussed with ICU care team.  I have spent 35 minutes providing critical care, in addition to initial critical time provided by CICU attending Dr. Dao, re-evaluated multiple times during the day.    Peace Mckay PA-C

## 2024-11-23 NOTE — AIRWAY REMOVAL NOTE  ADULT & PEDS - ARTIFICAL AIRWAY REMOVAL COMMENTS
Written order for extubation verified. The patient was identified by full name and birth date compared to the identification band. Present during the procedure was (Yissel ASHBY)

## 2024-11-23 NOTE — PROGRESS NOTE ADULT - SUBJECTIVE AND OBJECTIVE BOX
Admission date:  CHIEF COMPLAINT:  HPI:  74 year-old [place] female with history of CAD (s/p PCI 1998, 2006, 8/2024), HTN, HLD, T2DM, and GERD presents with intermittent chest pain at rest and on exertion for the past 3 days. Symptoms started on 11/17, pressure-like, 8/10 in intensity with radiation up the neck and jaw associated with mild nausea without vomiting, similar to her prior angina symptoms.  She took her 's NTG SL and symptoms improved within 5 minutes.  She came to ED for further evaluation as these episodes of intermittent chest pain persisted for the past couple days.  Currently, chest pain free s/p NTG SL twice since arrived (20 Nov 2024 04:16)    INTERVAL HISTORY:    REVIEW OF SYSTEMS: Denies xxxxxx; all others negative    MEDICATIONS  (STANDING):  aspirin enteric coated 81 milliGRAM(s) Oral daily  chlorhexidine 2% Cloths 1 Application(s) Topical <User Schedule>  chlorhexidine 4% Liquid 1 Application(s) Topical <User Schedule>  clopidogrel Tablet 75 milliGRAM(s) Oral daily  ezetimibe 10 milliGRAM(s) Oral daily  folic acid 1 milliGRAM(s) Oral daily  heparin  Infusion 600 Unit(s)/Hr (5 mL/Hr) IV Continuous <Continuous>  insulin lispro (ADMELOG) corrective regimen sliding scale   SubCutaneous every 6 hours  rosuvastatin 40 milliGRAM(s) Oral at bedtime    MEDICATIONS  (PRN):      Objective:  ICU Vital Signs Last 24 Hrs  T(C): 37.1 (23 Nov 2024 19:00), Max: 37.9 (23 Nov 2024 12:00)  T(F): 98.7 (23 Nov 2024 19:00), Max: 100.2 (23 Nov 2024 12:00)  HR: 99 (23 Nov 2024 21:05) (78 - 143)  BP: --  BP(mean): --  ABP: 144/58 (23 Nov 2024 20:00) (95/46 - 180/72)  ABP(mean): 93 (23 Nov 2024 20:00) (65 - 114)  RR: 26 (23 Nov 2024 21:05) (10 - 31)  SpO2: 100% (23 Nov 2024 21:05) (99% - 100%)    O2 Parameters below as of 23 Nov 2024 21:05  Patient On (Oxygen Delivery Method): nasal cannula  O2 Flow (L/min): 2              11-22 @ 07:01 - 11-23 @ 07:00  --------------------------------------------------------  IN: 637.7 mL / OUT: 1755 mL / NET: -1117.3 mL    11-23 @ 07:01  - 11-23 @ 22:04  --------------------------------------------------------  IN: 56.7 mL / OUT: 880 mL / NET: -823.3 mL      Daily     Daily     PHYSICAL EXAM:  GENERAL: No acute distress, well-developed  HEAD:  Atraumatic, Normocephalic  EYES: EOMI, PERRLA, conjunctiva and sclera clear  NECK: Supple, no lymphadenopathy, no JVD  CHEST/LUNG: CTAB; No wheezes, rales, or rhonchi  HEART: Regular rate and rhythm. Normal S1/S2. No murmurs, rubs, or gallops  ABDOMEN: Soft, non-tender, non-distended; normal bowel sounds, no organomegaly  EXTREMITIES:  2+ peripheral pulses b/l, No clubbing, cyanosis, or edema  NEUROLOGY: A+Ox3  SKIN: No rashes or lesions      TELEMETRY:     EKG:     IMAGING:    Labs:  ABG - ( 23 Nov 2024 10:40 )  pH, Arterial: 7.33  pH, Blood: x     /  pCO2: 28    /  pO2: 222   / HCO3: 15    / Base Excess: -9.6  /  SaO2: 98.8                                    13.7   7.76  )-----------( 162      ( 23 Nov 2024 00:13 )             39.9     11-23    135  |  102  |  26[H]  ----------------------------<  184[H]  4.2   |  17[L]  |  1.33[H]    Ca    9.3      23 Nov 2024 00:13  Phos  3.6     11-23  Mg     2.3     11-23    TPro  6.7  /  Alb  3.5  /  TBili  0.6  /  DBili  x   /  AST  69[H]  /  ALT  30  /  AlkPhos  66  11-23    LIVER FUNCTIONS - ( 23 Nov 2024 00:13 )  Alb: 3.5 g/dL / Pro: 6.7 g/dL / ALK PHOS: 66 U/L / ALT: 30 U/L / AST: 69 U/L / GGT: x           PT/INR - ( 23 Nov 2024 21:18 )   PT: 14.0 sec;   INR: 1.22 ratio         PTT - ( 23 Nov 2024 21:18 )  PTT:135.9 sec    Urinalysis Basic - ( 23 Nov 2024 00:13 )    Color: x / Appearance: x / SG: x / pH: x  Gluc: 184 mg/dL / Ketone: x  / Bili: x / Urobili: x   Blood: x / Protein: x / Nitrite: x   Leuk Esterase: x / RBC: x / WBC x   Sq Epi: x / Non Sq Epi: x / Bacteria: x        HEALTH ISSUES - PROBLEM Dx:  Unstable angina    HTN (hypertension)    HLD (hyperlipidemia)    Type 2 diabetes mellitus    Prophylactic measure    GERD (gastroesophageal reflux disease)      ====================ASSESSMENT ==============  74F CAD (s/p PCI ‘98, ‘06, 8/2024), HTN, HLD, GERD p/w CP, f/w NSTEMI. LHC w/ ISR LAD s/p POBA. c/b persistent CP & euglycemic DKA req insulin & nitro in CICU. On floor, [facility] req TVP & intubation.     Plan:  ====================== NEUROLOGY=====================  A+Ox3  -off sedation since this AM       ==================== RESPIRATORY======================  #Acute respiratory failure  - intubated for airway protection during RRT  - s/p extubation today to high flow, now transitioned ot NC  -Trending ABGs      Mechanical Ventilation:  Mode: AC/ CMV (Assist Control/ Continuous Mandatory Ventilation)  RR (machine): 16  TV (machine): 400  FiO2: 40  PEEP: 5  ITime: 1  MAP: 9  PIP: 19      ====================CARDIOVASCULAR==================  #NSTEMI  - 11/22 LHC: 100 pLAD ISR x1 POBA  - DAPT: ASA/Plavix  - holding GDMT while on pressors, was on pressors again today     #CHB  - RRT called for AMS & CHB  - intubated for airway protection  - TVP placed emergently on 6MONTI  -Holding AV archie blockers   -Planned for eventual PPM  - f/u EP recs     ===================HEMATOLOGIC/ONC ===================  - Monitor H/H and plts  - DVT PPX: HSQ    aspirin enteric coated 81 milliGRAM(s) Oral daily  clopidogrel Tablet 75 milliGRAM(s) Oral daily  heparin   Injectable 5000 Unit(s) SubCutaneous every 8 hours    ===================== RENAL =========================  No acute issues  - Continue monitoring urine output, lytes, SCr/ BUN  - replete lytes prn with goal K >4 and Mg >2    ==================== GASTROINTESTINAL===================  s/p extubation   -passed bedside dysphagia  -start PO diet     aluminum hydroxide/magnesium hydroxide/simethicone Suspension 30 milliLiter(s) Oral every 4 hours PRN Dyspepsia  folic acid 1 milliGRAM(s) Oral daily  sodium chloride 0.9% lock flush 10 milliLiter(s) IV Push every 1 hour PRN Pre/post blood products, medications, blood draw, and to maintain line patency    =======================    ENDOCRINE  =====================  #Hyperglycemia,resolved  -started pre meal ISS and at bedtime     ezetimibe 10 milliGRAM(s) Oral daily  insulin lispro (ADMELOG) corrective regimen sliding scale   SubCutaneous every 6 hours  rosuvastatin 40 milliGRAM(s) Oral at bedtime    ========================INFECTIOUS DISEASE================  #Afebrile  - no leukocytosis  - monitor and trend WBC and temperature curve     Patient requires continuous monitoring with bedside rhythm monitoring, pulse ox monitoring, and intermittent blood gas analysis. Care plan discussed with ICU care team. Patient remained critical and at risk for life threatening decompensation.  Patient seen, examined and plan discussed with CCU team during rounds.     I have personally provided __35__ minutes of critical care time excluding time spent on separate procedures, in addition to initial critical care time provided by the CICU Attending, Dr. Dao.

## 2024-11-24 LAB
ALBUMIN SERPL ELPH-MCNC: 3.3 G/DL — SIGNIFICANT CHANGE UP (ref 3.3–5)
ALBUMIN SERPL ELPH-MCNC: 3.3 G/DL — SIGNIFICANT CHANGE UP (ref 3.3–5)
ALP SERPL-CCNC: 68 U/L — SIGNIFICANT CHANGE UP (ref 40–120)
ALP SERPL-CCNC: 74 U/L — SIGNIFICANT CHANGE UP (ref 40–120)
ALT FLD-CCNC: 25 U/L — SIGNIFICANT CHANGE UP (ref 10–45)
ALT FLD-CCNC: 27 U/L — SIGNIFICANT CHANGE UP (ref 10–45)
ANION GAP SERPL CALC-SCNC: 16 MMOL/L — SIGNIFICANT CHANGE UP (ref 5–17)
ANION GAP SERPL CALC-SCNC: 19 MMOL/L — HIGH (ref 5–17)
APTT BLD: 33.4 SEC — SIGNIFICANT CHANGE UP (ref 24.5–35.6)
APTT BLD: 38.5 SEC — HIGH (ref 24.5–35.6)
APTT BLD: 82 SEC — HIGH (ref 24.5–35.6)
AST SERPL-CCNC: 48 U/L — HIGH (ref 10–40)
AST SERPL-CCNC: 59 U/L — HIGH (ref 10–40)
B-OH-BUTYR SERPL-SCNC: 2.1 MMOL/L — HIGH
BASOPHILS # BLD AUTO: 0.03 K/UL — SIGNIFICANT CHANGE UP (ref 0–0.2)
BASOPHILS # BLD AUTO: 0.04 K/UL — SIGNIFICANT CHANGE UP (ref 0–0.2)
BASOPHILS NFR BLD AUTO: 0.4 % — SIGNIFICANT CHANGE UP (ref 0–2)
BASOPHILS NFR BLD AUTO: 0.4 % — SIGNIFICANT CHANGE UP (ref 0–2)
BILIRUB SERPL-MCNC: 0.7 MG/DL — SIGNIFICANT CHANGE UP (ref 0.2–1.2)
BILIRUB SERPL-MCNC: 0.8 MG/DL — SIGNIFICANT CHANGE UP (ref 0.2–1.2)
BUN SERPL-MCNC: 21 MG/DL — SIGNIFICANT CHANGE UP (ref 7–23)
BUN SERPL-MCNC: 22 MG/DL — SIGNIFICANT CHANGE UP (ref 7–23)
CALCIUM SERPL-MCNC: 8.9 MG/DL — SIGNIFICANT CHANGE UP (ref 8.4–10.5)
CALCIUM SERPL-MCNC: 8.9 MG/DL — SIGNIFICANT CHANGE UP (ref 8.4–10.5)
CHLORIDE SERPL-SCNC: 104 MMOL/L — SIGNIFICANT CHANGE UP (ref 96–108)
CHLORIDE SERPL-SCNC: 104 MMOL/L — SIGNIFICANT CHANGE UP (ref 96–108)
CO2 SERPL-SCNC: 14 MMOL/L — LOW (ref 22–31)
CO2 SERPL-SCNC: 17 MMOL/L — LOW (ref 22–31)
CREAT SERPL-MCNC: 1.02 MG/DL — SIGNIFICANT CHANGE UP (ref 0.5–1.3)
CREAT SERPL-MCNC: 1.27 MG/DL — SIGNIFICANT CHANGE UP (ref 0.5–1.3)
EGFR: 44 ML/MIN/1.73M2 — LOW
EGFR: 58 ML/MIN/1.73M2 — LOW
EOSINOPHIL # BLD AUTO: 0.14 K/UL — SIGNIFICANT CHANGE UP (ref 0–0.5)
EOSINOPHIL # BLD AUTO: 0.17 K/UL — SIGNIFICANT CHANGE UP (ref 0–0.5)
EOSINOPHIL NFR BLD AUTO: 1.7 % — SIGNIFICANT CHANGE UP (ref 0–6)
EOSINOPHIL NFR BLD AUTO: 1.9 % — SIGNIFICANT CHANGE UP (ref 0–6)
GAS PNL BLDA: SIGNIFICANT CHANGE UP
GAS PNL BLDV: SIGNIFICANT CHANGE UP
GLUCOSE BLDC GLUCOMTR-MCNC: 109 MG/DL — HIGH (ref 70–99)
GLUCOSE BLDC GLUCOMTR-MCNC: 151 MG/DL — HIGH (ref 70–99)
GLUCOSE BLDC GLUCOMTR-MCNC: 212 MG/DL — HIGH (ref 70–99)
GLUCOSE BLDC GLUCOMTR-MCNC: 236 MG/DL — HIGH (ref 70–99)
GLUCOSE SERPL-MCNC: 124 MG/DL — HIGH (ref 70–99)
GLUCOSE SERPL-MCNC: 169 MG/DL — HIGH (ref 70–99)
HCT VFR BLD CALC: 35.8 % — SIGNIFICANT CHANGE UP (ref 34.5–45)
HCT VFR BLD CALC: 38.8 % — SIGNIFICANT CHANGE UP (ref 34.5–45)
HGB BLD-MCNC: 12.3 G/DL — SIGNIFICANT CHANGE UP (ref 11.5–15.5)
HGB BLD-MCNC: 13.1 G/DL — SIGNIFICANT CHANGE UP (ref 11.5–15.5)
IMM GRANULOCYTES NFR BLD AUTO: 0.4 % — SIGNIFICANT CHANGE UP (ref 0–0.9)
IMM GRANULOCYTES NFR BLD AUTO: 0.5 % — SIGNIFICANT CHANGE UP (ref 0–0.9)
INR BLD: 1.25 RATIO — HIGH (ref 0.85–1.16)
LYMPHOCYTES # BLD AUTO: 1.57 K/UL — SIGNIFICANT CHANGE UP (ref 1–3.3)
LYMPHOCYTES # BLD AUTO: 1.83 K/UL — SIGNIFICANT CHANGE UP (ref 1–3.3)
LYMPHOCYTES # BLD AUTO: 17.4 % — SIGNIFICANT CHANGE UP (ref 13–44)
LYMPHOCYTES # BLD AUTO: 22.6 % — SIGNIFICANT CHANGE UP (ref 13–44)
MAGNESIUM SERPL-MCNC: 2.1 MG/DL — SIGNIFICANT CHANGE UP (ref 1.6–2.6)
MCHC RBC-ENTMCNC: 31.4 PG — SIGNIFICANT CHANGE UP (ref 27–34)
MCHC RBC-ENTMCNC: 31.5 PG — SIGNIFICANT CHANGE UP (ref 27–34)
MCHC RBC-ENTMCNC: 33.8 G/DL — SIGNIFICANT CHANGE UP (ref 32–36)
MCHC RBC-ENTMCNC: 34.4 G/DL — SIGNIFICANT CHANGE UP (ref 32–36)
MCV RBC AUTO: 91.6 FL — SIGNIFICANT CHANGE UP (ref 80–100)
MCV RBC AUTO: 93 FL — SIGNIFICANT CHANGE UP (ref 80–100)
MONOCYTES # BLD AUTO: 0.7 K/UL — SIGNIFICANT CHANGE UP (ref 0–0.9)
MONOCYTES # BLD AUTO: 0.77 K/UL — SIGNIFICANT CHANGE UP (ref 0–0.9)
MONOCYTES NFR BLD AUTO: 7.8 % — SIGNIFICANT CHANGE UP (ref 2–14)
MONOCYTES NFR BLD AUTO: 9.5 % — SIGNIFICANT CHANGE UP (ref 2–14)
NEUTROPHILS # BLD AUTO: 5.27 K/UL — SIGNIFICANT CHANGE UP (ref 1.8–7.4)
NEUTROPHILS # BLD AUTO: 6.51 K/UL — SIGNIFICANT CHANGE UP (ref 1.8–7.4)
NEUTROPHILS NFR BLD AUTO: 65.3 % — SIGNIFICANT CHANGE UP (ref 43–77)
NEUTROPHILS NFR BLD AUTO: 72.1 % — SIGNIFICANT CHANGE UP (ref 43–77)
NRBC # BLD: 0 /100 WBCS — SIGNIFICANT CHANGE UP (ref 0–0)
NRBC # BLD: 0 /100 WBCS — SIGNIFICANT CHANGE UP (ref 0–0)
PHOSPHATE SERPL-MCNC: 3.2 MG/DL — SIGNIFICANT CHANGE UP (ref 2.5–4.5)
PLATELET # BLD AUTO: 135 K/UL — LOW (ref 150–400)
PLATELET # BLD AUTO: 153 K/UL — SIGNIFICANT CHANGE UP (ref 150–400)
POTASSIUM SERPL-MCNC: 3.9 MMOL/L — SIGNIFICANT CHANGE UP (ref 3.5–5.3)
POTASSIUM SERPL-MCNC: 4 MMOL/L — SIGNIFICANT CHANGE UP (ref 3.5–5.3)
POTASSIUM SERPL-SCNC: 3.9 MMOL/L — SIGNIFICANT CHANGE UP (ref 3.5–5.3)
POTASSIUM SERPL-SCNC: 4 MMOL/L — SIGNIFICANT CHANGE UP (ref 3.5–5.3)
PROCALCITONIN SERPL-MCNC: 0.12 NG/ML — HIGH (ref 0.02–0.1)
PROT SERPL-MCNC: 6.5 G/DL — SIGNIFICANT CHANGE UP (ref 6–8.3)
PROT SERPL-MCNC: 6.6 G/DL — SIGNIFICANT CHANGE UP (ref 6–8.3)
PROTHROM AB SERPL-ACNC: 14.4 SEC — HIGH (ref 9.9–13.4)
RBC # BLD: 3.91 M/UL — SIGNIFICANT CHANGE UP (ref 3.8–5.2)
RBC # BLD: 4.17 M/UL — SIGNIFICANT CHANGE UP (ref 3.8–5.2)
RBC # FLD: 13.5 % — SIGNIFICANT CHANGE UP (ref 10.3–14.5)
RBC # FLD: 13.6 % — SIGNIFICANT CHANGE UP (ref 10.3–14.5)
SODIUM SERPL-SCNC: 137 MMOL/L — SIGNIFICANT CHANGE UP (ref 135–145)
SODIUM SERPL-SCNC: 137 MMOL/L — SIGNIFICANT CHANGE UP (ref 135–145)
WBC # BLD: 8.08 K/UL — SIGNIFICANT CHANGE UP (ref 3.8–10.5)
WBC # BLD: 9.03 K/UL — SIGNIFICANT CHANGE UP (ref 3.8–10.5)
WBC # FLD AUTO: 8.08 K/UL — SIGNIFICANT CHANGE UP (ref 3.8–10.5)
WBC # FLD AUTO: 9.03 K/UL — SIGNIFICANT CHANGE UP (ref 3.8–10.5)

## 2024-11-24 PROCEDURE — 93010 ELECTROCARDIOGRAM REPORT: CPT

## 2024-11-24 PROCEDURE — 71045 X-RAY EXAM CHEST 1 VIEW: CPT | Mod: 26

## 2024-11-24 PROCEDURE — 99223 1ST HOSP IP/OBS HIGH 75: CPT

## 2024-11-24 PROCEDURE — 99233 SBSQ HOSP IP/OBS HIGH 50: CPT

## 2024-11-24 PROCEDURE — 99292 CRITICAL CARE ADDL 30 MIN: CPT

## 2024-11-24 PROCEDURE — 99291 CRITICAL CARE FIRST HOUR: CPT

## 2024-11-24 RX ORDER — ONDANSETRON HYDROCHLORIDE 4 MG/1
4 TABLET, FILM COATED ORAL ONCE
Refills: 0 | Status: COMPLETED | OUTPATIENT
Start: 2024-11-24 | End: 2024-11-24

## 2024-11-24 RX ORDER — LOSARTAN POTASSIUM 100 MG/1
25 TABLET, FILM COATED ORAL DAILY
Refills: 0 | Status: DISCONTINUED | OUTPATIENT
Start: 2024-11-24 | End: 2024-11-24

## 2024-11-24 RX ORDER — METOPROLOL TARTRATE 100 MG/1
5 TABLET, FILM COATED ORAL ONCE
Refills: 0 | Status: COMPLETED | OUTPATIENT
Start: 2024-11-24 | End: 2024-11-24

## 2024-11-24 RX ORDER — INSULIN GLARGINE 100 [IU]/ML
12 INJECTION, SOLUTION SUBCUTANEOUS AT BEDTIME
Refills: 0 | Status: DISCONTINUED | OUTPATIENT
Start: 2024-11-24 | End: 2024-11-25

## 2024-11-24 RX ORDER — METOCLOPRAMIDE HYDROCHLORIDE 10 MG/1
10 TABLET ORAL ONCE
Refills: 0 | Status: COMPLETED | OUTPATIENT
Start: 2024-11-24 | End: 2024-11-24

## 2024-11-24 RX ORDER — AMIODARONE HYDROCHLORIDE 200 MG/1
150 TABLET ORAL ONCE
Refills: 0 | Status: COMPLETED | OUTPATIENT
Start: 2024-11-24 | End: 2024-11-24

## 2024-11-24 RX ORDER — ACETAMINOPHEN 500MG 500 MG/1
1000 TABLET, COATED ORAL ONCE
Refills: 0 | Status: COMPLETED | OUTPATIENT
Start: 2024-11-24 | End: 2024-11-24

## 2024-11-24 RX ADMIN — CLOPIDOGREL 75 MILLIGRAM(S): 75 TABLET, FILM COATED ORAL at 08:36

## 2024-11-24 RX ADMIN — METOPROLOL TARTRATE 5 MILLIGRAM(S): 100 TABLET, FILM COATED ORAL at 16:27

## 2024-11-24 RX ADMIN — METOCLOPRAMIDE HYDROCHLORIDE 10 MILLIGRAM(S): 10 TABLET ORAL at 03:12

## 2024-11-24 RX ADMIN — Medication 6.5 UNIT(S)/HR: at 12:09

## 2024-11-24 RX ADMIN — CHLORHEXIDINE GLUCONATE 1 APPLICATION(S): 1.2 RINSE ORAL at 05:34

## 2024-11-24 RX ADMIN — Medication 4.5 UNIT(S)/HR: at 05:36

## 2024-11-24 RX ADMIN — AMIODARONE HYDROCHLORIDE 600 MILLIGRAM(S): 200 TABLET ORAL at 16:13

## 2024-11-24 RX ADMIN — ROSUVASTATIN CALCIUM 40 MILLIGRAM(S): 5 TABLET, FILM COATED ORAL at 21:09

## 2024-11-24 RX ADMIN — Medication 1 MILLIGRAM(S): at 08:36

## 2024-11-24 RX ADMIN — Medication 81 MILLIGRAM(S): at 08:36

## 2024-11-24 RX ADMIN — ONDANSETRON HYDROCHLORIDE 4 MILLIGRAM(S): 4 TABLET, FILM COATED ORAL at 16:12

## 2024-11-24 RX ADMIN — Medication 2: at 21:10

## 2024-11-24 RX ADMIN — Medication 8 UNIT(S)/HR: at 23:45

## 2024-11-24 RX ADMIN — ACETAMINOPHEN 500MG 1000 MILLIGRAM(S): 500 TABLET, COATED ORAL at 20:36

## 2024-11-24 RX ADMIN — Medication 10 MILLIGRAM(S): at 08:36

## 2024-11-24 RX ADMIN — Medication 4: at 11:24

## 2024-11-24 RX ADMIN — INSULIN GLARGINE 12 UNIT(S): 100 INJECTION, SOLUTION SUBCUTANEOUS at 21:52

## 2024-11-24 RX ADMIN — ACETAMINOPHEN 500MG 400 MILLIGRAM(S): 500 TABLET, COATED ORAL at 20:03

## 2024-11-24 RX ADMIN — Medication 4: at 16:15

## 2024-11-24 NOTE — PROGRESS NOTE ADULT - SUBJECTIVE AND OBJECTIVE BOX
VIKTORIA HERNÁNDEZ  MRN-[id number]  Patient is a 74y old  Female who presents with a chief complaint of chest pain (2024 17:31)    HPI:  74 year-old [place] female with history of CAD (s/p PCI , , 2024), HTN, HLD, T2DM, and GERD presents with intermittent chest pain at rest and on exertion for the past 3 days. Symptoms started on , pressure-like, 8/10 in intensity with radiation up the neck and jaw associated with mild nausea without vomiting, similar to her prior angina symptoms.  She took her 's NTG SL and symptoms improved within 5 minutes.  She came to ED for further evaluation as these episodes of intermittent chest pain persisted for the past couple days.  Currently, chest pain free s/p NTG SL twice since arrived (2024 04:16)      24 HOUR EVENTS:    REVIEW OF SYSTEMS:    CONSTITUTIONAL: No weakness, fevers or chills  EYES/ENT: No visual changes;  No vertigo or throat pain   NECK: No pain or stiffness  RESPIRATORY: No cough, wheezing, hemoptysis; No shortness of breath  CARDIOVASCULAR: No chest pain or palpitations  GASTROINTESTINAL: No abdominal or epigastric pain. No nausea, vomiting, or hematemesis; No diarrhea or constipation. No melena or hematochezia.  GENITOURINARY: No dysuria, frequency or hematuria  NEUROLOGICAL: No numbness or weakness  SKIN: No itching, rashes      ICU Vital Signs Last 24 Hrs  T(C): 37.3 (2024 15:00), Max: 38.2 (2024 11:00)  T(F): 99.1 (2024 15:00), Max: 100.8 (2024 11:00)  HR: 96 (2024 19:00) (88 - 128)  BP: --  BP(mean): --  ABP: 141/54 (2024 19:00) (90/54 - 193/86)  ABP(mean): 88 (2024 19:00) (71 - 129)  RR: 21 (2024 19:00) (13 - 30)  SpO2: 100% (:00) (99% - 100%)    O2 Parameters below as of 2024 19:00  Patient On (Oxygen Delivery Method): room air            CVP(mm Hg): --  CO: --  CI: --  PA: --  PA(mean): --  PA(direct): --  PCWP: --  LA: --  RA: --  SVR: --  SVRI: --  PVR: --  PVRI: --  I&O's Summary    :  -  2024 07:00  --------------------------------------------------------  IN: 214.2 mL / OUT: 1855 mL / NET: -1640.8 mL    2024 07:  -  2024 19:19  --------------------------------------------------------  IN: 708 mL / OUT: 1390 mL / NET: -682 mL        CAPILLARY BLOOD GLUCOSE    CAPILLARY BLOOD GLUCOSE      POCT Blood Glucose.: 236 mg/dL (2024 16:14)      PHYSICAL EXAM:  GENERAL: No acute distress, well-developed  HEAD:  Atraumatic, Normocephalic  EYES: EOMI, PERRLA, conjunctiva and sclera clear  NECK: Supple, no lymphadenopathy, no JVD  CHEST/LUNG: CTAB; No wheezes, rales, or rhonchi  HEART: Regular rate and rhythm. Normal S1/S2. No murmurs, rubs, or gallops  ABDOMEN: Soft, non-tender, non-distended; normal bowel sounds, no organomegaly  EXTREMITIES:  2+ peripheral pulses b/l, No clubbing, cyanosis, or edema  NEUROLOGY: A&O x 3, no focal deficits  SKIN: No rashes or lesions    ============================I/O===========================   I&O's Detail    :  -  2024 07:00  --------------------------------------------------------  IN:    Dexmedetomidine: 13.6 mL    Heparin: 87.5 mL    Oral Fluid: 100 mL    Propofol: 13.1 mL  Total IN: 214.2 mL    OUT:    Indwelling Catheter - Urethral (mL): 1855 mL  Total OUT: 1855 mL    Total NET: -1640.8 mL      2024 07:01  -  2024 19:19  --------------------------------------------------------  IN:    Heparin: 68 mL    IV PiggyBack: 100 mL    Oral Fluid: 540 mL  Total IN: 708 mL    OUT:    Indwelling Catheter - Urethral (mL): 1390 mL  Total OUT: 1390 mL    Total NET: -682 mL        ============================ LABS =========================                        13.1   9.03  )-----------( 153      ( 2024 00:47 )             38.8         137  |  104  |  22  ----------------------------<  124[H]  4.0   |  14[L]  |  1.27    Ca    8.9      2024 00:47  Phos  3.2       Mg     2.1         TPro  6.6  /  Alb  3.3  /  TBili  0.8  /  DBili  x   /  AST  59[H]  /  ALT  25  /  AlkPhos  68      Troponin T, High Sensitivity Result: 1628 ng/L (24 @ 10:04)    CKMB Units: 21.6 ng/mL (24 @ 10:04)            LIVER FUNCTIONS - ( 2024 00:47 )  Alb: 3.3 g/dL / Pro: 6.6 g/dL / ALK PHOS: 68 U/L / ALT: 25 U/L / AST: 59 U/L / GGT: x           PT/INR - ( 2024 00:47 )   PT: 14.4 sec;   INR: 1.25 ratio         PTT - ( 2024 18:12 )  PTT:38.5 sec  ABG - ( 2024 01:20 )  pH, Arterial: 7.30  pH, Blood: x     /  pCO2: 31    /  pO2: 137   / HCO3: 15    / Base Excess: -9.9  /  SaO2: 98.3              Blood Gas Arterial, Lactate: 1.3 mmol/L (24 @ 01:20)  Blood Gas Venous - Lactate: 1.5 mmol/L (24 @ 01:06)  Blood Gas Arterial, Lactate: 0.6 mmol/L (24 @ 10:40)  Blood Gas Arterial, Lactate: 0.7 mmol/L (24 @ 09:36)  Blood Gas Arterial, Lactate: 0.8 mmol/L (24 @ 08:45)  Blood Gas Arterial, Lactate: 1.0 mmol/L (24 @ 00:05)  Blood Gas Arterial, Lactate: 1.1 mmol/L (24 @ 14:50)  Blood Gas Venous - Lactate: 4.0 mmol/L (24 @ 10:15)    Urinalysis Basic - ( 2024 00:47 )    Color: x / Appearance: x / SG: x / pH: x  Gluc: 124 mg/dL / Ketone: x  / Bili: x / Urobili: x   Blood: x / Protein: x / Nitrite: x   Leuk Esterase: x / RBC: x / WBC x   Sq Epi: x / Non Sq Epi: x / Bacteria: x      ======================Micro/Rad/Cardio=================  Telemetry: Reviewed   EKG: Reviewed  CXR: Reviewed  Culture: Reviewed   Echo:   Cath: Cardiac Cath Lab - Adult:   [facility]  [address] [place] [id number] [phone]  Cath Lab Report -- Comprehensive Report  Patient: VIKTORIA HERNÁNDEZ  Study date: 2020  Account number: [id number]  MR number: [id number]  : 1950  Gender: Female  Race: O  Case Physician(s):  Malu Beltran M.D.  Fellow:  MD Vi King MD  Referring Physician:  Paul Garcia M.D.  INDICATIONS: Abnormal stress test.  PROCEDURE:  --  Left heart catheterization.  --  Left coronary angiography.  --  Right coronary angiography.  --  Diagnostic myocardial fractional flow reserve.  --  Sonosite - Diagnostic.  TECHNIQUE: The risks and alternatives of the procedures and conscious  sedation were explained to the patient and informed consent was obtained.  Cardiac catheterization performed electively.  Local anesthetic given. Right radial artery access. A 5 Fr. Radial  Glidesheath Slender was inserted in the vessel. RRA was aborted as there  was extreme subclavian tourtuosity Rightfemoral artery access. Local  anesthetic given. A 4fr Sheath [place] was inserted in the vessel,  utilizing the modified Seldinger technique. Left heart catheterization. A  4 Fr JR 4.0 catheter was utilized. After recording ascending aortic  pressure, the catheter was advanced across the aortic valve and left  ventricular pressure was recorded. Left coronary artery angiography. The  vessel was injected utilizing a 4 Fr JL 4.0 catheter and positioned in the  vessel ostia under fluoroscopic guidance. Angiography was performed in  multiple projections using hand-injection of contrast. Right coronary  artery angiography. The vessel was injected utilizing a 4 Fr JR 4.0  catheter and positioned in the vessel ostia under fluoroscopic guidance.  Angiography was performed in multiple projections using hand-injection of  contrast. Myocardial (fractional) flow reserve in the lesion in the  proximal right coronary artery. The vessel was entered with a 5 Fr guiding  catheter. Flow reserve was measured using a 0.014" pressure-monitoring  Verrata PLUS Wire J Tip guide-wire. Based on the results of this study,  the lesion was judged to be non-significant and no intervention was  performed. Sonosite - Diagnostic. RADIATION EXPOSURE: 14 min.  CONTRASTGIVEN: 70 ml Optiray.  MEDICATIONS GIVEN: Midazolam, 1 mg, IV. Fentanyl, 25 mcg, IV. Heparin, 3000  units, IV. Heparin, 2000 units, IV. 2% Lidocaine, 3 ml, subcutaneously.  Cardene, 400 mcg. 2% Lidocaine, 10 ml, subcutaneously. 0.9% Normal saline,  50 ml, IV.  VENTRICLES: No left ventriculogram was performed.  CORONARY VESSELS: The coronary circulation is right dominant.  LM:   --  LM: Angiography showed mild atherosclerosis with no flow limiting  lesions.  LAD:   --  Proximal LAD: There was a diffuse 20 % stenosis at the site of a  prior stent. There was MANUELA grade 3 flow through the vessel (brisk flow).  --  Mid LAD: There was a discrete 40 % stenosis at a site with no prior  intervention. There was MANUELA grade 3 flow through the vessel (brisk flow).  CX:   --  OM1: There was a tubular 20 % stenosis at the site of a prior  stent. There was MANUELA grade 3 flow through the vessel (brisk flow).  --  OM2: There was a diffuse 20 % stenosis at the site of a prior stent.  There was MANUELA grade 3 flow through the vessel (brisk flow).  RCA:   --  Proximal RCA: There was a diffuse 40 % stenosis at the site of a  prior stent. There was MANUELA grade 3 flow through the vessel (brisk flow).  --  Mid RCA: There was a diffuse 20 % stenosis at the site ofa prior  stent. There was MANUELA grade 3 flow through the vessel (brisk flow).  --  Distal RCA: Angiography showed mild atherosclerosis with no flow  limiting lesions.  --  RPL1: Angiography showed mild atherosclerosis with no flow limiting  lesions.  COMPLICATIONS: There were no complications.  DIAGNOSTIC IMPRESSIONS: Patent stents LAD, OM-1, OM-2 and RCA  No significant obstructive CAD  DIAGNOSTIC RECOMMENDATIONS: Medical therapy.  INTERVENTIONAL IMPRESSIONS: Patent stents LAD, OM-1, OM-2 and RCA  No significant obstructive CAD  INTERVENTIONAL RECOMMENDATIONS: Medical therapy.  Prepared and signed by  Malu Beltran M.D.  Signed 2020 16:24:15  HEMODYNAMIC TABLES  Pressures:  Baseline  Pressures:  - HR: 69  Pressures:  - Rhythm:  Pressures:  -- Aortic Pressure (S/D/M): 201/85/133  Pressures:  -- Left Ventricle (s/edp): 195/27/--  Outputs:  Baseline  Outputs:  -- CALCULATIONS[age]  Outputs:  -- CALCULATIONS: Body Surface Area: 1.54  Outputs:  -- CALCULATIONS: Height in cm: 155.00  Outputs:  -- CALCULATIONS: Sex: Female  Outputs:  -- CALCULATIONS: Weight in k.00  Outputs:  -- OUTPUTS: O2 consumption: 192.42  Outputs:  -- OUTPUTS: Vo2 Indexed: 125.00 (20 @ 09:08)  Cardiac Cath Lab - Adult:   [facility]  Department of Cardiology  [address] [place] [address]  [phone]  Cath Lab Report -- Comprehensive Report  Patient: VIKTORIA HERNÁNDEZ  Study date: 2017  Account number: [id number]  MR number: [id number]  : 1950  Gender: Female  Race: O  Case Physician(s):  Catalina Dimas M.D.  Fellow:  Cherrie Jasso D.O.  Referring Physician:  Paul Garcia M.D.  INDICATIONS: Unstable angina - CCS3.  HISTORY: The patient has a history of coronary artery disease. The patient  has hypertension, medication-treated dyslipidemia, and a family history of  coronary artery disease.  PROCEDURE:  --  Left heart catheterization with ventriculography.  --  Left coronary angiography.  --  Right coronary angiography.  TECHNIQUE: The risks and alternatives of the procedures and conscious  sedation were explained to the patient and informed consent was obtained.  Cardiac catheterization performed electively.  Local anesthetic given. Right radial artery access. Local anesthetic given.  A 6FR PRELUDE KIT was inserted in the vessel, utilizing the modified  Seldinger technique. Left heart catheterization. Ventriculography was  performed. A 5FR FR4.0 EXPO catheter was utilized. After recording  ascending aortic pressure, the catheter was advanced across the aortic  valve and left ventricular pressure was recorded. Left coronary artery  angiography. The vessel was injected utilizing a 5FR FL3.5 EXPO catheter  and positioned in the vessel ostia underfluoroscopic guidance.  Angiography was performed in multiple projections using hand-injection of  contrast. Right coronary artery angiography. The vessel was injected  utilizing a 5FR FR4.0 EXPO catheter and positioned in the vessel ostia  under fluoroscopic guidance. Angiography was performed in multiple  projections using hand-injection of contrast. RADIATION EXPOSURE: 9.4 min.  CONTRAST GIVEN: 28 ml Optiray.  MEDICATIONS GIVEN: Fentanyl, 50 mcg, IV. Midazolam, 1 mg, IV. Heparin, 2500  units, IV. Cardene, 500 mcg.  VENTRICLES: EF estimated was 60 %.  CORONARY VESSELS: The coronary circulation is right dominant.  LM:   --  LM: Normal.  LAD:   --  Proximal LAD: There was a diffuse 25 % stenosis at the site of a  prior angioplasty with stent placement.  CX:   --  OM1: There was a diffuse 25 % stenosis at the site of a prior  angioplasty with stent placement.  RCA:   --  Proximal RCA: There was a diffuse 10 % stenosis at the site of a  prior angioplasty with stent placement. There was TIMIgrade 3 flow  through the vessel (brisk flow). An intervention was performed.  --  Mid RCA: There was a diffuse 40 % stenosis at the site of a prior  angioplasty with stent placement. There was MANUELA grade 3 flow through the  vessel (brisk flow).  COMPLICATIONS: There were no complications.  DIAGNOSTIC RECOMMENDATIONS: Medical management is recommended.  Prepared and signed by  Catalina Dimas M.D.  Signed 2017 17:59:08  HEMODYNAMIC TABLES  Pressures:  Baseline/ Room Air  Pressures:  - HR: 70  Pressures:  - Rhythm:  Pressures:  -- Aortic Pressure (S/D/M): 144/72/103  Pressures:  -- Left Ventricle (s/edp): 145/11/--  Outputs:  Baseline/ Room Air  Outputs:  -- CALCULATIONS: Age in years: 66.95  Outputs:  -- CALCULATIONS: Body Surface Area: 1.48  Outputs:  -- CALCULATIONS: Height in cm: 152.00  Outputs:  -- CALCULATIONS: Sex: Female  Outputs:  -- CALCULATIONS: Weight in k.60 (17 @ 12:01)    ======================================================  PAST MEDICAL & SURGICAL HISTORY:  HTN (hypertension)      HLD (hyperlipidemia)      DM (diabetes mellitus)      CAD (coronary artery disease)      Stented coronary artery      COVID-19      No significant past surgical history              ======================= LINES/TUBES  =====================  [place]: (Date placed)  Central Line: (Date placed)  HD Catheter: (Date placed)  Arterial Line: (Date placed)  Endotracheal Tube: (Date placed)  Streeter: (Date placed)  ======================= DISPOSITION  =====================  [X] Critical   [ ] Guarded    [ ] Stable    [X] Maintain in CICU  [ ] Downgrade to [place]lemtry  [ ] Discharge Home    Patient requires continuous monitoring with bedside rhythm monitoring, pulse ox monitoring, and intermittent blood gas analysis. Care plan discussed with ICU care team. Patient remained critical and at risk for life threatening decompensation.  Patient seen, examined and plan discussed with CCU team during rounds.     I have personally provided 30 minutes of critical care time excluding time spent on separate procedures, in addition to initial critical care time provided by the CICU Attending, Dr. Uriarte/ Louisa/ Darian/ Feliberto/ Livia/ Sb .       [facility] [phone]      VIKTORIA HERNÁNDEZ  MRN-[id number]  Patient is a 74y old  Female who presents with a chief complaint of chest pain (2024 17:31)    HPI: 74 year-old [place] female with history of CAD (s/p PCI , , 2024), HTN, HLD, T2DM, and GERD presents with intermittent chest pain at rest and on exertion for the past 3 days. Symptoms started on , pressure-like, 8/10 in intensity with radiation up the neck and jaw associated with mild nausea without vomiting, similar to her prior angina symptoms.  She took her 's NTG SL and symptoms improved within 5 minutes.  She came to ED for further evaluation as these episodes of intermittent chest pain persisted for the past couple days.  Currently, chest pain free s/p NTG SL twice since arrived (2024 04:16)    REVIEW OF SYSTEMS:  CONSTITUTIONAL: No weakness, fevers or chills  EYES/ENT: No visual changes;  No vertigo or throat pain   NECK: No pain or stiffness  RESPIRATORY: No cough, wheezing, hemoptysis; No shortness of breath  CARDIOVASCULAR: No chest pain or palpitations  GASTROINTESTINAL: No abdominal or epigastric pain  No nausea, vomiting, or hematemesis; No diarrhea or constipation. No melena or hematochezia.  GENITOURINARY: No dysuria, frequency or hematuria  NEUROLOGICAL: No numbness or weakness  SKIN: No itching, rashes    ICU Vital Signs Last 24 Hrs  T(C): 37.3 (2024 15:00), Max: 38.2 (2024 11:00)  T(F): 99.1 (2024 15:00), Max: 100.8 (2024 11:00)  HR: 96 (2024 19:00) (88 - 128)  ABP: 141/54 (2024 19:00) (90/54 - 193/86)  ABP(mean): 88 (2024 19:00) (71 - 129)  RR: 21 (2024 19:00) (13 - 30)  SpO2: 100% (2024 19:00) (99% - 100%)    O2 Parameters below as of 2024 19:00  Patient On (Oxygen Delivery Method): room air      I&O's Summary    :  -  2024 07:00  --------------------------------------------------------  IN: 214.2 mL / OUT: 1855 mL / NET: -1640.8 mL    2024 07:  -  2024 19:19  --------------------------------------------------------  IN: 708 mL / OUT: 1390 mL / NET: -682 mL      CAPILLARY BLOOD GLUCOSE  POCT Blood Glucose.: 236 mg/dL (2024 16:14)  ============================I/O===========================   I&O's Detail    :  -  2024 07:00  --------------------------------------------------------  IN:    Dexmedetomidine: 13.6 mL    Heparin: 87.5 mL    Oral Fluid: 100 mL    Propofol: 13.1 mL  Total IN: 214.2 mL    OUT:    Indwelling Catheter - Urethral (mL): 1855 mL  Total OUT: 1855 mL    Total NET: -1640.8 mL      2024 07:  -  2024 19:19  --------------------------------------------------------  IN:    Heparin: 68 mL    IV PiggyBack: 100 mL    Oral Fluid: 540 mL  Total IN: 708 mL    OUT:    Indwelling Catheter - Urethral (mL): 1390 mL  Total OUT: 1390 mL    Total NET: -682 mL  ============================ LABS =========================                     13.1   9.03  )-----------( 153      ( 2024 00:47 )             38.8         137  |  104  |  22  ----------------------------<  124[H]  4.0   |  14[L]  |  1.27    Ca    8.9      2024 00:47  Phos  3.2       Mg     2.1         TPro  6.6  /  Alb  3.3  /  TBili  0.8  /  DBili  x   /  AST  59[H]  /  ALT  25  /  AlkPhos  68      Troponin T, High Sensitivity Result: 1628 ng/L (24 @ 10:04)    CKMB Units: 21.6 ng/mL (24 @ 10:04)    LIVER FUNCTIONS - ( 2024 00:47 )  Alb: 3.3 g/dL / Pro: 6.6 g/dL / ALK PHOS: 68 U/L / ALT: 25 U/L / AST: 59 U/L / GGT: x           PT/INR - ( 2024 00:47 )   PT: 14.4 sec;   INR: 1.25 ratio    PTT - ( 2024 18:12 )  PTT:38.5 sec    ABG - ( 2024 01:20 )  pH, Arterial: 7.30  pH, Blood: x     /  pCO2: 31    /  pO2: 137   / HCO3: 15    / Base Excess: -9.9  /  SaO2: 98.3      Blood Gas Arterial, Lactate: 1.3 mmol/L (24 @ 01:20)  Blood Gas Venous - Lactate: 1.5 mmol/L (24 @ 01:06)  Blood Gas Arterial, Lactate: 0.6 mmol/L (24 @ 10:40)  Blood Gas Arterial, Lactate: 0.7 mmol/L (24 @ 09:36)  Blood Gas Arterial, Lactate: 0.8 mmol/L (24 @ 08:45)  Blood Gas Arterial, Lactate: 1.0 mmol/L (24 @ 00:05)  Blood Gas Arterial, Lactate: 1.1 mmol/L (24 @ 14:50)  Blood Gas Venous - Lactate: 4.0 mmol/L (24 @ 10:15)    Urinalysis Basic - ( 2024 00:47 )    Color: x / Appearance: x / SG: x / pH: x  Gluc: 124 mg/dL / Ketone: x  / Bili: x / Urobili: x   Blood: x / Protein: x / Nitrite: x   Leuk Esterase: x / RBC: x / WBC x   Sq Epi: x / Non Sq Epi: x / Bacteria: x  ======================Micro/Rad/Cardio=================  Telemetry: Reviewed   EKG: Reviewed  CXR: Reviewed  Culture: Reviewed   Echo:   Cath: Cardiac Cath Lab - Adult:   [place] [address]  [phone]  Cath Lab Report -- Comprehensive Report  Patient: VIKTORIA HERNÁNDEZ  Study date: 2020  Account number: [id number]  MR number: [id number]  : 1950  Gender: Female  Race: O  Case Physician(s):  Malu Beltran M.D.  Fellow:  MD Vi King MD  Referring Physician:  Paul Garcia M.D.  INDICATIONS: Abnormal stress test.  PROCEDURE:  --  Left heart catheterization.  --  Left coronary angiography.  --  Right coronary angiography.  --  Diagnostic myocardial fractional flow reserve.  --  Sonosite - Diagnostic.  TECHNIQUE: The risks and alternatives of the procedures and conscious  sedation were explained to the patient and informed consent was obtained.  Cardiac catheterization performed electively.  Local anesthetic given. Right radial artery access. A 5 Fr. Radial  Glidesheath Slender was inserted in the vessel. RRA was aborted as there  was extreme subclavian tourtuosity Rightfemoral artery access. Local  anesthetic given. A 4fr Sheath [place] was inserted in the vessel,  utilizing the modified Seldinger technique. Left heart catheterization. A  4 Fr JR 4.0 catheter was utilized. After recording ascending aortic  pressure, the catheter was advanced across the aortic valve and left  ventricular pressure was recorded. Left coronary artery angiography. The  vessel was injected utilizing a 4 Fr JL 4.0 catheter and positioned in the  vessel ostia under fluoroscopic guidance. Angiography was performed in  multiple projections using hand-injection of contrast. Right coronary  artery angiography. The vessel was injected utilizing a 4 Fr JR 4.0  catheter and positioned in the vessel ostia under fluoroscopic guidance.  Angiography was performed in multiple projections using hand-injection of  contrast. Myocardial (fractional) flow reserve in the lesion in the  proximal right coronary artery. The vessel was entered with a 5 Fr guiding  catheter. Flow reserve was measured using a 0.014" pressure-monitoring  Verrata PLUS Wire J Tip guide-wire. Based on the results of this study,  the lesion was judged to be non-significant and no intervention was  performed. Sonosite - Diagnostic. RADIATION EXPOSURE: 14 min.  CONTRASTGIVEN: 70 ml Optiray.  MEDICATIONS GIVEN: Midazolam, 1 mg, IV. Fentanyl, 25 mcg, IV. Heparin, 3000  units, IV. Heparin, 2000 units, IV. 2% Lidocaine, 3 ml, subcutaneously.  Cardene, 400 mcg. 2% Lidocaine, 10 ml, subcutaneously. 0.9% Normal saline,  50 ml, IV.  VENTRICLES: No left ventriculogram was performed.  CORONARY VESSELS: The coronary circulation is right dominant.  LM:   --  LM: Angiography showed mild atherosclerosis with no flow limiting  lesions.  LAD:   --  Proximal LAD: There was a diffuse 20 % stenosis at the site of a  prior stent. There was MANUELA grade 3 flow through the vessel (brisk flow).  --  Mid LAD: There was a discrete 40 % stenosis at a site with no prior  intervention. There was MANUELA grade 3 flow through the vessel (brisk flow).  CX:   --  OM1: There was a tubular 20 % stenosis at the site of a prior  stent. There was MANUELA grade 3 flow through the vessel (brisk flow).  --  OM2: There was a diffuse 20 % stenosis at the site of a prior stent.  There was MANUELA grade 3 flow through the vessel (brisk flow).  RCA:   --  Proximal RCA: There was a diffuse 40 % stenosis at the site of a  prior stent. There was MANUELA grade 3 flow through the vessel (brisk flow).  --  Mid RCA: There was a diffuse 20 % stenosis at the site ofa prior  stent. There was MANUELA grade 3 flow through the vessel (brisk flow).  --  Distal RCA: Angiography showed mild atherosclerosis with no flow  limiting lesions.  --  RPL1: Angiography showed mild atherosclerosis with no flow limiting  lesions.  COMPLICATIONS: There were no complications.  DIAGNOSTIC IMPRESSIONS: Patent stents LAD, OM-1, OM-2 and RCA  No significant obstructive CAD  DIAGNOSTIC RECOMMENDATIONS: Medical therapy.  INTERVENTIONAL IMPRESSIONS: Patent stents LAD, OM-1, OM-2 and RCA  No significant obstructive CAD  INTERVENTIONAL RECOMMENDATIONS: Medical therapy.  Prepared and signed by  Malu Beltran M.D.  Signed 2020 16:24:15  HEMODYNAMIC TABLES  Pressures:  Baseline  Pressures:  - HR: 69  Pressures:  - Rhythm:  Pressures:  -- Aortic Pressure (S/D/M): 201/85/133  Pressures:  -- Left Ventricle (s/edp): 195/27/--  Outputs:  Baseline  Outputs:  -- CALCULATIONS: Age in years: 70.41  Outputs:  -- CALCULATIONS: Body Surface Area: 1.54  Outputs:  -- CALCULATIONS: Height in cm: 155.00  Outputs:  -- CALCULATIONS: Sex: Female  Outputs:  -- CALCULATIONS: Weight in k.00  Outputs:  -- OUTPUTS: O2 consumption: 192.42  Outputs:  -- OUTPUTS: Vo2 Indexed: 125.00 (20 @ 09:08)  Cardiac Cath Lab - Adult:   [facility]  Department of Cardiology  [address] [place] [address]  [phone]  Cath Lab Report -- Comprehensive Report  Patient: VIKTORIA HERNÁNDEZ  Study date: 2017  Account number: [id number]  MR number: [id number]  : 1950  Gender: Female  Race: O  Case Physician(s):  Catalina Dimas M.D.  Fellow:  Cherrie Jasso D.O.  Referring Physician:  Paul Garcia M.D.  INDICATIONS: Unstable angina - CCS3.  HISTORY: The patient has a history of coronary artery disease. The patient  has hypertension, medication-treated dyslipidemia, and a family history of  coronary artery disease.  PROCEDURE:  --  Left heart catheterization with ventriculography.  --  Left coronary angiography.  --  Right coronary angiography.  TECHNIQUE: The risks and alternatives of the procedures and conscious  sedation were explained to the patient and informed consent was obtained.  Cardiac catheterization performed electively.  Local anesthetic given. Right radial artery access. Local anesthetic given.  A 6FR PRELUDE KIT was inserted in the vessel, utilizing the modified  Seldinger technique. Left heart catheterization. Ventriculography was  performed. A 5FR FR4.0 EXPO catheter was utilized. After recording  ascending aortic pressure, the catheter was advanced across the aortic  valve and left ventricular pressure was recorded. Left coronary artery  angiography. The vessel was injected utilizing a 5FR FL3.5 EXPO catheter  and positioned in the vessel ostia underfluoroscopic guidance.  Angiography was performed in multiple projections using hand-injection of  contrast. Right coronary artery angiography. The vessel was injected  utilizing a 5FR FR4.0 EXPO catheter and positioned in the vessel ostia  under fluoroscopic guidance. Angiography was performed in multiple  projections using hand-injection of contrast. RADIATION EXPOSURE: 9.4 min.  CONTRAST GIVEN: 28 ml Optiray.  MEDICATIONS GIVEN: Fentanyl, 50 mcg, IV. Midazolam, 1 mg, IV. Heparin, 2500  units, IV. Cardene, 500 mcg.  VENTRICLES: EF estimated was 60 %.  CORONARY VESSELS: The coronary circulation is right dominant.  LM:   --  LM: Normal.  LAD:   --  Proximal LAD: There was a diffuse 25 % stenosis at the site of a  prior angioplasty with stent placement.  CX:   --  OM1: There was a diffuse 25 % stenosis at the site of a prior  angioplasty with stent placement.  RCA:   --  Proximal RCA: There was a diffuse 10 % stenosis at the site of a  prior angioplasty with stent placement. There was TIMIgrade 3 flow  through the vessel (brisk flow). An intervention was performed.  --  Mid RCA: There was a diffuse 40 % stenosis at the site of a prior  angioplasty with stent placement. There was MANUELA grade 3 flow through the  vessel (brisk flow).  COMPLICATIONS: There were no complications.  DIAGNOSTIC RECOMMENDATIONS: Medical management is recommended.  Prepared and signed by  Catalina Dimas M.D.  Signed 2017 17:59:08  HEMODYNAMIC TABLES  Pressures:  Baseline/ Room Air  Pressures:  - HR: 70  Pressures:  - Rhythm:  Pressures:  -- Aortic Pressure (S/D/M): 144/72/103  Pressures:  -- Left Ventricle (s/edp): 145/11/--  Outputs:  Baseline/ Room Air  Outputs:  -- CALCULATIONS[age]  Outputs:  -- CALCULATIONS: Body Surface Area: 1.48  Outputs:  -- CALCULATIONS: Height in cm: 152.00  Outputs:  -- CALCULATIONS: Sex: Female  Outputs:  -- CALCULATIONS: Weight in k.60 (17 @ 12:01)  ======================================================  PAST MEDICAL & SURGICAL HISTORY:  HTN (hypertension)      HLD (hyperlipidemia)      DM (diabetes mellitus)      CAD (coronary artery disease)      Stented coronary artery      COVID-19      No significant past surgical history    A/P: 74F CAD (s/p PCI ‘98, , 2024), HTN, HLD, GERD p/w CP, f/w NSTEMI. LHC w/ ISR LAD s/p POBA. c/b persistent CP & euglycemic DKA req insulin & nitro in CICU. On floor, CHB req TVP & intubation. Now extub .    NEURO  - off sedation s/p extubation  - AOx3, no acute issues  - continue to monitor mental status as per protocol     RESPIRATORY  #Acute respiratory failure  - intubated for airway protection during RRT  - extubated  to HFNC  - Tolerating room air w/ SPO2 100%  - continue to monitor SpO2 with goal >94%    CARDIO  #NSTEMI  -  LHC: 100 pLAD ISR x1 POBA  - DAPT: ASA/Plavix  - cont crestor and zetia  - BPs labile, defer GDMT at this time    #CHB  - RRT called for AMS & CHB/symptomatic bradycardia  - intubated for airway protection  - TVP placed emergently on 6MONTI, replaced in CICU  - started on dopamine & epi, both currently off  - currently sinus tachy in 110s on telemetry  - follow up EP reccs for PPM    #Afib RVR  - new  w/ rates 150s, BPs stable  - converted after Amio bolus x1 and IV Lopressor 5mg  - started on heparin gtt for AC    HEME  - Monitor H/H and plts  - DVT PPX: on systemic heparin    RENAL/  #BK- resolved  - likely i/s/o hypoperfusion during CHB episode  - Continue monitoring urine output, lytes, SCr/ BUN  - replete lytes prn with goal K >4 and Mg >2    GI  - tolerating DASH diet  - monitor for BM    ENDO  #Hyperglycemia d/t stress  - Cont ISS, gluc controlled  - monitor FS    ID  #Low grade fever (tmax 100.2F)  - no leukocytosis  - UA and RVP neg  - ID clearance prior to PPM per EP  - BCx x2 to be sent  - ID consult placed  - monitor and trend WBC and temperature curve     Lines  RIJ TVP   R Ax [place]   ======================= DISPOSITION  =====================  [X] Critical   [ ] Guarded    [ ] Stable    [X] Maintain in CICU  [ ] Downgrade to Telemetry  [ ] Discharge Home    Patient requires continuous monitoring with bedside rhythm monitoring, pulse ox monitoring, and intermittent blood gas analysis. Care plan discussed with ICU care team. Patient remained critical and at risk for life threatening decompensation.  Patient seen, examined and plan discussed with CCU team during rounds.     I have personally provided 30 minutes of critical care time excluding time spent on separate procedures, in addition to initial critical care time provided by the CICU Attending, Dr. Feliberto [phone]

## 2024-11-24 NOTE — CONSULT NOTE ADULT - SUBJECTIVE AND OBJECTIVE BOX
Patient is a 74y old  Female who presents with a chief complaint of chest pain (24 Nov 2024 14:05)    HPI:  74-year-old female with a past medical history of CAD status post PCI most recently in August 2024, hypertension, hyperlipidemia, diabetes who is admitted to the hospital due to chest pain.    Patient reports onset of chest pain on 11/17/2024.  Reports radiation to the neck and jaw, also reports nausea.  Patient reports taking sublingual nitrogen with improvement of symptoms.  Presented to the ED after chest pain persisted for several days.    Patient admitted for further management of unstable angina.  Patient underwent left heart catheterization on 11/20/2024.  In recovery, developed chest pain again with repeat angiogram not showing any acute findings.  Patient was then admitted to CICU for further monitoring given multiple electrolyte disturbances and chest pain.      Patient was transferred out of CICU on eleven 2122.4 however then had an RRT called due to bradycardia.  Patient had been complaining of nausea vomiting.  Patient required pacing and intubation, then readmitted to the CICU.  Patient extubated on 11/23/2024.  Plan for PPM placement.    ID consulted due to a Tmax of 100.2 overnight with plan for PPM and concern for infection.  Patient with a Tmax of 100.8 Fahrenheit past 24 hours.  Otherwise hemodynamically stable on room air.  Latest labs show no leukocytosis, BMP with renal function with GFR 44, hepatic function with AST 59.  Blood cultures were obtained 11/24/2024 and are pending.  Patient being monitored off antibiotics.  Chest x-ray without evidence for consolidation        prior hospital charts reviewed [x  ]  primary team notes reviewed [ x ]  other consultant notes reviewed [ x ]    PAST MEDICAL & SURGICAL HISTORY:  HTN (hypertension)      HLD (hyperlipidemia)      DM (diabetes mellitus)      CAD (coronary artery disease)      Stented coronary artery      COVID-19      No significant past surgical history          Allergies  No Known Allergies    ANTIMICROBIALS (past 90 days)  MEDICATIONS  (STANDING):          MEDICATIONS  (STANDING):  aspirin enteric coated 81 daily  clopidogrel Tablet 75 daily  ezetimibe 10 daily  heparin  Infusion 600 <Continuous>  insulin lispro (ADMELOG) corrective regimen sliding scale  three times a day before meals  insulin lispro (ADMELOG) corrective regimen sliding scale  at bedtime  rosuvastatin 40 at bedtime    SOCIAL HISTORY:     Lives at home.  FAMILY HISTORY:  Family history of heart attack (Father, Mother)    Family history of CVA (Mother)    FH: diabetes mellitus (Sibling)      REVIEW OF SYSTEMS  [  ] ROS unobtainable because:    [ x ] All other systems negative except as noted below:	    Constitutional:  [ ] fever [ ] chills  [ ] weight loss  [x ] weakness  Skin:  [ ] rash [ ] phlebitis	  Eyes: [ ] icterus [ ] pain  [ ] discharge	  ENMT: [ ] sore throat  [ ] thrush [ ] ulcers [ ] exudates  Respiratory: [ ] dyspnea [ ] hemoptysis [ ] cough [ ] sputum	  Cardiovascular:  [ ] chest pain [ ] palpitations [ ] edema	  Gastrointestinal:  [ ] nausea [ ] vomiting [ ] diarrhea [ ] constipation [ ] pain	  Genitourinary:  [ ] dysuria [ ] frequency [ ] hematuria [ ] discharge [ ] flank pain  [ ] incontinence  Musculoskeletal:  [ ] myalgias [ ] arthralgias [ ] arthritis  [ ] back pain  Neurological:  [ ] headache [ ] seizures  [ ] confusion/altered mental status  Psychiatric:  [ ] anxiety [ ] depression	  Hematology/Lymphatics:  [ ] lymphadenopathy  Endocrine:  [ ] adrenal [ ] thyroid  Allergic/Immunologic:	 [ ] transplant [ ] seasonal    Vital Signs Last 24 Hrs  T(F): 99.1 (11-24-24 @ 15:00), Max: 100.8 (11-24-24 @ 11:00)  Vital Signs Last 24 Hrs  HR: 91 (11-24-24 @ 17:30) (88 - 128)  BP: --  RR: 28 (11-24-24 @ 17:30)  SpO2: 100% (11-24-24 @ 17:30) (99% - 100%)  Wt(kg): --    PHYSICAL EXAM:  Constitutional: non-toxic, no distress  HEAD/EYES: anicteric, no conjunctival injection  ENT:  supple, no thrush, R IJ+  Cardiovascular:   normal S1, S2, no murmur, no edema  Respiratory:  clear BS bilaterally, no wheezes, no rales  GI:  soft, non-tender, normal bowel sounds  :  no cali, no CVA tenderness  Musculoskeletal:  no synovitis, normal ROM  Neurologic: awake and alert, normal strength, no focal findings  Skin:  no rash, no erythema, no phlebitis  Heme/Onc: no lymphadenopathy   Psychiatric:  awake, alert, appropriate mood                            13.1   9.03  )-----------( 153      ( 24 Nov 2024 00:47 )             38.8   11-24    137  |  104  |  22  ----------------------------<  124[H]  4.0   |  14[L]  |  1.27    Ca    8.9      24 Nov 2024 00:47  Phos  3.2     11-24  Mg     2.1     11-24    TPro  6.6  /  Alb  3.3  /  TBili  0.8  /  DBili  x   /  AST  59[H]  /  ALT  25  /  AlkPhos  68  11-24    Urinalysis Basic - ( 24 Nov 2024 00:47 )    Color: x / Appearance: x / SG: x / pH: x  Gluc: 124 mg/dL / Ketone: x  / Bili: x / Urobili: x   Blood: x / Protein: x / Nitrite: x   Leuk Esterase: x / RBC: x / WBC x   Sq Epi: x / Non Sq Epi: x / Bacteria: x    MICROBIOLOGY:              RADIOLOGY:  imaging below personally reviewed and agree with findings

## 2024-11-24 NOTE — PROGRESS NOTE ADULT - ASSESSMENT
74F CAD (s/p PCI ‘98, ‘06, 8/2024), HTN, HLD, GERD p/w CP, f/w NSTEMI. LHC w/ ISR LAD s/p POBA. c/b persistent CP & euglycemic DKA req insulin & nitro in CICU. On floor, CHB req TVP & intubation. Now extub 11/24.    NEURO  - off sedation s/p extubation  - AOx3, no acute issues  - continue to monitor mental status as per protocol     RESPIRATORY  #Acute respiratory failure  - intubated for airway protection during RRT  - extubated 11/23 to HFNC  - Tolerating room air w/ SPO2 100%  - continue to monitor SpO2 with goal >94%    CARDIO  #NSTEMI  - 11/22 LHC: 100 pLAD ISR x1 POBA  - DAPT: ASA/Plavix  - cont crestor and zetia  - BPs labile, defer GDMT at this time    #CHB  - RRT called for AMS & CHB/symptomatic bradycardia  - intubated for airway protection  - TVP placed emergently on 6MONTI, replaced in CICU  - started on dopamine & epi, both currently off  - currently sinus tachy in 110s on telemetry  - follow up EP reccs for PPM    #Afib RVR  - new 11/23 w/ rates 150s, BPs stable  - converted after Amio bolus x1 and IV Lopressor 5mg  - started on heparin gtt for AC    HEME  - Monitor H/H and plts  - DVT PPX: on systemic heparin    RENAL/  #BK- resolved  - likely i/s/o hypoperfusion during CHB episode  - Continue monitoring urine output, lytes, SCr/ BUN  - replete lytes prn with goal K >4 and Mg >2    GI  - tolerating DASH diet  - monitor for BM    ENDO  #Hyperglycemia d/t stress  - Cont ISS, gluc controlled  - monitor FS    ID  #Afebrile  - no leukocytosis  - monitor and trend WBC and temperature curve     Lines  RIJ TVP 11/22  R Ax [place] 11/22 74F CAD (s/p PCI ‘98, ‘06, 8/2024), HTN, HLD, GERD p/w CP, f/w NSTEMI. LHC w/ ISR LAD s/p POBA. c/b persistent CP & euglycemic DKA req insulin & nitro in CICU. On floor, CHB req TVP & intubation. Now extub 11/24.    NEURO  - off sedation s/p extubation  - AOx3, no acute issues  - continue to monitor mental status as per protocol     RESPIRATORY  #Acute respiratory failure  - intubated for airway protection during RRT  - extubated 11/23 to HFNC  - Tolerating room air w/ SPO2 100%  - continue to monitor SpO2 with goal >94%    CARDIO  #NSTEMI  - 11/22 LHC: 100 pLAD ISR x1 POBA  - DAPT: ASA/Plavix  - cont crestor and zetia  - BPs labile, defer GDMT at this time    #CHB  - RRT called for AMS & CHB/symptomatic bradycardia  - intubated for airway protection  - TVP placed emergently on 6MONTI, replaced in CICU  - started on dopamine & epi, both currently off  - currently sinus tachy in 110s on telemetry  - follow up EP reccs for PPM    #Afib RVR  - new 11/23 w/ rates 150s, BPs stable  - converted after Amio bolus x1 and IV Lopressor 5mg  - started on heparin gtt for AC    HEME  - Monitor H/H and plts  - DVT PPX: on systemic heparin    RENAL/  #BK- resolved  - likely i/s/o hypoperfusion during CHB episode  - Continue monitoring urine output, lytes, SCr/ BUN  - replete lytes prn with goal K >4 and Mg >2    GI  - tolerating DASH diet  - monitor for BM    ENDO  #Hyperglycemia d/t stress  - Cont ISS, gluc controlled  - monitor FS    ID  #Low grade fever (tmax 100.2F)  - no leukocytosis  - UA and RVP neg  - ID clearance prior to PPM per EP  - BCx x2 to be sent  - ID consult placed  - monitor and trend WBC and temperature curve     Lines  RIJ TVP 11/22  R Ax [place] 11/22

## 2024-11-24 NOTE — CONSULT NOTE ADULT - ASSESSMENT
74-year-old female with a past medical history of CAD status post PCI most recently in August 2024, hypertension, hyperlipidemia, diabetes who is admitted to the hospital due to chest pain.    #Concern for infe  #CAD, NSTEMI  #Complete heart block  #Presence of right IJ transvenous pacemaker    Recommendations  Tmax 100.8 Fahrenheit today  Patient feels malaise, weakness however denies any other localizing symptoms  Unclear etiology for fever at this time  Follow pending blood cultures  Obtain full RVP  Monitor IVs, no evidence for phlebitis at this time  Consider right IJ removal/exchange  If fever recurs, obtain CT chest/abdomen/pelvis  Given fever, if no emergent procedure indicated - would hold off procedure and proceed once blood cultures are negative more than 48 hours  Follow fever curve and WBC count    Darrian Frank MD  Division of Infectious Diseases

## 2024-11-24 NOTE — PROGRESS NOTE ADULT - SUBJECTIVE AND OBJECTIVE BOX
No complaints    MEDICATIONS:  aspirin enteric coated 81 milliGRAM(s) Oral daily  clopidogrel Tablet 75 milliGRAM(s) Oral daily  heparin  Infusion 600 Unit(s)/Hr IV Continuous <Continuous>            ezetimibe 10 milliGRAM(s) Oral daily  insulin lispro (ADMELOG) corrective regimen sliding scale   SubCutaneous three times a day before meals  insulin lispro (ADMELOG) corrective regimen sliding scale   SubCutaneous at bedtime  rosuvastatin 40 milliGRAM(s) Oral at bedtime    chlorhexidine 2% Cloths 1 Application(s) Topical <User Schedule>  chlorhexidine 4% Liquid 1 Application(s) Topical <User Schedule>  folic acid 1 milliGRAM(s) Oral daily      REVIEW OF SYSTEMS:  Complete 10point ROS negative.    PHYSICAL EXAM:  T(C): 38.2 (11-24-24 @ 11:00), Max: 38.2 (11-24-24 @ 11:00)  HR: 109 (11-24-24 @ 13:00) (87 - 113)  BP: --  RR: 20 (11-24-24 @ 13:00) (10 - 27)  SpO2: 99% (11-24-24 @ 13:00) (99% - 100%)  Wt(kg): --  I&O's Summary    23 Nov 2024 07:01  -  24 Nov 2024 07:00  --------------------------------------------------------  IN: 214.2 mL / OUT: 1855 mL / NET: -1640.8 mL    24 Nov 2024 07:01  -  24 Nov 2024 14:06  --------------------------------------------------------  IN: 515.5 mL / OUT: 885 mL / NET: -369.5 mL        Appearance: Normal	  HEENT:   Normal oral mucosa, PERRL, EOMI, RIJ TVP	  Lymphatic: No lymphadenopathy  Cardiovascular: paradoxical S2 No JVD, No murmurs, No edema  Respiratory: Lungs clear to auscultation	  Psychiatry: A & O x 3, Mood & affect appropriate  Gastrointestinal:  Soft, Non-tender, + BS	  Skin: No rashes, No ecchymoses, No cyanosis	  Neurologic: Non-focal  Extremities: Normal range of motion, No clubbing, cyanosis or edema  Vascular: Peripheral pulses palpable 2+ bilaterally        LABS:	 	    CBC Full  -  ( 24 Nov 2024 00:47 )  WBC Count : 9.03 K/uL  Hemoglobin : 13.1 g/dL  Hematocrit : 38.8 %  Platelet Count - Automated : 153 K/uL  Mean Cell Volume : 93.0 fl  Mean Cell Hemoglobin : 31.4 pg  Mean Cell Hemoglobin Concentration : 33.8 g/dL  Auto Neutrophil # : 6.51 K/uL  Auto Lymphocyte # : 1.57 K/uL  Auto Monocyte # : 0.70 K/uL  Auto Eosinophil # : 0.17 K/uL  Auto Basophil # : 0.04 K/uL  Auto Neutrophil % : 72.1 %  Auto Lymphocyte % : 17.4 %  Auto Monocyte % : 7.8 %  Auto Eosinophil % : 1.9 %  Auto Basophil % : 0.4 %    11-24    137  |  104  |  22  ----------------------------<  124[H]  4.0   |  14[L]  |  1.27  11-23    135  |  102  |  26[H]  ----------------------------<  184[H]  4.2   |  17[L]  |  1.33[H]    Ca    8.9      24 Nov 2024 00:47  Ca    9.3      23 Nov 2024 00:13  Phos  3.2     11-24  Phos  3.6     11-23  Mg     2.1     11-24  Mg     2.3     11-23    TPro  6.6  /  Alb  3.3  /  TBili  0.8  /  DBili  x   /  AST  59[H]  /  ALT  25  /  AlkPhos  68  11-24  TPro  6.7  /  Alb  3.5  /  TBili  0.6  /  DBili  x   /  AST  69[H]  /  ALT  30  /  AlkPhos  66  11-23      PREVIOUS DIAGNOSTIC TESTING:    [ ] Echocardiogram:    11/23/2024  1 . Left ventricular systolic function is mildly decreased with an ejection fraction visually estimated at 45 to 50 %. Regional wall motion abnormalities present.   2. Multiple segmental abnormalities exist. See findings.   3. Normal right ventricular cavity size, with normal wall thickness, and normal right ventricular systolic function.  4. No pericardial effusion seen.    ASSESSMENT/PLAN: 	  74 year-old [place] female with history of CAD (s/p PCI 1998, 2006, 8/2024), HTN, HLD, T2DM, and GERD presents with intermittent chest pain at rest and on exertion for the past 3 days. Underwent POBA for pLAD ISR. EP consulted for transient symptomatic CHB. Remains with LBBB.  F/u EF= 45-50% ( basal and mid inferior septum and basal anteroseptal segment are akinetic. The basal and mid inferior wall, basal inferolateral segment, and basal anterolateral segment are hypokinetic). Presentation most c/w embolization to septal perforators.  Given the persistent LBBB I do favor proceeding with PPM (BIV PM).    - NPO past MN Sunday for procedure Monday

## 2024-11-24 NOTE — PROGRESS NOTE ADULT - SUBJECTIVE AND OBJECTIVE BOX
VIKTORIA HERNÁNDEZ  MRN-[id number]  Patient is a 74y old  Female who presents with a chief complaint of chest pain (23 Nov 2024 22:03)    HPI:  74 year-old [place]kin[place] female with history of CAD (s/p PCI 1998, 2006, 8/2024), HTN, HLD, T2DM, and GERD presents with intermittent chest pain at rest and on exertion for the past 3 days. Symptoms started on 11/17, pressure-like, 8/10 in intensity with radiation up the neck and jaw associated with mild nausea without vomiting, similar to her prior angina symptoms.  She took her 's NTG SL and symptoms improved within 5 minutes.  She came to ED for further evaluation as these episodes of intermittent chest pain persisted for the past couple days.  Currently, chest pain free s/p NTG SL twice since arrived (20 Nov 2024 04:16)        Overnight events:    REVIEW OF SYSTEMS:    CONSTITUTIONAL: No weakness, fevers or chills  EYES/ENT: No visual changes;  No vertigo or throat pain   NECK: No pain or stiffness  RESPIRATORY: No cough, wheezing, hemoptysis; No shortness of breath  CARDIOVASCULAR: No chest pain or palpitations  GASTROINTESTINAL: No abdominal or epigastric pain. No nausea, vomiting, or hematemesis; No diarrhea or constipation. No melena or hematochezia.  GENITOURINARY: No dysuria, frequency or hematuria  NEUROLOGICAL: No numbness or weakness  SKIN: No itching, rashes      Physical Exam:  Vital Signs Last 24 Hrs  T(C): 37.6 (24 Nov 2024 03:00), Max: 37.9 (23 Nov 2024 12:00)  T(F): 99.7 (24 Nov 2024 03:00), Max: 100.2 (23 Nov 2024 12:00)  HR: 100 (24 Nov 2024 06:00) (87 - 143)  RR: 22 (24 Nov 2024 06:00) (10 - 31)  SpO2: 100% (24 Nov 2024 06:00) (100% - 100%)    Parameters below as of 24 Nov 2024 06:00  Patient On (Oxygen Delivery Method): room air      Physical Exam:   Constitutional: NAD, well-groomed, well-developed  HEENT: PERRLA, EOMI, no drainage or redness  Neck: supple,  No JVD  Respiratory: Breath Sounds equal & clear bilaterally to auscultation, no rales/rhonchi/wheezing, no accessory muscle use noted  Cardiovascular: Regular rate, regular rhythm, normal S1, S2; no murmurs or rub  Gastrointestinal: Soft, non-tender, non distended, + bowel sounds  Extremities: HEARD x 4, no peripheral edema, no cyanosis, no clubbing   Neurological: A+O x 3; speech clear and intact; no sensory, motor  deficits, normal reflexes  Skin: warm, dry, well perfused    ============================I/O===========================   I&O's Detail    23 Nov 2024 07:01  -  24 Nov 2024 07:00  --------------------------------------------------------  IN:    Dexmedetomidine: 13.6 mL    Heparin: 87.5 mL    Oral Fluid: 100 mL    Propofol: 13.1 mL  Total IN: 214.2 mL    OUT:    Indwelling Catheter - Urethral (mL): 1855 mL  Total OUT: 1855 mL    Total NET: -1640.8 mL        ============================ LABS =========================                        13.1   9.03  )-----------( 153      ( 24 Nov 2024 00:47 )             38.8     11-24    137  |  104  |  22  ----------------------------<  124[H]  4.0   |  14[L]  |  1.27    Ca    8.9      24 Nov 2024 00:47  Phos  3.2     11-24  Mg     2.1     11-24    TPro  6.6  /  Alb  3.3  /  TBili  0.8  /  DBili  x   /  AST  59[H]  /  ALT  25  /  AlkPhos  68  11-24    LIVER FUNCTIONS - ( 24 Nov 2024 00:47 )  Alb: 3.3 g/dL / Pro: 6.6 g/dL / ALK PHOS: 68 U/L / ALT: 25 U/L / AST: 59 U/L / GGT: x           PT/INR - ( 24 Nov 2024 00:47 )   PT: 14.4 sec;   INR: 1.25 ratio         PTT - ( 24 Nov 2024 05:04 )  PTT:82.0 sec  ABG - ( 24 Nov 2024 01:20 )  pH, Arterial: 7.30  pH, Blood: x     /  pCO2: 31    /  pO2: 137   / HCO3: 15    / Base Excess: -9.9  /  SaO2: 98.3              Urinalysis Basic - ( 24 Nov 2024 00:47 )    Color: x / Appearance: x / SG: x / pH: x  Gluc: 124 mg/dL / Ketone: x  / Bili: x / Urobili: x   Blood: x / Protein: x / Nitrite: x   Leuk Esterase: x / RBC: x / WBC x   Sq Epi: x / Non Sq Epi: x / Bacteria: x      ======================Micro/Rad/Cardio=================  Culture: Reviewed   CXR: Reviewed  Echo:Reviewed  ======================================================  PAST MEDICAL & SURGICAL HISTORY:  HTN (hypertension)      HLD (hyperlipidemia)      DM (diabetes mellitus)      CAD (coronary artery disease)      Stented coronary artery      COVID-19      No significant past surgical history

## 2024-11-25 LAB
ALBUMIN SERPL ELPH-MCNC: 3.2 G/DL — LOW (ref 3.3–5)
ALP SERPL-CCNC: 77 U/L — SIGNIFICANT CHANGE UP (ref 40–120)
ALT FLD-CCNC: 23 U/L — SIGNIFICANT CHANGE UP (ref 10–45)
ANION GAP SERPL CALC-SCNC: 13 MMOL/L — SIGNIFICANT CHANGE UP (ref 5–17)
APTT BLD: 45.2 SEC — HIGH (ref 24.5–35.6)
APTT BLD: 51 SEC — HIGH (ref 24.5–35.6)
APTT BLD: 57.8 SEC — HIGH (ref 24.5–35.6)
AST SERPL-CCNC: 43 U/L — HIGH (ref 10–40)
B-OH-BUTYR SERPL-SCNC: 1.9 MMOL/L — HIGH
BASOPHILS # BLD AUTO: 0.02 K/UL — SIGNIFICANT CHANGE UP (ref 0–0.2)
BASOPHILS NFR BLD AUTO: 0.3 % — SIGNIFICANT CHANGE UP (ref 0–2)
BILIRUB SERPL-MCNC: 0.7 MG/DL — SIGNIFICANT CHANGE UP (ref 0.2–1.2)
BUN SERPL-MCNC: 17 MG/DL — SIGNIFICANT CHANGE UP (ref 7–23)
CALCIUM SERPL-MCNC: 8.7 MG/DL — SIGNIFICANT CHANGE UP (ref 8.4–10.5)
CHLORIDE SERPL-SCNC: 106 MMOL/L — SIGNIFICANT CHANGE UP (ref 96–108)
CO2 SERPL-SCNC: 19 MMOL/L — LOW (ref 22–31)
CREAT SERPL-MCNC: 0.88 MG/DL — SIGNIFICANT CHANGE UP (ref 0.5–1.3)
EGFR: 69 ML/MIN/1.73M2 — SIGNIFICANT CHANGE UP
EOSINOPHIL # BLD AUTO: 0.2 K/UL — SIGNIFICANT CHANGE UP (ref 0–0.5)
EOSINOPHIL NFR BLD AUTO: 2.9 % — SIGNIFICANT CHANGE UP (ref 0–6)
FLUAV AG NPH QL: SIGNIFICANT CHANGE UP
FLUBV AG NPH QL: SIGNIFICANT CHANGE UP
GLUCOSE BLDC GLUCOMTR-MCNC: 132 MG/DL — HIGH (ref 70–99)
GLUCOSE BLDC GLUCOMTR-MCNC: 140 MG/DL — HIGH (ref 70–99)
GLUCOSE BLDC GLUCOMTR-MCNC: 157 MG/DL — HIGH (ref 70–99)
GLUCOSE BLDC GLUCOMTR-MCNC: 182 MG/DL — HIGH (ref 70–99)
GLUCOSE BLDC GLUCOMTR-MCNC: 220 MG/DL — HIGH (ref 70–99)
GLUCOSE SERPL-MCNC: 151 MG/DL — HIGH (ref 70–99)
HCT VFR BLD CALC: 36.6 % — SIGNIFICANT CHANGE UP (ref 34.5–45)
HGB BLD-MCNC: 12.4 G/DL — SIGNIFICANT CHANGE UP (ref 11.5–15.5)
IMM GRANULOCYTES NFR BLD AUTO: 0.4 % — SIGNIFICANT CHANGE UP (ref 0–0.9)
INR BLD: 1.1 RATIO — SIGNIFICANT CHANGE UP (ref 0.85–1.16)
INR BLD: 1.1 RATIO — SIGNIFICANT CHANGE UP (ref 0.85–1.16)
LYMPHOCYTES # BLD AUTO: 1.44 K/UL — SIGNIFICANT CHANGE UP (ref 1–3.3)
LYMPHOCYTES # BLD AUTO: 21.1 % — SIGNIFICANT CHANGE UP (ref 13–44)
MAGNESIUM SERPL-MCNC: 2.2 MG/DL — SIGNIFICANT CHANGE UP (ref 1.6–2.6)
MCHC RBC-ENTMCNC: 30.2 PG — SIGNIFICANT CHANGE UP (ref 27–34)
MCHC RBC-ENTMCNC: 33.9 G/DL — SIGNIFICANT CHANGE UP (ref 32–36)
MCV RBC AUTO: 89.1 FL — SIGNIFICANT CHANGE UP (ref 80–100)
MONOCYTES # BLD AUTO: 0.58 K/UL — SIGNIFICANT CHANGE UP (ref 0–0.9)
MONOCYTES NFR BLD AUTO: 8.5 % — SIGNIFICANT CHANGE UP (ref 2–14)
MRSA PCR RESULT.: SIGNIFICANT CHANGE UP
NEUTROPHILS # BLD AUTO: 4.56 K/UL — SIGNIFICANT CHANGE UP (ref 1.8–7.4)
NEUTROPHILS NFR BLD AUTO: 66.8 % — SIGNIFICANT CHANGE UP (ref 43–77)
NRBC # BLD: 0 /100 WBCS — SIGNIFICANT CHANGE UP (ref 0–0)
PHOSPHATE SERPL-MCNC: 2.2 MG/DL — LOW (ref 2.5–4.5)
PLATELET # BLD AUTO: 133 K/UL — LOW (ref 150–400)
POTASSIUM SERPL-MCNC: 3.8 MMOL/L — SIGNIFICANT CHANGE UP (ref 3.5–5.3)
POTASSIUM SERPL-SCNC: 3.8 MMOL/L — SIGNIFICANT CHANGE UP (ref 3.5–5.3)
PROT SERPL-MCNC: 6.4 G/DL — SIGNIFICANT CHANGE UP (ref 6–8.3)
PROTHROM AB SERPL-ACNC: 12.5 SEC — SIGNIFICANT CHANGE UP (ref 9.9–13.4)
PROTHROM AB SERPL-ACNC: 12.6 SEC — SIGNIFICANT CHANGE UP (ref 9.9–13.4)
RBC # BLD: 4.11 M/UL — SIGNIFICANT CHANGE UP (ref 3.8–5.2)
RBC # FLD: 13.2 % — SIGNIFICANT CHANGE UP (ref 10.3–14.5)
RSV RNA NPH QL NAA+NON-PROBE: SIGNIFICANT CHANGE UP
S AUREUS DNA NOSE QL NAA+PROBE: SIGNIFICANT CHANGE UP
SARS-COV-2 RNA SPEC QL NAA+PROBE: SIGNIFICANT CHANGE UP
SODIUM SERPL-SCNC: 138 MMOL/L — SIGNIFICANT CHANGE UP (ref 135–145)
WBC # BLD: 6.83 K/UL — SIGNIFICANT CHANGE UP (ref 3.8–10.5)
WBC # FLD AUTO: 6.83 K/UL — SIGNIFICANT CHANGE UP (ref 3.8–10.5)

## 2024-11-25 PROCEDURE — 74176 CT ABD & PELVIS W/O CONTRAST: CPT | Mod: 26

## 2024-11-25 PROCEDURE — 71250 CT THORAX DX C-: CPT | Mod: 26

## 2024-11-25 PROCEDURE — 93010 ELECTROCARDIOGRAM REPORT: CPT

## 2024-11-25 PROCEDURE — 99292 CRITICAL CARE ADDL 30 MIN: CPT

## 2024-11-25 PROCEDURE — 99232 SBSQ HOSP IP/OBS MODERATE 35: CPT

## 2024-11-25 PROCEDURE — 71045 X-RAY EXAM CHEST 1 VIEW: CPT | Mod: 26

## 2024-11-25 PROCEDURE — 99291 CRITICAL CARE FIRST HOUR: CPT

## 2024-11-25 RX ORDER — SENNOSIDES 8.6 MG
2 TABLET ORAL AT BEDTIME
Refills: 0 | Status: DISCONTINUED | OUTPATIENT
Start: 2024-11-25 | End: 2024-12-02

## 2024-11-25 RX ORDER — POTASSIUM PHOSPHATE, MONOBASIC POTASSIUM PHOSPHATE, DIBASIC INJECTION, 236; 224 MG/ML; MG/ML
30 SOLUTION, CONCENTRATE INTRAVENOUS ONCE
Refills: 0 | Status: COMPLETED | OUTPATIENT
Start: 2024-11-25 | End: 2024-11-25

## 2024-11-25 RX ORDER — POLYETHYLENE GLYCOL 3350 17 G/17G
17 POWDER, FOR SOLUTION ORAL DAILY
Refills: 0 | Status: DISCONTINUED | OUTPATIENT
Start: 2024-11-25 | End: 2024-12-02

## 2024-11-25 RX ORDER — INSULIN GLARGINE 100 [IU]/ML
15 INJECTION, SOLUTION SUBCUTANEOUS AT BEDTIME
Refills: 0 | Status: DISCONTINUED | OUTPATIENT
Start: 2024-11-25 | End: 2024-11-26

## 2024-11-25 RX ADMIN — CLOPIDOGREL 75 MILLIGRAM(S): 75 TABLET, FILM COATED ORAL at 11:14

## 2024-11-25 RX ADMIN — Medication 10 MILLIGRAM(S): at 11:15

## 2024-11-25 RX ADMIN — POTASSIUM PHOSPHATE, MONOBASIC POTASSIUM PHOSPHATE, DIBASIC INJECTION, 83.33 MILLIMOLE(S): 236; 224 SOLUTION, CONCENTRATE INTRAVENOUS at 06:37

## 2024-11-25 RX ADMIN — POLYETHYLENE GLYCOL 3350 17 GRAM(S): 17 POWDER, FOR SOLUTION ORAL at 05:19

## 2024-11-25 RX ADMIN — CHLORHEXIDINE GLUCONATE 1 APPLICATION(S): 1.2 RINSE ORAL at 05:19

## 2024-11-25 RX ADMIN — Medication 9 UNIT(S)/HR: at 07:44

## 2024-11-25 RX ADMIN — Medication 1: at 11:16

## 2024-11-25 RX ADMIN — Medication 10 UNIT(S)/HR: at 20:52

## 2024-11-25 RX ADMIN — Medication 1 MILLIGRAM(S): at 11:15

## 2024-11-25 RX ADMIN — Medication 2: at 16:26

## 2024-11-25 RX ADMIN — Medication 81 MILLIGRAM(S): at 11:14

## 2024-11-25 RX ADMIN — ROSUVASTATIN CALCIUM 40 MILLIGRAM(S): 5 TABLET, FILM COATED ORAL at 21:57

## 2024-11-25 RX ADMIN — Medication 9 UNIT(S)/HR: at 06:05

## 2024-11-25 RX ADMIN — Medication 3 UNIT(S): at 11:15

## 2024-11-25 RX ADMIN — Medication 3 UNIT(S): at 07:30

## 2024-11-25 RX ADMIN — INSULIN GLARGINE 15 UNIT(S): 100 INJECTION, SOLUTION SUBCUTANEOUS at 21:57

## 2024-11-25 RX ADMIN — Medication 3 UNIT(S): at 16:25

## 2024-11-25 NOTE — PROGRESS NOTE ADULT - SUBJECTIVE AND OBJECTIVE BOX
VIKTORIA HERNÁNDEZ  MRN-[id number]  Patient is a 74y old  Female who presents with a chief complaint of CHB (24 Nov 2024 19:19)    HPI:  74 year-old [place] female with history of CAD (s/p PCI 1998, 2006, 8/2024), HTN, HLD, T2DM, and GERD presents with intermittent chest pain at rest and on exertion for the past 3 days. Symptoms started on 11/17, pressure-like, 8/10 in intensity with radiation up the neck and jaw associated with mild nausea without vomiting, similar to her prior angina symptoms.  She took her 's NTG SL and symptoms improved within 5 minutes.  She came to ED for further evaluation as these episodes of intermittent chest pain persisted for the past couple days.  Currently, chest pain free s/p NTG SL twice since arrived (20 Nov 2024 04:16)        Overnight events: Tmax 100.6F overnight, CT chest/AP pending.     REVIEW OF SYSTEMS:    CONSTITUTIONAL: No weakness, fevers or chills  EYES/ENT: No visual changes;  No vertigo or throat pain   NECK: No pain or stiffness  RESPIRATORY: No cough, wheezing, hemoptysis; No shortness of breath  CARDIOVASCULAR: No chest pain or palpitations  GASTROINTESTINAL: No abdominal or epigastric pain. No nausea, vomiting, or hematemesis; No diarrhea or constipation. No melena or hematochezia.  GENITOURINARY: No dysuria, frequency or hematuria  NEUROLOGICAL: No numbness or weakness  SKIN: No itching, rashes      Physical Exam:  Vital Signs Last 24 Hrs  T(C): 37 (25 Nov 2024 03:00), Max: 38.2 (24 Nov 2024 11:00)  T(F): 98.6 (25 Nov 2024 03:00), Max: 100.8 (24 Nov 2024 11:00)  HR: 93 (25 Nov 2024 06:00) (86 - 128)  BP: 145/70 (25 Nov 2024 06:00) (108/59 - 157/73)  BP(mean): 100 (25 Nov 2024 06:00) (77 - 107)  RR: 12 (25 Nov 2024 06:00) (12 - 30)  SpO2: 100% (25 Nov 2024 06:00) (99% - 100%)    Parameters below as of 25 Nov 2024 06:00  Patient On (Oxygen Delivery Method): room air      Physical Exam:   Constitutional: NAD, well-groomed, well-developed  HEENT: PERRLA, EOMI, no drainage or redness  Neck: supple,  No JVD  Respiratory: Breath Sounds equal & clear bilaterally to auscultation, no rales/rhonchi/wheezing, no accessory muscle use noted  Cardiovascular: Regular rate, regular rhythm, normal S1, S2; no murmurs or rub  Gastrointestinal: Soft, non-tender, non distended, + bowel sounds  Extremities: HEARD x 4, no peripheral edema, no cyanosis, no clubbing   Neurological: A+O x 3; speech clear and intact; no sensory, motor  deficits, normal reflexes  Skin: warm, dry, well perfused  Lines: RIJ TVP dressing C/D/I, no bleeding noted.    ============================I/O===========================   I&O's Detail    23 Nov 2024 07:01  -  24 Nov 2024 07:00  --------------------------------------------------------  IN:    Dexmedetomidine: 13.6 mL    Heparin: 87.5 mL    Oral Fluid: 100 mL    Propofol: 13.1 mL  Total IN: 214.2 mL    OUT:    Indwelling Catheter - Urethral (mL): 1855 mL  Total OUT: 1855 mL    Total NET: -1640.8 mL      24 Nov 2024 07:01  -  25 Nov 2024 06:43  --------------------------------------------------------  IN:    Heparin: 146 mL    IV PiggyBack: 200 mL    Oral Fluid: 600 mL  Total IN: 946 mL    OUT:    Indwelling Catheter - Urethral (mL): 1660 mL    Voided (mL): 575 mL  Total OUT: 2235 mL    Total NET: -1289 mL        ============================ LABS =========================                        12.4   6.83  )-----------( 133      ( 25 Nov 2024 05:23 )             36.6     11-25    138  |  106  |  17  ----------------------------<  151[H]  3.8   |  19[L]  |  0.88    Ca    8.7      25 Nov 2024 05:22  Phos  2.2     11-25  Mg     2.2     11-25    TPro  6.4  /  Alb  3.2[L]  /  TBili  0.7  /  DBili  x   /  AST  43[H]  /  ALT  23  /  AlkPhos  77  11-25    LIVER FUNCTIONS - ( 25 Nov 2024 05:22 )  Alb: 3.2 g/dL / Pro: 6.4 g/dL / ALK PHOS: 77 U/L / ALT: 23 U/L / AST: 43 U/L / GGT: x           PT/INR - ( 25 Nov 2024 05:23 )   PT: 12.5 sec;   INR: 1.10 ratio         PTT - ( 25 Nov 2024 05:23 )  PTT:51.0 sec  ABG - ( 24 Nov 2024 01:20 )  pH, Arterial: 7.30  pH, Blood: x     /  pCO2: 31    /  pO2: 137   / HCO3: 15    / Base Excess: -9.9  /  SaO2: 98.3              Urinalysis Basic - ( 25 Nov 2024 05:22 )    Color: x / Appearance: x / SG: x / pH: x  Gluc: 151 mg/dL / Ketone: x  / Bili: x / Urobili: x   Blood: x / Protein: x / Nitrite: x   Leuk Esterase: x / RBC: x / WBC x   Sq Epi: x / Non Sq Epi: x / Bacteria: x      ======================Micro/Rad/Cardio=================  Culture: Reviewed   CXR: Reviewed  Echo:Reviewed  ======================================================  PAST MEDICAL & SURGICAL HISTORY:  HTN (hypertension)      HLD (hyperlipidemia)      DM (diabetes mellitus)      CAD (coronary artery disease)      Stented coronary artery      COVID-19      No significant past surgical history

## 2024-11-25 NOTE — PROGRESS NOTE ADULT - SUBJECTIVE AND OBJECTIVE BOX
24H hour events:     MEDICATIONS:  aspirin enteric coated 81 milliGRAM(s) Oral daily  clopidogrel Tablet 75 milliGRAM(s) Oral daily  heparin  Infusion 600 Unit(s)/Hr IV Continuous <Continuous>          polyethylene glycol 3350 17 Gram(s) Oral daily  senna 2 Tablet(s) Oral at bedtime    ezetimibe 10 milliGRAM(s) Oral daily  insulin glargine Injectable (LANTUS) 15 Unit(s) SubCutaneous at bedtime  insulin lispro (ADMELOG) corrective regimen sliding scale   SubCutaneous three times a day before meals  insulin lispro (ADMELOG) corrective regimen sliding scale   SubCutaneous at bedtime  insulin lispro Injectable (ADMELOG) 3 Unit(s) SubCutaneous three times a day before meals  rosuvastatin 40 milliGRAM(s) Oral at bedtime    chlorhexidine 2% Cloths 1 Application(s) Topical <User Schedule>  chlorhexidine 4% Liquid 1 Application(s) Topical <User Schedule>  folic acid 1 milliGRAM(s) Oral daily      REVIEW OF SYSTEMS:  Complete 12point ROS negative.    PHYSICAL EXAM:  T(C): 37.5 (11-25-24 @ 08:00), Max: 38.1 (11-24-24 @ 19:00)  HR: 97 (11-25-24 @ 11:00) (86 - 128)  BP: 122/56 (11-25-24 @ 11:00) (108/59 - 157/73)  RR: 19 (11-25-24 @ 11:00) (12 - 30)  SpO2: 100% (11-25-24 @ 11:00) (99% - 100%)  Wt(kg): --  I&O's Summary    24 Nov 2024 07:01  -  25 Nov 2024 07:00  --------------------------------------------------------  IN: 955 mL / OUT: 2235 mL / NET: -1280 mL    25 Nov 2024 07:01  -  25 Nov 2024 11:53  --------------------------------------------------------  IN: 267 mL / OUT: 300 mL / NET: -33 mL        Appearance: Normal	  HEENT:   Normal oral mucosa, PERRL, EOMI	  Lymphatic: No lymphadenopathy  Cardiovascular: Normal S1 S2, No JVD, No murmurs, No edema  Respiratory: Lungs clear to auscultation	  Psychiatry: A & O x 3, Mood & affect appropriate  Gastrointestinal:  Soft, Non-tender, + BS	  Skin: No rashes, No ecchymoses, No cyanosis	  Neurologic: Non-focal  Extremities: Normal range of motion, No clubbing, cyanosis or edema  Vascular: Peripheral pulses palpable 2+ bilaterally        LABS:	 	    CBC Full  -  ( 25 Nov 2024 05:23 )  WBC Count : 6.83 K/uL  Hemoglobin : 12.4 g/dL  Hematocrit : 36.6 %  Platelet Count - Automated : 133 K/uL  Mean Cell Volume : 89.1 fl  Mean Cell Hemoglobin : 30.2 pg  Mean Cell Hemoglobin Concentration : 33.9 g/dL  Auto Neutrophil # : 4.56 K/uL  Auto Lymphocyte # : 1.44 K/uL  Auto Monocyte # : 0.58 K/uL  Auto Eosinophil # : 0.20 K/uL  Auto Basophil # : 0.02 K/uL  Auto Neutrophil % : 66.8 %  Auto Lymphocyte % : 21.1 %  Auto Monocyte % : 8.5 %  Auto Eosinophil % : 2.9 %  Auto Basophil % : 0.3 %    11-25    138  |  106  |  17  ----------------------------<  151[H]  3.8   |  19[L]  |  0.88  11-24    137  |  104  |  21  ----------------------------<  169[H]  3.9   |  17[L]  |  1.02    Ca    8.7      25 Nov 2024 05:22  Ca    8.9      24 Nov 2024 19:54  Phos  2.2     11-25  Phos  3.2     11-24  Mg     2.2     11-25  Mg     2.1     11-24    TPro  6.4  /  Alb  3.2[L]  /  TBili  0.7  /  DBili  x   /  AST  43[H]  /  ALT  23  /  AlkPhos  77  11-25  TPro  6.5  /  Alb  3.3  /  TBili  0.7  /  DBili  x   /  AST  48[H]  /  ALT  27  /  AlkPhos  74  11-24      proBNP:   Lipid Profile:   HgA1c:   TSH:       CARDIAC MARKERS:          TELEMETRY: 	    ECG:  	  RADIOLOGY:  OTHER: 	    PREVIOUS DIAGNOSTIC TESTING:    [ ] Echocardiogram:    [ ]  Catheterization:  [ ] Stress Test:  	  	  ASSESSMENT/PLAN: 	     24H hour events:   In bed, daughter at bedside; appears comfortable, denies sob dizziness chest pain, or palpitations    MEDICATIONS:  aspirin enteric coated 81 milliGRAM(s) Oral daily  clopidogrel Tablet 75 milliGRAM(s) Oral daily  heparin  Infusion 600 Unit(s)/Hr IV Continuous <Continuous>  polyethylene glycol 3350 17 Gram(s) Oral daily  senna 2 Tablet(s) Oral at bedtime  ezetimibe 10 milliGRAM(s) Oral daily  insulin glargine Injectable (LANTUS) 15 Unit(s) SubCutaneous at bedtime  insulin lispro (ADMELOG) corrective regimen sliding scale   SubCutaneous three times a day before meals  insulin lispro (ADMELOG) corrective regimen sliding scale   SubCutaneous at bedtime  insulin lispro Injectable (ADMELOG) 3 Unit(s) SubCutaneous three times a day before meals  rosuvastatin 40 milliGRAM(s) Oral at bedtime  chlorhexidine 2% Cloths 1 Application(s) Topical <User Schedule>  chlorhexidine 4% Liquid 1 Application(s) Topical <User Schedule>  folic acid 1 milliGRAM(s) Oral daily      REVIEW OF SYSTEMS:  Complete 12point ROS negative.    PHYSICAL EXAM:  T(C): 37.5 (11-25-24 @ 08:00), Max: 38.1 (11-24-24 @ 19:00)  HR: 97 (11-25-24 @ 11:00) (86 - 128)  BP: 122/56 (11-25-24 @ 11:00) (108/59 - 157/73)  RR: 19 (11-25-24 @ 11:00) (12 - 30)  SpO2: 100% (11-25-24 @ 11:00) (99% - 100%)  Wt(kg): --  I&O's Summary    24 Nov 2024 07:01  -  25 Nov 2024 07:00  --------------------------------------------------------  IN: 955 mL / OUT: 2235 mL / NET: -1280 mL    25 Nov 2024 07:01  -  25 Nov 2024 11:53  --------------------------------------------------------  IN: 267 mL / OUT: 300 mL / NET: -33 mL        Appearance: Normal, in NAD	  Head: normocephalic, atraumatic  Neck: supple  Cardiovascular: RRR S1 S2, no m/r/g; RIJ TVP VVI @ 60 ppm, mA10, threshold 2  Respiratory: Lungs clear to auscultation	  Psychiatry: A & O x 3, Mood & affect appropriate  Gastrointestinal:  Soft, Non-tender, + BS	  : voiding  Skin: No rashes, No ecchymoses  Neurologic: Non-focal  Extremities: Normal range of motion, No clubbing, cyanosis or edema  Vascular: Peripheral pulses palpable 2+ bilaterally        LABS:	 	    CBC Full  -  ( 25 Nov 2024 05:23 )  WBC Count : 6.83 K/uL  Hemoglobin : 12.4 g/dL  Hematocrit : 36.6 %  Platelet Count - Automated : 133 K/uL  Mean Cell Volume : 89.1 fl  Mean Cell Hemoglobin : 30.2 pg  Mean Cell Hemoglobin Concentration : 33.9 g/dL  Auto Neutrophil # : 4.56 K/uL  Auto Lymphocyte # : 1.44 K/uL  Auto Monocyte # : 0.58 K/uL  Auto Eosinophil # : 0.20 K/uL  Auto Basophil # : 0.02 K/uL  Auto Neutrophil % : 66.8 %  Auto Lymphocyte % : 21.1 %  Auto Monocyte % : 8.5 %  Auto Eosinophil % : 2.9 %  Auto Basophil % : 0.3 %    11-25    138  |  106  |  17  ----------------------------<  151[H]  3.8   |  19[L]  |  0.88  11-24    137  |  104  |  21  ----------------------------<  169[H]  3.9   |  17[L]  |  1.02    Ca    8.7      25 Nov 2024 05:22  Ca    8.9      24 Nov 2024 19:54  Phos  2.2     11-25  Phos  3.2     11-24  Mg     2.2     11-25  Mg     2.1     11-24    TPro  6.4  /  Alb  3.2[L]  /  TBili  0.7  /  DBili  x   /  AST  43[H]  /  ALT  23  /  AlkPhos  77  11-25  TPro  6.5  /  Alb  3.3  /  TBili  0.7  /  DBili  x   /  AST  48[H]  /  ALT  27  /  AlkPhos  74  11-24      proBNP:   Lipid Profile:   HgA1c:   TSH:       CARDIAC MARKERS:      TELEMETRY: NSR/ST. 1st degree AV BLock mostly 80-90s, pAF up to 120-130s 11/14 afternoon, currently in NSR      Echo 11/23/24:    CONCLUSIONS:      1. Left ventricular systolic function is mildly decreased with an ejection fraction visually estimated at 45 to 50 %. Regional wall motion abnormalities present.   2. Multiple segmental abnormalities exist. See findings.   3. Normal right ventricular cavity size, with normal wall thickness, and normal right ventricular systolic function.  4. No pericardial effusion seen.  ________________________________________________________________________________________  FINDINGS:     Left Ventricle:  Left ventricular wall thickness is normal. Left ventricular systolic function is mildly decreased with an ejection fraction visually estimated at 45 to 50%. There are regional wall motion abnormalities present.  LV Wall Scoring: The basal and mid inferior septum and basal anteroseptal  segment are akinetic. The basal and mid inferior wall, basal inferolateral  segment, and basal anterolateral segment are hypokinetic. All remaining scored  segments are normal.     Right Ventricle:  The right ventricle is not well visualized. The right ventricular cavity is normal in size, with normal wall thickness and right ventricular systolic function is normal. Tricuspid annular plane systolic excursion (TAPSE) is 1.6 cm (normal >=1.7 cm). Tricuspid annular tissue Doppler S' is 16.3 cm/s (normal >10 cm/s). A device lead is visualized.     Right Atrium:  The right atrium is normal in size with an indexed volume of 11.18 ml/m².     Aortic Valve:  There is no evidence of aortic regurgitation.     Mitral Valve:  There is calcification of the mitral valve annulus.     Pericardium:  No pericardial effusion seen.  ____________________________________________________________________  QUANTITATIVE DATA:  Left Ventricle Measurements: (Indexed to BSA)     Visualized LV EF%: 45 to 50%     e' lateral: 12.50 cm/s  e' medial:  11.30 cm/s    Right Ventricle Measurements: Right Atrial Measurements:     TAPSE:           1.6 cm       RA Vol:       17.00 ml  RV S' Vmax:      16.30 cm/s   RA Vol Index: 11.18 ml/m²  RV Base (RVID1): 2.3 cm  RV Mid (RVID2):  1.5 cm    LVOT / RVOT/ Qp/Qs Data:(Indexed to BSA)  LVOT Diameter:  1.80 cm  LVOT Area:      2.54 cm²  LVOT Vmax:      1.12 m/s  LVOT Vmn:       0.775 m/s  LVOT VTI:       15.90 cm  LVOT peak grad: 5 mmHg  LVOT mean grad: 2.5 mmHg  LVOT SV:        40.5 ml   26.61 ml/m²    Tricuspid Valve Measurements:     TV S' 16.3 cm/s

## 2024-11-25 NOTE — PROGRESS NOTE ADULT - NSPROGADDITIONALINFOA_GEN_ALL_CORE
75yo woman with CAD s/p multiple PCIs, HTN found to have euglycemic DKA and POBA for ISR of prior LAD stent developed CHB requiring TVP although intrinsic sinus with 1st deg AV block currently backup only.     Neuro no issues   Pulm extubated to RA (intubated during arrest)   CV TVP placed but not requiring it. New AFib/AFL on amio and lopressor (held now). Cont DAPT. Start BB after PPM placement.   Renal Cr wnl   GI Tolerating diet, NPO for PPM   ID intermittent fevers, holding abx and cultures per ID but reculture if fevers again. Await pan scan   Heme heparin   Endo BG mid 200s with AG and now on basal bolus   Lines RIJ TVP R ax art line

## 2024-11-25 NOTE — PROGRESS NOTE ADULT - THIS PATIENT HAS THE FOLLOWING CONDITION(S)/DIAGNOSES ON THIS ADMISSION:
Renal Disease/Acute Respiratory Failure
Acute Respiratory Failure
None
Acute Respiratory Failure
Encephalopathy/Acute Respiratory Failure
None
Renal Disease/Acute Respiratory Failure

## 2024-11-25 NOTE — PROGRESS NOTE ADULT - ASSESSMENT
74 year-old [place] female with history of CAD (s/p PCI 1998, 2006, 8/2024), HTN, HLD, T2DM, and GERD presents with intermittent chest pain at rest and on exertion for the past 3 days. Underwent POBA for pLAD ISR. EP consulted for transient symptomatic CHB. Remains with LBBB.  F/u EF= 45-50% ( basal and mid inferior septum and basal anteroseptal segment are akinetic. The basal and mid inferior wall, basal inferolateral segment, and basal anterolateral segment are hypokinetic). Presentation most c/w embolization to septal perforators with persistent LBBB.    1. Transient CHB  2. s/p POBA to pLAD 11/20/24       74 year-old [place] female with history of CAD (s/p PCI 1998, 2006, 8/2024), HTN, HLD, T2DM, and GERD presents with intermittent chest pain at rest and on exertion for the past 3 days. Underwent POBA for pLAD ISR. EP consulted for transient symptomatic CHB. Remains with LBBB.  F/u EF= 45-50% ( basal and mid inferior septum and basal anteroseptal segment are akinetic. The basal and mid inferior wall, basal inferolateral segment, and basal anterolateral segment are hypokinetic). Presentation most c/w embolization to septal perforators with persistent LBBB. s/p Code/RRT on 11/22/24 for symptomatic rodriguez/syncope with heart rate down to 33 bpm, intubated, TVP inserted and paced during RRT. Patient extubated 11/24.  EPS consulted for CHB, LBBB.    1. Transient CHB, LBBB  2. s/p POBA to pLAD 11/20/24    - Patient on tele mostly NSR 80-90s, had tachy/AF to 130s on 11/24 afternoon, paced 2-3 beats in conversion to sinus  - Currently had TVP set VVI @ 60 with mA 10, threshold 2  - Patient had fever of 38.2 on 11/24 at 11am, blood culture  - On AC/ Heparin drip  - May benefit from Biventricular PPM when ready  - Monitor temp and WBC  - Off Dopa and Epi  - CICU Care    #[phone]

## 2024-11-25 NOTE — PROGRESS NOTE ADULT - SUBJECTIVE AND OBJECTIVE BOX
VIKTORIA HERNÁNDEZ  MRN-[id number]  Patient is a 74y old  Female who presents with a chief complaint of chest pain (2024 17:26)    HPI:  74 year-old [place]ien female with history of CAD (s/p PCI , , 2024), HTN, HLD, T2DM, and GERD presents with intermittent chest pain at rest and on exertion for the past 3 days. Symptoms started on , pressure-like, 8/10 in intensity with radiation up the neck and jaw associated with mild nausea without vomiting, similar to her prior angina symptoms.  She took her 's NTG SL and symptoms improved within 5 minutes.  She came to ED for further evaluation as these episodes of intermittent chest pain persisted for the past couple days.  Currently, chest pain free s/p NTG SL twice since arrived (2024 04:16)      Hospital Course:    24 HOUR EVENTS:    REVIEW OF SYSTEMS:    CONSTITUTIONAL: No weakness, fevers or chills  EYES/ENT: No visual changes;  No vertigo or throat pain   NECK: No pain or stiffness  RESPIRATORY: No cough, wheezing, hemoptysis; No shortness of breath  CARDIOVASCULAR: No chest pain or palpitations  GASTROINTESTINAL: No abdominal or epigastric pain. No nausea, vomiting, or hematemesis; No diarrhea or constipation. No melena or hematochezia.  GENITOURINARY: No dysuria, frequency or hematuria  NEUROLOGICAL: No numbness or weakness  SKIN: No itching, rashes      ICU Vital Signs Last 24 Hrs  T(C): 37.7 (2024 19:00), Max: 37.7 (2024 19:00)  T(F): 99.8 (2024 19:00), Max: 99.8 (2024 19:00)  HR: 98 (:00) (86 - 106)  BP: 146/64 (:00) (108/59 - 167/79)  BP(mean): 92 (2024 19:00) (77 - 114)  ABP: 95/75 (2024 21:00) (95/75 - 159/79)  ABP(mean): 83 (2024 21:00) (83 - 106)  RR: 20 (:00) (12 - 28)  SpO2: 100% (2024 19:00) (100% - 100%)    O2 Parameters below as of 2024 19:00  Patient On (Oxygen Delivery Method): room air            CVP(mm Hg): --  CO: --  CI: --  PA: --  PA(mean): --  PA(direct): --  PCWP: --  LA: --  RA: --  SVR: --  SVRI: --  PVR: --  PVRI: --  I&O's Summary    2024 07:01  -  2024 07:00  --------------------------------------------------------  IN: 1038 mL / OUT: 2235 mL / NET: -1197 mL    2024 07:01  -  2024 19:25  --------------------------------------------------------  IN: 1354 mL / OUT: 1225 mL / NET: 129 mL        CAPILLARY BLOOD GLUCOSE    CAPILLARY BLOOD GLUCOSE      POCT Blood Glucose.: 220 mg/dL (2024 16:24)      PHYSICAL EXAM:  GENERAL: No acute distress, well-developed  HEAD:  Atraumatic, Normocephalic  EYES: EOMI, PERRLA, conjunctiva and sclera clear  NECK: Supple, no lymphadenopathy, no JVD  CHEST/LUNG: CTAB; No wheezes, rales, or rhonchi  HEART: Regular rate and rhythm. Normal S1/S2. No murmurs, rubs, or gallops  ABDOMEN: Soft, non-tender, non-distended; normal bowel sounds, no organomegaly  EXTREMITIES:  2+ peripheral pulses b/l, No clubbing, cyanosis, or edema  NEUROLOGY: A&O x 3, no focal deficits  SKIN: No rashes or lesions    ============================I/O===========================   I&O's Detail    2024 07:01  -  2024 07:00  --------------------------------------------------------  IN:    Heparin: 155 mL    IV PiggyBack: 283 mL    Oral Fluid: 600 mL  Total IN: 1038 mL    OUT:    Indwelling Catheter - Urethral (mL): 1660 mL    Voided (mL): 575 mL  Total OUT: 2235 mL    Total NET: -1197 mL      2024 07:01  -  2024 19:25  --------------------------------------------------------  IN:    Heparin: 99 mL    IV PiggyBack: 415 mL    Oral Fluid: 840 mL  Total IN: 1354 mL    OUT:    Voided (mL): 1225 mL  Total OUT: 1225 mL    Total NET: 129 mL        ============================ LABS =========================                        12.4   6.83  )-----------( 133      ( 2024 05:23 )             36.6         138  |  106  |  17  ----------------------------<  151[H]  3.8   |  19[L]  |  0.88    Ca    8.7      2024 05:22  Phos  2.2       Mg     2.2         TPro  6.4  /  Alb  3.2[L]  /  TBili  0.7  /  DBili  x   /  AST  43[H]  /  ALT  23  /  AlkPhos  77                  LIVER FUNCTIONS - ( 2024 05:22 )  Alb: 3.2 g/dL / Pro: 6.4 g/dL / ALK PHOS: 77 U/L / ALT: 23 U/L / AST: 43 U/L / GGT: x           PT/INR - ( 2024 05:23 )   PT: 12.5 sec;   INR: 1.10 ratio         PTT - ( 2024 11:21 )  PTT:57.8 sec  ABG - ( 2024 01:20 )  pH, Arterial: 7.30  pH, Blood: x     /  pCO2: 31    /  pO2: 137   / HCO3: 15    / Base Excess: -9.9  /  SaO2: 98.3              Blood Gas Arterial, Lactate: 1.3 mmol/L (24 @ 01:20)  Blood Gas Venous - Lactate: 1.5 mmol/L (24 @ 01:06)  Blood Gas Arterial, Lactate: 0.6 mmol/L (24 @ 10:40)  Blood Gas Arterial, Lactate: 0.7 mmol/L (24 @ 09:36)  Blood Gas Arterial, Lactate: 0.8 mmol/L (24 @ 08:45)  Blood Gas Arterial, Lactate: 1.0 mmol/L (24 @ 00:05)    Urinalysis Basic - ( 2024 05:22 )    Color: x / Appearance: x / SG: x / pH: x  Gluc: 151 mg/dL / Ketone: x  / Bili: x / Urobili: x   Blood: x / Protein: x / Nitrite: x   Leuk Esterase: x / RBC: x / WBC x   Sq Epi: x / Non Sq Epi: x / Bacteria: x      ======================Micro/Rad/Cardio=================  Telemtry: Reviewed   EKG: Reviewed  CXR: Reviewed  Culture: Reviewed   Echo:   Cath: Cardiac Cath Lab - Adult:   [address] [place] [phone]  Cath Lab Report -- Comprehensive Report  Patient: VIKTORIA HERNÁNDEZ  Study date: 2020  Account number: [id number]  MR number: [id number]  : 1950  Gender: Female  Race: O  Case Physician(s):  Malu Beltran M.D.  Fellow:  MD Vi King MD  Referring Physician:  Paul Garcia M.D.  INDICATIONS: Abnormal stress test.  PROCEDURE:  --  Left heart catheterization.  --  Left coronary angiography.  --  Right coronary angiography.  --  Diagnostic myocardial fractional flow reserve.  --  Sonosite - Diagnostic.  TECHNIQUE: The risks and alternatives of the procedures and conscious  sedation were explained to the patient and informed consent was obtained.  Cardiac catheterization performed electively.  Local anesthetic given. Right radial artery access. A 5 Fr. Radial  Glidesheath Slender was inserted in the vessel. RRA was aborted as there  was extreme subclavian tourtuosity Rightfemoral artery access. Local  anesthetic given. A 4fr Sheath Winigan was inserted in the vessel,  utilizing the modified Seldinger technique. Left heart catheterization. A  4 Fr JR 4.0 catheter was utilized. After recording ascending aortic  pressure, the catheter was advanced across the aortic valve and left  ventricular pressure was recorded. Left coronary artery angiography. The  vessel was injected utilizing a 4 Fr JL 4.0 catheter and positioned in the  vessel ostia under fluoroscopic guidance. Angiography was performed in  multiple projections using hand-injection of contrast. Right coronary  artery angiography. The vessel was injected utilizing a 4 Fr JR 4.0  catheter and positioned in the vessel ostia under fluoroscopic guidance.  Angiography was performed in multiple projections using hand-injection of  contrast. Myocardial (fractional) flow reserve in the lesion in the  proximal right coronary artery. The vessel was entered with a 5 Fr guiding  catheter. Flow reserve was measured using a 0.014" pressure-monitoring  Verrata PLUS Wire J Tip guide-wire. Based on the results of this study,  the lesion was judged to be non-significant and no intervention was  performed. Sonosite - Diagnostic. RADIATION EXPOSURE: 14 min.  CONTRASTGIVEN: 70 ml Optiray.  MEDICATIONS GIVEN: Midazolam, 1 mg, IV. Fentanyl, 25 mcg, IV. Heparin, 3000  units, IV. Heparin, 2000 units, IV. 2% Lidocaine, 3 ml, subcutaneously.  Cardene, 400 mcg. 2% Lidocaine, 10 ml, subcutaneously. 0.9% Normal saline,  50 ml, IV.  VENTRICLES: No left ventriculogram was performed.  CORONARY VESSELS: The coronary circulation is right dominant.  LM:   --  LM: Angiography showed mild atherosclerosis with no flow limiting  lesions.  LAD:   --  Proximal LAD: There was a diffuse 20 % stenosis at the site of a  prior stent. There was MANUELA grade 3 flow through the vessel (brisk flow).  --  Mid LAD: There was a discrete 40 % stenosis at a site with no prior  intervention. There was MANUELA grade 3 flow through the vessel (brisk flow).  CX:   --  OM1: There was a tubular 20 % stenosis at the site of a prior  stent. There was MANUELA grade 3 flow through the vessel (brisk flow).  --  OM2: There was a diffuse 20 % stenosis at the site of a prior stent.  There was MANUELA grade 3 flow through the vessel (brisk flow).  RCA:   --  Proximal RCA: There was a diffuse 40 % stenosis at the site of a  prior stent. There was MANUELA grade 3 flow through the vessel (brisk flow).  --  Mid RCA: There was a diffuse 20 % stenosis at the site ofa prior  stent. There was MANUELA grade 3 flow through the vessel (brisk flow).  --  Distal RCA: Angiography showed mild atherosclerosis with no flow  limiting lesions.  --  RPL1: Angiography showed mild atherosclerosis with no flow limiting  lesions.  COMPLICATIONS: There were no complications.  DIAGNOSTIC IMPRESSIONS: Patent stents LAD, OM-1, OM-2 and RCA  No significant obstructive CAD  DIAGNOSTIC RECOMMENDATIONS: Medical therapy.  INTERVENTIONAL IMPRESSIONS: Patent stents LAD, OM-1, OM-2 and RCA  No significant obstructive CAD  INTERVENTIONAL RECOMMENDATIONS: Medical therapy.  Prepared and signed by  Malu Beltran M.D.  Signed 2020 16:24:15  HEMODYNAMIC TABLES  Pressures:  Baseline  Pressures:  - HR: 69  Pressures:  - Rhythm:  Pressures:  -- Aortic Pressure (S/D/M): 201/85/133  Pressures:  -- Left Ventricle (s/edp): 195/27/--  Outputs:  Baseline  Outputs:  -- CALCULATIONS[age]  Outputs:  -- CALCULATIONS: Body Surface Area: 1.54  Outputs:  -- CALCULATIONS: Height in cm: 155.00  Outputs:  -- CALCULATIONS: Sex: Female  Outputs:  -- CALCULATIONS: Weight in k.00  Outputs:  -- OUTPUTS: O2 consumption: 192.42  Outputs:  -- OUTPUTS: Vo2 Indexed: 125.00 (20 @ 09:08)  Cardiac Cath Lab - Adult:   [facility]  Department of Cardiology  [address] [place] [address]  [phone]  Cath Lab Report -- Comprehensive Report  Patient: VIKTORIA HERNÁNDEZ  Study date: 2017  Account number: [id number]  MR number: [id number]  : 1950  Gender: Female  Race: O  Case Physician(s):  Catalina Dimas M.D.  Fellow:  Cherrie Jasso D.O.  Referring Physician:  Paul Garcia M.D.  INDICATIONS: Unstable angina - CCS3.  HISTORY: The patient has a history of coronary artery disease. The patient  has hypertension, medication-treated dyslipidemia, and a family history of  coronary artery disease.  PROCEDURE:  --  Left heart catheterization with ventriculography.  --  Left coronary angiography.  --  Right coronary angiography.  TECHNIQUE: The risks and alternatives of the procedures and conscious  sedation were explained to the patient and informed consent was obtained.  Cardiac catheterization performed electively.  Local anesthetic given. Right radial artery access. Local anesthetic given.  A 6FR PRELUDE KIT was inserted in the vessel, utilizing the modified  Seldinger technique. Left heart catheterization. Ventriculography was  performed. A 5FR FR4.0 EXPO catheter was utilized. After recording  ascending aortic pressure, the catheter was advanced across the aortic  valve and left ventricular pressure was recorded. Left coronary artery  angiography. The vessel was injected utilizing a 5FR FL3.5 EXPO catheter  and positioned in the vessel ostia underfluoroscopic guidance.  Angiography was performed in multiple projections using hand-injection of  contrast. Right coronary artery angiography. The vessel was injected  utilizing a 5FR FR4.0 EXPO catheter and positioned in the vessel ostia  under fluoroscopic guidance. Angiography was performed in multiple  projections using hand-injection of contrast. RADIATION EXPOSURE: 9.4 min.  CONTRAST GIVEN: 28 ml Optiray.  MEDICATIONS GIVEN: Fentanyl, 50 mcg, IV. Midazolam, 1 mg, IV. Heparin, 2500  units, IV. Cardene, 500 mcg.  VENTRICLES: EF estimated was 60 %.  CORONARY VESSELS: The coronary circulation is right dominant.  LM:   --  LM: Normal.  LAD:   --  Proximal LAD: There was a diffuse 25 % stenosis at the site of a  prior angioplasty with stent placement.  CX:   --  OM1: There was a diffuse 25 % stenosis at the site of a prior  angioplasty with stent placement.  RCA:   --  Proximal RCA: There was a diffuse 10 % stenosis at the site of a  prior angioplasty with stent placement. There was TIMIgrade 3 flow  through the vessel (brisk flow). An intervention was performed.  --  Mid RCA: There was a diffuse 40 % stenosis at the site of a prior  angioplasty with stent placement. There was MANUELA grade 3 flow through the  vessel (brisk flow).  COMPLICATIONS: There were no complications.  DIAGNOSTIC RECOMMENDATIONS: Medical management is recommended.  Prepared and signed by  Catalina Dimas M.D.  Signed 2017 17:59:08  HEMODYNAMIC TABLES  Pressures:  Baseline/ Room Air  Pressures:  - HR: 70  Pressures:  - Rhythm:  Pressures:  -- Aortic Pressure (S/D/M): 144/72/103  Pressures:  -- Left Ventricle (s/edp): 145/11/--  Outputs:  Baseline/ Room Air  Outputs:  -- CALCULATIONS[age]  Outputs:  -- CALCULATIONS: Body Surface Area: 1.48  Outputs:  -- CALCULATIONS: Height in cm: 152.00  Outputs:  -- CALCULATIONS: Sex: Female  Outputs:  -- CALCULATIONS: Weight in k.60 (17 @ 12:01)    ======================================================  PAST MEDICAL & SURGICAL HISTORY:  HTN (hypertension)      HLD (hyperlipidemia)      DM (diabetes mellitus)      CAD (coronary artery disease)      Stented coronary artery      COVID-19      No significant past surgical history        ====================ASSESSMENT ==============  74F CAD (s/p PCI ‘98, , 2024), HTN, HLD, GERD p/w CP, f/w NSTEMI. LHC w/ ISR LAD s/p POBA. c/b persistent CP & euglycemic DKA req insulin & nitro in CICU. On floor, CHB req TVP & intubation. Now extubuated . C/b Afib/aflutter RVR started on heparin gtt.    Plan:  ====================== NEUROLOGY=====================  - off sedation s/p extubation  - AOx3, no acute issues  - continue to monitor mental status as per protocol   ==================== RESPIRATORY======================  Mechanical Ventilation:    #Acute respiratory failure  - intubated for airway protection during RRT  - extubated  to HFNC  - Tolerating room air w/ SPO2 100%  - continue to monitor SpO2 with goal >94%  ====================CARDIOVASCULAR==================  #NSTEMI  -  LHC: 100 pLAD ISR x1 POBA  - DAPT: ASA/Plavix  - cont crestor and zetia  - BPs labile, defer GDMT at this time    #CHB  - RRT called for AMS & CHB/symptomatic bradycardia  - intubated for airway protection  - TVP placed emergently on 6MONTI, replaced in CICU  - started on dopamine & epi, both currently off  - currently sinus tachy in 110s on telemetry  - pending PPM, EP following    #Afib RVR/Aflutter RVR  - new afib RVR  w/ rates 150s, BPs stable s/p Amio 150 and Lopressor 5 IV w/ return to NSR  -  AFlutter RVR w/ associated nausea/vomiting s/p Amio 150 and Lopressor 5 IV w/ return to NSR  - will start BB after PPM placement   - started on heparin gtt for AC  ===================HEMATOLOGIC/ONC ===================  aspirin enteric coated 81 milliGRAM(s) Oral daily  clopidogrel Tablet 75 milliGRAM(s) Oral daily  heparin  Infusion 600 Unit(s)/Hr (9 mL/Hr) IV Continuous <Continuous>  - Monitor H/H and plts  - DVT PPX: on systemic heparin  ===================== RENAL =========================  Continue monitoring urine output  #BK- resolved  - likely i/s/o hypoperfusion during CHB episode  - Continue monitoring urine output, lytes, SCr/ BUN  - replete lytes prn with goal K >4 and Mg >2  ==================== GASTROINTESTINAL===================  folic acid 1 milliGRAM(s) Oral daily  polyethylene glycol 3350 17 Gram(s) Oral daily  senna 2 Tablet(s) Oral at bedtime  - tolerating DASH diet  - monitor for BM  =======================    ENDOCRINE  =====================  ezetimibe 10 milliGRAM(s) Oral daily  insulin glargine Injectable (LANTUS) 15 Unit(s) SubCutaneous at bedtime  insulin lispro (ADMELOG) corrective regimen sliding scale   SubCutaneous three times a day before meals  insulin lispro (ADMELOG) corrective regimen sliding scale   SubCutaneous at bedtime  insulin lispro Injectable (ADMELOG) 3 Unit(s) SubCutaneous three times a day before meals  rosuvastatin 40 milliGRAM(s) Oral at bedtime  #Hyperglycemia d/t stress  - Cont ISS, gluc controlled  - monitor FS  ========================INFECTIOUS DISEASE================  #fever (tmax 100.6F)  - no leukocytosis  - UA and RVP neg  - BCx x2 pending  - no clear source at this time, pending pan CT  - ID following  - monitor and trend WBC and temperature curve     Patient requires continuous monitoring with bedside rhythm monitoring, pulse ox monitoring, and intermittent blood gas analysis. Care plan discussed with ICU care team. Patient remained critical and at risk for life threatening decompensation.  Patient seen, examined and plan discussed with CCU team during rounds.     I have personally provided ____ minutes of critical care time excluding time spent on separate procedures, in addition to initial critical care time provided by the CICU Attending, Dr. Montalvo    By signing my name below, I, Amara Hui, attest that this documentation has been prepared under the direction and in the presence of Neris Richmond NP  Electronically signed: Lindsay Gomes, 24 @ 19:25    I, Neris Richmond NP, personally performed the services described in this documentation. all medical record entries made by the scribe were at my direction and in my presence. I have reviewed the chart and agree that the record reflects my personal performance and is accurate and complete  Electronically signed: Neris Richmond NP       VIKTORIA HERNÁNDEZ  MRN-[id number]  Patient is a 74y old  Female who presents with a chief complaint of chest pain (2024 17:26)    HPI: 74 year-old [place] female with history of CAD (s/p PCI , , 2024), HTN, HLD, T2DM, and GERD presents with intermittent chest pain at rest and on exertion for the past 3 days. Symptoms started on , pressure-like, 8/10 in intensity with radiation up the neck and jaw associated with mild nausea without vomiting, similar to her prior angina symptoms.  She took her 's NTG SL and symptoms improved within 5 minutes.  She came to ED for further evaluation as these episodes of intermittent chest pain persisted for the past couple days.  Currently, chest pain free s/p NTG SL twice since arrived (2024 04:16)    REVIEW OF SYSTEMS:  CONSTITUTIONAL: No weakness, fevers or chills  EYES/ENT: No visual changes;  No vertigo or throat pain   NECK: No pain or stiffness  RESPIRATORY: No cough, wheezing, hemoptysis; No shortness of breath  CARDIOVASCULAR: No chest pain or palpitations  GASTROINTESTINAL: No abdominal or epigastric pain  No nausea, vomiting, or hematemesis; No diarrhea or constipation. No melena or hematochezia.  GENITOURINARY: No dysuria, frequency or hematuria  NEUROLOGICAL: No numbness or weakness  SKIN: No itching, rashes    ICU Vital Signs Last 24 Hrs  T(C): 37.7 (2024 19:00), Max: 37.7 (2024 19:00)  T(F): 99.8 (2024 19:00), Max: 99.8 (2024 19:00)  HR: 98 (:00) (86 - 106)  BP: 146/64 (:00) (108/59 - 167/79)  BP(mean): 92 (2024 19:00) (77 - 114)  ABP: 95/75 (2024 21:00) (95/75 - 159/79)  ABP(mean): 83 (2024 21:00) (83 - 106)  RR: 20 (:00) (12 - 28)  SpO2: 100% (2024 19:00) (100% - 100%)    O2 Parameters below as of 2024 19:00  Patient On (Oxygen Delivery Method): room air      I&O's Summary    :  -  2024 07:  --------------------------------------------------------  IN: 1038 mL / OUT: 2235 mL / NET: -1197 mL    :  -  :  --------------------------------------------------------  IN: 1354 mL / OUT: 1225 mL / NET: 129 mL      CAPILLARY BLOOD GLUCOSE  POCT Blood Glucose.: 220 mg/dL (2024 16:24)  ============================I/O===========================   I&O's Detail    :  -  2024 07:00  --------------------------------------------------------  IN:    Heparin: 155 mL    IV PiggyBack: 283 mL    Oral Fluid: 600 mL  Total IN: 1038 mL    OUT:    Indwelling Catheter - Urethral (mL): 1660 mL    Voided (mL): 575 mL  Total OUT: 2235 mL    Total NET: -1197 mL      2024 07:  -  :  --------------------------------------------------------  IN:    Heparin: 99 mL    IV PiggyBack: 415 mL    Oral Fluid: 840 mL  Total IN: 1354 mL    OUT:    Voided (mL): 1225 mL  Total OUT: 1225 mL    Total NET: 129 mL  ============================ LABS =========================                      12.4   6.83  )-----------( 133      ( 2024 05:23 )             36.6         138  |  106  |  17  ----------------------------<  151[H]  3.8   |  19[L]  |  0.88    Ca    8.7      2024 05:22  Phos  2.2       Mg     2.2         TPro  6.4  /  Alb  3.2[L]  /  TBili  0.7  /  DBili  x   /  AST  43[H]  /  ALT  23  /  AlkPhos  77      LIVER FUNCTIONS - ( 2024 05:22 )  Alb: 3.2 g/dL / Pro: 6.4 g/dL / ALK PHOS: 77 U/L / ALT: 23 U/L / AST: 43 U/L / GGT: x           PT/INR - ( 2024 05:23 )   PT: 12.5 sec;   INR: 1.10 ratio    PTT - ( 2024 11:21 )  PTT:57.8 sec    ABG - ( 2024 01:20 )  pH, Arterial: 7.30  pH, Blood: x     /  pCO2: 31    /  pO2: 137   / HCO3: 15    / Base Excess: -9.9  /  SaO2: 98.3      Blood Gas Arterial, Lactate: 1.3 mmol/L (24 @ 01:20)  Blood Gas Venous - Lactate: 1.5 mmol/L (24 @ 01:06)  Blood Gas Arterial, Lactate: 0.6 mmol/L (24 @ 10:40)  Blood Gas Arterial, Lactate: 0.7 mmol/L (24 @ 09:36)  Blood Gas Arterial, Lactate: 0.8 mmol/L (24 @ 08:45)  Blood Gas Arterial, Lactate: 1.0 mmol/L (24 @ 00:05)    Urinalysis Basic - ( 2024 05:22 )    Color: x / Appearance: x / SG: x / pH: x  Gluc: 151 mg/dL / Ketone: x  / Bili: x / Urobili: x   Blood: x / Protein: x / Nitrite: x   Leuk Esterase: x / RBC: x / WBC x   Sq Epi: x / Non Sq Epi: x / Bacteria: x  ======================Micro/Rad/Cardio=================  Telemtry: Reviewed   EKG: Reviewed  CXR: Reviewed  Culture: Reviewed   Echo:   Cath: Cardiac Cath Lab - Adult:   [place] [address] [place] [address]  [phone]  Cath Lab Report -- Comprehensive Report  Patient: VIKTORIA HERNÁNDEZ  Study date: 2020  Account number: [id number]  MR number: [id number]  : 1950  Gender: Female  Race: O  Case Physician(s):  Malu Beltran M.D.  Fellow:  MD Vi King MD  Referring Physician:  Paul Garcia M.D.  INDICATIONS: Abnormal stress test.  PROCEDURE:  --  Left heart catheterization.  --  Left coronary angiography.  --  Right coronary angiography.  --  Diagnostic myocardial fractional flow reserve.  --  Sonosite - Diagnostic.  TECHNIQUE: The risks and alternatives of the procedures and conscious  sedation were explained to the patient and informed consent was obtained.  Cardiac catheterization performed electively.  Local anesthetic given. Right radial artery access. A 5 Fr. Radial  Glidesheath Slender was inserted in the vessel. RRA was aborted as there  was extreme subclavian tourtuosity Rightfemoral artery access. Local  anesthetic given. A 4fr Sheath McLain was inserted in the vessel,  utilizing the modified Seldinger technique. Left heart catheterization. A  4 Fr JR 4.0 catheter was utilized. After recording ascending aortic  pressure, the catheter was advanced across the aortic valve and left  ventricular pressure was recorded. Left coronary artery angiography. The  vessel was injected utilizing a 4 Fr JL 4.0 catheter and positioned in the  vessel ostia under fluoroscopic guidance. Angiography was performed in  multiple projections using hand-injection of contrast. Right coronary  artery angiography. The vessel was injected utilizing a 4 Fr JR 4.0  catheter and positioned in the vessel ostia under fluoroscopic guidance.  Angiography was performed in multiple projections using hand-injection of  contrast. Myocardial (fractional) flow reserve in the lesion in the  proximal right coronary artery. The vessel was entered with a 5 Fr guiding  catheter. Flow reserve was measured using a 0.014" pressure-monitoring  Verrata PLUS Wire J Tip guide-wire. Based on the results of this study,  the lesion was judged to be non-significant and no intervention was  performed. Sonosite - Diagnostic. RADIATION EXPOSURE: 14 min.  CONTRASTGIVEN: 70 ml Optiray.  MEDICATIONS GIVEN: Midazolam, 1 mg, IV. Fentanyl, 25 mcg, IV. Heparin, 3000  units, IV. Heparin, 2000 units, IV. 2% Lidocaine, 3 ml, subcutaneously.  Cardene, 400 mcg. 2% Lidocaine, 10 ml, subcutaneously. 0.9% Normal saline,  50 ml, IV.  VENTRICLES: No left ventriculogram was performed.  CORONARY VESSELS: The coronary circulation is right dominant.  LM:   --  LM: Angiography showed mild atherosclerosis with no flow limiting  lesions.  LAD:   --  Proximal LAD: There was a diffuse 20 % stenosis at the site of a  prior stent. There was MANUELA grade 3 flow through the vessel (brisk flow).  --  Mid LAD: There was a discrete 40 % stenosis at a site with no prior  intervention. There was MANUELA grade 3 flow through the vessel (brisk flow).  CX:   --  OM1: There was a tubular 20 % stenosis at the site of a prior  stent. There was MANUELA grade 3 flow through the vessel (brisk flow).  --  OM2: There was a diffuse 20 % stenosis at the site of a prior stent.  There was MANUELA grade 3 flow through the vessel (brisk flow).  RCA:   --  Proximal RCA: There was a diffuse 40 % stenosis at the site of a  prior stent. There was MANUELA grade 3 flow through the vessel (brisk flow).  --  Mid RCA: There was a diffuse 20 % stenosis at the site ofa prior  stent. There was MANUELA grade 3 flow through the vessel (brisk flow).  --  Distal RCA: Angiography showed mild atherosclerosis with no flow  limiting lesions.  --  RPL1: Angiography showed mild atherosclerosis with no flow limiting  lesions.  COMPLICATIONS: There were no complications.  DIAGNOSTIC IMPRESSIONS: Patent stents LAD, OM-1, OM-2 and RCA  No significant obstructive CAD  DIAGNOSTIC RECOMMENDATIONS: Medical therapy.  INTERVENTIONAL IMPRESSIONS: Patent stents LAD, OM-1, OM-2 and RCA  No significant obstructive CAD  INTERVENTIONAL RECOMMENDATIONS: Medical therapy.  Prepared and signed by  Malu Beltran M.D.  Signed 2020 16:24:15  HEMODYNAMIC TABLES  Pressures:  Baseline  Pressures:  - HR: 69  Pressures:  - Rhythm:  Pressures:  -- Aortic Pressure (S/D/M): 201/85/133  Pressures:  -- Left Ventricle (s/edp): 195/27/--  Outputs:  Baseline  Outputs:  -- CALCULATIONS[age]  Outputs:  -- CALCULATIONS: Body Surface Area: 1.54  Outputs:  -- CALCULATIONS: Height in cm: 155.00  Outputs:  -- CALCULATIONS: Sex: Female  Outputs:  -- CALCULATIONS: Weight in k.00  Outputs:  -- OUTPUTS: O2 consumption: 192.42  Outputs:  -- OUTPUTS: Vo2 Indexed: 125.00 (20 @ 09:08)  Cardiac Cath Lab - Adult:   [facility]  Department of Cardiology  [address] [place] [address]  [phone]  Cath Lab Report -- Comprehensive Report  Patient: VIKTORIA HERNÁNDEZ  Study date: 2017  Account number: [id number]  MR number: [id number]  : 1950  Gender: Female  Race: O  Case Physician(s):  Catalina Dimas M.D.  Fellow:  Cherrie Jasso D.O.  Referring Physician:  Paul Garcia M.D.  INDICATIONS: Unstable angina - CCS3.  HISTORY: The patient has a history of coronary artery disease. The patient  has hypertension, medication-treated dyslipidemia, and a family history of  coronary artery disease.  PROCEDURE:  --  Left heart catheterization with ventriculography.  --  Left coronary angiography.  --  Right coronary angiography.  TECHNIQUE: The risks and alternatives of the procedures and conscious  sedation were explained to the patient and informed consent was obtained.  Cardiac catheterization performed electively.  Local anesthetic given. Right radial artery access. Local anesthetic given.  A 6FR PRELUDE KIT was inserted in the vessel, utilizing the modified  Seldinger technique. Left heart catheterization. Ventriculography was  performed. A 5FR FR4.0 EXPO catheter was utilized. After recording  ascending aortic pressure, the catheter was advanced across the aortic  valve and left ventricular pressure was recorded. Left coronary artery  angiography. The vessel was injected utilizing a 5FR FL3.5 EXPO catheter  and positioned in the vessel ostia underfluoroscopic guidance.  Angiography was performed in multiple projections using hand-injection of  contrast. Right coronary artery angiography. The vessel was injected  utilizing a 5FR FR4.0 EXPO catheter and positioned in the vessel ostia  under fluoroscopic guidance. Angiography was performed in multiple  projections using hand-injection of contrast. RADIATION EXPOSURE: 9.4 min.  CONTRAST GIVEN: 28 ml Optiray.  MEDICATIONS GIVEN: Fentanyl, 50 mcg, IV. Midazolam, 1 mg, IV. Heparin, 2500  units, IV. Cardene, 500 mcg.  VENTRICLES: EF estimated was 60 %.  CORONARY VESSELS: The coronary circulation is right dominant.  LM:   --  LM: Normal.  LAD:   --  Proximal LAD: There was a diffuse 25 % stenosis at the site of a  prior angioplasty with stent placement.  CX:   --  OM1: There was a diffuse 25 % stenosis at the site of a prior  angioplasty with stent placement.  RCA:   --  Proximal RCA: There was a diffuse 10 % stenosis at the site of a  prior angioplasty with stent placement. There was TIMIgrade 3 flow  through the vessel (brisk flow). An intervention was performed.  --  Mid RCA: There was a diffuse 40 % stenosis at the site of a prior  angioplasty with stent placement. There was MANUELA grade 3 flow through the  vessel (brisk flow).  COMPLICATIONS: There were no complications.  DIAGNOSTIC RECOMMENDATIONS: Medical management is recommended.  Prepared and signed by  Catalina Dimas M.D.  Signed 2017 17:59:08  HEMODYNAMIC TABLES  Pressures:  Baseline/ Room Air  Pressures:  - HR: 70  Pressures:  - Rhythm:  Pressures:  -- Aortic Pressure (S/D/M): 144/72/103  Pressures:  -- Left Ventricle (s/edp): 145/11/--  Outputs:  Baseline/ Room Air  Outputs:  -- CALCULATIONS[age]  Outputs:  -- CALCULATIONS: Body Surface Area: 1.48  Outputs:  -- CALCULATIONS: Height in cm: 152.00  Outputs:  -- CALCULATIONS: Sex: Female  Outputs:  -- CALCULATIONS: Weight in k.60 (17 @ 12:01)  ======================================================  PAST MEDICAL & SURGICAL HISTORY:  HTN (hypertension)      HLD (hyperlipidemia)      DM (diabetes mellitus)      CAD (coronary artery disease)      Stented coronary artery      COVID-19      No significant past surgical history    ====================ASSESSMENT ==============  74F CAD (s/p PCI ‘98, ‘, 2024), HTN, HLD, GERD p/w CP, f/w NSTEMI. LHC w/ ISR LAD s/p POBA. c/b persistent CP & euglycemic DKA req insulin & nitro in CICU. On floor, [facility] req TVP & intubation. Now extubuated . C/b Afib/aflutter RVR started on heparin gtt.    Plan:  ====================== NEUROLOGY=====================  - off sedation s/p extubation  - AOx3, no acute issues  - continue to monitor mental status as per protocol   ==================== RESPIRATORY======================  Mechanical Ventilation:    #Acute respiratory failure  - intubated for airway protection during RRT  - extubated  to HFNC  - Tolerating room air w/ SPO2 100%  - continue to monitor SpO2 with goal >94%  ====================CARDIOVASCULAR==================  #NSTEMI  -  LHC: 100 pLAD ISR x1 POBA  - DAPT: ASA/Plavix  - cont crestor and zetia  - BPs labile, defer GDMT at this time    #CHB  - RRT called for AMS & CHB/symptomatic bradycardia  - intubated for airway protection  - TVP placed emergently on 6MONTI, replaced in CICU  - started on dopamine & epi, both currently off  - currently sinus tachy in 110s on telemetry  - pending PPM, EP following    #Afib RVR/Aflutter RVR  - new afib RVR  w/ rates 150s, BPs stable s/p Amio 150 and Lopressor 5 IV w/ return to NSR  -  AFlutter RVR w/ associated nausea/vomiting s/p Amio 150 and Lopressor 5 IV w/ return to NSR  - will start BB after PPM placement   - started on heparin gtt for AC  ===================HEMATOLOGIC/ONC ===================  aspirin enteric coated 81 milliGRAM(s) Oral daily  clopidogrel Tablet 75 milliGRAM(s) Oral daily  heparin  Infusion 600 Unit(s)/Hr (9 mL/Hr) IV Continuous <Continuous>  - Monitor H/H and plts  - DVT PPX: on systemic heparin  ===================== RENAL =========================  Continue monitoring urine output  #BK- resolved  - likely i/s/o hypoperfusion during CHB episode  - Continue monitoring urine output, lytes, SCr/ BUN  - replete lytes prn with goal K >4 and Mg >2  ==================== GASTROINTESTINAL===================  folic acid 1 milliGRAM(s) Oral daily  polyethylene glycol 3350 17 Gram(s) Oral daily  senna 2 Tablet(s) Oral at bedtime  - tolerating DASH diet  - monitor for BM  =======================    ENDOCRINE  =====================  ezetimibe 10 milliGRAM(s) Oral daily  insulin glargine Injectable (LANTUS) 15 Unit(s) SubCutaneous at bedtime  insulin lispro (ADMELOG) corrective regimen sliding scale   SubCutaneous three times a day before meals  insulin lispro (ADMELOG) corrective regimen sliding scale   SubCutaneous at bedtime  insulin lispro Injectable (ADMELOG) 3 Unit(s) SubCutaneous three times a day before meals  rosuvastatin 40 milliGRAM(s) Oral at bedtime  #Hyperglycemia d/t stress  - Cont ISS, gluc controlled  - monitor FS  ========================INFECTIOUS DISEASE================  #fever (tmax 100.6F)  - no leukocytosis  - UA and RVP neg  - BCx x2 pending  - no clear source at this time, pending pan CT  - ID following  - monitor and trend WBC and temperature curve     Patient requires continuous monitoring with bedside rhythm monitoring, pulse ox monitoring, and intermittent blood gas analysis. Care plan discussed with ICU care team. Patient remained critical and at risk for life threatening decompensation.  Patient seen, examined and plan discussed with CCU team during rounds.     I have personally provided 30 minutes of critical care time excluding time spent on separate procedures, in addition to initial critical care time provided by the CICU Attending, Dr. Montalvo    By signing my name below, I, Amara Hui, attest that this documentation has been prepared under the direction and in the presence of Neris Richmond NP  Electronically signed: Lindsay Gomes, 24 @ 19:25    I, Neris Richmond NP, personally performed the services described in this documentation. all medical record entries made by the namibcheyenne were at my direction and in my presence. I have reviewed the chart and agree that the record reflects my personal performance and is accurate and complete  Electronically signed: Neris Richmond NP

## 2024-11-25 NOTE — PROGRESS NOTE ADULT - SUBJECTIVE AND OBJECTIVE BOX
Follow Up:  concern for infection, fever    Interval History/ROS:  Overnight: no acute events. Seen and examined a tbedside - no new complaints.    Allergies  No Known Allergies        ANTIMICROBIALS:      OTHER MEDS:  MEDICATIONS  (STANDING):  aspirin enteric coated 81 daily  clopidogrel Tablet 75 daily  ezetimibe 10 daily  heparin  Infusion 600 <Continuous>  insulin glargine Injectable (LANTUS) 15 at bedtime  insulin lispro (ADMELOG) corrective regimen sliding scale  three times a day before meals  insulin lispro (ADMELOG) corrective regimen sliding scale  at bedtime  insulin lispro Injectable (ADMELOG) 3 three times a day before meals  polyethylene glycol 3350 17 daily  rosuvastatin 40 at bedtime  senna 2 at bedtime      Vital Signs Last 24 Hrs  T(C): 37.2 (25 Nov 2024 16:00), Max: 38.1 (24 Nov 2024 19:00)  T(F): 99 (25 Nov 2024 16:00), Max: 100.6 (24 Nov 2024 19:00)  HR: 106 (25 Nov 2024 17:00) (86 - 106)  BP: 147/67 (25 Nov 2024 17:00) (108/59 - 167/79)  BP(mean): 97 (25 Nov 2024 17:00) (77 - 114)  RR: 28 (25 Nov 2024 17:00) (12 - 28)  SpO2: 100% (25 Nov 2024 17:00) (100% - 100%)    Parameters below as of 25 Nov 2024 17:00  Patient On (Oxygen Delivery Method): room air        PHYSICAL EXAM:  Constitutional: non-toxic, no distress  HEAD/EYES: anicteric, no conjunctival injection  ENT:  supple, no thrush, R IJ+  Cardiovascular:   normal S1, S2, no murmur, no edema  Respiratory:  clear BS bilaterally, no wheezes, no rales  GI:  soft, non-tender, normal bowel sounds  :  no cali, no CVA tenderness  Musculoskeletal:  no synovitis, normal ROM  Neurologic: awake and alert, normal strength, no focal findings  Skin:  no rash, no erythema, no phlebitis  Heme/Onc: no lymphadenopathy   Psychiatric:  awake, alert, appropriate mood                                12.4   6.83  )-----------( 133      ( 25 Nov 2024 05:23 )             36.6       11-25    138  |  106  |  17  ----------------------------<  151[H]  3.8   |  19[L]  |  0.88    Ca    8.7      25 Nov 2024 05:22  Phos  2.2     11-25  Mg     2.2     11-25    TPro  6.4  /  Alb  3.2[L]  /  TBili  0.7  /  DBili  x   /  AST  43[H]  /  ALT  23  /  AlkPhos  77  11-25      Urinalysis Basic - ( 25 Nov 2024 05:22 )    Color: x / Appearance: x / SG: x / pH: x  Gluc: 151 mg/dL / Ketone: x  / Bili: x / Urobili: x   Blood: x / Protein: x / Nitrite: x   Leuk Esterase: x / RBC: x / WBC x   Sq Epi: x / Non Sq Epi: x / Bacteria: x        MICROBIOLOGY:  v    Culture - Blood (collected 24 Nov 2024 10:14)  Source: .Blood BLOOD  Preliminary Report (25 Nov 2024 15:01):    No growth at 24 hours    Culture - Blood (collected 24 Nov 2024 10:14)  Source: .Blood BLOOD  Preliminary Report (25 Nov 2024 15:01):    No growth at 24 hours                    RADIOLOGY:  Imaging reviewed

## 2024-11-25 NOTE — PROGRESS NOTE ADULT - ASSESSMENT
74F CAD (s/p PCI ‘98, ‘06, 8/2024), HTN, HLD, GERD p/w CP, f/w NSTEMI. LHC w/ ISR LAD s/p POBA. c/b persistent CP & euglycemic DKA req insulin & nitro in CICU. On floor, [facility] req TVP & intubation. Now extubuated 11/24. C/b Afib/aflutter RVR started on heparin gtt.    NEURO  - off sedation s/p extubation  - AOx3, no acute issues  - continue to monitor mental status as per protocol     RESPIRATORY  #Acute respiratory failure  - intubated for airway protection during RRT  - extubated 11/23 to HFNC  - Tolerating room air w/ SPO2 100%  - continue to monitor SpO2 with goal >94%    CARDIO  #NSTEMI  - 11/22 LHC: 100 pLAD ISR x1 POBA  - DAPT: ASA/Plavix  - cont crestor and zetia  - BPs labile, defer GDMT at this time    #CHB  - RRT called for AMS & CHB/symptomatic bradycardia  - intubated for airway protection  - TVP placed emergently on 6MONTI, replaced in CICU  - started on dopamine & epi, both currently off  - currently sinus tachy in 110s on telemetry  - pending PPM, EP following    #Afib RVR/Aflutter RVR  - new afib RVR 11/23 w/ rates 150s, BPs stable s/p Amio 150 and Lopressor 5 IV w/ return to NSR  - 11/24 AFlutter RVR w/ associated nausea/vomiting s/p Amio 150 and Lopressor 5 IV w/ return to NSR  - will start BB after PPM placement   - started on heparin gtt for AC    HEME  - Monitor H/H and plts  - DVT PPX: on systemic heparin    RENAL/  #BK- resolved  - likely i/s/o hypoperfusion during CHB episode  - Continue monitoring urine output, lytes, SCr/ BUN  - replete lytes prn with goal K >4 and Mg >2    GI  - tolerating DASH diet  - monitor for BM    ENDO  #Hyperglycemia d/t stress  - Cont ISS, gluc controlled  - monitor FS    ID  #fever (tmax 100.6F)  - no leukocytosis  - UA and RVP neg  - BCx x2 pending  - no clear source at this time, pending pan CT  - ID following  - monitor and trend WBC and temperature curve     Lines  RIJ TVP 11/22

## 2024-11-25 NOTE — PROGRESS NOTE ADULT - ASSESSMENT
74-year-old female with a past medical history of CAD status post PCI most recently in August 2024, hypertension, hyperlipidemia, diabetes who is admitted to the hospital due to chest pain.    #Concern for infe  #CAD, NSTEMI  #Complete heart block  #Presence of right IJ transvenous pacemaker    Recommendations  Tmax 100.8 Fahrenheit past 24 hrs, no fever today  Continues to deny any other localizing symptoms  Unclear etiology for fever at this time  Follow pending blood cultures, negative to date  Obtain full RVP  Monitor IVs, no evidence for phlebitis at this time  Consider right IJ removal/exchange  If fever recurs, obtain CT chest/abdomen/pelvis  Given fever, if no emergent procedure indicated - would hold off procedure and proceed once blood cultures are negative more than 48 hours  Follow fever curve and WBC count    Darrian Frank MD  Division of Infectious Diseases

## 2024-11-26 LAB
ALBUMIN SERPL ELPH-MCNC: 3.4 G/DL — SIGNIFICANT CHANGE UP (ref 3.3–5)
ALP SERPL-CCNC: 97 U/L — SIGNIFICANT CHANGE UP (ref 40–120)
ALT FLD-CCNC: 42 U/L — SIGNIFICANT CHANGE UP (ref 10–45)
ANION GAP SERPL CALC-SCNC: 15 MMOL/L — SIGNIFICANT CHANGE UP (ref 5–17)
APTT BLD: 62.6 SEC — HIGH (ref 24.5–35.6)
AST SERPL-CCNC: 54 U/L — HIGH (ref 10–40)
BASOPHILS # BLD AUTO: 0.04 K/UL — SIGNIFICANT CHANGE UP (ref 0–0.2)
BASOPHILS NFR BLD AUTO: 0.6 % — SIGNIFICANT CHANGE UP (ref 0–2)
BILIRUB SERPL-MCNC: 0.9 MG/DL — SIGNIFICANT CHANGE UP (ref 0.2–1.2)
BUN SERPL-MCNC: 13 MG/DL — SIGNIFICANT CHANGE UP (ref 7–23)
CALCIUM SERPL-MCNC: 9.2 MG/DL — SIGNIFICANT CHANGE UP (ref 8.4–10.5)
CHLORIDE SERPL-SCNC: 102 MMOL/L — SIGNIFICANT CHANGE UP (ref 96–108)
CO2 SERPL-SCNC: 21 MMOL/L — LOW (ref 22–31)
CREAT SERPL-MCNC: 0.82 MG/DL — SIGNIFICANT CHANGE UP (ref 0.5–1.3)
EGFR: 75 ML/MIN/1.73M2 — SIGNIFICANT CHANGE UP
EOSINOPHIL # BLD AUTO: 0.2 K/UL — SIGNIFICANT CHANGE UP (ref 0–0.5)
EOSINOPHIL NFR BLD AUTO: 2.8 % — SIGNIFICANT CHANGE UP (ref 0–6)
GLUCOSE BLDC GLUCOMTR-MCNC: 121 MG/DL — HIGH (ref 70–99)
GLUCOSE BLDC GLUCOMTR-MCNC: 165 MG/DL — HIGH (ref 70–99)
GLUCOSE BLDC GLUCOMTR-MCNC: 180 MG/DL — HIGH (ref 70–99)
GLUCOSE BLDC GLUCOMTR-MCNC: 283 MG/DL — HIGH (ref 70–99)
GLUCOSE SERPL-MCNC: 152 MG/DL — HIGH (ref 70–99)
HCT VFR BLD CALC: 36.9 % — SIGNIFICANT CHANGE UP (ref 34.5–45)
HGB BLD-MCNC: 13.2 G/DL — SIGNIFICANT CHANGE UP (ref 11.5–15.5)
IMM GRANULOCYTES NFR BLD AUTO: 0.3 % — SIGNIFICANT CHANGE UP (ref 0–0.9)
INR BLD: 1.15 RATIO — SIGNIFICANT CHANGE UP (ref 0.85–1.16)
LYMPHOCYTES # BLD AUTO: 2.14 K/UL — SIGNIFICANT CHANGE UP (ref 1–3.3)
LYMPHOCYTES # BLD AUTO: 29.9 % — SIGNIFICANT CHANGE UP (ref 13–44)
MAGNESIUM SERPL-MCNC: 2.1 MG/DL — SIGNIFICANT CHANGE UP (ref 1.6–2.6)
MCHC RBC-ENTMCNC: 31.7 PG — SIGNIFICANT CHANGE UP (ref 27–34)
MCHC RBC-ENTMCNC: 35.8 G/DL — SIGNIFICANT CHANGE UP (ref 32–36)
MCV RBC AUTO: 88.7 FL — SIGNIFICANT CHANGE UP (ref 80–100)
MONOCYTES # BLD AUTO: 0.59 K/UL — SIGNIFICANT CHANGE UP (ref 0–0.9)
MONOCYTES NFR BLD AUTO: 8.3 % — SIGNIFICANT CHANGE UP (ref 2–14)
NEUTROPHILS # BLD AUTO: 4.16 K/UL — SIGNIFICANT CHANGE UP (ref 1.8–7.4)
NEUTROPHILS NFR BLD AUTO: 58.1 % — SIGNIFICANT CHANGE UP (ref 43–77)
NRBC # BLD: 0 /100 WBCS — SIGNIFICANT CHANGE UP (ref 0–0)
PHOSPHATE SERPL-MCNC: 2.1 MG/DL — LOW (ref 2.5–4.5)
PLATELET # BLD AUTO: 147 K/UL — LOW (ref 150–400)
POTASSIUM SERPL-MCNC: 4.1 MMOL/L — SIGNIFICANT CHANGE UP (ref 3.5–5.3)
POTASSIUM SERPL-SCNC: 4.1 MMOL/L — SIGNIFICANT CHANGE UP (ref 3.5–5.3)
PROT SERPL-MCNC: 6.8 G/DL — SIGNIFICANT CHANGE UP (ref 6–8.3)
PROTHROM AB SERPL-ACNC: 13.2 SEC — SIGNIFICANT CHANGE UP (ref 9.9–13.4)
RBC # BLD: 4.16 M/UL — SIGNIFICANT CHANGE UP (ref 3.8–5.2)
RBC # FLD: 12.9 % — SIGNIFICANT CHANGE UP (ref 10.3–14.5)
SODIUM SERPL-SCNC: 138 MMOL/L — SIGNIFICANT CHANGE UP (ref 135–145)
WBC # BLD: 7.15 K/UL — SIGNIFICANT CHANGE UP (ref 3.8–10.5)
WBC # FLD AUTO: 7.15 K/UL — SIGNIFICANT CHANGE UP (ref 3.8–10.5)

## 2024-11-26 PROCEDURE — 71045 X-RAY EXAM CHEST 1 VIEW: CPT | Mod: 26

## 2024-11-26 PROCEDURE — 99291 CRITICAL CARE FIRST HOUR: CPT

## 2024-11-26 PROCEDURE — 99232 SBSQ HOSP IP/OBS MODERATE 35: CPT

## 2024-11-26 PROCEDURE — 93010 ELECTROCARDIOGRAM REPORT: CPT

## 2024-11-26 RX ORDER — GLUCAGON INJECTION, SOLUTION 0.5 MG/.1ML
1 INJECTION, SOLUTION SUBCUTANEOUS ONCE
Refills: 0 | Status: DISCONTINUED | OUTPATIENT
Start: 2024-11-26 | End: 2024-12-02

## 2024-11-26 RX ORDER — 0.9 % SODIUM CHLORIDE 0.9 %
1000 INTRAVENOUS SOLUTION INTRAVENOUS
Refills: 0 | Status: DISCONTINUED | OUTPATIENT
Start: 2024-11-26 | End: 2024-12-02

## 2024-11-26 RX ORDER — VANCOMYCIN HCL 900 MCG/MG
1000 POWDER (GRAM) MISCELLANEOUS ONCE
Refills: 0 | Status: DISCONTINUED | OUTPATIENT
Start: 2024-11-26 | End: 2024-11-26

## 2024-11-26 RX ORDER — INSULIN GLARGINE 100 [IU]/ML
10 INJECTION, SOLUTION SUBCUTANEOUS ONCE
Refills: 0 | Status: COMPLETED | OUTPATIENT
Start: 2024-11-26 | End: 2024-11-26

## 2024-11-26 RX ORDER — HEPARIN SODIUM 5000 [USP'U]/.5ML
15 INJECTION, SOLUTION INTRAVENOUS; SUBCUTANEOUS ONCE
Refills: 0 | Status: COMPLETED | OUTPATIENT
Start: 2024-11-26 | End: 2024-11-26

## 2024-11-26 RX ORDER — INSULIN GLARGINE 100 [IU]/ML
18 INJECTION, SOLUTION SUBCUTANEOUS AT BEDTIME
Refills: 0 | Status: DISCONTINUED | OUTPATIENT
Start: 2024-11-27 | End: 2024-11-29

## 2024-11-26 RX ADMIN — CHLORHEXIDINE GLUCONATE 1 APPLICATION(S): 1.2 RINSE ORAL at 05:06

## 2024-11-26 RX ADMIN — Medication 1 MILLIGRAM(S): at 10:51

## 2024-11-26 RX ADMIN — INSULIN GLARGINE 10 UNIT(S): 100 INJECTION, SOLUTION SUBCUTANEOUS at 22:07

## 2024-11-26 RX ADMIN — Medication 3 UNIT(S): at 16:51

## 2024-11-26 RX ADMIN — CLOPIDOGREL 75 MILLIGRAM(S): 75 TABLET, FILM COATED ORAL at 10:52

## 2024-11-26 RX ADMIN — Medication 1: at 08:43

## 2024-11-26 RX ADMIN — HEPARIN SODIUM 63.75 MILLIMOLE(S): 5000 INJECTION, SOLUTION INTRAVENOUS; SUBCUTANEOUS at 06:04

## 2024-11-26 RX ADMIN — Medication 10 MILLIGRAM(S): at 10:51

## 2024-11-26 RX ADMIN — Medication 1: at 10:55

## 2024-11-26 RX ADMIN — CHLORHEXIDINE GLUCONATE 1 APPLICATION(S): 1.2 RINSE ORAL at 05:13

## 2024-11-26 RX ADMIN — ROSUVASTATIN CALCIUM 40 MILLIGRAM(S): 5 TABLET, FILM COATED ORAL at 22:08

## 2024-11-26 RX ADMIN — Medication 81 MILLIGRAM(S): at 10:51

## 2024-11-26 NOTE — PROGRESS NOTE ADULT - ATTENDING COMMENTS
seen and agree  48 hours post negative cultures  TVP out  Plan for BiV pacemaker
Patient presented with unstable angina, seen to have prox-LAD disease that was treated with POBA.  Post procedure, she had chest pain, diaphoresis, and TTE showing new wall motion abnormality.  She was taken back to the cath lab, but no new disease was seen.  Trend lactate to normal.  Supportive care including dual antiplatelet therapy, high intensity statin, and anti-anginals, including beta-blockers.
seen and agree
Patient presented with unstable angina, seen to have prox-LAD disease that was treated with POBA.  Post procedure, she had chest pain, diaphoresis, and TTE showing new wall motion abnormality.  She was taken back to the cath lab, but no new disease was seen.  Trend lactate to normal.  Supportive care including dual antiplatelet therapy, high intensity statin, and anti-anginals, including beta-blockers.      Sent to telemetry floor last night, noted to have new complete heart block this AM. Course then complicated by bradycardic arrest requiring emergent TVP placement.  Intubated during arrest.  Returned to CICU for further management.     Suspect evolution of Type 4a myocardial infarction (MI due to PCI) as etiology.
Patient presented with unstable angina, seen to have prox-LAD disease that was treated with POBA.  Post procedure, she had chest pain, diaphoresis, and TTE showing new wall motion abnormality.  She was taken back to the cath lab, but no new disease was seen.  Trend lactate to normal.  Supportive care including dual antiplatelet therapy, high intensity statin, and anti-anginals, including beta-blockers.      Complete heart block on 11/22. Course then complicated by bradycardic arrest requiring emergent TVP placement.  Intubated during arrest.  Returned to CICU for further management.     Now re-extubated on 11/23  Seen to have PAF with RVR, LBBB morphology    Suspect evolution of Type 4a myocardial infarction (MI due to PCI) as etiology.

## 2024-11-26 NOTE — PROGRESS NOTE ADULT - ASSESSMENT
74 year-old [place] female with history of CAD (s/p PCI 1998, 2006, 8/2024), HTN, HLD, T2DM, and GERD presents with intermittent chest pain at rest and on exertion for the past 3 days. Underwent POBA for pLAD ISR. EP consulted for transient symptomatic CHB. Remains with LBBB.  F/u EF= 45-50% ( basal and mid inferior septum and basal anteroseptal segment are akinetic. The basal and mid inferior wall, basal inferolateral segment, and basal anterolateral segment are hypokinetic). Presentation most c/w embolization to septal perforators with persistent LBBB. s/p Code/RRT on 11/22/24 for symptomatic rodriguez/syncope with heart rate down to 33 bpm, intubated, TVP inserted and paced during RRT. Patient extubated 11/24.  EPS consulted for CHB, LBBB.    Last fever 11/24 1900 1006. ID rec neg bcxs 48 hrs prior to placing device. Will plan for BiV-PPM tomorrow if continues to have clear cxs. Plan to remove TVP today as has continued to not require pacing with NSR ovn on telemetry.    Recommendations:  - remove TVP  - NPO midnight  - hold AC for possible procedure tomorrow  - ctm Telemetry  - K > 4.0, Mg > 2.0 74 year-old [place] female with history of CAD (s/p PCI 1998, 2006, 8/2024), HTN, HLD, T2DM, and GERD presents with intermittent chest pain at rest and on exertion for the past 3 days. Underwent POBA for pLAD ISR. EP consulted for transient symptomatic CHB. Remains with LBBB.  F/u EF= 45-50% ( basal and mid inferior septum and basal anteroseptal segment are akinetic. The basal and mid inferior wall, basal inferolateral segment, and basal anterolateral segment are hypokinetic). Presentation most c/w embolization to septal perforators with persistent LBBB. s/p Code/RRT on 11/22/24 for symptomatic rodriguez/syncope with heart rate down to 33 bpm, intubated, TVP inserted and paced during RRT. Patient extubated 11/24.  EPS consulted for CHB, LBBB.    Last fever 11/24 1900 1006. ID rec neg bcxs 48 hrs prior to placing device. Will plan for BiV-PPM today if continues to have clear cxs. Plan to remove TVP today as has continued to not require pacing with NSR ovn on telemetry.    Recommendations:  - remove TVP  keep NPO for PPM today

## 2024-11-26 NOTE — DIETITIAN INITIAL EVALUATION ADULT - PERTINENT LABORATORY DATA
11-26    138  |  102  |  13  ----------------------------<  152[H]  4.1   |  21[L]  |  0.82    Ca    9.2      26 Nov 2024 02:01  Phos  2.1     11-26  Mg     2.1     11-26    TPro  6.8  /  Alb  3.4  /  TBili  0.9  /  DBili  x   /  AST  54[H]  /  ALT  42  /  AlkPhos  97  11-26  POCT Blood Glucose.: 180 mg/dL (11-26-24 @ 08:41)  A1C with Estimated Average Glucose Result: 7.2 % (11-21-24 @ 01:56)  A1C with Estimated Average Glucose Result: 8.5 % (08-16-24 @ 01:55)  A1C with Estimated Average Glucose Result: 8.7 % (05-04-24 @ 06:01)

## 2024-11-26 NOTE — DIETITIAN INITIAL EVALUATION ADULT - NS FNS DIET ORDER
Diet, NPO after Midnight:      NPO Start Date: 25-Nov-2024,   NPO Start Time: 23:59  Except Medications (11-25-24 @ 19:34)

## 2024-11-26 NOTE — DIETITIAN INITIAL EVALUATION ADULT - PERSON TAUGHT/METHOD
Provided recommendations to optimize PO and protein intake, to start with protein, and sips of supplement throughout the day; reviewed foods with protein and menu order procedures in hospital./verbal instruction/teach back - (Patient repeats in own words)/patient instructed/daughter instructed

## 2024-11-26 NOTE — DIETITIAN INITIAL EVALUATION ADULT - OTHER INFO
- HbA1c 7.2%; on Jardiance, Tresiba and Novolog PTA. Pt was on Ozempic for her elevated A1c (8.7% in May) but stopped it a couple months ago due to unwanted weight loss.    Weight Hx:  - UBW per pt 120lbs. States after starting Ozempic her weight went as low as 108lbs but she has been gaining weight back since. Dosing weight 120lbs, however current wt 71lbs - ?accuracy. Per Mika GUERRERO pt 110lbs 8/15/24, 114lbs 9/3/24. Will continue to monitor/trend.

## 2024-11-26 NOTE — DIETITIAN INITIAL EVALUATION ADULT - OTHER CALCULATIONS
Defer fluid needs to team. Recent weight of 114lbs (Sept 2024) used for calculations given weight discrepancies in medical record.

## 2024-11-26 NOTE — PROGRESS NOTE ADULT - SUBJECTIVE AND OBJECTIVE BOX
Destinee Aranda PA-C  CICU PA   Contact via [facility] [employer]    CCU PROGRESS NOTE:    VIKTORIA HERNÁNDEZ  MRN-[id number]  Patient is a 74y old  Female who presents with a chief complaint of CHB (25 Nov 2024 19:24)    INTERVAL HPI/OVERNIGHT EVENTS: No acute events overnight.    SUBJECTIVE: Patient seen and examined at bedside. No acute complaints. Denies chest pain, SOB, palpitations, dizziness/lightheadedness, LE edema.    MEDICATIONS  (STANDING):  aspirin enteric coated 81 milliGRAM(s) Oral daily  chlorhexidine 2% Cloths 1 Application(s) Topical <User Schedule>  chlorhexidine 4% Liquid 1 Application(s) Topical <User Schedule>  clopidogrel Tablet 75 milliGRAM(s) Oral daily  ezetimibe 10 milliGRAM(s) Oral daily  folic acid 1 milliGRAM(s) Oral daily  heparin  Infusion 600 Unit(s)/Hr (10 mL/Hr) IV Continuous <Continuous>  insulin glargine Injectable (LANTUS) 15 Unit(s) SubCutaneous at bedtime  insulin lispro (ADMELOG) corrective regimen sliding scale   SubCutaneous three times a day before meals  insulin lispro (ADMELOG) corrective regimen sliding scale   SubCutaneous at bedtime  insulin lispro Injectable (ADMELOG) 3 Unit(s) SubCutaneous three times a day before meals  polyethylene glycol 3350 17 Gram(s) Oral daily  rosuvastatin 40 milliGRAM(s) Oral at bedtime  senna 2 Tablet(s) Oral at bedtime    MEDICATIONS  (PRN):    OBJECTIVE:  ICU Vital Signs Last 24 Hrs  T(C): 37.4 (26 Nov 2024 03:00), Max: 37.7 (25 Nov 2024 19:00)  T(F): 99.3 (26 Nov 2024 03:00), Max: 99.8 (25 Nov 2024 19:00)  HR: 98 (26 Nov 2024 06:00) (93 - 106)  BP: 120/56 (26 Nov 2024 06:00) (111/62 - 172/72)  BP(mean): 81 (26 Nov 2024 06:00) (75 - 119)  RR: 25 (26 Nov 2024 06:00) (13 - 29)  SpO2: 100% (26 Nov 2024 06:00) (100% - 100%)    O2 Parameters below as of 26 Nov 2024 03:00  Patient On (Oxygen Delivery Method): room air    I&O's Summary    24 Nov 2024 07:01  -  25 Nov 2024 07:00  --------------------------------------------------------  IN: 1038 mL / OUT: 2235 mL / NET: -1197 mL    25 Nov 2024 07:01  -  26 Nov 2024 06:57  --------------------------------------------------------  IN: 1431 mL / OUT: 2075 mL / NET: -644 mL    CAPILLARY BLOOD GLUCOSE    GENERAL: NAD, lying in bed comfortably  NECK: no JVD  HEART: S1, S2, regular rate and rhythm, no murmurs, rubs, or gallops  LUNGS: Unlabored respirations.  Clear to auscultation bilaterally, no crackles, wheezing, or rhonchi  ABDOMEN: Soft, nontender, nondistended, +BS  EXTREMITIES: 2+ peripheral pulses bilaterally. No clubbing, cyanosis, or edema    ============================I/O===========================   I&O's Detail    24 Nov 2024 07:01  -  25 Nov 2024 07:00  --------------------------------------------------------  IN:    Heparin: 155 mL    IV PiggyBack: 283 mL    Oral Fluid: 600 mL  Total IN: 1038 mL    OUT:    Indwelling Catheter - Urethral (mL): 1660 mL    Voided (mL): 575 mL  Total OUT: 2235 mL    Total NET: -1197 mL    25 Nov 2024 07:01  -  26 Nov 2024 06:57  --------------------------------------------------------  IN:    Heparin: 176 mL    IV PiggyBack: 415 mL    Oral Fluid: 840 mL  Total IN: 1431 mL    OUT:    Voided (mL): 2075 mL  Total OUT: 2075 mL    Total NET: -644 mL    ============================ LABS =========================                        13.2   7.15  )-----------( 147      ( 26 Nov 2024 02:01 )             36.9     11-26    138  |  102  |  13  ----------------------------<  152[H]  4.1   |  21[L]  |  0.82    Ca    9.2      26 Nov 2024 02:01  Phos  2.1     11-26  Mg     2.1     11-26    TPro  6.8  /  Alb  3.4  /  TBili  0.9  /  DBili  x   /  AST  54[H]  /  ALT  42  /  AlkPhos  97  11-26    LIVER FUNCTIONS - ( 26 Nov 2024 02:01 )  Alb: 3.4 g/dL / Pro: 6.8 g/dL / ALK PHOS: 97 U/L / ALT: 42 U/L / AST: 54 U/L / GGT: x           PT/INR - ( 26 Nov 2024 02:01 )   PT: 13.2 sec;   INR: 1.15 ratio      PTT - ( 26 Nov 2024 02:01 )  PTT:62.6 sec    Blood Gas Arterial, Lactate: 1.3 mmol/L (11-24-24 @ 01:20)  Blood Gas Venous - Lactate: 1.5 mmol/L (11-24-24 @ 01:06)  Blood Gas Arterial, Lactate: 0.6 mmol/L (11-23-24 @ 10:40)  Blood Gas Arterial, Lactate: 0.7 mmol/L (11-23-24 @ 09:36)  Blood Gas Arterial, Lactate: 0.8 mmol/L (11-23-24 @ 08:45)    Urinalysis Basic - ( 26 Nov 2024 02:01 )    Color: x / Appearance: x / SG: x / pH: x  Gluc: 152 mg/dL / Ketone: x  / Bili: x / Urobili: x   Blood: x / Protein: x / Nitrite: x   Leuk Esterase: x / RBC: x / WBC x   Sq Epi: x / Non Sq Epi: x / Bacteria: x      ======================MICRO/RAD/CARDIO=================  Telemetry: Reviewed   EKG: Reviewed  CXR: Reviewed  Culture: Reviewed   Echo:   Cath:

## 2024-11-26 NOTE — DIETITIAN INITIAL EVALUATION ADULT - PROBLEM SELECTOR PLAN 1
- Cath (5/3/24): Severe ISR of the pRCA s/p POBA but due to severe ISR with significant neointimal hyperplasia, unable to dilate the lesion.  Severe ISR of the pLAD stent (iFR 0.73). Moderate ISR of the mLCx stent  - Cath (8/18/24): 70% in-stent restenosis of the pLAD,  treated with intravascular lithotripsy and ERI x1, Medical therapy for  of RCA   - Cardiology consult appreciated  - Tele monitor for arrhythmia, Troponin 27, 23, 29, EKG without ischemia  - NTG SL prn for chest pain  - will continue ASA 81mg, Plavix 75mg, Imdur 120mg, Metoprolol 100mg bid, Crestor 40mg and Zetia 10mg  - will increase Ranexa to 1000mg bid  - will likely need [facility]  - will check TSH, A1C and FLP

## 2024-11-26 NOTE — PROGRESS NOTE ADULT - SUBJECTIVE AND OBJECTIVE BOX
Follow Up:  fever    Interval History/ROS:  Overnight: No acute events.  Patient remains afebrile past 48 hours.  Otherwise hemodynamically stable.  BMP with renal function within normal limits, AST 54.  Blood cultures from 11/24/2024 negative to date.  CT chest/abdomen/pelvis shows no evidence for infectious foci.    Seen and examined at bedside. no new complaints.    Allergies  No Known Allergies        ANTIMICROBIALS:      OTHER MEDS:  MEDICATIONS  (STANDING):  aspirin enteric coated 81 daily  clopidogrel Tablet 75 daily  ezetimibe 10 daily  insulin glargine Injectable (LANTUS) 15 at bedtime  insulin lispro (ADMELOG) corrective regimen sliding scale  three times a day before meals  insulin lispro (ADMELOG) corrective regimen sliding scale  at bedtime  insulin lispro Injectable (ADMELOG) 3 three times a day before meals  polyethylene glycol 3350 17 daily  rosuvastatin 40 at bedtime  senna 2 at bedtime      Vital Signs Last 24 Hrs  T(C): 37.3 (26 Nov 2024 11:00), Max: 37.7 (25 Nov 2024 19:00)  T(F): 99.1 (26 Nov 2024 11:00), Max: 99.8 (25 Nov 2024 19:00)  HR: 102 (26 Nov 2024 14:00) (95 - 106)  BP: 157/79 (26 Nov 2024 14:00) (112/55 - 172/72)  BP(mean): 103 (26 Nov 2024 14:00) (70 - 119)  RR: 18 (26 Nov 2024 14:00) (14 - 29)  SpO2: 99% (26 Nov 2024 14:00) (99% - 100%)    Parameters below as of 26 Nov 2024 14:00  Patient On (Oxygen Delivery Method): room air        PHYSICAL EXAM:  Constitutional: non-toxic, no distress  HEAD/EYES: anicteric, no conjunctival injection  ENT:  supple, no thrush, R IJ+  Cardiovascular:   normal S1, S2, no murmur, no edema  Respiratory:  clear BS bilaterally, no wheezes, no rales  GI:  soft, non-tender, normal bowel sounds  :  no cali, no CVA tenderness  Musculoskeletal:  no synovitis, normal ROM  Neurologic: awake and alert, normal strength, no focal findings  Skin:  no rash, no erythema, no phlebitis  Heme/Onc: no lymphadenopathy   Psychiatric:  awake, alert, appropriate mood                                  13.2   7.15  )-----------( 147      ( 26 Nov 2024 02:01 )             36.9       11-26    138  |  102  |  13  ----------------------------<  152[H]  4.1   |  21[L]  |  0.82    Ca    9.2      26 Nov 2024 02:01  Phos  2.1     11-26  Mg     2.1     11-26    TPro  6.8  /  Alb  3.4  /  TBili  0.9  /  DBili  x   /  AST  54[H]  /  ALT  42  /  AlkPhos  97  11-26      Urinalysis Basic - ( 26 Nov 2024 02:01 )    Color: x / Appearance: x / SG: x / pH: x  Gluc: 152 mg/dL / Ketone: x  / Bili: x / Urobili: x   Blood: x / Protein: x / Nitrite: x   Leuk Esterase: x / RBC: x / WBC x   Sq Epi: x / Non Sq Epi: x / Bacteria: x        MICROBIOLOGY:  v    Culture - Blood (collected 24 Nov 2024 10:14)  Source: .Blood BLOOD  Preliminary Report (26 Nov 2024 15:01):    No growth at 48 Hours    Culture - Blood (collected 24 Nov 2024 10:14)  Source: .Blood BLOOD  Preliminary Report (26 Nov 2024 15:01):    No growth at 48 Hours                    RADIOLOGY:  Imaging reviewed

## 2024-11-26 NOTE — DIETITIAN INITIAL EVALUATION ADULT - ORAL INTAKE PTA/DIET HISTORY
PTA pt reports fair/good appetite/PO intake. Follows a Halal diet. Notes she was Ozempic for several months which resulted in unintentional weight loss. NKFA or intolerances reported. Takes CoQ10, vitamin C, turmeric, Mg and calcium supplements. Denies any chewing/swallowing difficulty at baseline.

## 2024-11-26 NOTE — PROGRESS NOTE ADULT - NSPROGADDITIONALINFOA_GEN_ALL_CORE
75yo woman with CAD s/p multiple PCIs, HTN found to have euglycemic DKA and POBA for ISR of prior LAD stent developed CHB requiring TVP although intrinsic sinus with 1st deg AV block currently backup only.     Neuro no issues   Pulm extubated to RA (intubated during arrest)   CV TVP placed but not requiring it. New AFib/AFL on amio and lopressor (held now). Cont DAPT. Start BB after PPM placement.   Renal Cr wnl   GI Tolerating diet, NPO for PPM   ID intermittent fevers, holding abx and cultures per ID but reculture if fevers again. No e/o infection on full body CT   Heme heparin for AFib/AFL   Endo BG mid 200s with AG and now on basal bolus   Lines RIJ TVP R ax art line

## 2024-11-26 NOTE — PROGRESS NOTE ADULT - ASSESSMENT
74-year-old female with a past medical history of CAD status post PCI most recently in August 2024, hypertension, hyperlipidemia, diabetes who is admitted to the hospital due to chest pain.    #Concern for infe  #CAD, NSTEMI  #Complete heart block  #Presence of right IJ transvenous pacemaker    Recommendations  Afebrile now past 48 hours  Continues to deny any other localizing symptoms  Unclear etiology for fever at this time  Follow pending blood cultures, negative to date  Monitor IVs, no evidence for phlebitis at this time  Consider right IJ removal/exchange  No evidence for infection noted in CT chest/abdomen/pelvis on 11/25  Overall unclear etiology for fever, no evidence for infection at this time - no objection to proceed for PPM placement with EP  Ultimate decision to proceed per EP after risk v benefit discussion/analysis  Follow fever curve and WBC count    ID to sign off. Please contact as issues arise.    Darrian Frank MD  Division of Infectious Diseases

## 2024-11-26 NOTE — PROGRESS NOTE ADULT - SUBJECTIVE AND OBJECTIVE BOX
Cardiology Progress Note  ------------------------------------------------------------------------------------------  SUBJECTIVE:   - Tele: NSR 99, no events ovn  - No events overnight. Denies CP, SOB or Palpitations.     -------------------------------------------------------------------------------------------  VS:  T(F): 99.1 (11-26), Max: 99.8 (11-25)  HR: 100 (11-26) (95 - 106)  BP: 140/70 (11-26) (111/62 - 172/72)  RR: 21 (11-26)  SpO2: 100% (11-26)  I&O's Summary    25 Nov 2024 07:01 - 26 Nov 2024 07:00  --------------------------------------------------------  IN: 1601 mL / OUT: 2075 mL / NET: -474 mL    26 Nov 2024 07:01  -  26 Nov 2024 10:40  --------------------------------------------------------  IN: 187.5 mL / OUT: 250 mL / NET: -62.5 mL      PHYSICAL EXAM:  GENERAL: fatigued appearing but NAD, answers questions appropriately  HEENT: EOMI  CHEST/LUNG: Unlabored respirations.  HEART: Regular rate and rhythm; No murmurs, rubs, or gallops.  ABDOMEN: soft, NTND  EXTREMITIES: No edema    -------------------------------------------------------------------------------------------  LABS:                          13.2   7.15  )-----------( 147      ( 26 Nov 2024 02:01 )             36.9     11-26    138  |  102  |  13  ----------------------------<  152[H]  4.1   |  21[L]  |  0.82    Ca    9.2      26 Nov 2024 02:01  Phos  2.1     11-26  Mg     2.1     11-26    TPro  6.8  /  Alb  3.4  /  TBili  0.9  /  DBili  x   /  AST  54[H]  /  ALT  42  /  AlkPhos  97  11-26    PT/INR - ( 26 Nov 2024 02:01 )   PT: 13.2 sec;   INR: 1.15 ratio         PTT - ( 26 Nov 2024 02:01 )  PTT:62.6 sec  CARDIAC MARKERS ( 22 Nov 2024 10:04 )  1628 ng/L / x     / x     / x     / x     / 21.6 ng/mL  CARDIAC MARKERS ( 21 Nov 2024 06:27 )  1035 ng/L / x     / x     / x     / x     / 48.7 ng/mL  CARDIAC MARKERS ( 21 Nov 2024 01:56 )  714 ng/L / x     / x     / x     / x     / 47.3 ng/mL  CARDIAC MARKERS ( 20 Nov 2024 22:19 )  433 ng/L / x     / x     / x     / x     / 33.6 ng/mL  CARDIAC MARKERS ( 19 Nov 2024 23:19 )  29 ng/L / x     / x     / x     / x     / x                -------------------------------------------------------------------------------------------  Meds:  aspirin enteric coated 81 milliGRAM(s) Oral daily  chlorhexidine 2% Cloths 1 Application(s) Topical <User Schedule>  chlorhexidine 4% Liquid 1 Application(s) Topical <User Schedule>  clopidogrel Tablet 75 milliGRAM(s) Oral daily  ezetimibe 10 milliGRAM(s) Oral daily  folic acid 1 milliGRAM(s) Oral daily  insulin glargine Injectable (LANTUS) 15 Unit(s) SubCutaneous at bedtime  insulin lispro (ADMELOG) corrective regimen sliding scale   SubCutaneous three times a day before meals  insulin lispro (ADMELOG) corrective regimen sliding scale   SubCutaneous at bedtime  insulin lispro Injectable (ADMELOG) 3 Unit(s) SubCutaneous three times a day before meals  polyethylene glycol 3350 17 Gram(s) Oral daily  rosuvastatin 40 milliGRAM(s) Oral at bedtime  senna 2 Tablet(s) Oral at bedtime    -------------------------------------------------------------------------------------------  Cardiovascular Diagnostic Testing:    ECG:     Echo:     Stress Testing:    Cath:    -------------------------------------------------------------------------------------------

## 2024-11-26 NOTE — DIETITIAN INITIAL EVALUATION ADULT - REASON INDICATOR FOR ASSESSMENT
Assessment warranted for length of stay.  Information obtained from pt, pt's daughter at bedside, medical record.

## 2024-11-26 NOTE — DIETITIAN INITIAL EVALUATION ADULT - PERTINENT MEDS FT
MEDICATIONS  (STANDING):  aspirin enteric coated 81 milliGRAM(s) Oral daily  chlorhexidine 2% Cloths 1 Application(s) Topical <User Schedule>  chlorhexidine 4% Liquid 1 Application(s) Topical <User Schedule>  clopidogrel Tablet 75 milliGRAM(s) Oral daily  ezetimibe 10 milliGRAM(s) Oral daily  folic acid 1 milliGRAM(s) Oral daily  insulin glargine Injectable (LANTUS) 15 Unit(s) SubCutaneous at bedtime  insulin lispro (ADMELOG) corrective regimen sliding scale   SubCutaneous three times a day before meals  insulin lispro (ADMELOG) corrective regimen sliding scale   SubCutaneous at bedtime  insulin lispro Injectable (ADMELOG) 3 Unit(s) SubCutaneous three times a day before meals  polyethylene glycol 3350 17 Gram(s) Oral daily  rosuvastatin 40 milliGRAM(s) Oral at bedtime  senna 2 Tablet(s) Oral at bedtime    MEDICATIONS  (PRN):

## 2024-11-26 NOTE — DIETITIAN NUTRITION RISK NOTIFICATION - TREATMENT: THE FOLLOWING DIET HAS BEEN RECOMMENDED
Diet, NPO after Midnight:      NPO Start Date: 25-Nov-2024,   NPO Start Time: 23:59  Except Medications (11-25-24 @ 19:34) [Active]  Diet, DASH/TLC:   Sodium & Cholesterol Restricted  Consistent Carbohydrate {No Snacks} (CSTCHO)  Bear (11-23-24 @ 12:15) [Active]

## 2024-11-26 NOTE — PROGRESS NOTE ADULT - ASSESSMENT
====================ASSESSMENT ==============  74F CAD (s/p PCI ‘98, ‘06, 8/2024), HTN, HLD, GERD p/w CP, f/w NSTEMI. LHC w/ ISR LAD s/p POBA. c/b persistent CP & euglycemic DKA req insulin & nitro in CICU. On floor, [facility] req TVP & intubation. Now extubuated 11/24. C/b Afib/aflutter RVR started on heparin gtt.    Plan:  ====================== NEUROLOGY=====================  - off sedation s/p extubation  - AOx3, no acute issues  - continue to monitor mental status as per protocol   ==================== RESPIRATORY======================  Mechanical Ventilation:    #Acute respiratory failure  - intubated for airway protection during RRT  - extubated 11/23 to HFNC  - Tolerating room air w/ SPO2 100%  - continue to monitor SpO2 with goal >94%  ====================CARDIOVASCULAR==================  #NSTEMI  - 11/22 LHC: 100 pLAD ISR x1 POBA  - DAPT: ASA/Plavix  - cont crestor and zetia  - BPs labile, defer GDMT at this time    #CHB  - RRT called for AMS & CHB/symptomatic bradycardia  - intubated for airway protection  - TVP placed emergently on 6MONTI, replaced in CICU  - started on dopamine & epi, both currently off  - currently sinus tachy in 110s on telemetry  - pending PPM, EP following    #Afib RVR/Aflutter RVR  - new afib RVR 11/23 w/ rates 150s, BPs stable s/p Amio 150 and Lopressor 5 IV w/ return to NSR  - 11/24 AFlutter RVR w/ associated nausea/vomiting s/p Amio 150 and Lopressor 5 IV w/ return to NSR  - will start BB after PPM placement   - started on heparin gtt for AC  ===================HEMATOLOGIC/ONC ===================  aspirin enteric coated 81 milliGRAM(s) Oral daily  clopidogrel Tablet 75 milliGRAM(s) Oral daily  heparin  Infusion 600 Unit(s)/Hr (9 mL/Hr) IV Continuous <Continuous>  - Monitor H/H and plts  - DVT PPX: on systemic heparin  ===================== RENAL =========================  Continue monitoring urine output  #BK- resolved  - likely i/s/o hypoperfusion during CHB episode  - Continue monitoring urine output, lytes, SCr/ BUN  - replete lytes prn with goal K >4 and Mg >2  ==================== GASTROINTESTINAL===================  folic acid 1 milliGRAM(s) Oral daily  polyethylene glycol 3350 17 Gram(s) Oral daily  senna 2 Tablet(s) Oral at bedtime  - tolerating DASH diet  - monitor for BM  =======================    ENDOCRINE  =====================  ezetimibe 10 milliGRAM(s) Oral daily  insulin glargine Injectable (LANTUS) 15 Unit(s) SubCutaneous at bedtime  insulin lispro (ADMELOG) corrective regimen sliding scale   SubCutaneous three times a day before meals  insulin lispro (ADMELOG) corrective regimen sliding scale   SubCutaneous at bedtime  insulin lispro Injectable (ADMELOG) 3 Unit(s) SubCutaneous three times a day before meals  rosuvastatin 40 milliGRAM(s) Oral at bedtime  #Hyperglycemia d/t stress  - Cont ISS, gluc controlled  - monitor FS  ========================INFECTIOUS DISEASE================  #fever (tmax 100.6F)  - no leukocytosis  - UA and RVP neg  - BCx x2 pending  - no clear source at this time, pending pan CT  - ID following  - monitor and trend WBC and temperature curve    ====================ASSESSMENT ==============  74F CAD (s/p PCI ‘98, ‘06, 8/2024), HTN, HLD, GERD p/w CP, f/w NSTEMI. LHC w/ ISR LAD s/p POBA. c/b persistent CP & euglycemic DKA req insulin & nitro in CICU. On floor, [facility] req TVP & intubation. Now extubuated 11/24. C/b Afib/aflutter RVR started on heparin gtt.    Plan:  ====================== NEUROLOGY=====================  - off sedation s/p extubation  - AOx3, no acute issues  - continue to monitor mental status as per protocol   ==================== RESPIRATORY======================  Mechanical Ventilation:    #Acute respiratory failure  - intubated for airway protection during RRT  - extubated 11/23 to HFNC  - Tolerating room air w/ SPO2 100%  - continue to monitor SpO2 with goal >94%  ====================CARDIOVASCULAR==================  #NSTEMI  - 11/22 LHC: 100 pLAD ISR x1 POBA  - DAPT: ASA/Plavix  - cont crestor and zetia  - BPs labile, defer GDMT at this time    #CHB  - RRT called for AMS & CHB/symptomatic bradycardia  - intubated for airway protection  - TVP placed emergently on 6MONTI, replaced in CICU  - started on dopamine & epi, both currently off  - currently sinus tachy in 110s on telemetry  - pending PPM, EP following    #Afib RVR/Aflutter RVR  - new afib RVR 11/23 w/ rates 150s, BPs stable s/p Amio 150 and Lopressor 5 IV w/ return to NSR  - 11/24 AFlutter RVR w/ associated nausea/vomiting s/p Amio 150 and Lopressor 5 IV w/ return to NSR  - will start BB after PPM placement   - started on heparin gtt for AC  ===================HEMATOLOGIC/ONC ===================  aspirin enteric coated 81 milliGRAM(s) Oral daily  clopidogrel Tablet 75 milliGRAM(s) Oral daily  heparin  Infusion 600 Unit(s)/Hr (9 mL/Hr) IV Continuous <Continuous>  - Monitor H/H and plts  - DVT PPX: on systemic heparin  ===================== RENAL =========================  Continue monitoring urine output  #BK- resolved  - likely i/s/o hypoperfusion during CHB episode  - Continue monitoring urine output, lytes, SCr/ BUN  - replete lytes prn with goal K >4 and Mg >2  ==================== GASTROINTESTINAL===================  folic acid 1 milliGRAM(s) Oral daily  polyethylene glycol 3350 17 Gram(s) Oral daily  senna 2 Tablet(s) Oral at bedtime  - tolerating DASH diet  - monitor for BM  =======================    ENDOCRINE  =====================  ezetimibe 10 milliGRAM(s) Oral daily  insulin glargine Injectable (LANTUS) 15 Unit(s) SubCutaneous at bedtime  insulin lispro (ADMELOG) corrective regimen sliding scale   SubCutaneous three times a day before meals  insulin lispro (ADMELOG) corrective regimen sliding scale   SubCutaneous at bedtime  insulin lispro Injectable (ADMELOG) 3 Unit(s) SubCutaneous three times a day before meals  rosuvastatin 40 milliGRAM(s) Oral at bedtime  #Hyperglycemia d/t stress  - Cont ISS, gluc controlled  - monitor FS  ========================INFECTIOUS DISEASE================  #fever (tmax 100.6F)  - no leukocytosis  - UA and RVP neg  - BCx x2 pending  - no clear source at this time, pan CT w/o infectious source   - ID following  - monitor and trend WBC and temperature curve

## 2024-11-26 NOTE — CHART NOTE - NSCHARTNOTEFT_GEN_A_CORE
CCU Transfer Note    Transfer from: CCU    Transfer to: ( x ) Telemetry --    Accepting Physician:    Team covering on floor: ACP/Resident team   Signout given to: Hospitalist and     HOSPITAL COURSE:    74 year old Female [place] PMHx CAD (s/p PCI 1998, 2006, 8/2024), HTN, HLD, DM, GERD who presented with chest pain and was admitted 11/19 for NSTEMI. Patient went for LHC on 11/21 and is now s/p POBA to previous pLAD ERI which had ISR. While recovering in [facility], patient developed persistent chest pain radiating to jaw with new LBBB. She went back to cath lab, repeat LHC showing patent pLAD and otherwise no new lesions. In [facility], she became tachycardic to 120s with SBP 160s. Urgent TTE showing newly reduced EF, no beta blockers given, received ranolazine and imdur. Found to be in euglycemic DKA (Takes SGLT2 at home) with an anion gap of 24, lactate 3.9, BHB 3.7, and glucose in the 200s. She was started on Nitro gtt for chest pain and HTN which has now been weaned off. Patient was admitted to the CICU 11/21 for further management post cath & for DKA. (Takes SGLT2 inhibitors at home, likely euglycemic DKA). Started on Insulin gtt for DKA protocol. Nitro gtt was ultimately weaned off. Started on beta blockers which she tolerated. She was successfully transitioned off insulin gtt, s/p gap closure x2. Patient was transferred to [facility] on 11/21 in the evening under medicine & private cardiology (Adilson).     RRT called in the morning on 11/22 for bradycardia. On telemetry, patient became rodriguez from 80s to 30s. Patient was found to be in altered and vomiting. Cardiology & CICU re-consulted for further management. RRT escalated to Code Blue, however patient never lost a pulse. She was subsequently intubated during the RRT for airway protection given AMS & vomiting, was being transcutaneously paced, RIJ TVP was placed emergently at bedside on [facility], and she was transferred back to CICU for further management.  Has not required TVP during most recent CICU admission. Pt with new afib RVR/flutter last 11/24. TVP now removed 11/26 with NSR on telemetry. PPM delayed due to fever on 11/24, waiting for blood cx clearance x 48 hours. Plan for PPM tomorrow.     FOR FOLLOW U:  [] f/u PPM procedure timing  [] Uptitrate GDMT as tolerated, BB until after PPM placement     Vital Signs Last 24 Hrs  T(C): 37.3 (26 Nov 2024 11:00), Max: 37.7 (25 Nov 2024 19:00)  T(F): 99.1 (26 Nov 2024 11:00), Max: 99.8 (25 Nov 2024 19:00)  HR: 101 (26 Nov 2024 11:00) (95 - 106)  BP: 125/68 (26 Nov 2024 11:00) (111/62 - 172/72)  BP(mean): 90 (26 Nov 2024 11:00) (70 - 119)  RR: 17 (26 Nov 2024 11:00) (14 - 29)  SpO2: 100% (26 Nov 2024 11:00) (100% - 100%)    Parameters below as of 26 Nov 2024 11:00  Patient On (Oxygen Delivery Method): room air      MEDICATIONS  (STANDING):  aspirin enteric coated 81 milliGRAM(s) Oral daily  chlorhexidine 2% Cloths 1 Application(s) Topical <User Schedule>  chlorhexidine 4% Liquid 1 Application(s) Topical <User Schedule>  clopidogrel Tablet 75 milliGRAM(s) Oral daily  ezetimibe 10 milliGRAM(s) Oral daily  folic acid 1 milliGRAM(s) Oral daily  insulin glargine Injectable (LANTUS) 15 Unit(s) SubCutaneous at bedtime  insulin lispro (ADMELOG) corrective regimen sliding scale   SubCutaneous three times a day before meals  insulin lispro (ADMELOG) corrective regimen sliding scale   SubCutaneous at bedtime  insulin lispro Injectable (ADMELOG) 3 Unit(s) SubCutaneous three times a day before meals  polyethylene glycol 3350 17 Gram(s) Oral daily  rosuvastatin 40 milliGRAM(s) Oral at bedtime  senna 2 Tablet(s) Oral at bedtime    MEDICATIONS  (PRN):                  Maisha Almendarez, CCU PA CCU Transfer Note    Transfer from: CCU    Transfer to: ( x ) Telemetry --    Accepting Physician: Dr. Donna Griffiths  Team covering on floor: ACP/Resident team   Signout given to: Hospitalist and     HOSPITAL COURSE:    74 year old Female [place] PMHx CAD (s/p PCI 1998, 2006, 8/2024), HTN, HLD, DM, GERD who presented with chest pain and was admitted 11/19 for NSTEMI. Patient went for LHC on 11/21 and is now s/p POBA to previous pLAD ERI which had ISR. While recovering in [facility], patient developed persistent chest pain radiating to jaw with new LBBB. She went back to cath lab, repeat LHC showing patent pLAD and otherwise no new lesions. In [facility], she became tachycardic to 120s with SBP 160s. Urgent TTE showing newly reduced EF, no beta blockers given, received ranolazine and imdur. Found to be in euglycemic DKA (Takes SGLT2 at home) with an anion gap of 24, lactate 3.9, BHB 3.7, and glucose in the 200s. She was started on Nitro gtt for chest pain and HTN which has now been weaned off. Patient was admitted to the CICU 11/21 for further management post cath & for DKA. (Takes SGLT2 inhibitors at home, likely euglycemic DKA). Started on Insulin gtt for DKA protocol. Nitro gtt was ultimately weaned off. Started on beta blockers which she tolerated. She was successfully transitioned off insulin gtt, s/p gap closure x2. Patient was transferred to [facility] on 11/21 in the evening under medicine & private cardiology (Adilson).     RRT called in the morning on 11/22 for bradycardia. On telemetry, patient became rodriguez from 80s to 30s. Patient was found to be in altered and vomiting. Cardiology & CICU re-consulted for further management. RRT escalated to Code Blue, however patient never lost a pulse. She was subsequently intubated during the RRT for airway protection given AMS & vomiting, was being transcutaneously paced, RIJ TVP was placed emergently at bedside on [facility], and she was transferred back to CICU for further management.  Has not required TVP during most recent CICU admission. Pt with new afib RVR/flutter last 11/24. TVP now removed 11/26 with NSR on telemetry. PPM delayed due to fever on 11/24, waiting for blood cx clearance x 48 hours. Plan for PPM tomorrow.     FOR FOLLOW U:  [] f/u PPM procedure timing  [] Uptitrate GDMT as tolerated, BB until after PPM placement     Vital Signs Last 24 Hrs  T(C): 37.3 (26 Nov 2024 11:00), Max: 37.7 (25 Nov 2024 19:00)  T(F): 99.1 (26 Nov 2024 11:00), Max: 99.8 (25 Nov 2024 19:00)  HR: 101 (26 Nov 2024 11:00) (95 - 106)  BP: 125/68 (26 Nov 2024 11:00) (111/62 - 172/72)  BP(mean): 90 (26 Nov 2024 11:00) (70 - 119)  RR: 17 (26 Nov 2024 11:00) (14 - 29)  SpO2: 100% (26 Nov 2024 11:00) (100% - 100%)    Parameters below as of 26 Nov 2024 11:00  Patient On (Oxygen Delivery Method): room air      MEDICATIONS  (STANDING):  aspirin enteric coated 81 milliGRAM(s) Oral daily  chlorhexidine 2% Cloths 1 Application(s) Topical <User Schedule>  chlorhexidine 4% Liquid 1 Application(s) Topical <User Schedule>  clopidogrel Tablet 75 milliGRAM(s) Oral daily  ezetimibe 10 milliGRAM(s) Oral daily  folic acid 1 milliGRAM(s) Oral daily  insulin glargine Injectable (LANTUS) 15 Unit(s) SubCutaneous at bedtime  insulin lispro (ADMELOG) corrective regimen sliding scale   SubCutaneous three times a day before meals  insulin lispro (ADMELOG) corrective regimen sliding scale   SubCutaneous at bedtime  insulin lispro Injectable (ADMELOG) 3 Unit(s) SubCutaneous three times a day before meals  polyethylene glycol 3350 17 Gram(s) Oral daily  rosuvastatin 40 milliGRAM(s) Oral at bedtime  senna 2 Tablet(s) Oral at bedtime    MEDICATIONS  (PRN):                  Maisha Almendarez, CCPERLA MANUEL CICU Transfer Note    Transfer from: CICU    Transfer to: Telemetry [facility]    Accepting Physician: Dr. Donna Griffiths  Team covering on floor: ACP  Signout given to: Hospitalist and Medicine ACP: Skinny Rizo via TEAMS    HOSPITAL COURSE:    74 year old Female [place] PMHx CAD (s/p PCI 1998, 2006, 8/2024), HTN, HLD, DM, GERD who presented with chest pain and was admitted 11/19 for NSTEMI. Patient went for LHC on 11/21 and is now s/p POBA to previous pLAD ERI which had ISR. While recovering in [facility], patient developed persistent chest pain radiating to jaw with new LBBB. She went back to cath lab, repeat LHC showing patent pLAD and otherwise no new lesions. In [facility], she became tachycardic to 120s with SBP 160s. Urgent TTE showing newly reduced EF, no beta blockers given, received ranolazine and imdur. Found to be in euglycemic DKA (Takes SGLT2 at home) with an anion gap of 24, lactate 3.9, BHB 3.7, and glucose in the 200s. She was started on Nitro gtt for chest pain and HTN which has now been weaned off. Patient was admitted to the CICU 11/21 for further management post cath & for DKA. (Takes SGLT2 inhibitors at home, likely euglycemic DKA). Started on Insulin gtt for DKA protocol. Nitro gtt was ultimately weaned off. Started on beta blockers which she tolerated. She was successfully transitioned off insulin gtt, s/p gap closure x2. Patient was transferred to [facility] on 11/21 in the evening under medicine & private cardiology ([facility]ilson).     RRT called in the morning on 11/22 for bradycardia. On telemetry, patient became rodriguez from 80s to 30s. Patient was found to be in altered and vomiting. Cardiology & CICU re-consulted for further management. RRT escalated to Code Blue, however patient never lost a pulse. She was subsequently intubated during the RRT for airway protection given AMS & vomiting, was being transcutaneously paced, RIJ TVP was placed emergently at bedside on [facility], and she was transferred back to CICU for further management.  Has not required TVP during most recent CICU admission. Pt with new afib RVR/flutter last 11/24. TVP now removed 11/26 with NSR on telemetry. PPM delayed due to fever on 11/24, waiting for blood cx clearance x 48 hours. Plan for PPM tomorrow.     FOR FOLLOW U:  [] f/u PPM procedure timing  [] Uptitrate GDMT as tolerated  [] Avoid BB until after PPM placement  [] NO HSQ or AC until cleared by EP     Vital Signs Last 24 Hrs  T(C): 37.3 (26 Nov 2024 11:00), Max: 37.7 (25 Nov 2024 19:00)  T(F): 99.1 (26 Nov 2024 11:00), Max: 99.8 (25 Nov 2024 19:00)  HR: 101 (26 Nov 2024 11:00) (95 - 106)  BP: 125/68 (26 Nov 2024 11:00) (111/62 - 172/72)  BP(mean): 90 (26 Nov 2024 11:00) (70 - 119)  RR: 17 (26 Nov 2024 11:00) (14 - 29)  SpO2: 100% (26 Nov 2024 11:00) (100% - 100%)    Parameters below as of 26 Nov 2024 11:00  Patient On (Oxygen Delivery Method): room air      MEDICATIONS  (STANDING):  aspirin enteric coated 81 milliGRAM(s) Oral daily  chlorhexidine 2% Cloths 1 Application(s) Topical <User Schedule>  chlorhexidine 4% Liquid 1 Application(s) Topical <User Schedule>  clopidogrel Tablet 75 milliGRAM(s) Oral daily  ezetimibe 10 milliGRAM(s) Oral daily  folic acid 1 milliGRAM(s) Oral daily  insulin glargine Injectable (LANTUS) 15 Unit(s) SubCutaneous at bedtime  insulin lispro (ADMELOG) corrective regimen sliding scale   SubCutaneous three times a day before meals  insulin lispro (ADMELOG) corrective regimen sliding scale   SubCutaneous at bedtime  insulin lispro Injectable (ADMELOG) 3 Unit(s) SubCutaneous three times a day before meals  polyethylene glycol 3350 17 Gram(s) Oral daily  rosuvastatin 40 milliGRAM(s) Oral at bedtime  senna 2 Tablet(s) Oral at bedtime    NADEGE Enriquez

## 2024-11-27 DIAGNOSIS — I44.2 ATRIOVENTRICULAR BLOCK, COMPLETE: ICD-10-CM

## 2024-11-27 LAB
ANION GAP SERPL CALC-SCNC: 15 MMOL/L — SIGNIFICANT CHANGE UP (ref 5–17)
BUN SERPL-MCNC: 14 MG/DL — SIGNIFICANT CHANGE UP (ref 7–23)
CALCIUM SERPL-MCNC: 9.4 MG/DL — SIGNIFICANT CHANGE UP (ref 8.4–10.5)
CHLORIDE SERPL-SCNC: 103 MMOL/L — SIGNIFICANT CHANGE UP (ref 96–108)
CO2 SERPL-SCNC: 21 MMOL/L — LOW (ref 22–31)
CREAT SERPL-MCNC: 0.82 MG/DL — SIGNIFICANT CHANGE UP (ref 0.5–1.3)
EGFR: 75 ML/MIN/1.73M2 — SIGNIFICANT CHANGE UP
GLUCOSE BLDC GLUCOMTR-MCNC: 162 MG/DL — HIGH (ref 70–99)
GLUCOSE BLDC GLUCOMTR-MCNC: 178 MG/DL — HIGH (ref 70–99)
GLUCOSE BLDC GLUCOMTR-MCNC: 202 MG/DL — HIGH (ref 70–99)
GLUCOSE BLDC GLUCOMTR-MCNC: 207 MG/DL — HIGH (ref 70–99)
GLUCOSE BLDC GLUCOMTR-MCNC: 236 MG/DL — HIGH (ref 70–99)
GLUCOSE BLDC GLUCOMTR-MCNC: 392 MG/DL — HIGH (ref 70–99)
GLUCOSE SERPL-MCNC: 181 MG/DL — HIGH (ref 70–99)
HCT VFR BLD CALC: 38.9 % — SIGNIFICANT CHANGE UP (ref 34.5–45)
HGB BLD-MCNC: 13.5 G/DL — SIGNIFICANT CHANGE UP (ref 11.5–15.5)
MAGNESIUM SERPL-MCNC: 2 MG/DL — SIGNIFICANT CHANGE UP (ref 1.6–2.6)
MCHC RBC-ENTMCNC: 30.8 PG — SIGNIFICANT CHANGE UP (ref 27–34)
MCHC RBC-ENTMCNC: 34.7 G/DL — SIGNIFICANT CHANGE UP (ref 32–36)
MCV RBC AUTO: 88.8 FL — SIGNIFICANT CHANGE UP (ref 80–100)
NRBC # BLD: 0 /100 WBCS — SIGNIFICANT CHANGE UP (ref 0–0)
PHOSPHATE SERPL-MCNC: 2.7 MG/DL — SIGNIFICANT CHANGE UP (ref 2.5–4.5)
PLATELET # BLD AUTO: 172 K/UL — SIGNIFICANT CHANGE UP (ref 150–400)
POTASSIUM SERPL-MCNC: 4.1 MMOL/L — SIGNIFICANT CHANGE UP (ref 3.5–5.3)
POTASSIUM SERPL-SCNC: 4.1 MMOL/L — SIGNIFICANT CHANGE UP (ref 3.5–5.3)
RBC # BLD: 4.38 M/UL — SIGNIFICANT CHANGE UP (ref 3.8–5.2)
RBC # FLD: 12.2 % — SIGNIFICANT CHANGE UP (ref 10.3–14.5)
SODIUM SERPL-SCNC: 139 MMOL/L — SIGNIFICANT CHANGE UP (ref 135–145)
WBC # BLD: 4.38 K/UL — SIGNIFICANT CHANGE UP (ref 3.8–10.5)
WBC # FLD AUTO: 4.38 K/UL — SIGNIFICANT CHANGE UP (ref 3.8–10.5)

## 2024-11-27 PROCEDURE — 99233 SBSQ HOSP IP/OBS HIGH 50: CPT

## 2024-11-27 PROCEDURE — 33208 INSRT HEART PM ATRIAL & VENT: CPT | Mod: KX

## 2024-11-27 PROCEDURE — 71045 X-RAY EXAM CHEST 1 VIEW: CPT | Mod: 26

## 2024-11-27 PROCEDURE — 33225 L VENTRIC PACING LEAD ADD-ON: CPT

## 2024-11-27 PROCEDURE — 93010 ELECTROCARDIOGRAM REPORT: CPT

## 2024-11-27 RX ORDER — VANCOMYCIN HCL 900 MCG/MG
1000 POWDER (GRAM) MISCELLANEOUS ONCE
Refills: 0 | Status: DISCONTINUED | OUTPATIENT
Start: 2024-11-27 | End: 2024-12-01

## 2024-11-27 RX ORDER — ONDANSETRON HYDROCHLORIDE 4 MG/1
4 TABLET, FILM COATED ORAL ONCE
Refills: 0 | Status: COMPLETED | OUTPATIENT
Start: 2024-11-27 | End: 2024-11-27

## 2024-11-27 RX ORDER — ACETAMINOPHEN 500MG 500 MG/1
650 TABLET, COATED ORAL EVERY 6 HOURS
Refills: 0 | Status: DISCONTINUED | OUTPATIENT
Start: 2024-11-27 | End: 2024-12-02

## 2024-11-27 RX ORDER — LOSARTAN POTASSIUM 100 MG/1
25 TABLET, FILM COATED ORAL DAILY
Refills: 0 | Status: DISCONTINUED | OUTPATIENT
Start: 2024-11-27 | End: 2024-12-02

## 2024-11-27 RX ADMIN — Medication 4: at 00:36

## 2024-11-27 RX ADMIN — ONDANSETRON HYDROCHLORIDE 4 MILLIGRAM(S): 4 TABLET, FILM COATED ORAL at 12:51

## 2024-11-27 RX ADMIN — Medication 1 MILLIGRAM(S): at 15:40

## 2024-11-27 RX ADMIN — Medication 2: at 14:50

## 2024-11-27 RX ADMIN — CLOPIDOGREL 75 MILLIGRAM(S): 75 TABLET, FILM COATED ORAL at 15:40

## 2024-11-27 RX ADMIN — ACETAMINOPHEN 500MG 650 MILLIGRAM(S): 500 TABLET, COATED ORAL at 15:40

## 2024-11-27 RX ADMIN — ROSUVASTATIN CALCIUM 40 MILLIGRAM(S): 5 TABLET, FILM COATED ORAL at 22:05

## 2024-11-27 RX ADMIN — INSULIN GLARGINE 18 UNIT(S): 100 INJECTION, SOLUTION SUBCUTANEOUS at 22:05

## 2024-11-27 RX ADMIN — Medication 10 MILLIGRAM(S): at 15:41

## 2024-11-27 RX ADMIN — ONDANSETRON HYDROCHLORIDE 4 MILLIGRAM(S): 4 TABLET, FILM COATED ORAL at 15:07

## 2024-11-27 RX ADMIN — Medication 2: at 06:22

## 2024-11-27 RX ADMIN — Medication 81 MILLIGRAM(S): at 15:39

## 2024-11-27 RX ADMIN — Medication 4: at 17:53

## 2024-11-27 RX ADMIN — ACETAMINOPHEN 500MG 650 MILLIGRAM(S): 500 TABLET, COATED ORAL at 23:06

## 2024-11-27 RX ADMIN — ACETAMINOPHEN 500MG 650 MILLIGRAM(S): 500 TABLET, COATED ORAL at 22:06

## 2024-11-27 NOTE — PROGRESS NOTE ADULT - SUBJECTIVE AND OBJECTIVE BOX
INTERVAL HISTORY:    Transferred out of CICU. No events overnight. The patient was seen at bedside. S/P BiV-PPM today. C/o pain on left shoulder. Daughter at bedside.       ROS: Negative except for described above    Vital Signs Last 24 Hrs  T(C): 36.7 (27 Nov 2024 12:20), Max: 37.2 (26 Nov 2024 16:00)  T(F): 98 (27 Nov 2024 12:20), Max: 99 (26 Nov 2024 16:00)  HR: 105 (27 Nov 2024 13:20) (97 - 110)  BP: 115/67 (27 Nov 2024 13:20) (115/67 - 172/63)  BP(mean): 108 (27 Nov 2024 08:25) (91 - 121)  RR: 17 (27 Nov 2024 13:20) (15 - 24)  SpO2: 98% (27 Nov 2024 13:20) (97% - 100%)    Parameters below as of 27 Nov 2024 13:20  Patient On (Oxygen Delivery Method): room air      Physical examination:  GENERAL: Vitals stable. In mild distress due pain, AOx3  HEAD: Atraumatic, Normocephalic.  NECK: Supple, No JVD.  CHEST/LUNG: no crackles, rales or wheezing  HEART: regular rate and rhythm, no murmurs, S3 or S4 gallops, left sided bandage+  ABDOMEN: Soft, nontender, nondistended.   EXTREMITIES: No edema. 2+ Peripheral Pulse, no skin discoloration      Pertinent labs/images reviewed      Cardiac testing reviewed:    ECG reviewed;  ECG 11/23 & 11/24: afib/flutter    Telemetry today: NSR, intermittently paced.    TTE 11/23/24  CONCLUSIONS:      1. Left ventricular systolic function is mildly decreased with an ejection fraction visually estimated at 45 to 50 %. Regional wall motion abnormalities present.   2. Multiple segmental abnormalities exist. See findings.   3. Normal right ventricular cavity size, with normal wall thickness, and normal right ventricular systolic function.   4. No pericardial effusion seen.      TTE 11/20/24    CONCLUSIONS:      1. Left ventricular systolic function is moderately decreased with an ejection fraction of 43 % by Davlia's method of disks. Regional wall motion abnormalities present.   2. Basal and mid anterior septum, basal inferoseptal segment, and basal inferior segment are abnormal.   3. Compared to the transthoracic echocardiogram performed on 11/20/2024, the areas of hypokinesis are new.    TTE 11/20/4    CONCLUSIONS:      1. Left ventricular cavity is small. Left ventricular wall thickness is normal. Left ventricular systolic function is normal with an ejection fraction visually estimated at 60 to 65 %. There are no regional wall motion abnormalities seen.   2. Normal right ventricular cavity size and normal right ventricular systolic function.   3. No significant valvular disease.   4. No pericardial effusion seen.   5. No prior echocardiogram is available for comparison.      Cath 11/20/24  Coronary Angiography   The coronary circulation is right dominant. Cardiac catheterization was performed electively.    LM   Left main artery: Angiography shows minor luminal irregularities.    LAD   Proximal left anterior descending: In-stent restenosis. . There is a 99 % stenosis.  CX   Circumflex: Patent stents with mild ISR. .    RCA   Right coronary artery: Left to right collaterals. . There is a 100 %stenosis.    Interventional Findings:   Interventional Details   Proximal left anterior descending: This was a 99 % in-stent restenosis. Guidewire crossing was successful.      Cath 11/20/2024  Diagnostic Conclusions:   Pt brought back to the CCL for recurrent angina post PCI - all vessels patent. Unclear etiology of chest pain   Close monitoring.

## 2024-11-27 NOTE — PROGRESS NOTE ADULT - ASSESSMENT
74 year-old female with history of CAD (s/p PCI 1998, 2006, 8/2024), HTN, HLD, T2DM, and GERD pw CP s/p LHC 11/20 Severe proximal LAD in-stent restenosis s/p successful balloon  angioplasty but w/ recurrent CP s/p nitro gtt and euglycemic DKA (now resolved), s/p code blue/bradycardia s/p TVP, new afib, transferred out of CICU now s/p PPM placement 11/27.

## 2024-11-27 NOTE — PROGRESS NOTE ADULT - SUBJECTIVE AND OBJECTIVE BOX
Soo Levine MD  Division of Hospital Medicine  Available via MS Teams      Patient is a 74y old  Female who presents with a chief complaint of chest pain (27 Nov 2024 14:00)        SUBJECTIVE / OVERNIGHT EVENTS:  Overnight, no events.  No n/f/chills, cp, sob, abd pain.  states she has some pain at PPM site  c/o nausea  seen w/ daughter at bedside      I&O's Summary    26 Nov 2024 07:01  -  27 Nov 2024 07:00  --------------------------------------------------------  IN: 187.5 mL / OUT: 1250 mL / NET: -1062.5 mL    27 Nov 2024 07:01  -  27 Nov 2024 14:48  --------------------------------------------------------  IN: 240 mL / OUT: 0 mL / NET: 240 mL      Vital Signs Last 24 Hrs  T(C): 36.7 (27 Nov 2024 12:20), Max: 37.2 (26 Nov 2024 16:00)  T(F): 98 (27 Nov 2024 12:20), Max: 99 (26 Nov 2024 16:00)  HR: 105 (27 Nov 2024 13:20) (97 - 110)  BP: 115/67 (27 Nov 2024 13:20) (115/67 - 172/63)  BP(mean): 108 (27 Nov 2024 08:25) (91 - 121)  RR: 17 (27 Nov 2024 13:20) (15 - 24)  SpO2: 98% (27 Nov 2024 13:20) (97% - 100%)    Parameters below as of 27 Nov 2024 13:20  Patient On (Oxygen Delivery Method): room air        PHYSICAL EXAM:  GENERAL:  Well appearing, elderly f, in NAD  HEAD:  NCAT  EYES: conjunctiva clear  NECK: Supple  CHEST/LUNG: L PPM site dressed w/ gauze.  CTA B/L. No w/r/r.  HEART: Normal S1, S2. No m/r/g.   ABDOMEN: SNTND  EXTREMITIES:  2+ Peripheral Pulses, No clubbing, cyanosis, edema.  PSYCH: appropriate affect  SKIN: No rashes or lesions    LABS:                        13.5   4.38  )-----------( 172      ( 27 Nov 2024 05:54 )             38.9     11-27    139  |  103  |  14  ----------------------------<  181[H]  4.1   |  21[L]  |  0.82    Ca    9.4      27 Nov 2024 05:54  Phos  2.7     11-27  Mg     2.0     11-27    TPro  6.8  /  Alb  3.4  /  TBili  0.9  /  DBili  x   /  AST  54[H]  /  ALT  42  /  AlkPhos  97  11-26    PT/INR - ( 26 Nov 2024 02:01 )   PT: 13.2 sec;   INR: 1.15 ratio         PTT - ( 26 Nov 2024 02:01 )  PTT:62.6 sec      Urinalysis Basic - ( 27 Nov 2024 05:54 )    Color: x / Appearance: x / SG: x / pH: x  Gluc: 181 mg/dL / Ketone: x  / Bili: x / Urobili: x   Blood: x / Protein: x / Nitrite: x   Leuk Esterase: x / RBC: x / WBC x   Sq Epi: x / Non Sq Epi: x / Bacteria: x            CAPILLARY BLOOD GLUCOSE      POCT Blood Glucose.: 178 mg/dL (27 Nov 2024 14:30)  POCT Blood Glucose.: 236 mg/dL (27 Nov 2024 14:24)  POCT Blood Glucose.: 162 mg/dL (27 Nov 2024 06:15)  POCT Blood Glucose.: 207 mg/dL (27 Nov 2024 00:33)  POCT Blood Glucose.: 283 mg/dL (26 Nov 2024 22:02)  POCT Blood Glucose.: 121 mg/dL (26 Nov 2024 16:46)    MEDICATIONS  (STANDING):  aspirin enteric coated 81 milliGRAM(s) Oral daily  clopidogrel Tablet 75 milliGRAM(s) Oral daily  dextrose 5%. 1000 milliLiter(s) (50 mL/Hr) IV Continuous <Continuous>  dextrose 5%. 1000 milliLiter(s) (100 mL/Hr) IV Continuous <Continuous>  dextrose 50% Injectable 25 Gram(s) IV Push once  dextrose 50% Injectable 12.5 Gram(s) IV Push once  dextrose 50% Injectable 25 Gram(s) IV Push once  ezetimibe 10 milliGRAM(s) Oral daily  folic acid 1 milliGRAM(s) Oral daily  glucagon  Injectable 1 milliGRAM(s) IntraMuscular once  insulin glargine Injectable (LANTUS) 18 Unit(s) SubCutaneous at bedtime  insulin lispro (ADMELOG) corrective regimen sliding scale   SubCutaneous every 6 hours  polyethylene glycol 3350 17 Gram(s) Oral daily  rosuvastatin 40 milliGRAM(s) Oral at bedtime  senna 2 Tablet(s) Oral at bedtime  vancomycin  IVPB 1000 milliGRAM(s) IV Intermittent once    MEDICATIONS  (PRN):  dextrose Oral Gel 15 Gram(s) Oral once PRN Blood Glucose LESS THAN 70 milliGRAM(s)/deciliter    Home Medications:  Aspirin Enteric Coated 81 mg oral delayed release tablet: 1 tab(s) orally once a day (20 Nov 2024 06:04)  folic acid: 1 milligram(s) orally once a day (20 Nov 2024 06:04)  isosorbide mononitrate 60 mg oral tablet, extended release: 2 tab(s) orally once a day (20 Nov 2024 06:04)  Jardiance 25 mg oral tablet: 1 tab(s) orally once a day (20 Nov 2024 06:04)  losartan 25 mg oral tablet: 1 tab(s) orally once a day hold for sbp&lt;90 (20 Nov 2024 06:04)  metoprolol tartrate 100 mg oral tablet: 1 tab(s) orally 2 times a day hold for sbp&lt;90, HR &lt;55 (20 Nov 2024 06:04)  NovoLOG FlexPen 100 units/mL injectable solution: 20 international unit(s) injectable 3 times a day (20 Nov 2024 06:04)  Ranexa 500 mg oral tablet, extended release: 1 tab(s) orally 2 times a day (20 Nov 2024 06:04)  rosuvastatin 40 mg oral tablet: 1 tab(s) orally once a day (at bedtime) (20 Nov 2024 06:04)  Tresiba 100 units/mL subcutaneous solution: 10 unit(s) subcutaneous once a day (at bedtime) (20 Nov 2024 06:04)  Zetia 10 mg oral tablet: 1 tab(s) orally once a day (20 Nov 2024 06:04)

## 2024-11-27 NOTE — PRE-ANESTHESIA EVALUATION ADULT - NSANTHPMHFT_GEN_ALL_CORE
74F [place] PMHx CAD (s/p PCI 1998, 2006, 8/2024), HTN, HLD, DM, GERD who presented with chest pain and was admitted 11/19 for NSTEMI. Patient went for LHC on 11/21 and is now s/p POBA to previous pLAD ERI which had ISR. Hospital course c/b euglycemic DKA (Takes SGLT2 at home).  RRT called in the morning on 11/22 for bradycardia, AMS, vomiting; subsequently intubated, RIJ TVP. Pt with new afib RVR/flutter last 11/24, extubated.

## 2024-11-27 NOTE — PROGRESS NOTE ADULT - PROBLEM SELECTOR PLAN 1
s/p [facility] 11/20 Severe proximal LAD in-stent restenosis s/p successful balloon  angioplasty   TTE 45-50%, not in heart failure  - ASA/plavix x 12 months  - resume losartan 25 mg daily  - f/u EP when safe to resume BB - was on metoprolol tartrate 100 mg bid but would start lower dose  - c/ statin

## 2024-11-27 NOTE — PROGRESS NOTE ADULT - PROBLEM SELECTOR PLAN 5
dvt ppx: hold a/c given PPM placement, resume when safe to do so from EP perspective  Dispo: DC 11/28 pending PT recs and AM CXR  d/w daughter at bedside, all ques answered

## 2024-11-27 NOTE — PROGRESS NOTE ADULT - SUBJECTIVE AND OBJECTIVE BOX
24H hour events:   Seen post pacemaker procedures, (+) mild discomfort at pacemaker site, no sob, palpitations, dizziness    MEDICATIONS:  aspirin enteric coated 81 milliGRAM(s) Oral daily  clopidogrel Tablet 75 milliGRAM(s) Oral daily  vancomycin  IVPB 1000 milliGRAM(s) IV Intermittent once  polyethylene glycol 3350 17 Gram(s) Oral daily  senna 2 Tablet(s) Oral at bedtime  dextrose 50% Injectable 25 Gram(s) IV Push once  dextrose 50% Injectable 12.5 Gram(s) IV Push once  dextrose 50% Injectable 25 Gram(s) IV Push once  dextrose Oral Gel 15 Gram(s) Oral once PRN  ezetimibe 10 milliGRAM(s) Oral daily  glucagon  Injectable 1 milliGRAM(s) IntraMuscular once  insulin glargine Injectable (LANTUS) 18 Unit(s) SubCutaneous at bedtime  insulin lispro (ADMELOG) corrective regimen sliding scale   SubCutaneous every 6 hours  rosuvastatin 40 milliGRAM(s) Oral at bedtime  dextrose 5%. 1000 milliLiter(s) IV Continuous <Continuous>  dextrose 5%. 1000 milliLiter(s) IV Continuous <Continuous>  folic acid 1 milliGRAM(s) Oral daily      REVIEW OF SYSTEMS:  Complete 12point ROS negative except as noted above    PHYSICAL EXAM:  T(C): 36.7 (11-27-24 @ 12:20), Max: 37.2 (11-26-24 @ 16:00)  HR: 105 (11-27-24 @ 13:20) (97 - 110)  BP: 115/67 (11-27-24 @ 13:20) (115/67 - 172/63)  RR: 17 (11-27-24 @ 13:20) (15 - 24)  SpO2: 98% (11-27-24 @ 13:20) (97% - 100%)  Wt(kg): --  I&O's Summary    26 Nov 2024 07:01  -  27 Nov 2024 07:00  --------------------------------------------------------  IN: 187.5 mL / OUT: 1250 mL / NET: -1062.5 mL    27 Nov 2024 07:01  -  27 Nov 2024 14:01  --------------------------------------------------------  IN: 240 mL / OUT: 0 mL / NET: 240 mL      Appearance: Normal, in NAD	  Head: normocephalic  Neck: supple  Cardiovascular: RRR S1 S2, no m/r/g  Respiratory: Lungs clear to auscultation	  Psychiatry: A & O x 3, Mood & affect appropriate  Gastrointestinal:  Soft, Non-tender, + BS	  ; voids  Skin: No rashes, No ecchymoses, left PPM site with dressing C/D/I	  Neurologic: Non-focal  Extremities: Normal range of motion, No clubbing, cyanosis or edema  Vascular: Peripheral pulses palpable 2+ bilaterally        LABS:	 	    CBC Full  -  ( 27 Nov 2024 05:54 )  WBC Count : 4.38 K/uL  Hemoglobin : 13.5 g/dL  Hematocrit : 38.9 %  Platelet Count - Automated : 172 K/uL  Mean Cell Volume : 88.8 fl  Mean Cell Hemoglobin : 30.8 pg  Mean Cell Hemoglobin Concentration : 34.7 g/dL  Auto Neutrophil # : x  Auto Lymphocyte # : x  Auto Monocyte # : x  Auto Eosinophil # : x  Auto Basophil # : x  Auto Neutrophil % : x  Auto Lymphocyte % : x  Auto Monocyte % : x  Auto Eosinophil % : x  Auto Basophil % : x    11-27    139  |  103  |  14  ----------------------------<  181[H]  4.1   |  21[L]  |  0.82  11-26    138  |  102  |  13  ----------------------------<  152[H]  4.1   |  21[L]  |  0.82    Ca    9.4      27 Nov 2024 05:54  Ca    9.2      26 Nov 2024 02:01  Phos  2.7     11-27  Phos  2.1     11-26  Mg     2.0     11-27  Mg     2.1     11-26    TPro  6.8  /  Alb  3.4  /  TBili  0.9  /  DBili  x   /  AST  54[H]  /  ALT  42  /  AlkPhos  97  11-26      proBNP:   Lipid Profile:   HgA1c:   TSH:       CARDIAC MARKERS:      TELEMETRY: was SB/NSR pre PPM, now AV Paced 60s   	    Echo 11/23/24:    CONCLUSIONS:      1. Left ventricular systolic function is mildly decreased with an ejection fraction visually estimated at 45 to 50 %. Regional wall motion abnormalities present.   2. Multiple segmental abnormalities exist. See findings.   3. Normal right ventricular cavity size, with normal wall thickness, and normal right ventricular systolic function.  4. No pericardial effusion seen.

## 2024-11-27 NOTE — PROGRESS NOTE ADULT - ASSESSMENT
Mrs. Flores is a 74 year-old [place] female with history of CAD (s/p PCI 1998, 2006, 8/2024), HTN, HLD, T2DM, and GERD presents with chest pain and BECERRA. Admitted for unstable angina. Underwent POBA for pLAD ISR on 11/20th. Taken back for a diagnostic cath due to new RWMA on TTE and refractory angina which showed patent stent. She was transferred to CICU treated with nitro gtt. Found with euglycemic DKA. Clinically stable, transitioned off insulin drip and off nitro. Transferred to telemetry floor where a Code/RRT was active on 11/22/24 for symptomatic rodriguez/syncope with heart rate down to 33 bpm, intubated, TVP inserted and paced during RRT. Developed new onset Afib/aflutter with RVR last documented on 11/24th. EPS consulted for CHB. PPM implant was deferred due to concerns for Infection. Seen by ID. She was successfully extubated on 11/24.  Downgraded to telemetry. S/p Medtronic biV- PPM today.     #CAD with NSTEMI s/p POBA to pLAD for ISR 11/20th.  #HFrEF  # Tachy-italia syndrome with CHB/LBBB/new Afib/flutter with RVR s/p PPM 11/27th  #Acute hypoxic respiratory failure requiring mechanical ventilation s/p extubation on 11/24th    Plan:  Continue DAPT. Resume AC when safe by EP for new Afib/flutter.   Will drop Aspirin 1 week after POBA and then continue with Plavix/DOAC with GI protection  Continue CHF medication. Continue Losartan 25 mg QD PO if BP allows. Will initiate beta blocker from tomorrow.  C/w Statin  Please provide pain control  Fall & bleeding precaution  Continue hemodynamic and telemetry monitoring  Monitor electrolytes and replace as needed      Юлия Mirza MD  Attending Cardiologist     Non-invasive Cardiology/Advanced Cardiac Imaging  [facility]   Tel: [phone] .

## 2024-11-27 NOTE — PROGRESS NOTE ADULT - ASSESSMENT
74 year-old [place] female with history of CAD (s/p PCI 1998, 2006, 8/2024), HTN, HLD, T2DM, and GERD presents with intermittent chest pain at rest and on exertion for the past 3 days. Underwent POBA for pLAD ISR. EP consulted for transient symptomatic CHB. Remains with LBBB.  F/u EF= 45-50% ( basal and mid inferior septum and basal anteroseptal segment are akinetic. The basal and mid inferior wall, basal inferolateral segment, and basal anterolateral segment are hypokinetic). Presentation most c/w embolization to septal perforators with persistent LBBB. s/p Code/RRT on 11/22/24 for symptomatic rodriguez/syncope with heart rate down to 33 bpm, intubated, TVP inserted and paced during RRT. Patient extubated 11/24.  EPS consulted for CHB, LBBB.    1. Transient CHB, LBBB  2. s/p POBA to pLAD 11/20/24    - Blood culture no growth x 48hrs  - s/p Medtronic Biventricular PPM implant 11/27  - Monitor PPM site for bleeding or swelling  - F/U CXR today r/o pneumothorax and PA/Lat in am to evaluate lead placement  - Post op pacemaker instructions reviewed with patient and family, including remote transmission  - ID and booklet given to patient  - PPM to be interrogated by device rep tomorrow  - F/U with EP [facility] on 12/9/24 at 1:40 pm  - Continue telemetry monitoring    #[phone]

## 2024-11-28 LAB
GLUCOSE BLDC GLUCOMTR-MCNC: 190 MG/DL — HIGH (ref 70–99)
GLUCOSE BLDC GLUCOMTR-MCNC: 194 MG/DL — HIGH (ref 70–99)
GLUCOSE BLDC GLUCOMTR-MCNC: 289 MG/DL — HIGH (ref 70–99)
GLUCOSE BLDC GLUCOMTR-MCNC: 315 MG/DL — HIGH (ref 70–99)
GLUCOSE BLDC GLUCOMTR-MCNC: 358 MG/DL — HIGH (ref 70–99)
GLUCOSE BLDC GLUCOMTR-MCNC: 393 MG/DL — HIGH (ref 70–99)

## 2024-11-28 PROCEDURE — 99233 SBSQ HOSP IP/OBS HIGH 50: CPT | Mod: GC

## 2024-11-28 PROCEDURE — 71046 X-RAY EXAM CHEST 2 VIEWS: CPT | Mod: 26

## 2024-11-28 RX ORDER — ACETAMINOPHEN 500MG 500 MG/1
1000 TABLET, COATED ORAL ONCE
Refills: 0 | Status: COMPLETED | OUTPATIENT
Start: 2024-11-28 | End: 2024-11-28

## 2024-11-28 RX ORDER — MAGNESIUM, ALUMINUM HYDROXIDE 200-225/5
30 SUSPENSION, ORAL (FINAL DOSE FORM) ORAL ONCE
Refills: 0 | Status: COMPLETED | OUTPATIENT
Start: 2024-11-28 | End: 2024-11-28

## 2024-11-28 RX ADMIN — ROSUVASTATIN CALCIUM 40 MILLIGRAM(S): 5 TABLET, FILM COATED ORAL at 21:41

## 2024-11-28 RX ADMIN — INSULIN GLARGINE 18 UNIT(S): 100 INJECTION, SOLUTION SUBCUTANEOUS at 21:40

## 2024-11-28 RX ADMIN — ACETAMINOPHEN 500MG 1000 MILLIGRAM(S): 500 TABLET, COATED ORAL at 04:00

## 2024-11-28 RX ADMIN — Medication 10: at 17:01

## 2024-11-28 RX ADMIN — ACETAMINOPHEN 500MG 650 MILLIGRAM(S): 500 TABLET, COATED ORAL at 11:58

## 2024-11-28 RX ADMIN — CLOPIDOGREL 75 MILLIGRAM(S): 75 TABLET, FILM COATED ORAL at 11:57

## 2024-11-28 RX ADMIN — Medication 1 MILLIGRAM(S): at 11:57

## 2024-11-28 RX ADMIN — Medication 81 MILLIGRAM(S): at 11:57

## 2024-11-28 RX ADMIN — Medication 6: at 00:29

## 2024-11-28 RX ADMIN — Medication 8: at 11:58

## 2024-11-28 RX ADMIN — ACETAMINOPHEN 500MG 400 MILLIGRAM(S): 500 TABLET, COATED ORAL at 03:03

## 2024-11-28 RX ADMIN — LOSARTAN POTASSIUM 25 MILLIGRAM(S): 100 TABLET, FILM COATED ORAL at 05:06

## 2024-11-28 RX ADMIN — Medication 2: at 05:55

## 2024-11-28 RX ADMIN — Medication 10 MILLIGRAM(S): at 11:58

## 2024-11-28 RX ADMIN — Medication 30 MILLILITER(S): at 22:10

## 2024-11-28 NOTE — PHYSICAL THERAPY INITIAL EVALUATION ADULT - NSPTDMEREC_GEN_A_CORE
If pt d/c home would require home PT, assist with ALL mobility, & DME: RW & polyfly w/c.  Patient will require a rolling walker at home due to decreased balance, strength and endurance to help complete MRADL's (Mobility related aides for daily living).

## 2024-11-28 NOTE — PHYSICAL THERAPY INITIAL EVALUATION ADULT - PERTINENT HX OF CURRENT PROBLEM, REHAB EVAL
74 year-old [place] female with history of CAD (s/p PCI 1998, 2006, 8/2024), HTN, HLD, T2DM, and GERD presents with intermittent chest pain at rest and on exertion for the past 3 days s/p LHC x2 today with POBA pLAD ERI ISR c/b DKA and refractory angina admitted to CICU for further treatment.   CCU downgrade 11/26   DX: #Refractory angina c/b ADHF- s/p LHC 11/20 w/ POBA to previous pLAD ERI ISR Returned to cath lab for persistent CP with dynamic EKG changes, no acute changes in coronary arteries seen, CP improved with SL nitro, s/p nitro ggt - limited TTE showing acutely decreased EF (60 to 43%) in between LHC, c/w  Continue DAPT, IMDUR & Lopressor  11/22 - RRT /Code Blue transferred back to CICU on Dopamine gtt      #HTN- Hold home anti-HTN Losartan 25 and lopressor 100 BID iso SCAI B shock s/p Nitro gtt      #DKA-  Anion gap 26 now improved and gap closed     #11/22 syncope, rodriguez 30s bpm w/ CHB on EKG, s/p RRT, code blue, on pressors and pacing, return to CICU found in complete HB s/p TVP removed 11/26 w/ underlying 1st deg HB  11/27 S/P Biv Medtronic PPM  - s/p BIV PPM 11/27   CT Chest/A&P: (-).

## 2024-11-28 NOTE — PHYSICAL THERAPY INITIAL EVALUATION ADULT - PLANNED THERAPY INTERVENTIONS, PT EVAL
Stair training... GOAL: In 4 weeks pt will negotiate 5 stairs independently with least restrictive device./balance training/bed mobility training/gait training/strengthening/transfer training

## 2024-11-28 NOTE — PROGRESS NOTE ADULT - SUBJECTIVE AND OBJECTIVE BOX
Patient is a 74y old  Female who presents with a chief complaint of chest pain (28 Nov 2024 11:02)      SUBJECTIVE / OVERNIGHT EVENTS: No events chart reviewed   Pain at the [facility] site   afebrile   no SOB   daughter at bedside     MEDICATIONS  (STANDING):  aspirin enteric coated 81 milliGRAM(s) Oral daily  clopidogrel Tablet 75 milliGRAM(s) Oral daily  dextrose 5%. 1000 milliLiter(s) (50 mL/Hr) IV Continuous <Continuous>  dextrose 5%. 1000 milliLiter(s) (100 mL/Hr) IV Continuous <Continuous>  dextrose 50% Injectable 25 Gram(s) IV Push once  dextrose 50% Injectable 12.5 Gram(s) IV Push once  dextrose 50% Injectable 25 Gram(s) IV Push once  ezetimibe 10 milliGRAM(s) Oral daily  folic acid 1 milliGRAM(s) Oral daily  glucagon  Injectable 1 milliGRAM(s) IntraMuscular once  insulin glargine Injectable (LANTUS) 18 Unit(s) SubCutaneous at bedtime  insulin lispro (ADMELOG) corrective regimen sliding scale   SubCutaneous every 6 hours  losartan 25 milliGRAM(s) Oral daily  polyethylene glycol 3350 17 Gram(s) Oral daily  rosuvastatin 40 milliGRAM(s) Oral at bedtime  senna 2 Tablet(s) Oral at bedtime  vancomycin  IVPB 1000 milliGRAM(s) IV Intermittent once    MEDICATIONS  (PRN):  acetaminophen     Tablet .. 650 milliGRAM(s) Oral every 6 hours PRN Mild Pain (1 - 3)  dextrose Oral Gel 15 Gram(s) Oral once PRN Blood Glucose LESS THAN 70 milliGRAM(s)/deciliter      Vital Signs Last 24 Hrs  T(C): 36.6 (28 Nov 2024 04:07), Max: 36.9 (27 Nov 2024 14:00)  T(F): 97.9 (28 Nov 2024 04:07), Max: 98.4 (27 Nov 2024 14:00)  HR: 81 (28 Nov 2024 04:07) (81 - 106)  BP: 115/74 (28 Nov 2024 04:07) (106/68 - 134/84)  BP(mean): --  RR: 18 (28 Nov 2024 04:07) (15 - 18)  SpO2: 98% (28 Nov 2024 04:07) (95% - 100%)    Parameters below as of 28 Nov 2024 04:07  Patient On (Oxygen Delivery Method): room air      CAPILLARY BLOOD GLUCOSE      POCT Blood Glucose.: 190 mg/dL (28 Nov 2024 07:38)  POCT Blood Glucose.: 194 mg/dL (28 Nov 2024 05:45)  POCT Blood Glucose.: 289 mg/dL (28 Nov 2024 00:23)  POCT Blood Glucose.: 392 mg/dL (27 Nov 2024 21:55)  POCT Blood Glucose.: 202 mg/dL (27 Nov 2024 17:40)  POCT Blood Glucose.: 178 mg/dL (27 Nov 2024 14:30)  POCT Blood Glucose.: 236 mg/dL (27 Nov 2024 14:24)    I&O's Summary    27 Nov 2024 07:01  -  28 Nov 2024 07:00  --------------------------------------------------------  IN: 480 mL / OUT: 0 mL / NET: 480 mL        PHYSICAL EXAM:  GENERAL: NAD  NECK: Supple  CHEST/LUNG: L PPM site dressed w/ gauze. no tense hematoma -   CTA B/L.equal air entry    HEART: Normal S1, S2. No m/r/g.   ABDOMEN: Soft NT/ND  EXTREMITIES:  No  edema.  PSYCH: appropriate affect        LABS:                        13.5   4.38  )-----------( 172      ( 27 Nov 2024 05:54 )             38.9     11-27    139  |  103  |  14  ----------------------------<  181[H]  4.1   |  21[L]  |  0.82    Ca    9.4      27 Nov 2024 05:54  Phos  2.7     11-27  Mg     2.0     11-27            Urinalysis Basic - ( 27 Nov 2024 05:54 )    Color: x / Appearance: x / SG: x / pH: x  Gluc: 181 mg/dL / Ketone: x  / Bili: x / Urobili: x   Blood: x / Protein: x / Nitrite: x   Leuk Esterase: x / RBC: x / WBC x   Sq Epi: x / Non Sq Epi: x / Bacteria: x        RADIOLOGY & ADDITIONAL TESTS:    Imaging Personally Reviewed:    Consultant(s) Notes Reviewed:      Care Discussed with Consultants/Other Providers:

## 2024-11-28 NOTE — PROGRESS NOTE ADULT - PROBLEM SELECTOR PLAN 1
s/p [facility] 11/20 Severe Proximal LAD in-stent restenosis s/p successful balloon  angioplasty no chest pain   TTE 45-50%, not in heart failure  - ASA/plavix x 12 months  - continue losartan 25 mg daily  - to d/w with EP when safe to resume BB   - c/ statin  _ EP note appreciated continue to hold DOAC

## 2024-11-28 NOTE — PHYSICAL THERAPY INITIAL EVALUATION ADULT - RANGE OF MOTION EXAMINATION, REHAB EVAL
LUE limited by pain, not tested/Right LE ROM was WFL (within functional limits)/bilateral lower extremity ROM was WFL (within functional limits)

## 2024-11-28 NOTE — PROGRESS NOTE ADULT - SUBJECTIVE AND OBJECTIVE BOX
24H hour events: Stable overnight  small hematoma at site  Patient with pain at site    MEDICATIONS:  aspirin enteric coated 81 milliGRAM(s) Oral daily  clopidogrel Tablet 75 milliGRAM(s) Oral daily  losartan 25 milliGRAM(s) Oral daily  vancomycin  IVPB 1000 milliGRAM(s) IV Intermittent once  acetaminophen     Tablet .. 650 milliGRAM(s) Oral every 6 hours PRN  polyethylene glycol 3350 17 Gram(s) Oral daily  senna 2 Tablet(s) Oral at bedtime  ezetimibe 10 milliGRAM(s) Oral daily  glucagon  Injectable 1 milliGRAM(s) IntraMuscular once  insulin glargine Injectable (LANTUS) 18 Unit(s) SubCutaneous at bedtime  insulin lispro (ADMELOG) corrective regimen sliding scale   SubCutaneous every 6 hours  rosuvastatin 40 milliGRAM(s) Oral at bedtime  folic acid 1 milliGRAM(s) Oral daily      PHYSICAL EXAM:  T(C): 36.6 (11-28-24 @ 04:07), Max: 36.9 (11-27-24 @ 14:00)  HR: 81 (11-28-24 @ 04:07) (81 - 106)  BP: 115/74 (11-28-24 @ 04:07) (106/68 - 134/84)  RR: 18 (11-28-24 @ 04:07) (15 - 18)  SpO2: 98% (11-28-24 @ 04:07) (95% - 100%)  Wt(kg): --  I&O's Summary    27 Nov 2024 07:01  -  28 Nov 2024 07:00  --------------------------------------------------------  IN: 480 mL / OUT: 0 mL / NET: 480 mL    LABS:	 	    CBC Full  -  ( 27 Nov 2024 05:54 )  WBC Count : 4.38 K/uL  Hemoglobin : 13.5 g/dL  Hematocrit : 38.9 %  Platelet Count - Automated : 172 K/uL  Mean Cell Volume : 88.8 fl  Mean Cell Hemoglobin : 30.8 pg  Mean Cell Hemoglobin Concentration : 34.7 g/dL      11-27    139  |  103  |  14  ----------------------------<  181[H]  4.1   |  21[L]  |  0.82    Ca    9.4      27 Nov 2024 05:54  Phos  2.7     11-27  Mg     2.0     11-27  	  ASSESSMENT/PLAN:   S/P BiV PPM  Sinus rhythm with BiV pacing  PPM check good  CXR OK  plan: adequate pain meds, would hold off on DOAC for a couple of more days (patient in sinus rhythm)

## 2024-11-28 NOTE — PHYSICAL THERAPY INITIAL EVALUATION ADULT - GENERAL OBSERVATIONS, REHAB EVAL
Chart reviewed events to date noted. Blood glucose reviewed. Pt tolerated 45min PT initial evaluation fairly well. Rec'd in bed in NAD, +tele, family bedside, agreeable to PT.

## 2024-11-28 NOTE — PHYSICAL THERAPY INITIAL EVALUATION ADULT - LIVES WITH, PROFILE
Pt lives with spouse, son & son's family in a private home with 5 steps to enter & first floor set up. Pt was independent with all mobility & ADLs prior to admit./children/spouse

## 2024-11-28 NOTE — PHYSICAL THERAPY INITIAL EVALUATION ADULT - GAIT DEVIATIONS NOTED, PT EVAL
decreased keisha/increased time in double stance/decreased step length/decreased stride length/decreased weight-shifting ability

## 2024-11-28 NOTE — PROGRESS NOTE ADULT - PROBLEM SELECTOR PLAN 5
dvt ppx: hold a/c now as per EP given PPM placement, resume when safe to do so from EP perspective  Dispo: [facility] 11/28 pending PT recs   d/w daughter at bedside

## 2024-11-29 ENCOUNTER — TRANSCRIPTION ENCOUNTER (OUTPATIENT)
Age: 74
End: 2024-11-29

## 2024-11-29 LAB
CULTURE RESULTS: SIGNIFICANT CHANGE UP
CULTURE RESULTS: SIGNIFICANT CHANGE UP
GLUCOSE BLDC GLUCOMTR-MCNC: 193 MG/DL — HIGH (ref 70–99)
GLUCOSE BLDC GLUCOMTR-MCNC: 257 MG/DL — HIGH (ref 70–99)
GLUCOSE BLDC GLUCOMTR-MCNC: 264 MG/DL — HIGH (ref 70–99)
GLUCOSE BLDC GLUCOMTR-MCNC: 284 MG/DL — HIGH (ref 70–99)
GLUCOSE BLDC GLUCOMTR-MCNC: 360 MG/DL — HIGH (ref 70–99)
GLUCOSE BLDC GLUCOMTR-MCNC: 372 MG/DL — HIGH (ref 70–99)
GLUCOSE BLDC GLUCOMTR-MCNC: 419 MG/DL — HIGH (ref 70–99)
GLUCOSE BLDC GLUCOMTR-MCNC: 449 MG/DL — HIGH (ref 70–99)
GLUCOSE BLDC GLUCOMTR-MCNC: 463 MG/DL — CRITICAL HIGH (ref 70–99)
SPECIMEN SOURCE: SIGNIFICANT CHANGE UP
SPECIMEN SOURCE: SIGNIFICANT CHANGE UP

## 2024-11-29 PROCEDURE — 99232 SBSQ HOSP IP/OBS MODERATE 35: CPT

## 2024-11-29 PROCEDURE — 99239 HOSP IP/OBS DSCHRG MGMT >30: CPT

## 2024-11-29 RX ORDER — INSULIN GLARGINE 100 [IU]/ML
24 INJECTION, SOLUTION SUBCUTANEOUS AT BEDTIME
Refills: 0 | Status: DISCONTINUED | OUTPATIENT
Start: 2024-11-29 | End: 2024-12-02

## 2024-11-29 RX ORDER — METOPROLOL TARTRATE 100 MG/1
1 TABLET, FILM COATED ORAL
Qty: 60 | Refills: 0
Start: 2024-11-29 | End: 2024-12-28

## 2024-11-29 RX ORDER — SENNOSIDES 8.6 MG
2 TABLET ORAL
Qty: 60 | Refills: 0
Start: 2024-11-29 | End: 2024-12-28

## 2024-11-29 RX ORDER — SENNA 187 MG
2 TABLET ORAL
Qty: 60 | Refills: 0 | DISCHARGE
Start: 2024-11-29 | End: 2024-12-28

## 2024-11-29 RX ADMIN — Medication 10: at 11:39

## 2024-11-29 RX ADMIN — Medication 6: at 00:24

## 2024-11-29 RX ADMIN — ROSUVASTATIN CALCIUM 40 MILLIGRAM(S): 5 TABLET, FILM COATED ORAL at 22:02

## 2024-11-29 RX ADMIN — CLOPIDOGREL 75 MILLIGRAM(S): 75 TABLET, FILM COATED ORAL at 11:39

## 2024-11-29 RX ADMIN — ACETAMINOPHEN 500MG 650 MILLIGRAM(S): 500 TABLET, COATED ORAL at 00:24

## 2024-11-29 RX ADMIN — Medication 1 MILLIGRAM(S): at 11:40

## 2024-11-29 RX ADMIN — Medication 10 MILLIGRAM(S): at 11:40

## 2024-11-29 RX ADMIN — Medication 2 TABLET(S): at 22:03

## 2024-11-29 RX ADMIN — Medication 6: at 23:06

## 2024-11-29 RX ADMIN — INSULIN GLARGINE 24 UNIT(S): 100 INJECTION, SOLUTION SUBCUTANEOUS at 23:06

## 2024-11-29 RX ADMIN — Medication 12: at 17:10

## 2024-11-29 RX ADMIN — LOSARTAN POTASSIUM 25 MILLIGRAM(S): 100 TABLET, FILM COATED ORAL at 06:13

## 2024-11-29 RX ADMIN — Medication 81 MILLIGRAM(S): at 11:39

## 2024-11-29 RX ADMIN — Medication 2: at 06:13

## 2024-11-29 RX ADMIN — ACETAMINOPHEN 500MG 650 MILLIGRAM(S): 500 TABLET, COATED ORAL at 01:30

## 2024-11-29 NOTE — PROGRESS NOTE ADULT - SUBJECTIVE AND OBJECTIVE BOX
24H hour events: Pt with some fatigue which she states is a chronic issue for her. Having diaphragmatic stim, LV vector reprogrammed with resolution    MEDICATIONS:  aspirin enteric coated 81 milliGRAM(s) Oral daily  clopidogrel Tablet 75 milliGRAM(s) Oral daily  losartan 25 milliGRAM(s) Oral daily    vancomycin  IVPB 1000 milliGRAM(s) IV Intermittent once      acetaminophen     Tablet .. 650 milliGRAM(s) Oral every 6 hours PRN    polyethylene glycol 3350 17 Gram(s) Oral daily  senna 2 Tablet(s) Oral at bedtime    dextrose 50% Injectable 25 Gram(s) IV Push once  dextrose 50% Injectable 12.5 Gram(s) IV Push once  dextrose 50% Injectable 25 Gram(s) IV Push once  dextrose Oral Gel 15 Gram(s) Oral once PRN  ezetimibe 10 milliGRAM(s) Oral daily  glucagon  Injectable 1 milliGRAM(s) IntraMuscular once  insulin glargine Injectable (LANTUS) 18 Unit(s) SubCutaneous at bedtime  insulin lispro (ADMELOG) corrective regimen sliding scale   SubCutaneous every 6 hours  rosuvastatin 40 milliGRAM(s) Oral at bedtime    dextrose 5%. 1000 milliLiter(s) IV Continuous <Continuous>  dextrose 5%. 1000 milliLiter(s) IV Continuous <Continuous>  folic acid 1 milliGRAM(s) Oral daily      REVIEW OF SYSTEMS:  See HPI, otherwise ROS negative.    PHYSICAL EXAM:  T(C): 36.7 (11-29-24 @ 11:17), Max: 36.7 (11-29-24 @ 04:27)  HR: 101 (11-29-24 @ 11:17) (101 - 105)  BP: 109/75 (11-29-24 @ 11:17) (109/75 - 145/87)  RR: 18 (11-29-24 @ 11:17) (17 - 18)  SpO2: 99% (11-29-24 @ 11:17) (98% - 100%)  Wt(kg): --  I&O's Summary    28 Nov 2024 07:01  -  29 Nov 2024 07:00  --------------------------------------------------------  IN: 360 mL / OUT: 0 mL / NET: 360 mL    29 Nov 2024 07:01  -  29 Nov 2024 13:12  --------------------------------------------------------  IN: 120 mL / OUT: 0 mL / NET: 120 mL        Appearance: Alert. NAD	  HEENT:   NC/AT	  Cardiovascular: +S1S2 RRR  Respiratory: CTA B/L	  Psychiatry: A & O x 3, Mood & affect appropriate  Gastrointestinal:  Soft	  Skin: LCW PPM site with mild edema, PPM can edges palpable, no significant ecchymosis nor hematoma  Neurologic: Non-focal  Extremities: No edema BLE      TELEMETRY: NSR with BiV pacing rates 100-120s  	    < from: Xray Chest 2 Views PA/Lat (11.28.24 @ 11:21) >  FINDINGS:  Left chest pacemaker with intact leads - again seen.  The heart is normal in size.  The lungs are clear. No pneumothorax or pleural effusion.    IMPRESSION:  Clear lungs.    < end of copied text >    < from: TTE Limited W or WO Ultrasound Enhancing Agent (11.23.24 @ 07:56) >  CONCLUSIONS:      1. Left ventricular systolic function is mildly decreased with an ejection fraction visually estimated at 45 to 50 %. Regional wall motion abnormalities present.   2. Multiple segmental abnormalities exist. See findings.   3. Normal right ventricular cavity size, with normal wall thickness, and normal right ventricular systolic function.  4. No pericardial effusion seen.    ________________________________________________________________________________________  FINDINGS:     Left Ventricle:  Left ventricular wall thickness is normal. Left ventricular systolic function is mildly decreased with an ejection fraction visually estimated at 45 to 50%. There are regional wall motion abnormalities present.  LV Wall Scoring: The basal and mid inferior septum and basal anteroseptal  segment are akinetic. The basal and mid inferior wall, basal inferolateral  segment, and basal anterolateral segment are hypokinetic. All remaining scored  segments are normal.          Right Ventricle:  The right ventricle is not well visualized. The right ventricular cavity is normal in size, with normal wall thickness and right ventricular systolic function is normal. Tricuspid annular plane systolic excursion (TAPSE) is 1.6 cm (normal >=1.7 cm). Tricuspid annular tissue Doppler S' is 16.3 cm/s (normal >10 cm/s). A device lead is visualized.     Right Atrium:  The right atrium is normal in size with an indexed volume of 11.18 ml/m².     Aortic Valve:  There is no evidence of aortic regurgitation.     Mitral Valve:  There is calcification of the mitral valve annulus.     Pericardium:  No pericardial effusion seen.  ____________________________________________________________________  QUANTITATIVE DATA:  Left Ventricle Measurements: (Indexed to BSA)     Visualized LV EF%: 45 to 50%     e' lateral: 12.50 cm/s  e' medial:  11.30 cm/s             Right Ventricle Measurements: Right Atrial Measurements:     TAPSE:           1.6 cm       RA Vol:       17.00 ml  RV S' Vmax:      16.30 cm/s   RA Vol Index: 11.18 ml/m²  RV Base (RVID1): 2.3 cm  RV Mid (RVID2):  1.5 cm       LVOT / RVOT/ Qp/Qs Data:(Indexed to BSA)  LVOT Diameter:  1.80 cm  LVOT Area:      2.54 cm²  LVOT Vmax:      1.12 m/s  LVOT Vmn:       0.775 m/s  LVOT VTI:       15.90 cm  LVOT peak grad: 5 mmHg  LVOT mean grad: 2.5 mmHg  LVOT SV:        40.5 ml   26.61 ml/m²       Tricuspid Valve Measurements:     TV S' 16.3 cm/s    < end of copied text >    	  ASSESSMENT/PLAN:

## 2024-11-29 NOTE — DISCHARGE NOTE NURSING/CASE MANAGEMENT/SOCIAL WORK - PATIENT PORTAL LINK FT
You can access the FollowMyHealth Patient Portal offered by [facility] by registering at the following website: [email or web address]. By joining [facility]’s FollowMyHealth portal, you will also be able to view your health information using other applications (apps) compatible with our system.

## 2024-11-29 NOTE — PROGRESS NOTE ADULT - ASSESSMENT
74 year-old [place] female with history of CAD (s/p PCI 1998, 2006, 8/2024), HTN, HLD, T2DM, and GERD presents with intermittent chest pain at rest and on exertion for the past 3 days. Underwent POBA for pLAD ISR. EP consulted for transient symptomatic CHB. Remains with LBBB.  F/u EF= 45-50% ( basal and mid inferior septum and basal anteroseptal segment are akinetic. The basal and mid inferior wall, basal inferolateral segment, and basal anterolateral segment are hypokinetic). Presentation most c/w embolization to septal perforators with persistent LBBB. s/p Code/RRT on 11/22/24 for symptomatic rodriguez/syncope with heart rate down to 33 bpm, intubated, TVP inserted and paced during RRT. Patient extubated 11/24.  EPS consulted for CHB, LBBB.    1. Transient CHB, LBBB  2. s/p POBA to pLAD 11/20/24    Pt currently in NSR with BiV pacing    Pt underwent Medtronic Biventricular PPM implant 11/27/2024.  She was experiencing some diaphragmatic stim today, vector reprogrammed from LV4-LV3 to LV3-LV2 with resolution.    - Post op pacemaker instructions reviewed with patient and family, including remote transmission  - ID and booklet given to patient  - F/U with EP Clinic on 12/9/24 at 1:40 pm  - Continue telemetry monitoring  - Can start Eliquis after another 2 days (small hematoma at PPM site - stable), stop ASA with Eliquis initiation  - D/w EP Attending and primary team 74 year-old [place]inamese female with history of CAD (s/p PCI 1998, 2006, 8/2024), HTN, HLD, T2DM, and GERD presents with intermittent chest pain at rest and on exertion for the past 3 days. Underwent POBA for pLAD ISR. EP consulted for transient symptomatic CHB. Remains with LBBB.  F/u EF= 45-50% ( basal and mid inferior septum and basal anteroseptal segment are akinetic. The basal and mid inferior wall, basal inferolateral segment, and basal anterolateral segment are hypokinetic). Presentation most c/w embolization to septal perforators with persistent LBBB. s/p Code/RRT on 11/22/24 for symptomatic rodriguez/syncope with heart rate down to 33 bpm, intubated, TVP inserted and paced during RRT. Patient extubated 11/24.  EPS consulted for CHB, LBBB. Pt also with PAF with RVR this admission.    1. Transient CHB, LBBB  2. s/p POBA to pLAD 11/20/24    Pt currently in NSR with BiV pacing    Pt underwent Medtronic Biventricular PPM implant 11/27/2024.  She was experiencing some diaphragmatic stim today, vector reprogrammed from LV4-LV3 to LV3-LV2 with resolution.    - Post op pacemaker instructions reviewed with patient and family, including remote transmission  - ID and booklet given to patient  - F/U with EP Clinic on 12/9/24 at 1:40 pm  - Continue telemetry monitoring  - Can start Eliquis after another 2 days (small hematoma at PPM site - stable), stop ASA with Eliquis initiation (eliquis being started for PAF this admission)  - D/w EP Attending and primary team

## 2024-11-29 NOTE — CHART NOTE - NSCHARTNOTEFT_GEN_A_CORE
Polyfly WC: Patient will require a polyfly wheelchair due to immobility . The beneficiary has a mobility limitation that significantly impairs his ability to participate in one or more MRADLs such as toileting, feeding, dressing, grooming, and bathing in customary locations in the home. The patient’s mobility limitation cannot be sufficiently resolved by the use of an appropriately fitted cane or walker. The patient is unable to ambulated with a walker. Use of a manual wheelchair will significantly improve the beneficiary’s ability to participate in MRADLs and the beneficiary will use it on a regular basis in the home. The beneficiary is able and willing to use the wheelchair in the home. The beneficiary cannot self-propel in a standard wheelchair. The patient can self-propel in a polyfly wheelchair. The beneficiary has sufficient upper extremity function and other physical and mental capabilities needed to safely self propel the manual lightweight wheelchair that is provided in the home during a typical day. Patient will need elevating leg rest because  patient has a musculoskeletal condition which prevents 90 degree flexion at the knee.

## 2024-11-29 NOTE — PROGRESS NOTE ADULT - SUBJECTIVE AND OBJECTIVE BOX
INTERVAL EVENTS/SUBJ:  No events     Home Medications:  Aspirin Enteric Coated 81 mg oral delayed release tablet: 1 tab(s) orally once a day (20 Nov 2024 06:04)  folic acid: 1 milligram(s) orally once a day (20 Nov 2024 06:04)  isosorbide mononitrate 60 mg oral tablet, extended release: 2 tab(s) orally once a day (20 Nov 2024 06:04)  Jardiance 25 mg oral tablet: 1 tab(s) orally once a day (20 Nov 2024 06:04)  losartan 25 mg oral tablet: 1 tab(s) orally once a day hold for sbp&lt;90 (20 Nov 2024 06:04)  metoprolol tartrate 100 mg oral tablet: 1 tab(s) orally 2 times a day hold for sbp&lt;90, HR &lt;55 (20 Nov 2024 06:04)  NovoLOG FlexPen 100 units/mL injectable solution: 20 international unit(s) injectable 3 times a day (20 Nov 2024 06:04)  Ranexa 500 mg oral tablet, extended release: 1 tab(s) orally 2 times a day (20 Nov 2024 06:04)  rosuvastatin 40 mg oral tablet: 1 tab(s) orally once a day (at bedtime) (20 Nov 2024 06:04)  Tresiba 100 units/mL subcutaneous solution: 10 unit(s) subcutaneous once a day (at bedtime) (20 Nov 2024 06:04)  Zetia 10 mg oral tablet: 1 tab(s) orally once a day (20 Nov 2024 06:04)      MEDICATIONS  (STANDING):  aspirin enteric coated 81 milliGRAM(s) Oral daily  clopidogrel Tablet 75 milliGRAM(s) Oral daily  dextrose 5%. 1000 milliLiter(s) (50 mL/Hr) IV Continuous <Continuous>  dextrose 5%. 1000 milliLiter(s) (100 mL/Hr) IV Continuous <Continuous>  dextrose 50% Injectable 25 Gram(s) IV Push once  dextrose 50% Injectable 12.5 Gram(s) IV Push once  dextrose 50% Injectable 25 Gram(s) IV Push once  ezetimibe 10 milliGRAM(s) Oral daily  folic acid 1 milliGRAM(s) Oral daily  glucagon  Injectable 1 milliGRAM(s) IntraMuscular once  insulin glargine Injectable (LANTUS) 18 Unit(s) SubCutaneous at bedtime  insulin lispro (ADMELOG) corrective regimen sliding scale   SubCutaneous every 6 hours  losartan 25 milliGRAM(s) Oral daily  polyethylene glycol 3350 17 Gram(s) Oral daily  rosuvastatin 40 milliGRAM(s) Oral at bedtime  senna 2 Tablet(s) Oral at bedtime  vancomycin  IVPB 1000 milliGRAM(s) IV Intermittent once    MEDICATIONS  (PRN):  acetaminophen     Tablet .. 650 milliGRAM(s) Oral every 6 hours PRN Mild Pain (1 - 3)  dextrose Oral Gel 15 Gram(s) Oral once PRN Blood Glucose LESS THAN 70 milliGRAM(s)/deciliter      Vital Signs Last 24 Hrs  T(C): 36.7 (29 Nov 2024 04:27), Max: 37 (28 Nov 2024 11:57)  T(F): 98 (29 Nov 2024 04:27), Max: 98.6 (28 Nov 2024 11:57)  HR: 102 (29 Nov 2024 04:27) (102 - 107)  BP: 142/91 (29 Nov 2024 04:27) (97/60 - 145/87)  BP(mean): --  RR: 18 (29 Nov 2024 04:27) (17 - 18)  SpO2: 98% (29 Nov 2024 04:27) (98% - 100%)    Parameters below as of 29 Nov 2024 04:27  Patient On (Oxygen Delivery Method): room air        REVIEW OF SYSTEMS:  As per HPI, otherwise unremarkable.     PHYSICAL EXAM:  Constitutional/Appearance: Normal, Well-developed  HEENT:   Normal oral mucosa, no drainage or redness, supple neck  Lymphatic: No lymphadenopathy  Cardiovascular: Normal S1 S2, No edema, II/VI CHEIKH  Respiratory: Lungs clear to auscultation, respirations non-labored  Psychiatry: A & O x 3, appropriate affect.   Gastrointestinal:  Soft, Non-tender, no distention  Skin: No rashes, No ecchymoses, No cyanosis	  Neurologic: Non-focal, Alert and oriented x 3  Extremities: Normal range of motion  Vascular: Peripheral pulses palpable 2+ bilaterally (radial)    LABS:      IMPRESSION AND PLAN: 74F [place]egalese w CAD, HTN, HL, DM2, GERD here w CP and BECERRA. Now s/p POBA for pLAD ISR and symptomatic bradycardia with syncope now s/p PPM.  -tele  -cont dapt, after 1 week stop asa and continue plavix and doac  -losartan   -restart metoprolol per EP  -statin      35 minutes were spent on this encounter for extensive review of medical record details including labs and/or imaging studies and/or adjacent care team and consultant records, as well as review and reconciliation of current medications. Time was spent on obtaining a history, performing physical examination of patient, and answering patient and/or family questions regarding plan of care. Time was also spent discussing plan of care with patient’s other care team members including primary and consulting teams. Time also was spent on documentation of this encounter into the EHR.    ***    Tho Madera MD, MPhil, [facility]  Cardiologist, [facility]  ; [employer] at [facility]  email: [email or web address] on-service contact/call information, go to [email or web address] and use [email or web address]" to login.  Outpatient Cardiology appointments, call  [phone] to arrange with a colleague; I do not have outpatient Cardiology clinic.

## 2024-11-29 NOTE — PHARMACOTHERAPY INTERVENTION NOTE - COMMENTS
Counseled patient and patient's  at bedside on the following discharge medications names (brand/generic), indication, and possible side effects:    Medications Discussed:  Aspirin Enteric Coated 81 mg oral delayed release tablet: 1 tab(s) orally once a day DC ON 12/1/24 AND START ELIQUIS ON 12/2/24  clopidogrel 75 mg oral tablet: 1 tab(s) orally once a day  Eliquis 5 mg oral tablet: 1 tab(s) orally 2 times a day START ON 12/2/24 - STOP aspirin on 12/1/24  folic acid: 1 milligram(s) orally once a day  Jardiance 25 mg oral tablet: 1 tab(s) orally once a day  losartan 25 mg oral tablet: 1 tab(s) orally once a day hold for sbp <90  metoprolol tartrate 100 mg oral tablet: 1 tab(s) orally 2 times a day  rosuvastatin 40 mg oral tablet: 1 tab(s) orally once a day (at bedtime)  senna leaf extract oral tablet: 2 tab(s) orally once a day (at bedtime)  Tresiba 100 units/mL subcutaneous solution: 22 unit(s) subcutaneous once a day (at bedtime)  Zetia 10 mg oral tablet: 1 tab(s) orally once a day    Counseled patient on Eliquis (brand/generic), indication, directions for use (1 tablet (5mg) twice daily) and possible side effects (bleeding). Patient educated to monitor for any signs of bleeding while on Eliquis- such as blood in the urine or stool, excessively bleeding gums, nose, cuts, and unprovoked/growing bruises. Counseled patient on taking acetaminophen over the counter if needed and to avoid NSAID medications like Advil or Aleve since they can increase bleeding risk. Patient was provided with a medication card for their new medication. Patient questions and concerns were answered and addressed.     Educated patient to continue aspirin 81mg daily + clopidogrel 75mg daily + Eliquis 5mg 2 times daily until 12/1/2024. Then, starting on 12/2/24, STOP aspirin 81mg daily, and only continue clopidogrel 75mg daily + Eliquis 5mg 2 times daily. Patient demonstrated understanding.    Confirmed with [facility] that Eliquis is fully covered by patient's insurance.    Esa Fleming, PharmD, BCPS  Clinical Pharmacy Specialist  Available on [employer]  Cell: [phone]

## 2024-11-29 NOTE — PROGRESS NOTE ADULT - NS ATTEND AMEND GEN_ALL_CORE FT
Agree with assessment and plan. s/p BiV MDT PPM. CXR with appropriate leads position  - follow up in the device clinic in 1-2 weeks  - keep area dry for 5 days
seen and agree

## 2024-11-29 NOTE — DISCHARGE NOTE NURSING/CASE MANAGEMENT/SOCIAL WORK - FINANCIAL ASSISTANCE
[facility] provides services at a reduced cost to those who are determined to be eligible through [facility]’s financial assistance program. Information regarding [facility]’s financial assistance program can be found by going to [email or web address] or by calling [phone].

## 2024-11-29 NOTE — CHART NOTE - NSCHARTNOTEFT_GEN_A_CORE
Seen with  at bedside, pt reports feeling well, has some pain still at site of PPM, otherwise pain controlled w/ tylenol. On exam, VSS, PE well appearing elderly F, NAD, L PPM site dressed with gauze, mild TTP, heart tachycardic, lungs cta, abd sntnd, no LE edema.  Appreciate EP and cardiology follow up  Seen by PT, patient/family declining RIAN and will go home w/ DME  Medically stable for DC home today - continue to hold NOAC given small hematoma at site of PPM and to start in 1 week with plavix (and dc asa at that time to avoid triple therapy).  DC time 43 min. All ques answered.  D/w HARITHA Tobias Seen with  at bedside, pt reports feeling well, has some pain still at site of PPM, otherwise pain controlled w/ tylenol. On exam, VSS, PE well appearing elderly F, NAD, L PPM site dressed with gauze, mild TTP, heart tachycardic, lungs cta, abd sntnd, no LE edema.  Appreciate EP and cardiology follow up  Noted hyperglycemia, will incr lantus to 22 u bedtime on discharge - pt is also off jardiance which she can resume on discharge as well.  Seen by PT, patient/family declining RIAN and will go home w/ DME  Medically stable for DC home today - continue to hold NOAC given small hematoma at site of PPM - plan to resume on 12/2 and to discontinue asa at that time to avoid triple therapy  Will clarify with EP re: starting BB.  DC time 43 min. All ques answered.  D/w HARITHA Tobias

## 2024-11-30 DIAGNOSIS — E11.59 TYPE 2 DIABETES MELLITUS WITH OTHER CIRCULATORY COMPLICATIONS: ICD-10-CM

## 2024-11-30 DIAGNOSIS — E11.65 TYPE 2 DIABETES MELLITUS WITH HYPERGLYCEMIA: ICD-10-CM

## 2024-11-30 LAB
ANION GAP SERPL CALC-SCNC: 14 MMOL/L — SIGNIFICANT CHANGE UP (ref 5–17)
BUN SERPL-MCNC: 15 MG/DL — SIGNIFICANT CHANGE UP (ref 7–23)
CALCIUM SERPL-MCNC: 9.8 MG/DL — SIGNIFICANT CHANGE UP (ref 8.4–10.5)
CHLORIDE SERPL-SCNC: 101 MMOL/L — SIGNIFICANT CHANGE UP (ref 96–108)
CO2 SERPL-SCNC: 17 MMOL/L — LOW (ref 22–31)
CREAT SERPL-MCNC: 0.81 MG/DL — SIGNIFICANT CHANGE UP (ref 0.5–1.3)
EGFR: 76 ML/MIN/1.73M2 — SIGNIFICANT CHANGE UP
GLUCOSE BLDC GLUCOMTR-MCNC: 155 MG/DL — HIGH (ref 70–99)
GLUCOSE BLDC GLUCOMTR-MCNC: 197 MG/DL — HIGH (ref 70–99)
GLUCOSE BLDC GLUCOMTR-MCNC: 237 MG/DL — HIGH (ref 70–99)
GLUCOSE BLDC GLUCOMTR-MCNC: 307 MG/DL — HIGH (ref 70–99)
GLUCOSE SERPL-MCNC: 298 MG/DL — HIGH (ref 70–99)
POTASSIUM SERPL-MCNC: 5.2 MMOL/L — SIGNIFICANT CHANGE UP (ref 3.5–5.3)
POTASSIUM SERPL-SCNC: 5.2 MMOL/L — SIGNIFICANT CHANGE UP (ref 3.5–5.3)
SODIUM SERPL-SCNC: 132 MMOL/L — LOW (ref 135–145)

## 2024-11-30 PROCEDURE — 99223 1ST HOSP IP/OBS HIGH 75: CPT

## 2024-11-30 PROCEDURE — 99232 SBSQ HOSP IP/OBS MODERATE 35: CPT | Mod: GC

## 2024-11-30 RX ORDER — INSULIN ASPART 100 [IU]/ML
8 INJECTION, SOLUTION INTRAVENOUS; SUBCUTANEOUS
Qty: 30 | Refills: 0
Start: 2024-11-30 | End: 2024-12-29

## 2024-11-30 RX ADMIN — INSULIN GLARGINE 24 UNIT(S): 100 INJECTION, SOLUTION SUBCUTANEOUS at 21:44

## 2024-11-30 RX ADMIN — Medication 1 MILLIGRAM(S): at 12:06

## 2024-11-30 RX ADMIN — LOSARTAN POTASSIUM 25 MILLIGRAM(S): 100 TABLET, FILM COATED ORAL at 06:13

## 2024-11-30 RX ADMIN — ROSUVASTATIN CALCIUM 40 MILLIGRAM(S): 5 TABLET, FILM COATED ORAL at 21:44

## 2024-11-30 RX ADMIN — Medication 8: at 12:04

## 2024-11-30 RX ADMIN — CLOPIDOGREL 75 MILLIGRAM(S): 75 TABLET, FILM COATED ORAL at 12:10

## 2024-11-30 RX ADMIN — Medication 8 UNIT(S): at 17:03

## 2024-11-30 RX ADMIN — Medication 10 MILLIGRAM(S): at 17:03

## 2024-11-30 RX ADMIN — Medication 2: at 17:04

## 2024-11-30 RX ADMIN — Medication 8 UNIT(S): at 12:03

## 2024-11-30 RX ADMIN — Medication 81 MILLIGRAM(S): at 12:07

## 2024-11-30 RX ADMIN — Medication 2: at 06:13

## 2024-11-30 NOTE — PROGRESS NOTE ADULT - PROBLEM SELECTOR PLAN 4
h/o euglycemic DKA so holding Jardiance inpt  Hyperglycemia with FSG above 400 yesterday   endo consulted - note appreciated   Lantus 24 units SC QHS   Admelog 8 units SC Premeal  check BMP

## 2024-11-30 NOTE — PROGRESS NOTE ADULT - SUBJECTIVE AND OBJECTIVE BOX
Patient is a 74y old  Female who presents with a chief complaint of chest pain (30 Nov 2024 10:01)      SUBJECTIVE / OVERNIGHT EVENTS: patient was to be discharged yesterday but held due to hyperglycemia. Feels tired no + mild tolerable pain at the [facility] site no events      MEDICATIONS  (STANDING):  aspirin enteric coated 81 milliGRAM(s) Oral daily  clopidogrel Tablet 75 milliGRAM(s) Oral daily  dextrose 5%. 1000 milliLiter(s) (100 mL/Hr) IV Continuous <Continuous>  dextrose 5%. 1000 milliLiter(s) (50 mL/Hr) IV Continuous <Continuous>  dextrose 50% Injectable 25 Gram(s) IV Push once  dextrose 50% Injectable 12.5 Gram(s) IV Push once  dextrose 50% Injectable 25 Gram(s) IV Push once  ezetimibe 10 milliGRAM(s) Oral daily  folic acid 1 milliGRAM(s) Oral daily  glucagon  Injectable 1 milliGRAM(s) IntraMuscular once  insulin glargine Injectable (LANTUS) 24 Unit(s) SubCutaneous at bedtime  insulin lispro (ADMELOG) corrective regimen sliding scale   SubCutaneous three times a day before meals  insulin lispro Injectable (ADMELOG) 8 Unit(s) SubCutaneous three times a day before meals  losartan 25 milliGRAM(s) Oral daily  polyethylene glycol 3350 17 Gram(s) Oral daily  rosuvastatin 40 milliGRAM(s) Oral at bedtime  senna 2 Tablet(s) Oral at bedtime  vancomycin  IVPB 1000 milliGRAM(s) IV Intermittent once    MEDICATIONS  (PRN):  acetaminophen     Tablet .. 650 milliGRAM(s) Oral every 6 hours PRN Mild Pain (1 - 3)  dextrose Oral Gel 15 Gram(s) Oral once PRN Blood Glucose LESS THAN 70 milliGRAM(s)/deciliter      Vital Signs Last 24 Hrs  T(C): 36.6 (30 Nov 2024 11:13), Max: 36.9 (29 Nov 2024 20:44)  T(F): 97.8 (30 Nov 2024 11:13), Max: 98.5 (30 Nov 2024 04:23)  HR: 91 (30 Nov 2024 11:13) (91 - 94)  BP: 112/75 (30 Nov 2024 11:13) (112/75 - 140/83)  BP(mean): --  RR: 18 (30 Nov 2024 11:13) (18 - 18)  SpO2: 98% (30 Nov 2024 11:13) (97% - 98%)    Parameters below as of 30 Nov 2024 11:13  Patient On (Oxygen Delivery Method): room air      CAPILLARY BLOOD GLUCOSE      POCT Blood Glucose.: 155 mg/dL (30 Nov 2024 06:09)  POCT Blood Glucose.: 284 mg/dL (29 Nov 2024 23:05)  POCT Blood Glucose.: 257 mg/dL (29 Nov 2024 21:20)  POCT Blood Glucose.: 360 mg/dL (29 Nov 2024 18:34)  POCT Blood Glucose.: 463 mg/dL (29 Nov 2024 17:09)  POCT Blood Glucose.: 419 mg/dL (29 Nov 2024 17:06)  POCT Blood Glucose.: 449 mg/dL (29 Nov 2024 16:51)  POCT Blood Glucose.: 372 mg/dL (29 Nov 2024 11:30)    I&O's Summary    29 Nov 2024 07:01  -  30 Nov 2024 07:00  --------------------------------------------------------  IN: 800 mL / OUT: 0 mL / NET: 800 mL        PHYSICAL EXAM:  GA- NAD  slightly dry oral mucosa   Cardiovascular: +S1S2 RRR  Respiratory: CTA B/L no crackles 	  A & O x 3, Mood & affect appropriate  Gastrointestinal:  Soft BS+ NT.ND  Skin: LCW PPM site with mild edema,  no ecchymosis no hematoma  Neurologic: Non-focal  Extremities: No LE edema    LABS:                    RADIOLOGY & ADDITIONAL TESTS:    Imaging Personally Reviewed:    Consultant(s) Notes Reviewed:      Care Discussed with Consultants/Other Providers:   Patient is a 74y old  Female who presents with a chief complaint of chest pain (30 Nov 2024 10:01)      SUBJECTIVE / OVERNIGHT EVENTS: patient was to be discharged yesterday but held due to hyperglycemia. Feels tired no + mild tolerable pain at the [facility] site no events      MEDICATIONS  (STANDING):  aspirin enteric coated 81 milliGRAM(s) Oral daily  clopidogrel Tablet 75 milliGRAM(s) Oral daily  dextrose 5%. 1000 milliLiter(s) (100 mL/Hr) IV Continuous <Continuous>  dextrose 5%. 1000 milliLiter(s) (50 mL/Hr) IV Continuous <Continuous>  dextrose 50% Injectable 25 Gram(s) IV Push once  dextrose 50% Injectable 12.5 Gram(s) IV Push once  dextrose 50% Injectable 25 Gram(s) IV Push once  ezetimibe 10 milliGRAM(s) Oral daily  folic acid 1 milliGRAM(s) Oral daily  glucagon  Injectable 1 milliGRAM(s) IntraMuscular once  insulin glargine Injectable (LANTUS) 24 Unit(s) SubCutaneous at bedtime  insulin lispro (ADMELOG) corrective regimen sliding scale   SubCutaneous three times a day before meals  insulin lispro Injectable (ADMELOG) 8 Unit(s) SubCutaneous three times a day before meals  losartan 25 milliGRAM(s) Oral daily  polyethylene glycol 3350 17 Gram(s) Oral daily  rosuvastatin 40 milliGRAM(s) Oral at bedtime  senna 2 Tablet(s) Oral at bedtime  vancomycin  IVPB 1000 milliGRAM(s) IV Intermittent once    MEDICATIONS  (PRN):  acetaminophen     Tablet .. 650 milliGRAM(s) Oral every 6 hours PRN Mild Pain (1 - 3)  dextrose Oral Gel 15 Gram(s) Oral once PRN Blood Glucose LESS THAN 70 milliGRAM(s)/deciliter      Vital Signs Last 24 Hrs  T(C): 36.6 (30 Nov 2024 11:13), Max: 36.9 (29 Nov 2024 20:44)  T(F): 97.8 (30 Nov 2024 11:13), Max: 98.5 (30 Nov 2024 04:23)  HR: 91 (30 Nov 2024 11:13) (91 - 94)  BP: 112/75 (30 Nov 2024 11:13) (112/75 - 140/83)  BP(mean): --  RR: 18 (30 Nov 2024 11:13) (18 - 18)  SpO2: 98% (30 Nov 2024 11:13) (97% - 98%)    Parameters below as of 30 Nov 2024 11:13  Patient On (Oxygen Delivery Method): room air      CAPILLARY BLOOD GLUCOSE      POCT Blood Glucose.: 155 mg/dL (30 Nov 2024 06:09)  POCT Blood Glucose.: 284 mg/dL (29 Nov 2024 23:05)  POCT Blood Glucose.: 257 mg/dL (29 Nov 2024 21:20)  POCT Blood Glucose.: 360 mg/dL (29 Nov 2024 18:34)  POCT Blood Glucose.: 463 mg/dL (29 Nov 2024 17:09)  POCT Blood Glucose.: 419 mg/dL (29 Nov 2024 17:06)  POCT Blood Glucose.: 449 mg/dL (29 Nov 2024 16:51)  POCT Blood Glucose.: 372 mg/dL (29 Nov 2024 11:30)    I&O's Summary    29 Nov 2024 07:01  -  30 Nov 2024 07:00  --------------------------------------------------------  IN: 800 mL / OUT: 0 mL / NET: 800 mL        PHYSICAL EXAM:  GA- NAD  slightly dry oral mucosa   Cardiovascular: +S1S2 RRR  Respiratory: CTA B/L no crackles 	  A & O x 3, Mood & affect appropriate  Gastrointestinal:  Soft BS+ NT.ND  Skin: LCW PPM site with mild edema,  no ecchymosis no hematoma  Neurologic: Non-focal  Extremities: No LE edema    LABS:      11-30    132[L]  |  101  |  15  ----------------------------<  298[H]  5.2   |  17[L]  |  0.81    Ca    9.8      30 Nov 2024 12:24                  RADIOLOGY & ADDITIONAL TESTS:    Imaging Personally Reviewed:    Consultant(s) Notes Reviewed:      Care Discussed with Consultants/Other Providers:

## 2024-11-30 NOTE — CONSULT NOTE ADULT - SUBJECTIVE AND OBJECTIVE BOX
ENDOCRINE INITIAL CONSULT -     HPI:  74 year-old [place] female with history of CAD (s/p PCI 1998, 2006, 8/2024), HTN, HLD, T2DM, and GERD presents with intermittent chest pain at rest and on exertion for the past 3 days. Symptoms started on 11/17, pressure-like, 8/10 in intensity with radiation up the neck and jaw associated with mild nausea without vomiting, similar to her prior angina symptoms.  She took her 's NTG SL and symptoms improved within 5 minutes.  She came to ED for further evaluation as these episodes of intermittent chest pain persisted for the past couple days.  Currently, chest pain free s/p NTG SL twice since arrived (20 Nov 2024 04:16)      ENDOCRINE HISTORY   DM2 dx   A1c 7.2%   hyperglycemic to 400s yesterday evening; had been receiving moderate ISS & Lantus 18 units qhs   current regimen - Jardiance 25mg daily, Tresiba 22 units qhs    follows with   smbg   complications - CAD w/ multiple PCI  diet/ex       PAST MEDICAL & SURGICAL HISTORY:  HTN (hypertension)      HLD (hyperlipidemia)      DM (diabetes mellitus)      CAD (coronary artery disease)      Stented coronary artery      COVID-19      No significant past surgical history          FAMILY HISTORY:  Family history of heart attack (Father, Mother)    Family history of CVA (Mother)    FH: diabetes mellitus (Sibling)        Social History:  , lives with  and son's family lives upstairs  ambulates without assist device  no smoking, rare social wine  retired H[employer] (20 Nov 2024 04:16)      Home Medications:  Aspirin Enteric Coated 81 mg oral delayed release tablet: 1 tab(s) orally once a day DC ON 12/1/24 AND START eLIQUIS ON 12/2/24 (29 Nov 2024 11:58)  folic acid: 1 milligram(s) orally once a day (20 Nov 2024 06:04)  Jardiance 25 mg oral tablet: 1 tab(s) orally once a day (20 Nov 2024 06:04)  losartan 25 mg oral tablet: 1 tab(s) orally once a day hold for sbp&lt;90 (20 Nov 2024 06:04)  rosuvastatin 40 mg oral tablet: 1 tab(s) orally once a day (at bedtime) (20 Nov 2024 06:04)  Tresiba 100 units/mL subcutaneous solution: 22 unit(s) subcutaneous once a day (at bedtime) (29 Nov 2024 13:38)  Zetia 10 mg oral tablet: 1 tab(s) orally once a day (20 Nov 2024 06:04)      MEDICATIONS  (STANDING):  aspirin enteric coated 81 milliGRAM(s) Oral daily  clopidogrel Tablet 75 milliGRAM(s) Oral daily  dextrose 5%. 1000 milliLiter(s) (100 mL/Hr) IV Continuous <Continuous>  dextrose 5%. 1000 milliLiter(s) (50 mL/Hr) IV Continuous <Continuous>  dextrose 50% Injectable 25 Gram(s) IV Push once  dextrose 50% Injectable 12.5 Gram(s) IV Push once  dextrose 50% Injectable 25 Gram(s) IV Push once  ezetimibe 10 milliGRAM(s) Oral daily  folic acid 1 milliGRAM(s) Oral daily  glucagon  Injectable 1 milliGRAM(s) IntraMuscular once  insulin glargine Injectable (LANTUS) 24 Unit(s) SubCutaneous at bedtime  insulin lispro (ADMELOG) corrective regimen sliding scale   SubCutaneous every 6 hours  losartan 25 milliGRAM(s) Oral daily  polyethylene glycol 3350 17 Gram(s) Oral daily  rosuvastatin 40 milliGRAM(s) Oral at bedtime  senna 2 Tablet(s) Oral at bedtime  vancomycin  IVPB 1000 milliGRAM(s) IV Intermittent once    MEDICATIONS  (PRN):  acetaminophen     Tablet .. 650 milliGRAM(s) Oral every 6 hours PRN Mild Pain (1 - 3)  dextrose Oral Gel 15 Gram(s) Oral once PRN Blood Glucose LESS THAN 70 milliGRAM(s)/deciliter      Allergies    No Known Allergies    Intolerances        REVIEW OF SYSTEMS  Constitutional: No fever  Eyes: No blurry vision  Neuro: No tremors  HEENT: No pain  Cardiovascular: No chest pain, palpitations  Respiratory: No SOB, no cough  GI: No nausea, vomiting, abdominal pain  : No dysuria  Skin: no rash  Psych: no depression  Endocrine: no polyuria, polydipsia  Hem/lymph: no swelling  Osteoporosis: no fractures  ALL OTHER SYSTEMS REVIEWED AND NEGATIVE     PHYSICAL EXAM   Vital Signs Last 24 Hrs  T(C): 36.9 (30 Nov 2024 04:23), Max: 36.9 (29 Nov 2024 20:44)  T(F): 98.5 (30 Nov 2024 04:23), Max: 98.5 (30 Nov 2024 04:23)  HR: 94 (30 Nov 2024 04:23) (94 - 101)  BP: 126/78 (30 Nov 2024 04:23) (109/75 - 140/83)  BP(mean): --  RR: 18 (30 Nov 2024 04:23) (18 - 18)  SpO2: 97% (30 Nov 2024 04:23) (97% - 99%)    Parameters below as of 30 Nov 2024 04:23  Patient On (Oxygen Delivery Method): room air      GENERAL: NAD, well-groomed, well-developed  EYES: No proptosis, no lid lag, anicteric  HEENT:  Atraumatic, Normocephalic, moist mucous membranes  THYROID: Normal size, no palpable nodules  RESPIRATORY: Nonlabored respirations on room air, normal rate, normal effort   CARDIOVASCULAR: Regular rate and rhythm, no peripheral edema  GI: Soft, nontender, non distended   SKIN: Dry, intact, No rashes or lesions  MUSCULOSKELETAL: Full range of motion, normal strength  NEURO: sensation intact, extraocular movements intact, no tremor  PSYCH: Alert and oriented x 3, reactive affect, reactive mood   CUSHING'S SIGNS: no striae      CAPILLARY BLOOD GLUCOSE      POCT Blood Glucose.: 155 mg/dL (30 Nov 2024 06:09)  POCT Blood Glucose.: 284 mg/dL (29 Nov 2024 23:05)  POCT Blood Glucose.: 257 mg/dL (29 Nov 2024 21:20)  POCT Blood Glucose.: 360 mg/dL (29 Nov 2024 18:34)  POCT Blood Glucose.: 463 mg/dL (29 Nov 2024 17:09)  POCT Blood Glucose.: 419 mg/dL (29 Nov 2024 17:06)  POCT Blood Glucose.: 449 mg/dL (29 Nov 2024 16:51)  POCT Blood Glucose.: 372 mg/dL (29 Nov 2024 11:30)      eMAR:  ezetimibe   10 milliGRAM(s) Oral (11-29-24 @ 11:40)    insulin glargine Injectable (LANTUS)   24 Unit(s) SubCutaneous (11-29-24 @ 23:06)    insulin lispro (ADMELOG) corrective regimen sliding scale   2 Unit(s) SubCutaneous (11-30-24 @ 06:13)   6 Unit(s) SubCutaneous (11-29-24 @ 23:06)   12 Unit(s) SubCutaneous (11-29-24 @ 17:10)   10 Unit(s) SubCutaneous (11-29-24 @ 11:39)    rosuvastatin   40 milliGRAM(s) Oral (11-29-24 @ 22:02)        A1C with Estimated Average Glucose Result: 7.2 % (11-21-24 @ 01:56)  A1C with Estimated Average Glucose Result: 8.5 % (08-16-24 @ 01:55)                  Thyroid Stimulating Hormone, Serum: 0.79 uIU/mL (11-21-24 @ 01:56)      LIPIDS    RADIOLOGY ENDOCRINE INITIAL CONSULT -     HPI:  74 year-old [place] female with history of CAD (s/p PCI 1998, 2006, 8/2024), HTN, HLD, T2DM, and GERD presents with intermittent chest pain at rest and on exertion for the past 3 days. Symptoms started on 11/17, pressure-like, 8/10 in intensity with radiation up the neck and jaw associated with mild nausea without vomiting, similar to her prior angina symptoms.  She took her 's NTG SL and symptoms improved within 5 minutes.  She came to ED for further evaluation as these episodes of intermittent chest pain persisted for the past couple days.  Currently, chest pain free s/p NTG SL twice since arrived (20 Nov 2024 04:16)      ENDOCRINE HISTORY   DM2 dx in her 50s   A1c 7.2%   hyperglycemic to 400s yesterday evening; had been receiving moderate ISS & Lantus 18 units qhs   current regimen - Jardiance 25mg daily, Tresiba 10 units qhs, Rx for Novolog 30 units tidac but takes only 20 units tidac as she reports she was having a lot of hypoglycemia at home while on 30 units tidac.   past meds - metformin for many years, later discontinued when started on insulin. has also been on Ozempic but reports she lost a lot of weight while on it so this was also self discontinued. Does not wish to be on it again. Was down to nearly 100 lbs she says.   follows with an [facility] Endocrinologist, forgot her name, but reports she has an appointment coming up in dec 2024 with her endo.   smbg using FSL cgm at home, reports usually okay, only really checks it when it alarms. Takes her set doses of insulin for the most part unless not feeling well, will check it, etc.   complications - CAD w/ multiple PCI  diet - reports she is a "normal diet", tries to eat healthy and limit her carbs as possible. Only at her hospital trays yesterday she says. Unsure why her sugars went up so high.   exercise - not very active, walks around at home       PAST MEDICAL & SURGICAL HISTORY:  HTN (hypertension)      HLD (hyperlipidemia)      DM (diabetes mellitus)      CAD (coronary artery disease)      Stented coronary artery      COVID-19      No significant past surgical history          FAMILY HISTORY:  Family history of heart attack (Father, Mother)    Family history of CVA (Mother)    FH: diabetes mellitus (Sibling)        Social History:  , lives with  and son's family lives upstairs  ambulates without assist device  no smoking, rare social wine  retired [employer] (20 Nov 2024 04:16)      Home Medications:  Aspirin Enteric Coated 81 mg oral delayed release tablet: 1 tab(s) orally once a day DC ON 12/1/24 AND START eLIQUIS ON 12/2/24 (29 Nov 2024 11:58)  folic acid: 1 milligram(s) orally once a day (20 Nov 2024 06:04)  Jardiance 25 mg oral tablet: 1 tab(s) orally once a day (20 Nov 2024 06:04)  losartan 25 mg oral tablet: 1 tab(s) orally once a day hold for sbp&lt;90 (20 Nov 2024 06:04)  rosuvastatin 40 mg oral tablet: 1 tab(s) orally once a day (at bedtime) (20 Nov 2024 06:04)  Tresiba 100 units/mL subcutaneous solution: 22 unit(s) subcutaneous once a day (at bedtime) (29 Nov 2024 13:38)  Zetia 10 mg oral tablet: 1 tab(s) orally once a day (20 Nov 2024 06:04)      MEDICATIONS  (STANDING):  aspirin enteric coated 81 milliGRAM(s) Oral daily  clopidogrel Tablet 75 milliGRAM(s) Oral daily  dextrose 5%. 1000 milliLiter(s) (100 mL/Hr) IV Continuous <Continuous>  dextrose 5%. 1000 milliLiter(s) (50 mL/Hr) IV Continuous <Continuous>  dextrose 50% Injectable 25 Gram(s) IV Push once  dextrose 50% Injectable 12.5 Gram(s) IV Push once  dextrose 50% Injectable 25 Gram(s) IV Push once  ezetimibe 10 milliGRAM(s) Oral daily  folic acid 1 milliGRAM(s) Oral daily  glucagon  Injectable 1 milliGRAM(s) IntraMuscular once  insulin glargine Injectable (LANTUS) 24 Unit(s) SubCutaneous at bedtime  insulin lispro (ADMELOG) corrective regimen sliding scale   SubCutaneous every 6 hours  losartan 25 milliGRAM(s) Oral daily  polyethylene glycol 3350 17 Gram(s) Oral daily  rosuvastatin 40 milliGRAM(s) Oral at bedtime  senna 2 Tablet(s) Oral at bedtime  vancomycin  IVPB 1000 milliGRAM(s) IV Intermittent once    MEDICATIONS  (PRN):  acetaminophen     Tablet .. 650 milliGRAM(s) Oral every 6 hours PRN Mild Pain (1 - 3)  dextrose Oral Gel 15 Gram(s) Oral once PRN Blood Glucose LESS THAN 70 milliGRAM(s)/deciliter      Allergies    No Known Allergies    Intolerances        REVIEW OF SYSTEMS  Constitutional: No fever  Eyes: No blurry vision  Neuro: No tremors  HEENT: No pain  Cardiovascular: No chest pain, palpitations  Respiratory: No SOB, no cough  GI: No nausea, vomiting, abdominal pain  : No dysuria  Skin: no rash  Psych: no depression  Endocrine: no polyuria, polydipsia  Hem/lymph: no swelling  Osteoporosis: no fractures  ALL OTHER SYSTEMS REVIEWED AND NEGATIVE     PHYSICAL EXAM   Vital Signs Last 24 Hrs  T(C): 36.9 (30 Nov 2024 04:23), Max: 36.9 (29 Nov 2024 20:44)  T(F): 98.5 (30 Nov 2024 04:23), Max: 98.5 (30 Nov 2024 04:23)  HR: 94 (30 Nov 2024 04:23) (94 - 101)  BP: 126/78 (30 Nov 2024 04:23) (109/75 - 140/83)  BP(mean): --  RR: 18 (30 Nov 2024 04:23) (18 - 18)  SpO2: 97% (30 Nov 2024 04:23) (97% - 99%)    Parameters below as of 30 Nov 2024 04:23  Patient On (Oxygen Delivery Method): room air      GENERAL: NAD, well-groomed, well-developed  EYES: No proptosis, no lid lag, anicteric  HEENT:  Atraumatic, Normocephalic, moist mucous membranes  THYROID: Normal size, no palpable nodules  RESPIRATORY: Nonlabored respirations on room air, normal rate, normal effort   CARDIOVASCULAR: Regular rate and rhythm, no peripheral edema  GI: Soft, nontender, non distended   SKIN: Dry, intact, No rashes or lesions  MUSCULOSKELETAL: Full range of motion, normal strength  NEURO: sensation intact, extraocular movements intact, no tremor  PSYCH: Alert and oriented x 3, reactive affect, reactive mood   CUSHING'S SIGNS: no striae      CAPILLARY BLOOD GLUCOSE      POCT Blood Glucose.: 155 mg/dL (30 Nov 2024 06:09)  POCT Blood Glucose.: 284 mg/dL (29 Nov 2024 23:05)  POCT Blood Glucose.: 257 mg/dL (29 Nov 2024 21:20)  POCT Blood Glucose.: 360 mg/dL (29 Nov 2024 18:34)  POCT Blood Glucose.: 463 mg/dL (29 Nov 2024 17:09)  POCT Blood Glucose.: 419 mg/dL (29 Nov 2024 17:06)  POCT Blood Glucose.: 449 mg/dL (29 Nov 2024 16:51)  POCT Blood Glucose.: 372 mg/dL (29 Nov 2024 11:30)      eMAR:  ezetimibe   10 milliGRAM(s) Oral (11-29-24 @ 11:40)    insulin glargine Injectable (LANTUS)   24 Unit(s) SubCutaneous (11-29-24 @ 23:06)    insulin lispro (ADMELOG) corrective regimen sliding scale   2 Unit(s) SubCutaneous (11-30-24 @ 06:13)   6 Unit(s) SubCutaneous (11-29-24 @ 23:06)   12 Unit(s) SubCutaneous (11-29-24 @ 17:10)   10 Unit(s) SubCutaneous (11-29-24 @ 11:39)    rosuvastatin   40 milliGRAM(s) Oral (11-29-24 @ 22:02)        A1C with Estimated Average Glucose Result: 7.2 % (11-21-24 @ 01:56)  A1C with Estimated Average Glucose Result: 8.5 % (08-16-24 @ 01:55)                  Thyroid Stimulating Hormone, Serum: 0.79 uIU/mL (11-21-24 @ 01:56)      LIPIDS    RADIOLOGY

## 2024-11-30 NOTE — PROGRESS NOTE ADULT - PROBLEM SELECTOR PLAN 5
dvt ppx: hold a/c for now   Dispo: DC once FSG optimized  patient to stop aspirin on 12/1 and start Eliquis on 12/2   patient doesn't want to go to rehab dvt ppx: hold a/c for now   Dispo: DC once FSG optimized hopefully tonight   patient to stop aspirin on 12/1 and start Eliquis on 12/2   patient doesn't want to go to rehab

## 2024-11-30 NOTE — PROGRESS NOTE ADULT - PROBLEM SELECTOR PLAN 1
s/p [facility] 11/20 Severe Proximal LAD in-stent restenosis s/p successful balloon  angioplasty no chest pain   TTE 45-50%, not in heart failure  - ASA/plavix for now   - 12/1 to stop aspirin and start Eliquis 12/2 ( out patient)  - continue losartan 25 mg daily  - EP note appreciated

## 2024-11-30 NOTE — CONSULT NOTE ADULT - ASSESSMENT
74 year-old female with history of CAD (s/p PCI 1998, 2006, 8/2024), HTN, HLD, T2DM, and GERD presents with intermittent chest pain at rest and on exertion for the past 3 days. Underwent POBA for pLAD ISR. Course complicated by CHB. s/p Code/RRT on 11/22/24 for symptomatic rodriguez/syncope with heart rate down to 33 bpm, intubated, TVP inserted and paced during RRT. Patient extubated 11/24. now s/p PPM.   Also with concern for Euglycemic DKA earlier this admission (BHB 3.9, ph 7.31, HAGMA & glucose in 200s 11/20. On Jardiance at home).     #DM2 with Severe Hyperglycemia to 400s   A1c 7.2%   egfr 75   home regimen: Tresiba 22 units qhs, Novolog , Jardiance     Inpatient Recommendations:   - Lantus 24 units SC QHS   - Admelog 8 units SC Premeal/TIDAC - hold if npo or if eating < 50% of meals   - Change to SEPARATE Admelog Moderate Correction Scale Premeal & Separate Moderate Correction Scale Bedtime - change to q6h if npo (unclear why she is currently ordered for moderate scales q6h)  - Goal -180  - FS Blood glucose monitoring Premeal/Bedtime - change to q6h if npo   - Hypoglycemia protocol   - Carb Consistent Diet     DC planning: Basal/bolus insulin regimen. If being discharged today, would increase to Tresiba 24 units qhs & bolus insulin 8 units TIDAC (hold if not eating). HOLD Jardiance on discharge given concern for Euglycemic DKA on admission. Check BMI. Consider addition of GLP1A as outpatient if no other contraindications given cardiac hx vs may consider renally dose adjusted Metformin ER for additional glycemic control if needed as outpatient. Please ensure patient has DM supplies/prescriptions prior to discharge (working glucometer, test strips, lancets, etoh swabs, insulin pen needles, etc.). Close outpatient follow up with PCP after discharge. Routine outpatient ophthalmology & podiatry evaluations advised. Outpatient endocrinology follow up at the location below if patient wishes. Please include info in dc summary for patient to contact office & schedule if she would like.     [facility]   [address]  [place] [address]  [phone]      #HTN  Recommendations:   - outpt BP goal < 130/80   - defer to primary team   - outpatient annual urinary microalb/cr ratio  - currently on losartan     #HLD  Recommendations:   - LDL goal < 55   - defer to primary team   - outpatient fasting lipid profile   - currently on statin & zetia       Maryuri Gilliland DO   Attending Physician   Department of Endocrinology, Diabetes and Metabolism     If before 9AM or after 5PM, or on weekends/holidays, please call the Endocrine answering service for assistance ([phone]).  For nonurgent matters, please email [email or web address] or [email or web address]     Please note that this patient may be followed by different provider tomorrow.  Notify endocrine 24 hours prior to discharge for final recommendations 74 year-old female with history of CAD (s/p PCI 1998, 2006, 8/2024), HTN, HLD, T2DM, and GERD presents with intermittent chest pain at rest and on exertion for the past 3 days. Underwent POBA for pLAD ISR. Course complicated by CHB. s/p Code/RRT on 11/22/24 for symptomatic rodriguez/syncope with heart rate down to 33 bpm, intubated, TVP inserted and paced during RRT. Patient extubated 11/24. now s/p PPM.   Also with concern for Euglycemic DKA earlier this admission (BHB 3.9, ph 7.31, HAGMA & glucose in 200s 11/20. On Jardiance at home).     #DM2 with Severe Hyperglycemia to 400s   #Euglycemic dka on this admission, resolved   A1c 7.2%   egfr 75   home regimen: Tresiba 22 units qhs, Novolog , Jardiance   glucose up to 400s yesterday - reports appetite is good but did not eat anything other than hospital trays yesterday   glucose this morning prior to breakfast 155 - received only 2 units of correction, no standing premeal dose   no steroids noted   please avoid IV medications in dextrose, instead change to saline if possible     Inpatient Recommendations:   - would suggest to repeat BMP prior to discharge, last BMP checked 11/27; patient was severely hyperglycemic to 400s last night & concern for euglycemic dka earlier this admission  - Increase to Lantus 24 units SC QHS   - Start Admelog 8 units SC Premeal/TIDAC - hold if npo or if eating < 50% of meals   - Change to SEPARATE Admelog Moderate Correction Scale Premeal & Separate Moderate Correction Scale Bedtime - change to q6h if npo (unclear why she is currently ordered for moderate scales q6h)  - Goal -180  - FS Blood glucose monitoring Premeal/Bedtime - change to q6h if npo   - Hypoglycemia protocol   - Carb Consistent Diet     DC planning: Basal/bolus insulin regimen. If being discharged today, would increase to Tresiba 24 units qhs & Novolog 8 units TIDAC (hold if not eating). HOLD Jardiance on discharge given concern for Euglycemic DKA on admission. Check BMI. Consider addition of GLP1A as outpatient if no other contraindications given cardiac hx vs may consider renally dose adjusted Metformin ER for additional glycemic control if needed as outpatient. Patient declines addition of GLP1A at this time as she reports she lost too much weight in the past with Ozempic. Please ensure patient has DM supplies/prescriptions prior to discharge (working glucometer, test strips, lancets, etoh swabs, insulin pen needles, etc.). Close outpatient follow up with PCP after discharge. Routine outpatient ophthalmology & podiatry evaluations advised. Outpatient endocrinology follow up with her [facility] endocrinologist, reports she has an appt in Dec 2024. Continue SMBG using FSL cgms.     #HTN  Recommendations:   - outpt BP goal < 130/80   - defer to primary team   - outpatient annual urinary microalb/cr ratio  - currently on losartan     #HLD  Recommendations:   - LDL goal < 55   - defer to primary team   - outpatient fasting lipid profile   - currently on statin & zetia     recs discussed with primary team mike Gilliland DO   Attending Physician   Department of Endocrinology, Diabetes and Metabolism     If before 9AM or after 5PM, or on weekends/holidays, please call the Endocrine answering service for assistance ([phone]).  For nonurgent matters, please email [email or web address] or [email or web address]     Please note that this patient may be followed by different provider tomorrow.  Notify endocrine 24 hours prior to discharge for final recommendations

## 2024-12-01 DIAGNOSIS — E11.65 TYPE 2 DIABETES MELLITUS WITH HYPERGLYCEMIA: ICD-10-CM

## 2024-12-01 LAB
ANION GAP SERPL CALC-SCNC: 12 MMOL/L — SIGNIFICANT CHANGE UP (ref 5–17)
BUN SERPL-MCNC: 12 MG/DL — SIGNIFICANT CHANGE UP (ref 7–23)
CALCIUM SERPL-MCNC: 10.1 MG/DL — SIGNIFICANT CHANGE UP (ref 8.4–10.5)
CHLORIDE SERPL-SCNC: 104 MMOL/L — SIGNIFICANT CHANGE UP (ref 96–108)
CO2 SERPL-SCNC: 22 MMOL/L — SIGNIFICANT CHANGE UP (ref 22–31)
CREAT SERPL-MCNC: 0.87 MG/DL — SIGNIFICANT CHANGE UP (ref 0.5–1.3)
EGFR: 70 ML/MIN/1.73M2 — SIGNIFICANT CHANGE UP
FLUAV AG NPH QL: SIGNIFICANT CHANGE UP
FLUBV AG NPH QL: SIGNIFICANT CHANGE UP
GLUCOSE BLDC GLUCOMTR-MCNC: 144 MG/DL — HIGH (ref 70–99)
GLUCOSE BLDC GLUCOMTR-MCNC: 156 MG/DL — HIGH (ref 70–99)
GLUCOSE BLDC GLUCOMTR-MCNC: 203 MG/DL — HIGH (ref 70–99)
GLUCOSE BLDC GLUCOMTR-MCNC: 216 MG/DL — HIGH (ref 70–99)
GLUCOSE BLDC GLUCOMTR-MCNC: 216 MG/DL — HIGH (ref 70–99)
GLUCOSE BLDC GLUCOMTR-MCNC: 265 MG/DL — HIGH (ref 70–99)
GLUCOSE SERPL-MCNC: 229 MG/DL — HIGH (ref 70–99)
HCT VFR BLD CALC: 36.7 % — SIGNIFICANT CHANGE UP (ref 34.5–45)
HGB BLD-MCNC: 12.5 G/DL — SIGNIFICANT CHANGE UP (ref 11.5–15.5)
LACTATE SERPL-SCNC: 1.7 MMOL/L — SIGNIFICANT CHANGE UP (ref 0.5–2)
LACTATE SERPL-SCNC: 2.2 MMOL/L — HIGH (ref 0.5–2)
MAGNESIUM SERPL-MCNC: 2 MG/DL — SIGNIFICANT CHANGE UP (ref 1.6–2.6)
MCHC RBC-ENTMCNC: 30 PG — SIGNIFICANT CHANGE UP (ref 27–34)
MCHC RBC-ENTMCNC: 34.1 G/DL — SIGNIFICANT CHANGE UP (ref 32–36)
MCV RBC AUTO: 88.2 FL — SIGNIFICANT CHANGE UP (ref 80–100)
NRBC # BLD: 0 /100 WBCS — SIGNIFICANT CHANGE UP (ref 0–0)
PLATELET # BLD AUTO: 223 K/UL — SIGNIFICANT CHANGE UP (ref 150–400)
POTASSIUM SERPL-MCNC: 4.2 MMOL/L — SIGNIFICANT CHANGE UP (ref 3.5–5.3)
POTASSIUM SERPL-SCNC: 4.2 MMOL/L — SIGNIFICANT CHANGE UP (ref 3.5–5.3)
RBC # BLD: 4.16 M/UL — SIGNIFICANT CHANGE UP (ref 3.8–5.2)
RBC # FLD: 12.9 % — SIGNIFICANT CHANGE UP (ref 10.3–14.5)
RSV RNA NPH QL NAA+NON-PROBE: SIGNIFICANT CHANGE UP
SARS-COV-2 RNA SPEC QL NAA+PROBE: SIGNIFICANT CHANGE UP
SODIUM SERPL-SCNC: 138 MMOL/L — SIGNIFICANT CHANGE UP (ref 135–145)
TROPONIN T, HIGH SENSITIVITY RESULT: 351 NG/L — HIGH (ref 0–51)
TROPONIN T, HIGH SENSITIVITY RESULT: 418 NG/L — HIGH (ref 0–51)
WBC # BLD: 6.63 K/UL — SIGNIFICANT CHANGE UP (ref 3.8–10.5)
WBC # FLD AUTO: 6.63 K/UL — SIGNIFICANT CHANGE UP (ref 3.8–10.5)

## 2024-12-01 PROCEDURE — 99232 SBSQ HOSP IP/OBS MODERATE 35: CPT

## 2024-12-01 PROCEDURE — 93010 ELECTROCARDIOGRAM REPORT: CPT

## 2024-12-01 PROCEDURE — 71045 X-RAY EXAM CHEST 1 VIEW: CPT | Mod: 26

## 2024-12-01 RX ORDER — MAGNESIUM, ALUMINUM HYDROXIDE 200-225/5
30 SUSPENSION, ORAL (FINAL DOSE FORM) ORAL ONCE
Refills: 0 | Status: COMPLETED | OUTPATIENT
Start: 2024-12-01 | End: 2024-12-01

## 2024-12-01 RX ADMIN — CLOPIDOGREL 75 MILLIGRAM(S): 75 TABLET, FILM COATED ORAL at 11:57

## 2024-12-01 RX ADMIN — Medication 81 MILLIGRAM(S): at 11:57

## 2024-12-01 RX ADMIN — ROSUVASTATIN CALCIUM 40 MILLIGRAM(S): 5 TABLET, FILM COATED ORAL at 22:21

## 2024-12-01 RX ADMIN — Medication 10 MILLIGRAM(S): at 11:57

## 2024-12-01 RX ADMIN — LOSARTAN POTASSIUM 25 MILLIGRAM(S): 100 TABLET, FILM COATED ORAL at 05:10

## 2024-12-01 RX ADMIN — INSULIN GLARGINE 24 UNIT(S): 100 INJECTION, SOLUTION SUBCUTANEOUS at 22:29

## 2024-12-01 RX ADMIN — Medication 10 UNIT(S): at 11:56

## 2024-12-01 RX ADMIN — Medication 10 UNIT(S): at 17:16

## 2024-12-01 RX ADMIN — Medication 2: at 08:15

## 2024-12-01 RX ADMIN — Medication 1 MILLIGRAM(S): at 11:57

## 2024-12-01 RX ADMIN — Medication 8 UNIT(S): at 08:15

## 2024-12-01 RX ADMIN — Medication 30 MILLILITER(S): at 09:24

## 2024-12-01 NOTE — CHART NOTE - NSCHARTNOTESELECT_GEN_ALL_CORE
CCU downgrade/Event Note
Cardiac Rehab ACP Note/Event Note
Event Note
Polyfly/Event Note
Accept Note/Event Note
CICU Accept Note/Transfer Note
CICU Downgrade/Transfer Note
Chest pain and worsening ECG changes post PCI ACP Note/Event Note
DC/Event Note
Event Note
RRT/CICU transfer/Event Note

## 2024-12-01 NOTE — PROGRESS NOTE ADULT - SUBJECTIVE AND OBJECTIVE BOX
Patient is a 74y old  Female who presents with a chief complaint of chest pain (30 Nov 2024 11:19)      SUBJECTIVE / OVERNIGHT EVENTS:    MEDICATIONS  (STANDING):  aspirin enteric coated 81 milliGRAM(s) Oral daily  clopidogrel Tablet 75 milliGRAM(s) Oral daily  dextrose 5%. 1000 milliLiter(s) (50 mL/Hr) IV Continuous <Continuous>  dextrose 5%. 1000 milliLiter(s) (100 mL/Hr) IV Continuous <Continuous>  dextrose 50% Injectable 25 Gram(s) IV Push once  dextrose 50% Injectable 12.5 Gram(s) IV Push once  dextrose 50% Injectable 25 Gram(s) IV Push once  ezetimibe 10 milliGRAM(s) Oral daily  folic acid 1 milliGRAM(s) Oral daily  glucagon  Injectable 1 milliGRAM(s) IntraMuscular once  insulin glargine Injectable (LANTUS) 24 Unit(s) SubCutaneous at bedtime  insulin lispro (ADMELOG) corrective regimen sliding scale   SubCutaneous three times a day before meals  insulin lispro Injectable (ADMELOG) 8 Unit(s) SubCutaneous three times a day before meals  losartan 25 milliGRAM(s) Oral daily  polyethylene glycol 3350 17 Gram(s) Oral daily  rosuvastatin 40 milliGRAM(s) Oral at bedtime  senna 2 Tablet(s) Oral at bedtime  vancomycin  IVPB 1000 milliGRAM(s) IV Intermittent once    MEDICATIONS  (PRN):  acetaminophen     Tablet .. 650 milliGRAM(s) Oral every 6 hours PRN Mild Pain (1 - 3)  dextrose Oral Gel 15 Gram(s) Oral once PRN Blood Glucose LESS THAN 70 milliGRAM(s)/deciliter      Vital Signs Last 24 Hrs  T(C): 36.9 (01 Dec 2024 04:44), Max: 36.9 (01 Dec 2024 04:44)  T(F): 98.5 (01 Dec 2024 04:44), Max: 98.5 (01 Dec 2024 04:44)  HR: 84 (01 Dec 2024 04:44) (84 - 91)  BP: 119/75 (01 Dec 2024 04:44) (112/75 - 120/76)  BP(mean): --  RR: 18 (01 Dec 2024 04:44) (18 - 18)  SpO2: 97% (01 Dec 2024 04:44) (97% - 100%)    Parameters below as of 30 Nov 2024 20:06  Patient On (Oxygen Delivery Method): room air      CAPILLARY BLOOD GLUCOSE      POCT Blood Glucose.: 156 mg/dL (01 Dec 2024 07:53)  POCT Blood Glucose.: 237 mg/dL (30 Nov 2024 21:07)  POCT Blood Glucose.: 197 mg/dL (30 Nov 2024 16:54)  POCT Blood Glucose.: 307 mg/dL (30 Nov 2024 11:41)    I&O's Summary    30 Nov 2024 07:01  -  01 Dec 2024 07:00  --------------------------------------------------------  IN: 460 mL / OUT: 0 mL / NET: 460 mL        PHYSICAL EXAM:  GENERAL: NAD, well-developed  HEAD:  Atraumatic, Normocephalic  EYES: EOMI, PERRLA, conjunctiva and sclera clear  NECK: Supple, No JVD  CHEST/LUNG: Clear to auscultation bilaterally; No wheeze  HEART: Regular rate and rhythm; No murmurs, rubs, or gallops  ABDOMEN: Soft, Nontender, Nondistended; Bowel sounds present  EXTREMITIES:  2+ Peripheral Pulses, No clubbing, cyanosis, or edema  PSYCH: AAOx3  NEUROLOGY: non-focal  SKIN: No rashes or lesions    LABS:    11-30    132[L]  |  101  |  15  ----------------------------<  298[H]  5.2   |  17[L]  |  0.81    Ca    9.8      30 Nov 2024 12:24            Urinalysis Basic - ( 30 Nov 2024 12:24 )    Color: x / Appearance: x / SG: x / pH: x  Gluc: 298 mg/dL / Ketone: x  / Bili: x / Urobili: x   Blood: x / Protein: x / Nitrite: x   Leuk Esterase: x / RBC: x / WBC x   Sq Epi: x / Non Sq Epi: x / Bacteria: x        RADIOLOGY & ADDITIONAL TESTS:    Imaging Personally Reviewed:    Consultant(s) Notes Reviewed:      Care Discussed with Consultants/Other Providers:   Patient is a 74y old  Female who presents with a chief complaint of chest pain (30 Nov 2024 11:19)      SUBJECTIVE / OVERNIGHT EVENTS:  c/o chest pressure and some SOB no dizziness no nausea   no events     MEDICATIONS  (STANDING):  aspirin enteric coated 81 milliGRAM(s) Oral daily  clopidogrel Tablet 75 milliGRAM(s) Oral daily  dextrose 5%. 1000 milliLiter(s) (50 mL/Hr) IV Continuous <Continuous>  dextrose 5%. 1000 milliLiter(s) (100 mL/Hr) IV Continuous <Continuous>  dextrose 50% Injectable 25 Gram(s) IV Push once  dextrose 50% Injectable 12.5 Gram(s) IV Push once  dextrose 50% Injectable 25 Gram(s) IV Push once  ezetimibe 10 milliGRAM(s) Oral daily  folic acid 1 milliGRAM(s) Oral daily  glucagon  Injectable 1 milliGRAM(s) IntraMuscular once  insulin glargine Injectable (LANTUS) 24 Unit(s) SubCutaneous at bedtime  insulin lispro (ADMELOG) corrective regimen sliding scale   SubCutaneous three times a day before meals  insulin lispro Injectable (ADMELOG) 8 Unit(s) SubCutaneous three times a day before meals  losartan 25 milliGRAM(s) Oral daily  polyethylene glycol 3350 17 Gram(s) Oral daily  rosuvastatin 40 milliGRAM(s) Oral at bedtime  senna 2 Tablet(s) Oral at bedtime  vancomycin  IVPB 1000 milliGRAM(s) IV Intermittent once    MEDICATIONS  (PRN):  acetaminophen     Tablet .. 650 milliGRAM(s) Oral every 6 hours PRN Mild Pain (1 - 3)  dextrose Oral Gel 15 Gram(s) Oral once PRN Blood Glucose LESS THAN 70 milliGRAM(s)/deciliter      Vital Signs Last 24 Hrs  T(C): 36.9 (01 Dec 2024 04:44), Max: 36.9 (01 Dec 2024 04:44)  T(F): 98.5 (01 Dec 2024 04:44), Max: 98.5 (01 Dec 2024 04:44)  HR: 84 (01 Dec 2024 04:44) (84 - 91)  BP: 119/75 (01 Dec 2024 04:44) (112/75 - 120/76)  BP(mean): --  RR: 18 (01 Dec 2024 04:44) (18 - 18)  SpO2: 97% (01 Dec 2024 04:44) (97% - 100%)    Parameters below as of 30 Nov 2024 20:06  Patient On (Oxygen Delivery Method): room air      CAPILLARY BLOOD GLUCOSE      POCT Blood Glucose.: 156 mg/dL (01 Dec 2024 07:53)  POCT Blood Glucose.: 237 mg/dL (30 Nov 2024 21:07)  POCT Blood Glucose.: 197 mg/dL (30 Nov 2024 16:54)  POCT Blood Glucose.: 307 mg/dL (30 Nov 2024 11:41)    I&O's Summary    30 Nov 2024 07:01  -  01 Dec 2024 07:00  --------------------------------------------------------  IN: 460 mL / OUT: 0 mL / NET: 460 mL        PHYSICAL EXAM:  GA- appeared anxious with hand over her chest  Cardiovascular: +S1S2 RRR  Chest : CTA B/L no crackles, PPM site with mild edema,  no ecchymosis no hematoma  A & O x 3, Mood & affect appropriate  Gastrointestinal:  Soft BS+ NT.ND  Neurologic: Non-focal  Extremities: No LE edema      LABS:    11-30    132[L]  |  101  |  15  ----------------------------<  298[H]  5.2   |  17[L]  |  0.81    Ca    9.8      30 Nov 2024 12:24    CXR - clear            Urinalysis Basic - ( 30 Nov 2024 12:24 )    Color: x / Appearance: x / SG: x / pH: x  Gluc: 298 mg/dL / Ketone: x  / Bili: x / Urobili: x   Blood: x / Protein: x / Nitrite: x   Leuk Esterase: x / RBC: x / WBC x   Sq Epi: x / Non Sq Epi: x / Bacteria: x        RADIOLOGY & ADDITIONAL TESTS:    Imaging Personally Reviewed:    Consultant(s) Notes Reviewed:      Care Discussed with Consultants/Other Providers:

## 2024-12-01 NOTE — CHART NOTE - NSCHARTNOTEFT_GEN_A_CORE
Called by RN to report patient with abdominal pressure/ palpitations, patient with non toxic appearance  - patient seen and evaluated, patient admits to abdominal pressure/ palpitations, she denies sob, chest pain. Patient admits to "heartburn feeling"  - Ekg ordered and reviewed by Dr. Donnelly  - Trop/Lactate ordered Trop 400- down from 1000, Lactate 2.2 down from 3.9, will cont to trend   - Patient will be monitored on tele   - CXR pending   - Will cont to monitor , plan above discussed with Dr. Donnelly who agrees with above.

## 2024-12-01 NOTE — PROGRESS NOTE ADULT - ASSESSMENT
74 year-old female with history of CAD (s/p PCI 1998, 2006, 8/2024), HTN, HLD, T2DM, and GERD pw CP s/p LHC 11/20 Severe proximal LAD in-stent restenosis s/p successful balloon  angioplasty but w/ recurrent CP s/p nitro gtt and euglycemic DKA (now resolved), s/p code blue/bradycardia s/p TVP, new afib, transferred out of CICU now s/p PPM placement 11/27. 74 year-old female with history of CAD (s/p PCI 1998, 2006, 8/2024), HTN, HLD, T2DM, and GERD pw CP s/p LHC 11/20 Severe proximal LAD in-stent restenosis s/p successful balloon  angioplasty but w/ recurrent CP s/p nitro gtt and euglycemic DKA (now resolved), s/p code blue/bradycardia s/p TVP, new afib, transferred out of CICU now s/p PPM placement 11/27. Course complicated by uncontrolled DM required endo consult and  has Chest pressure unchnaged EKG

## 2024-12-01 NOTE — PROGRESS NOTE ADULT - PROBLEM SELECTOR PLAN 5
dvt ppx: hold a/c for now   Dispo: DC once FSG optimized hopefully tonight   patient to stop aspirin on 12/1 and start Eliquis on 12/2   patient doesn't want to go to rehab dvt ppx: hold a/c for now   Dispo: DC hopefully in next 24 hours if CP free  patient to stop aspirin on 12/1 and start Eliquis on 12/2   patient doesn't want to go to rehab

## 2024-12-01 NOTE — PROGRESS NOTE ADULT - PROBLEM SELECTOR PLAN 1
s/p [facility] 11/20 Severe Proximal LAD in-stent restenosis s/p successful balloon  angioplasty no chest pain   TTE 45-50%, not in heart failure  - ASA/plavix for now   - 12/1 to stop aspirin and start Eliquis 12/2 ( out patient)  - continue losartan 25 mg daily  - EP note appreciated s/p [facility] 11/20 Severe Proximal LAD in-stent restenosis s/p successful balloon  angioplasty   this AM Chest pressure   no EKG changes   Trop in 400 downtrending   clear CXR    TTE 45-50%, not in heart failure  - ASA and  Eliquis to start 12/2  - ASA and Plavix until today   - continue losartan 25 mg daily  - monitor

## 2024-12-01 NOTE — PROGRESS NOTE ADULT - PROBLEM SELECTOR PLAN 4
h/o euglycemic DKA so holding Jardiance inpt  Hyperglycemia with FSG above 400 yesterday   endo consulted - note appreciated   Lantus 24 units SC QHS   Admelog 8 units SC Premeal  check BMP h/o euglycemic DKA so holding Jardiance inpt  Hyperglycemia with FSG above 400 yesterday   endo consulted  F/u note appreciated   Lantus 24 units SC QHS   Admelog 10 units before meals   FSG h/o euglycemic DKA so holding Jardiance inpt  Hyperglycemia with FSG above 400 yesterday   endo consulted  F/u note appreciated   Lantus 24 units SC QHS   Admelog 10 units before meals    better control

## 2024-12-01 NOTE — PROGRESS NOTE ADULT - PROBLEM SELECTOR PLAN 3
Recommendations:   - LDL goal < 55   - defer to primary team   - outpatient fasting lipid profile   - currently on statin & zetia

## 2024-12-01 NOTE — PROGRESS NOTE ADULT - SUBJECTIVE AND OBJECTIVE BOX
Patient seen today for follow up inpatient Diabetes Mellitus management.    Chief Complaint: Type 2 Diabetes Mellitus     INTERVAL HX:  Patient seen in [facility] 2DSU 256 W1. Patient is alert and oriented, resting in bed. Patient reports feeling weak today, no energy- primary team aware. Patient getting EKG done at bedside this am. Patient is eating full meals and tolerating POs well. FBg stable this am at 156mg/dL. No hypoglycemia. Post-prandial hyperglycemia noted to low 200s. Blood glucose levels in the last 24hrs have been 156-237mg/dL.     Review of Systems:  General: As above.  Respiratory: Denies any SOB, BECERRA, or cough.  Gastrointestinal: Denies any n/v/d or abdominal pain.   Endocrine: Denies any polyuria, polydipsia, polyphagia, visual changes, or numbness in feet.     Allergies  No Known Allergies      Intolerances  None.       MEDICATIONS  (STANDING):  acetaminophen     Tablet .. 650 milliGRAM(s) Oral every 6 hours PRN  aspirin enteric coated 81 milliGRAM(s) Oral daily  clopidogrel Tablet 75 milliGRAM(s) Oral daily  dextrose 5%. 1000 milliLiter(s) IV Continuous <Continuous>  dextrose 5%. 1000 milliLiter(s) IV Continuous <Continuous>  dextrose 50% Injectable 25 Gram(s) IV Push once  dextrose 50% Injectable 12.5 Gram(s) IV Push once  dextrose 50% Injectable 25 Gram(s) IV Push once  dextrose Oral Gel 15 Gram(s) Oral once PRN  ezetimibe 10 milliGRAM(s) Oral daily  folic acid 1 milliGRAM(s) Oral daily  glucagon  Injectable 1 milliGRAM(s) IntraMuscular once  insulin glargine Injectable (LANTUS) 24 Unit(s) SubCutaneous at bedtime  insulin lispro (ADMELOG) corrective regimen sliding scale   SubCutaneous <User Schedule>  insulin lispro (ADMELOG) corrective regimen sliding scale   SubCutaneous three times a day before meals  insulin lispro Injectable (ADMELOG) 12 Unit(s) SubCutaneous three times a day before meals  losartan 25 milliGRAM(s) Oral daily  polyethylene glycol 3350 17 Gram(s) Oral daily  rosuvastatin 40 milliGRAM(s) Oral at bedtime  senna 2 Tablet(s) Oral at bedtime      dextrose 50% Injectable 25 Gram(s) IV Push once  dextrose 50% Injectable 12.5 Gram(s) IV Push once  dextrose 50% Injectable 25 Gram(s) IV Push once  dextrose Oral Gel 15 Gram(s) Oral once PRN  ezetimibe 10 milliGRAM(s) Oral daily  glucagon  Injectable 1 milliGRAM(s) IntraMuscular once  insulin glargine Injectable (LANTUS) 24 Unit(s) SubCutaneous at bedtime  insulin lispro (ADMELOG) corrective regimen sliding scale   SubCutaneous <User Schedule>  insulin lispro (ADMELOG) corrective regimen sliding scale   SubCutaneous three times a day before meals  insulin lispro Injectable (ADMELOG) 12 Unit(s) SubCutaneous three times a day before meals  rosuvastatin 40 milliGRAM(s) Oral at bedtime      insulin lispro (ADMELOG) corrective regimen sliding scale   SubCutaneous <User Schedule>  insulin lispro (ADMELOG) corrective regimen sliding scale   SubCutaneous three times a day before meals  insulin lispro Injectable (ADMELOG) 12 Unit(s) SubCutaneous three times a day before meals      PHYSICAL EXAM:  VITALS:   T(C): 37.2 (12-01-24 @ 09:00), Max: 37.2 (12-01-24 @ 09:00)  HR: 110 (12-01-24 @ 09:00) (84 - 110)  BP: 109/73 (12-01-24 @ 09:00) (109/73 - 120/76)  RR: 24 (12-01-24 @ 09:00) (18 - 24)  SpO2: 98% (12-01-24 @ 09:00) (97% - 100%)    GENERAL: In no acute distress  Respiratory: Respirations unlabored  Extremities: Warm and dry, no edema  NEURO: Alert and oriented, appropriate     LABS:  POCT Blood Glucose.: 203 mg/dL (12-01-24 @ 09:15)  POCT Blood Glucose.: 156 mg/dL (12-01-24 @ 07:53)  POCT Blood Glucose.: 237 mg/dL (11-30-24 @ 21:07)  POCT Blood Glucose.: 197 mg/dL (11-30-24 @ 16:54)  POCT Blood Glucose.: 307 mg/dL (11-30-24 @ 11:41)  POCT Blood Glucose.: 155 mg/dL (11-30-24 @ 06:09)  POCT Blood Glucose.: 284 mg/dL (11-29-24 @ 23:05)  POCT Blood Glucose.: 257 mg/dL (11-29-24 @ 21:20)  POCT Blood Glucose.: 360 mg/dL (11-29-24 @ 18:34)  POCT Blood Glucose.: 463 mg/dL (11-29-24 @ 17:09)  POCT Blood Glucose.: 419 mg/dL (11-29-24 @ 17:06)  POCT Blood Glucose.: 449 mg/dL (11-29-24 @ 16:51)  POCT Blood Glucose.: 372 mg/dL (11-29-24 @ 11:30)  POCT Blood Glucose.: 193 mg/dL (11-29-24 @ 06:04)  POCT Blood Glucose.: 264 mg/dL (11-29-24 @ 00:17)  POCT Blood Glucose.: 358 mg/dL (11-28-24 @ 21:18)  POCT Blood Glucose.: 393 mg/dL (11-28-24 @ 16:54)  POCT Blood Glucose.: 315 mg/dL (11-28-24 @ 11:44)                          12.5   6.63  )-----------( 223      ( 01 Dec 2024 09:43 )             36.7     12-01    138  |  104  |  12  ----------------------------<  229[H]  4.2   |  22  |  0.87    Ca    10.1      01 Dec 2024 09:43  Mg     2.0     12-01      Urinalysis Basic - ( 01 Dec 2024 09:43 )    Color: x / Appearance: x / SG: x / pH: x  Gluc: 229 mg/dL / Ketone: x  / Bili: x / Urobili: x   Blood: x / Protein: x / Nitrite: x   Leuk Esterase: x / RBC: x / WBC x   Sq Epi: x / Non Sq Epi: x / Bacteria: x        A1C with Estimated Average Glucose Result: A1C with Estimated Average Glucose Result: 7.2 % (11-21-24 @ 01:56)  A1C with Estimated Average Glucose Result: 8.5 % (08-16-24 @ 01:55)

## 2024-12-01 NOTE — PROGRESS NOTE ADULT - NSPROGADDITIONALINFOA_GEN_ALL_CORE
Contact via [employer] Teams during business hours  To reach covering provider access AMION via [employer] tools  For Urgent matters/after-hours/weekends/holidays please page endocrine fellow on call   For nonurgent matters please email [email or web address]    Please note that this patient may be followed by different provider tomorrow.  Notify endocrine 24 hours prior to discharge for final recommendations

## 2024-12-01 NOTE — PROVIDER CONTACT NOTE (OTHER) - ASSESSMENT
A&O x 4. C/o of non-radiating chest pain. Vitals as charted.
A & O x 4. VS as charted. BS norm for this patient.

## 2024-12-01 NOTE — PROGRESS NOTE ADULT - PROBLEM SELECTOR PLAN 2
Recommendations:   - outpt BP goal < 130/80   - defer to primary team   - outpatient annual urinary microalb/cr ratio  - currently on losartan

## 2024-12-01 NOTE — PROGRESS NOTE ADULT - ASSESSMENT
74 year-old female with history of CAD (s/p PCI 1998, 2006, 8/2024), HTN, HLD, T2DM, and GERD presents with intermittent chest pain at rest and on exertion for the past 3 days. Underwent POBA for pLAD ISR. Course complicated by CHB. s/p Code/RRT on 11/22/24 for symptomatic rodriguez/syncope with heart rate down to 33 bpm, intubated, TVP inserted and paced during RRT. Patient extubated 11/24. now s/p PPM. Also with concern for Euglycemic DKA earlier this admission (BHB 3.9, ph 7.31, HAGMA & glucose in 200s 11/20. On Jardiance at home). Patient is doing okay, has been eating full meals and tolerating POs. FBG stable, no hypoglycemia. Post-prandial hyperglycemia noted- will adjust mealtime insulin for tighter BG control. Endocrine will closely monitor BG and adjust insulin as needed for BG goal 100-180mg/dL inpatient.     #DM2 with Severe Hyperglycemia to 400s   #Euglycemic dka on this admission, resolved   A1c 7.2%   egfr 75   home regimen: Tresiba 10 units QHS, Novolog 30u TID AC (reports only using 20u because of hypoglycemia), Jardiance 25mg once daily; has Freestyle Lon CGM  Endocrine f/u: [facility] [place] [facility]- has appt this month (December)

## 2024-12-01 NOTE — PROGRESS NOTE ADULT - PROBLEM SELECTOR PLAN 2
s/p TVP and now PPM 11/27  - EP note appreciated   - out patient follow up s/p TVP and now PPM 11/27  - EP note appreciated   - out-patient follow up  - PPM site stable

## 2024-12-01 NOTE — PROGRESS NOTE ADULT - PROBLEM SELECTOR PLAN 1
Inpatient Plan:  - Check BG TID AC, HS, and 2AM while on PO diet  - C/w Lantus 24u QHS  - Adjust Admelog 8u TID AC (HOLD if NPO)  - C/w moderate dose Admelog correctional scales TID AC, HS, and 2AM  - Please check BMI and height    DC planning:   - Basal/bolus (doses TBD closer to d/c). HOLD Jardiance on discharge given concern for Euglycemic DKA on admission. Consider renally dose adjusted Metformin ER for additional glycemic control if needed as outpatient. Patient declines addition of GLP1A at this time as she reports she lost too much weight in the past with Ozempic.  - Patient should check BG TID AC and HS at home. Can use CGM Freestyle Lon to monitor BGs but if BG <100mg/dL or >250mg/dL, patient should confirm with fingerstick BG. Please tell patient to contact Endocrinologist if BG <70mg/dL x1 or >200mg/dL consistently or >400mg/dL x1. Please send for reader plus sensor x3. Will supply patient with sample/set up samuel prior to discharge.   - Routine outpatient ophthalmology & podiatry evaluations advised.   - Outpatient endocrinology follow up with her [facility] endocrinologist, reports she has an appt in Dec 2024.     Pt will need RX for basal insulin pen (ie. Basaglar, Lantus, Tresiba, Toujeo, Levemir) and bolus insulin pen (ie. humalog/novolog/admelog) depending on insurance coverage; please send test scripts to see which is covered. Please send prescriptions for diabetes supplies (glucometer, test strips, lancets, alcohol swabs, insulin pen needles). Inpatient Plan:  - Check BG TID AC, HS, and 2AM while on PO diet  - C/w Lantus 24u QHS  - Adjust Admelog to 10u TID AC (HOLD if NPO)  - C/w moderate dose Admelog correctional scales TID AC, HS, and 2AM  - Please check BMI and height    DC planning:   - Basal/bolus (doses TBD closer to d/c). HOLD Jardiance on discharge given concern for Euglycemic DKA on admission. Consider renally dose adjusted Metformin ER for additional glycemic control if needed as outpatient. Patient declines addition of GLP1A at this time as she reports she lost too much weight in the past with Ozempic.  - Patient should check BG TID AC and HS at home. Can use CGM Freestyle Lon to monitor BGs but if BG <100mg/dL or >250mg/dL, patient should confirm with fingerstick BG. Please tell patient to contact Endocrinologist if BG <70mg/dL x1 or >200mg/dL consistently or >400mg/dL x1. Please send for reader plus sensor x3. Will supply patient with sample/set up samuel prior to discharge.   - Routine outpatient ophthalmology & podiatry evaluations advised.   - Outpatient endocrinology follow up with her [facility] endocrinologist, reports she has an appt in Dec 2024.     Pt will need RX for basal insulin pen (ie. Basaglar, Lantus, Tresiba, Toujeo, Levemir) and bolus insulin pen (ie. humalog/novolog/admelog) depending on insurance coverage; please send test scripts to see which is covered. Please send prescriptions for diabetes supplies (glucometer, test strips, lancets, alcohol swabs, insulin pen needles).

## 2024-12-02 VITALS — HEIGHT: 60 IN | WEIGHT: 114.42 LBS

## 2024-12-02 LAB
GLUCOSE BLDC GLUCOMTR-MCNC: 176 MG/DL — HIGH (ref 70–99)
GLUCOSE BLDC GLUCOMTR-MCNC: 197 MG/DL — HIGH (ref 70–99)
GLUCOSE BLDC GLUCOMTR-MCNC: 206 MG/DL — HIGH (ref 70–99)
GLUCOSE BLDC GLUCOMTR-MCNC: 210 MG/DL — HIGH (ref 70–99)

## 2024-12-02 PROCEDURE — 92920 PRQ TRLUML C ANGIOP 1ART&/BR: CPT | Mod: LD

## 2024-12-02 PROCEDURE — 93005 ELECTROCARDIOGRAM TRACING: CPT

## 2024-12-02 PROCEDURE — 83735 ASSAY OF MAGNESIUM: CPT

## 2024-12-02 PROCEDURE — 81001 URINALYSIS AUTO W/SCOPE: CPT

## 2024-12-02 PROCEDURE — 71046 X-RAY EXAM CHEST 2 VIEWS: CPT

## 2024-12-02 PROCEDURE — 83880 ASSAY OF NATRIURETIC PEPTIDE: CPT

## 2024-12-02 PROCEDURE — 84145 PROCALCITONIN (PCT): CPT

## 2024-12-02 PROCEDURE — 81003 URINALYSIS AUTO W/O SCOPE: CPT

## 2024-12-02 PROCEDURE — 87040 BLOOD CULTURE FOR BACTERIA: CPT

## 2024-12-02 PROCEDURE — 99285 EMERGENCY DEPT VISIT HI MDM: CPT

## 2024-12-02 PROCEDURE — 84132 ASSAY OF SERUM POTASSIUM: CPT

## 2024-12-02 PROCEDURE — 97116 GAIT TRAINING THERAPY: CPT

## 2024-12-02 PROCEDURE — 33208 INSRT HEART PM ATRIAL & VENT: CPT | Mod: KX

## 2024-12-02 PROCEDURE — 82435 ASSAY OF BLOOD CHLORIDE: CPT

## 2024-12-02 PROCEDURE — 80048 BASIC METABOLIC PNL TOTAL CA: CPT

## 2024-12-02 PROCEDURE — 85520 HEPARIN ASSAY: CPT

## 2024-12-02 PROCEDURE — C2621: CPT

## 2024-12-02 PROCEDURE — 85018 HEMOGLOBIN: CPT

## 2024-12-02 PROCEDURE — C8924: CPT

## 2024-12-02 PROCEDURE — 80061 LIPID PANEL: CPT

## 2024-12-02 PROCEDURE — 36415 COLL VENOUS BLD VENIPUNCTURE: CPT

## 2024-12-02 PROCEDURE — C1892: CPT

## 2024-12-02 PROCEDURE — 33225 L VENTRIC PACING LEAD ADD-ON: CPT

## 2024-12-02 PROCEDURE — 99232 SBSQ HOSP IP/OBS MODERATE 35: CPT

## 2024-12-02 PROCEDURE — C1898: CPT

## 2024-12-02 PROCEDURE — 86901 BLOOD TYPING SEROLOGIC RH(D): CPT

## 2024-12-02 PROCEDURE — 71045 X-RAY EXAM CHEST 1 VIEW: CPT

## 2024-12-02 PROCEDURE — 93325 DOPPLER ECHO COLOR FLOW MAPG: CPT

## 2024-12-02 PROCEDURE — 87637 SARSCOV2&INF A&B&RSV AMP PRB: CPT

## 2024-12-02 PROCEDURE — 85025 COMPLETE CBC W/AUTO DIFF WBC: CPT

## 2024-12-02 PROCEDURE — 82947 ASSAY GLUCOSE BLOOD QUANT: CPT

## 2024-12-02 PROCEDURE — 86900 BLOOD TYPING SEROLOGIC ABO: CPT

## 2024-12-02 PROCEDURE — 93454 CORONARY ARTERY ANGIO S&I: CPT | Mod: 59

## 2024-12-02 PROCEDURE — 87641 MR-STAPH DNA AMP PROBE: CPT

## 2024-12-02 PROCEDURE — 93010 ELECTROCARDIOGRAM REPORT: CPT

## 2024-12-02 PROCEDURE — C1725: CPT

## 2024-12-02 PROCEDURE — C1887: CPT

## 2024-12-02 PROCEDURE — C8929: CPT

## 2024-12-02 PROCEDURE — 85610 PROTHROMBIN TIME: CPT

## 2024-12-02 PROCEDURE — 82803 BLOOD GASES ANY COMBINATION: CPT

## 2024-12-02 PROCEDURE — 87640 STAPH A DNA AMP PROBE: CPT

## 2024-12-02 PROCEDURE — C1730: CPT

## 2024-12-02 PROCEDURE — 74176 CT ABD & PELVIS W/O CONTRAST: CPT | Mod: MC

## 2024-12-02 PROCEDURE — 84295 ASSAY OF SERUM SODIUM: CPT

## 2024-12-02 PROCEDURE — 84484 ASSAY OF TROPONIN QUANT: CPT

## 2024-12-02 PROCEDURE — 97161 PT EVAL LOW COMPLEX 20 MIN: CPT

## 2024-12-02 PROCEDURE — 71250 CT THORAX DX C-: CPT | Mod: MC

## 2024-12-02 PROCEDURE — 83036 HEMOGLOBIN GLYCOSYLATED A1C: CPT

## 2024-12-02 PROCEDURE — 86850 RBC ANTIBODY SCREEN: CPT

## 2024-12-02 PROCEDURE — 99239 HOSP IP/OBS DSCHRG MGMT >30: CPT

## 2024-12-02 PROCEDURE — 96372 THER/PROPH/DIAG INJ SC/IM: CPT

## 2024-12-02 PROCEDURE — 0241U: CPT

## 2024-12-02 PROCEDURE — 97530 THERAPEUTIC ACTIVITIES: CPT

## 2024-12-02 PROCEDURE — 82330 ASSAY OF CALCIUM: CPT

## 2024-12-02 PROCEDURE — 82962 GLUCOSE BLOOD TEST: CPT

## 2024-12-02 PROCEDURE — 84443 ASSAY THYROID STIM HORMONE: CPT

## 2024-12-02 PROCEDURE — 82010 KETONE BODYS QUAN: CPT

## 2024-12-02 PROCEDURE — 96375 TX/PRO/DX INJ NEW DRUG ADDON: CPT

## 2024-12-02 PROCEDURE — C1769: CPT

## 2024-12-02 PROCEDURE — 80053 COMPREHEN METABOLIC PANEL: CPT

## 2024-12-02 PROCEDURE — 85027 COMPLETE CBC AUTOMATED: CPT

## 2024-12-02 PROCEDURE — 84100 ASSAY OF PHOSPHORUS: CPT

## 2024-12-02 PROCEDURE — 83605 ASSAY OF LACTIC ACID: CPT

## 2024-12-02 PROCEDURE — 85014 HEMATOCRIT: CPT

## 2024-12-02 PROCEDURE — 82553 CREATINE MB FRACTION: CPT

## 2024-12-02 PROCEDURE — 82550 ASSAY OF CK (CPK): CPT

## 2024-12-02 PROCEDURE — 85730 THROMBOPLASTIN TIME PARTIAL: CPT

## 2024-12-02 PROCEDURE — C1894: CPT

## 2024-12-02 PROCEDURE — 96374 THER/PROPH/DIAG INJ IV PUSH: CPT

## 2024-12-02 PROCEDURE — 94002 VENT MGMT INPAT INIT DAY: CPT

## 2024-12-02 RX ORDER — APIXABAN 2.5 MG/1
1 TABLET, FILM COATED ORAL
Qty: 60 | Refills: 0
Start: 2024-12-02 | End: 2024-12-31

## 2024-12-02 RX ORDER — ISOPROPYL ALCOHOL, BENZOCAINE .7; .06 ML/ML; ML/ML
0 SWAB TOPICAL
Qty: 100 | Refills: 1
Start: 2024-12-02

## 2024-12-02 RX ORDER — APIXABAN 2.5 MG/1
5 TABLET, FILM COATED ORAL EVERY 12 HOURS
Refills: 0 | Status: DISCONTINUED | OUTPATIENT
Start: 2024-12-02 | End: 2024-12-02

## 2024-12-02 RX ORDER — INSULIN DEGLUDEC 100 U/ML
24 INJECTION, SOLUTION SUBCUTANEOUS
Qty: 0 | Refills: 0 | DISCHARGE

## 2024-12-02 RX ORDER — DULAGLUTIDE 4.5 MG/.5ML
0.75 INJECTION, SOLUTION SUBCUTANEOUS
Qty: 3 | Refills: 0
Start: 2024-12-02 | End: 2024-12-31

## 2024-12-02 RX ORDER — INSULIN DEGLUDEC 100 U/ML
24 INJECTION, SOLUTION SUBCUTANEOUS
Qty: 3 | Refills: 0
Start: 2024-12-02 | End: 2024-12-31

## 2024-12-02 RX ORDER — INSULIN ASPART 100 [IU]/ML
12 INJECTION, SOLUTION INTRAVENOUS; SUBCUTANEOUS
Qty: 30 | Refills: 0
Start: 2024-12-02 | End: 2024-12-31

## 2024-12-02 RX ADMIN — Medication 10 UNIT(S): at 17:27

## 2024-12-02 RX ADMIN — CLOPIDOGREL 75 MILLIGRAM(S): 75 TABLET, FILM COATED ORAL at 11:47

## 2024-12-02 RX ADMIN — Medication 1 MILLIGRAM(S): at 11:48

## 2024-12-02 RX ADMIN — Medication 10 MILLIGRAM(S): at 11:47

## 2024-12-02 RX ADMIN — POLYETHYLENE GLYCOL 3350 17 GRAM(S): 17 POWDER, FOR SOLUTION ORAL at 11:51

## 2024-12-02 RX ADMIN — Medication 2: at 17:26

## 2024-12-02 RX ADMIN — Medication 10 UNIT(S): at 08:33

## 2024-12-02 RX ADMIN — APIXABAN 5 MILLIGRAM(S): 2.5 TABLET, FILM COATED ORAL at 17:26

## 2024-12-02 RX ADMIN — Medication 2: at 08:33

## 2024-12-02 RX ADMIN — LOSARTAN POTASSIUM 25 MILLIGRAM(S): 100 TABLET, FILM COATED ORAL at 05:35

## 2024-12-02 RX ADMIN — Medication 4: at 11:49

## 2024-12-02 RX ADMIN — Medication 10 UNIT(S): at 11:49

## 2024-12-02 NOTE — PROGRESS NOTE ADULT - PROBLEM SELECTOR PLAN 1
- s/p [facility] 11/20 Severe Proximal LAD in-stent restenosis s/p successful balloon angioplasty   - TTE 45-50%, not in heart failure  - dc aspirin, c/w plavix and resume eliquis, 12/2  - c/w losartan 25 mg daily

## 2024-12-02 NOTE — PROGRESS NOTE ADULT - NUTRITIONAL ASSESSMENT
Diet, DASH/TLC:   Sodium & Cholesterol Restricted  Consistent Carbohydrate {No Snacks} (CSTCHO)  Bear (11-23-24 @ 12:15) [Active]
This patient has been assessed with a concern for Malnutrition and has been determined to have a diagnosis/diagnoses of Moderate protein-calorie malnutrition.    This patient is being managed with:   Diet DASH/TLC-  Sodium & Cholesterol Restricted  Consistent Carbohydrate {No Snacks} (CSTCHO)  Bear  Entered: Nov 23 2024 12:15PM  
Diet, DASH/TLC:   Sodium & Cholesterol Restricted  Consistent Carbohydrate {No Snacks} (CSTCHO)  Bear (11-23-24 @ 12:15) [Active]
This patient has been assessed with a concern for Malnutrition and has been determined to have a diagnosis/diagnoses of Moderate protein-calorie malnutrition.    This patient is being managed with:   Diet DASH/TLC-  Sodium & Cholesterol Restricted  Consistent Carbohydrate {No Snacks} (CSTCHO)  Bear  Entered: Nov 23 2024 12:15PM  
This patient has been assessed with a concern for Malnutrition and has been determined to have a diagnosis/diagnoses of Moderate protein-calorie malnutrition.    This patient is being managed with:   Diet NPO after Midnight-     NPO Start Date: 26-Nov-2024   NPO Start Time: 23:59  Entered: Nov 26 2024  4:59PM    Diet DASH/TLC-  Sodium & Cholesterol Restricted  Consistent Carbohydrate {No Snacks} (CSTCHO)  Halal  Entered: Nov 23 2024 12:15PM

## 2024-12-02 NOTE — PROGRESS NOTE ADULT - PROBLEM SELECTOR PROBLEM 2
Complete heart block
HTN (hypertension)
Complete heart block

## 2024-12-02 NOTE — PROGRESS NOTE ADULT - PROBLEM SELECTOR PROBLEM 1
Unstable angina
Unstable angina
Type 2 diabetes mellitus with hyperglycemia, with long-term current use of insulin
Unstable angina
Type 2 diabetes mellitus with hyperglycemia, with long-term current use of insulin
Unstable angina

## 2024-12-02 NOTE — PROGRESS NOTE ADULT - ASSESSMENT
74 year-old female with history of CAD (s/p PCI 1998, 2006, 8/2024), HTN, HLD, T2DM, and GERD pw CP s/p LHC 11/20 Severe proximal LAD in-stent restenosis s/p successful balloon  angioplasty but w/ recurrent CP s/p nitro gtt and euglycemic DKA (now resolved), s/p code blue/bradycardia s/p TVP, new afib, transferred out of CICU now s/p PPM placement 11/27. Course complicated by uncontrolled DM required endo consult and  has Chest pressure unchnaged EKG

## 2024-12-02 NOTE — PROGRESS NOTE ADULT - PROBLEM SELECTOR PROBLEM 3
HTN (hypertension)
HLD (hyperlipidemia)
HTN (hypertension)
HLD (hyperlipidemia)
HTN (hypertension)
HTN (hypertension)
HLD (hyperlipidemia)
HTN (hypertension)

## 2024-12-02 NOTE — PROGRESS NOTE ADULT - NSPROGADDITIONALINFOA_GEN_ALL_CORE
-Plan discussed with pt/team.  Contact info: [phone] (24/7). pager [phone]  Amion on [employer]  Teams on M-T-W-F. Unavailable Thu/Weekends/Holidays  Provided face to face education as well as assessed  pt/labs/meds and discussed plan with primary team  Adjusting insulin  Discharge plan  Follow up care

## 2024-12-02 NOTE — PROGRESS NOTE ADULT - ASSESSMENT
75 y/o F w/h/o controlled T2DM per age and comorbidities (A1C7.2%) on basal/bolus insulin therapy plus Jardiance. DM c/b CAD (s/p PCI 1998, 2006, 8/2024). Also h/o HTN, HLD, GERD. Here with  intermittent chest pain at rest and on exertion for the past 3 days. Underwent POBA for pLAD ISR. Course complicated by CHB. s/p Code/RRT on 11/22/24 for symptomatic rodriguez/syncope with heart rate down to 33 bpm, intubated, TVP inserted and paced during RRT. Patient extubated 11/24. now s/p PPM. Also with concern for Euglycemic DKA earlier this admission (BHB 3.9, ph 7.31, HAGMA & glucose in 200s 11/20. On Jardiance at home). Patient is now stable tolerating POs with BG not at goal > noted persistent prandial hyperglycemia. Per primary team, pt might be discharged home today. No hypoglycemia. Will adjust premeal insulin to BG goal 100-180mg/dL inpatient.     Home regimen: Tresiba 10 units QHS, Novolog 30u TID AC (reports only using 20u because of hypoglycemia), Jardiance 25mg once daily; has Freestyle Lon CGM> doesn't have device or phone with her to check history    Met with patient and reviewed the following:  -DM complications  -A1c LEVEL:  at goal  -Blood glucose goals: 100s to 180s as out pt  -Glucose monitoring frequency: ac and hs  -Insulin(s) action, time of administration and side effects. Also reviewed the need to stop Jardiance and the cardio/renal benefits of [id number]> Pt reports loosing too much weight while on Ozempic> agreeable to try Trulicity if insurance covers (studies have shown lower amount of weight loss with Trulicity in comparison to Ozempic). Pt to stop med if loosing too much weight.   -Need to stop Jardiance for now due to concern for euglycemic DKA at time of admission.   Importance of follow up care. Follows at [facility].  Pt able to verbalize understanding regarding the need for glucose monitoring, diet, DM meds and follow up care.

## 2024-12-02 NOTE — PROGRESS NOTE ADULT - SUBJECTIVE AND OBJECTIVE BOX
no complaints.    GENERAL: No fevers, no chills.  EYES: No blurry vision,  No photophobia  ENT: No sore throat.  No dysphagia  Cardiovascular: No chest pain, palpitations, orthopnea  Pulmonary: No cough, no wheezing. No shortness of breath  Gastrointestinal: No abdominal pain, no diarrhea, no constipation.  Musculoskeletal: No weakness.  No myalgias.  Dermatology:  No rashes.  Neuro: No Headache.  No vertigo.  No dizziness.  Psych: No anxiety, no depression.  Denies suicidal thoughts.    MEDICATIONS  (STANDING):  apixaban 5 milliGRAM(s) Oral every 12 hours  clopidogrel Tablet 75 milliGRAM(s) Oral daily  dextrose 5%. 1000 milliLiter(s) (50 mL/Hr) IV Continuous <Continuous>  dextrose 5%. 1000 milliLiter(s) (100 mL/Hr) IV Continuous <Continuous>  dextrose 50% Injectable 25 Gram(s) IV Push once  dextrose 50% Injectable 12.5 Gram(s) IV Push once  dextrose 50% Injectable 25 Gram(s) IV Push once  ezetimibe 10 milliGRAM(s) Oral daily  folic acid 1 milliGRAM(s) Oral daily  glucagon  Injectable 1 milliGRAM(s) IntraMuscular once  insulin glargine Injectable (LANTUS) 24 Unit(s) SubCutaneous at bedtime  insulin lispro (ADMELOG) corrective regimen sliding scale   SubCutaneous <User Schedule>  insulin lispro (ADMELOG) corrective regimen sliding scale   SubCutaneous three times a day before meals  insulin lispro Injectable (ADMELOG) 10 Unit(s) SubCutaneous three times a day before meals  losartan 25 milliGRAM(s) Oral daily  polyethylene glycol 3350 17 Gram(s) Oral daily  rosuvastatin 40 milliGRAM(s) Oral at bedtime  senna 2 Tablet(s) Oral at bedtime    MEDICATIONS  (PRN):  acetaminophen     Tablet .. 650 milliGRAM(s) Oral every 6 hours PRN Mild Pain (1 - 3)  dextrose Oral Gel 15 Gram(s) Oral once PRN Blood Glucose LESS THAN 70 milliGRAM(s)/deciliter    Vital Signs Last 24 Hrs  T(C): 36.7 (02 Dec 2024 11:03), Max: 36.9 (01 Dec 2024 20:31)  T(F): 98.1 (02 Dec 2024 11:03), Max: 98.4 (01 Dec 2024 20:31)  HR: 93 (02 Dec 2024 11:03) (85 - 104)  BP: 100/60 (02 Dec 2024 11:03) (100/60 - 130/81)  BP(mean): --  RR: 18 (02 Dec 2024 11:03) (18 - 20)  SpO2: 100% (02 Dec 2024 11:03) (98% - 100%)    Parameters below as of 02 Dec 2024 11:03  Patient On (Oxygen Delivery Method): room air    PHYSICAL EXAM:  GA- appeared anxious with hand over her chest  Cardiovascular: +S1S2 RRR  Chest : CTA B/L no crackles, PPM site with mild edema,  no ecchymosis no hematoma  A & O x 3, Mood & affect appropriate  Gastrointestinal:  Soft BS+ NT.ND  Neurologic: Non-focal  Extremities: No LE edema    .  LABS:                         12.5   6.63  )-----------( 223      ( 01 Dec 2024 09:43 )             36.7     12-01    138  |  104  |  12  ----------------------------<  229[H]  4.2   |  22  |  0.87    Ca    10.1      01 Dec 2024 09:43  Mg     2.0     12-01        Urinalysis Basic - ( 01 Dec 2024 09:43 )    Color: x / Appearance: x / SG: x / pH: x  Gluc: 229 mg/dL / Ketone: x  / Bili: x / Urobili: x   Blood: x / Protein: x / Nitrite: x   Leuk Esterase: x / RBC: x / WBC x   Sq Epi: x / Non Sq Epi: x / Bacteria: x            RADIOLOGY, EKG & ADDITIONAL TESTS: Reviewed.

## 2024-12-02 NOTE — PROGRESS NOTE ADULT - NSPROGADDITIONALINFOA_GEN_ALL_CORE
disposition: DC home 12/2  discussed with ACP    Meena Leyva D.O.  Division of Hospital Medicine  Available on MS Teams

## 2024-12-02 NOTE — PROGRESS NOTE ADULT - PROBLEM SELECTOR PLAN 3
LDL goal < 55 due to DM/CAD  Pt LDL 83  C/w ezetimibe 10 milliGRAM(s) Oral daily  C/w rosuvastatin 40 milliGRAM(s) Oral at bedtime  C/w low cholesterol diet   Manage per primary team   F/u levels as out pt

## 2024-12-02 NOTE — PROGRESS NOTE ADULT - TIME BILLING
plan of care, chart review
review of labs, imaging, notes, discussion of plan with patient, family
review of chart, labs, imaging, notes, discussion of plan with patient, family

## 2024-12-02 NOTE — PROGRESS NOTE ADULT - PROBLEM SELECTOR PLAN 1
- Check BG TID AC, HS, and 2AM while on PO diet  - C/w Lantus 24u QHS  - Adjust Admelog to 12u TID AC (HOLD if NPO)  - C/w moderate dose Admelog correctional scales TID AC, HS, and 2AM  - Please check BMI and height  DC planning:   - Basal/bolus (Tresiba 24 units plus Novolog 12 units ac meals plus Trulicity 0.75mg q week (pt agrees to try Trulicity)  - Discontinue Jardiance on discharge given concern for Euglycemic DKA on admission.   - If insurance doesn't cover Trulicity send rx for Metformin 1000mg bid (GFR70)  - Patient should check BG TID AC and HS at home.   - C/w CGM BioNitrogene 3. Please send RX for 2 sensors  -Pt to monitor BGs but if BG <100mg/dL or >250mg/dL, patient should confirm with fingerstick BG.   -Pt to contact pvt endo with BG <70mg/dL x1 or >200mg/dL consistently or >400mg/dL x1.   -Pt reports she has an appt in Dec 2024 with [facility] endocrinologist.   - Routine outpatient ophthalmology & podiatry evaluations advised.

## 2024-12-02 NOTE — PROGRESS NOTE ADULT - SUBJECTIVE AND OBJECTIVE BOX
INTERVAL EVENTS/SUBJ:  No events     Home Medications:  Aspirin Enteric Coated 81 mg oral delayed release tablet: 1 tab(s) orally once a day DC ON 12/1/24 AND START eLIQUIS ON 12/2/24 (29 Nov 2024 11:58)  folic acid: 1 milligram(s) orally once a day (20 Nov 2024 06:04)  insulin lispro 100 units/mL injectable solution: 2 unit(s) injectable 3 times a day (with meals) 2 Unit(s) if Glucose 151 - 200  4 Unit(s) if Glucose 201 - 250  6 Unit(s) if Glucose 251 - 300  8 Unit(s) if Glucose 301 - 350  10 Unit(s) if Glucose 351 - 400  12 Unit(s) if Glucose Greater Than 400 (30 Nov 2024 11:26)  losartan 25 mg oral tablet: 1 tab(s) orally once a day hold for sbp&lt;90 (20 Nov 2024 06:04)  rosuvastatin 40 mg oral tablet: 1 tab(s) orally once a day (at bedtime) (20 Nov 2024 06:04)  Tresiba 100 units/mL subcutaneous solution: 24 unit(s) subcutaneous once a day (at bedtime) (30 Nov 2024 11:26)  Zetia 10 mg oral tablet: 1 tab(s) orally once a day (20 Nov 2024 06:04)      MEDICATIONS  (STANDING):  aspirin enteric coated 81 milliGRAM(s) Oral daily  clopidogrel Tablet 75 milliGRAM(s) Oral daily  dextrose 5%. 1000 milliLiter(s) (50 mL/Hr) IV Continuous <Continuous>  dextrose 5%. 1000 milliLiter(s) (100 mL/Hr) IV Continuous <Continuous>  dextrose 50% Injectable 25 Gram(s) IV Push once  dextrose 50% Injectable 12.5 Gram(s) IV Push once  dextrose 50% Injectable 25 Gram(s) IV Push once  ezetimibe 10 milliGRAM(s) Oral daily  folic acid 1 milliGRAM(s) Oral daily  glucagon  Injectable 1 milliGRAM(s) IntraMuscular once  insulin glargine Injectable (LANTUS) 24 Unit(s) SubCutaneous at bedtime  insulin lispro (ADMELOG) corrective regimen sliding scale   SubCutaneous <User Schedule>  insulin lispro (ADMELOG) corrective regimen sliding scale   SubCutaneous three times a day before meals  insulin lispro Injectable (ADMELOG) 10 Unit(s) SubCutaneous three times a day before meals  losartan 25 milliGRAM(s) Oral daily  polyethylene glycol 3350 17 Gram(s) Oral daily  rosuvastatin 40 milliGRAM(s) Oral at bedtime  senna 2 Tablet(s) Oral at bedtime    MEDICATIONS  (PRN):  acetaminophen     Tablet .. 650 milliGRAM(s) Oral every 6 hours PRN Mild Pain (1 - 3)  dextrose Oral Gel 15 Gram(s) Oral once PRN Blood Glucose LESS THAN 70 milliGRAM(s)/deciliter      Vital Signs Last 24 Hrs  T(C): 36.5 (02 Dec 2024 05:02), Max: 37.2 (01 Dec 2024 09:00)  T(F): 97.7 (02 Dec 2024 05:02), Max: 99 (01 Dec 2024 09:00)  HR: 97 (02 Dec 2024 05:02) (85 - 110)  BP: 128/76 (02 Dec 2024 05:02) (109/73 - 130/81)  BP(mean): --  RR: 18 (02 Dec 2024 05:02) (18 - 24)  SpO2: 99% (02 Dec 2024 05:02) (98% - 100%)    Parameters below as of 02 Dec 2024 05:02  Patient On (Oxygen Delivery Method): room air        REVIEW OF SYSTEMS:  As per HPI, otherwise unremarkable.     PHYSICAL EXAM:  Constitutional/Appearance: Normal, Well-developed  HEENT:   Normal oral mucosa, no drainage or redness, supple neck  Lymphatic: No lymphadenopathy  Cardiovascular: Normal S1 S2, No edema, II/VI CHEIKH  Respiratory: Lungs clear to auscultation, respirations non-labored  Psychiatry: A & O x 3, appropriate affect.   Gastrointestinal:  Soft, Non-tender, no distention  Skin: No rashes, No ecchymoses, No cyanosis	  Neurologic: Non-focal, Alert and oriented x 3  Extremities: Normal range of motion  Vascular: Peripheral pulses palpable 2+ bilaterally (radial)    LABS:  CBC Full  -  ( 01 Dec 2024 09:43 )  WBC Count : 6.63 K/uL  RBC Count : 4.16 M/uL  Hemoglobin : 12.5 g/dL  Hematocrit : 36.7 %  Platelet Count - Automated : 223 K/uL  Mean Cell Volume : 88.2 fl  Mean Cell Hemoglobin : 30.0 pg  Mean Cell Hemoglobin Concentration : 34.1 g/dL  Auto Neutrophil # : x  Auto Lymphocyte # : x  Auto Monocyte # : x  Auto Eosinophil # : x  Auto Basophil # : x  Auto Neutrophil % : x  Auto Lymphocyte % : x  Auto Monocyte % : x  Auto Eosinophil % : x  Auto Basophil % : x      12-01    138  |  104  |  12  ----------------------------<  229[H]  4.2   |  22  |  0.87    Ca    10.1      01 Dec 2024 09:43  Mg     2.0     12-01    IMPRESSION AND PLAN: 74F [place] w CAD, HTN, HL, DM2, GERD here w CP and BECERRA. Now s/p POBA for pLAD ISR and symptomatic bradycardia with syncope now s/p PPM.  -tele  -stop asa, cont plavix, start eliquis  -losartan   -metoprolol per EP  -statin        35 minutes were spent on this encounter for extensive review of medical record details including labs and/or imaging studies and/or adjacent care team and consultant records, as well as review and reconciliation of current medications. Time was spent on obtaining a history, performing physical examination of patient, and answering patient and/or family questions regarding plan of care. Time was also spent discussing plan of care with patient’s other care team members including primary and consulting teams. Time also was spent on documentation of this encounter into the EHR.    ***    Tho Madera MD, MPhil, [facility]  Cardiologist, [facility]  ; [employer] at [facility]  email: [email or web address] on-service contact/call information, go to [email or web address] and use "[email or web address] [email or web address]" to login.  Outpatient Cardiology appointments, call  [phone] to arrange with a colleague; I do not have outpatient Cardiology clinic.

## 2024-12-02 NOTE — PROGRESS NOTE ADULT - PROVIDER SPECIALTY LIST ADULT
CCU
Cardiology
Cardiology
Electrophysiology
FLAVIO
Hospitalist
Hospitalist
Cardiology
FLAVIO
FLAVIO
Infectious Disease
Infectious Disease
Intervent Cardiology
Electrophysiology
FLAVIO
CCU
Cardiology
Electrophysiology
Electrophysiology
Hospitalist
FLAVIO
Hospitalist
Internal Medicine
Endocrinology
Endocrinology
Hospitalist
Hospitalist

## 2024-12-02 NOTE — PROGRESS NOTE ADULT - SUBJECTIVE AND OBJECTIVE BOX
DIABETES FOLLOW UP NOTE: Saw pt earlier today    Chief Complaint: Endocrine consult requested for management of DM    INTERVAL HX: Pt stable, reports tolerating POs with BG levels 100s to 200s in the last 24 hours. No prandial hyperglycemia No hypoglycemia. Denies any CP/palpitations at time of visit. Per primary team, pt might go home today.       Review of Systems:  General: As above  Cardiovascular: No chest pain, palpitations  Respiratory: No SOB, no cough  GI: No nausea, vomiting, abdominal pain  Endocrine: No polyuria, polydipsia or S&Sx of hypoglycemia    Allergies    No Known Allergies    Intolerances      MEDICATIONS:  ezetimibe 10 milliGRAM(s) Oral daily  insulin glargine Injectable (LANTUS) 24 Unit(s) SubCutaneous at bedtime  insulin lispro (ADMELOG) corrective regimen sliding scale   SubCutaneous <User Schedule>  insulin lispro (ADMELOG) corrective regimen sliding scale   SubCutaneous three times a day before meals  insulin lispro Injectable (ADMELOG) 10 Unit(s) SubCutaneous three times a day before meals  rosuvastatin 40 milliGRAM(s) Oral at bedtime        PHYSICAL EXAM:  VITALS: T(C): 36.7 (12-02-24 @ 11:03)  T(F): 98.1 (12-02-24 @ 11:03), Max: 98.4 (12-01-24 @ 20:31)  HR: 93 (12-02-24 @ 11:03) (93 - 104)  BP: 100/60 (12-02-24 @ 11:03) (100/60 - 128/76)  RR:  (18 - 18)  SpO2:  (98% - 100%)  Wt(kg): --  GENERAL: Female laying in bed in NAD  Chest: L chest PPM site with dressing D&I  Abdomen: Soft, nontender, non distended  Extremities: Warm, no edema in all 4 exts  NEURO: A&O X3    LABS:  POCT Blood Glucose.: 206 mg/dL (12-02-24 @ 11:48)  POCT Blood Glucose.: 197 mg/dL (12-02-24 @ 07:40)  POCT Blood Glucose.: 210 mg/dL (12-02-24 @ 02:01)  POCT Blood Glucose.: 216 mg/dL (12-01-24 @ 22:27)  POCT Blood Glucose.: 265 mg/dL (12-01-24 @ 21:21)  POCT Blood Glucose.: 216 mg/dL (12-01-24 @ 17:05)  POCT Blood Glucose.: 144 mg/dL (12-01-24 @ 11:43)  POCT Blood Glucose.: 203 mg/dL (12-01-24 @ 09:15)  POCT Blood Glucose.: 156 mg/dL (12-01-24 @ 07:53)  POCT Blood Glucose.: 237 mg/dL (11-30-24 @ 21:07)  POCT Blood Glucose.: 197 mg/dL (11-30-24 @ 16:54)  POCT Blood Glucose.: 307 mg/dL (11-30-24 @ 11:41)  POCT Blood Glucose.: 155 mg/dL (11-30-24 @ 06:09)  POCT Blood Glucose.: 284 mg/dL (11-29-24 @ 23:05)  POCT Blood Glucose.: 257 mg/dL (11-29-24 @ 21:20)  POCT Blood Glucose.: 360 mg/dL (11-29-24 @ 18:34)  POCT Blood Glucose.: 463 mg/dL (11-29-24 @ 17:09)  POCT Blood Glucose.: 419 mg/dL (11-29-24 @ 17:06)  POCT Blood Glucose.: 449 mg/dL (11-29-24 @ 16:51)                            12.5   6.63  )-----------( 223      ( 01 Dec 2024 09:43 )             36.7       12-01    138  |  104  |  12  ----------------------------<  229[H]  4.2   |  22  |  0.87    eGFR: 70    Ca    10.1      12-01  Mg     2.0     12-01      Thyroid Function Tests:  11-21 @ 01:56 TSH 0.79 FreeT4 -- T3 -- Anti TPO -- Anti Thyroglobulin Ab -- TSI --      A1C with Estimated Average Glucose Result: 7.2 % (11-21-24 @ 01:56)      Estimated Average Glucose: 160 mg/dL (11-21-24 @ 01:56)        11-21 Chol 182 Direct LDL -- LDL calculated 83 HDL 80 Trig 111

## 2024-12-09 ENCOUNTER — APPOINTMENT (OUTPATIENT)
Dept: ELECTROPHYSIOLOGY | Facility: CLINIC | Age: 74
End: 2024-12-09
Payer: MEDICARE

## 2024-12-09 ENCOUNTER — RESULT CHARGE (OUTPATIENT)
Age: 74
End: 2024-12-09

## 2024-12-09 ENCOUNTER — NON-APPOINTMENT (OUTPATIENT)
Age: 74
End: 2024-12-09

## 2024-12-09 VITALS — SYSTOLIC BLOOD PRESSURE: 150 MMHG | OXYGEN SATURATION: 98 % | HEART RATE: 91 BPM | DIASTOLIC BLOOD PRESSURE: 85 MMHG

## 2024-12-09 DIAGNOSIS — I44.2 ATRIOVENTRICULAR BLOCK, COMPLETE: ICD-10-CM

## 2024-12-09 PROCEDURE — 93000 ELECTROCARDIOGRAM COMPLETE: CPT | Mod: 59

## 2024-12-09 PROCEDURE — 93281 PM DEVICE PROGR EVAL MULTI: CPT

## 2024-12-09 PROCEDURE — 99024 POSTOP FOLLOW-UP VISIT: CPT

## 2025-01-14 ENCOUNTER — NON-APPOINTMENT (OUTPATIENT)
Age: 75
End: 2025-01-14

## 2025-01-16 ENCOUNTER — NON-APPOINTMENT (OUTPATIENT)
Age: 75
End: 2025-01-16

## 2025-02-27 RX ORDER — EZETIMIBE 10 MG/1
10 TABLET ORAL DAILY
Refills: 0 | Status: ACTIVE | COMMUNITY

## 2025-02-27 RX ORDER — FOLIC ACID 1 MG/1
1 TABLET ORAL DAILY
Refills: 0 | Status: ACTIVE | COMMUNITY

## 2025-02-27 RX ORDER — METFORMIN HYDROCHLORIDE 1000 MG/1
1000 TABLET, COATED ORAL
Refills: 0 | Status: ACTIVE | COMMUNITY

## 2025-02-27 RX ORDER — SENNOSIDES 8.6 MG/1
TABLET ORAL DAILY
Refills: 0 | Status: ACTIVE | COMMUNITY

## 2025-02-27 RX ORDER — DULAGLUTIDE 0.75 MG/.5ML
0.75 INJECTION, SOLUTION SUBCUTANEOUS WEEKLY
Refills: 0 | Status: ACTIVE | COMMUNITY

## 2025-03-04 ENCOUNTER — APPOINTMENT (OUTPATIENT)
Dept: CARDIOLOGY | Facility: CLINIC | Age: 75
End: 2025-03-04

## 2025-03-05 ENCOUNTER — APPOINTMENT (OUTPATIENT)
Dept: CARDIOLOGY | Facility: CLINIC | Age: 75
End: 2025-03-05

## 2025-03-10 ENCOUNTER — APPOINTMENT (OUTPATIENT)
Dept: ELECTROPHYSIOLOGY | Facility: CLINIC | Age: 75
End: 2025-03-10

## 2025-03-31 ENCOUNTER — INPATIENT (INPATIENT)
Facility: HOSPITAL | Age: 75
LOS: 7 days | Discharge: HOME | DRG: 251 | End: 2025-04-08
Attending: STUDENT IN AN ORGANIZED HEALTH CARE EDUCATION/TRAINING PROGRAM | Admitting: HOSPITALIST
Payer: MEDICARE

## 2025-03-31 VITALS
RESPIRATION RATE: 18 BRPM | HEART RATE: 101 BPM | HEIGHT: 60 IN | SYSTOLIC BLOOD PRESSURE: 182 MMHG | WEIGHT: 125 LBS | TEMPERATURE: 98 F | OXYGEN SATURATION: 96 % | DIASTOLIC BLOOD PRESSURE: 88 MMHG

## 2025-03-31 PROCEDURE — 99291 CRITICAL CARE FIRST HOUR: CPT

## 2025-03-31 PROCEDURE — 99053 MED SERV 10PM-8AM 24 HR FAC: CPT

## 2025-03-31 PROCEDURE — 93010 ELECTROCARDIOGRAM REPORT: CPT

## 2025-04-01 DIAGNOSIS — I48.0 PAROXYSMAL ATRIAL FIBRILLATION: ICD-10-CM

## 2025-04-01 DIAGNOSIS — R07.9 CHEST PAIN, UNSPECIFIED: ICD-10-CM

## 2025-04-01 DIAGNOSIS — E11.9 TYPE 2 DIABETES MELLITUS WITHOUT COMPLICATIONS: ICD-10-CM

## 2025-04-01 DIAGNOSIS — I25.10 ATHEROSCLEROTIC HEART DISEASE OF NATIVE CORONARY ARTERY WITHOUT ANGINA PECTORIS: ICD-10-CM

## 2025-04-01 DIAGNOSIS — I44.2 ATRIOVENTRICULAR BLOCK, COMPLETE: ICD-10-CM

## 2025-04-01 DIAGNOSIS — I10 ESSENTIAL (PRIMARY) HYPERTENSION: ICD-10-CM

## 2025-04-01 LAB
A1C WITH ESTIMATED AVERAGE GLUCOSE RESULT: 7.6 % — HIGH (ref 4–5.6)
ADD ON TEST-SPECIMEN IN LAB: SIGNIFICANT CHANGE UP
ALBUMIN SERPL ELPH-MCNC: 4.2 G/DL — SIGNIFICANT CHANGE UP (ref 3.3–5)
ALP SERPL-CCNC: 76 U/L — SIGNIFICANT CHANGE UP (ref 40–120)
ALT FLD-CCNC: 25 U/L — SIGNIFICANT CHANGE UP (ref 10–45)
ANION GAP SERPL CALC-SCNC: 13 MMOL/L — SIGNIFICANT CHANGE UP (ref 5–17)
APTT BLD: 34.2 SEC — SIGNIFICANT CHANGE UP (ref 24.5–35.6)
AST SERPL-CCNC: 26 U/L — SIGNIFICANT CHANGE UP (ref 10–40)
BASOPHILS # BLD AUTO: 0.03 K/UL — SIGNIFICANT CHANGE UP (ref 0–0.2)
BASOPHILS NFR BLD AUTO: 0.5 % — SIGNIFICANT CHANGE UP (ref 0–2)
BILIRUB SERPL-MCNC: 0.8 MG/DL — SIGNIFICANT CHANGE UP (ref 0.2–1.2)
BUN SERPL-MCNC: 24 MG/DL — HIGH (ref 7–23)
CALCIUM SERPL-MCNC: 10.2 MG/DL — SIGNIFICANT CHANGE UP (ref 8.4–10.5)
CHLORIDE SERPL-SCNC: 102 MMOL/L — SIGNIFICANT CHANGE UP (ref 96–108)
CHOLEST SERPL-MCNC: 159 MG/DL — SIGNIFICANT CHANGE UP
CO2 SERPL-SCNC: 23 MMOL/L — SIGNIFICANT CHANGE UP (ref 22–31)
CREAT SERPL-MCNC: 1.19 MG/DL — SIGNIFICANT CHANGE UP (ref 0.5–1.3)
EGFR: 48 ML/MIN/1.73M2 — LOW
EGFR: 48 ML/MIN/1.73M2 — LOW
EOSINOPHIL # BLD AUTO: 0.33 K/UL — SIGNIFICANT CHANGE UP (ref 0–0.5)
EOSINOPHIL NFR BLD AUTO: 5 % — SIGNIFICANT CHANGE UP (ref 0–6)
ESTIMATED AVERAGE GLUCOSE: 171 MG/DL — HIGH (ref 68–114)
GLUCOSE BLDC GLUCOMTR-MCNC: 283 MG/DL — HIGH (ref 70–99)
GLUCOSE BLDC GLUCOMTR-MCNC: 303 MG/DL — HIGH (ref 70–99)
GLUCOSE SERPL-MCNC: 227 MG/DL — HIGH (ref 70–99)
HCT VFR BLD CALC: 38.9 % — SIGNIFICANT CHANGE UP (ref 34.5–45)
HDLC SERPL-MCNC: 69 MG/DL — SIGNIFICANT CHANGE UP
HGB BLD-MCNC: 13.1 G/DL — SIGNIFICANT CHANGE UP (ref 11.5–15.5)
IMM GRANULOCYTES NFR BLD AUTO: 0.2 % — SIGNIFICANT CHANGE UP (ref 0–0.9)
INR BLD: 1.7 RATIO — HIGH (ref 0.85–1.16)
LDLC SERPL-MCNC: 76 MG/DL — SIGNIFICANT CHANGE UP
LIDOCAIN IGE QN: 60 U/L — SIGNIFICANT CHANGE UP (ref 7–60)
LIPID PNL WITH DIRECT LDL SERPL: 76 MG/DL — SIGNIFICANT CHANGE UP
LYMPHOCYTES # BLD AUTO: 2.23 K/UL — SIGNIFICANT CHANGE UP (ref 1–3.3)
LYMPHOCYTES # BLD AUTO: 33.9 % — SIGNIFICANT CHANGE UP (ref 13–44)
MCHC RBC-ENTMCNC: 29 PG — SIGNIFICANT CHANGE UP (ref 27–34)
MCHC RBC-ENTMCNC: 33.7 G/DL — SIGNIFICANT CHANGE UP (ref 32–36)
MCV RBC AUTO: 86.1 FL — SIGNIFICANT CHANGE UP (ref 80–100)
MONOCYTES # BLD AUTO: 0.47 K/UL — SIGNIFICANT CHANGE UP (ref 0–0.9)
MONOCYTES NFR BLD AUTO: 7.1 % — SIGNIFICANT CHANGE UP (ref 2–14)
NEUTROPHILS # BLD AUTO: 3.51 K/UL — SIGNIFICANT CHANGE UP (ref 1.8–7.4)
NEUTROPHILS NFR BLD AUTO: 53.3 % — SIGNIFICANT CHANGE UP (ref 43–77)
NONHDLC SERPL-MCNC: 90 MG/DL — SIGNIFICANT CHANGE UP
NRBC BLD AUTO-RTO: 0 /100 WBCS — SIGNIFICANT CHANGE UP (ref 0–0)
PLATELET # BLD AUTO: 172 K/UL — SIGNIFICANT CHANGE UP (ref 150–400)
POTASSIUM SERPL-MCNC: 4.7 MMOL/L — SIGNIFICANT CHANGE UP (ref 3.5–5.3)
POTASSIUM SERPL-SCNC: 4.7 MMOL/L — SIGNIFICANT CHANGE UP (ref 3.5–5.3)
PROT SERPL-MCNC: 7.3 G/DL — SIGNIFICANT CHANGE UP (ref 6–8.3)
PROTHROM AB SERPL-ACNC: 19.4 SEC — HIGH (ref 9.9–13.4)
RBC # BLD: 4.52 M/UL — SIGNIFICANT CHANGE UP (ref 3.8–5.2)
RBC # FLD: 13.1 % — SIGNIFICANT CHANGE UP (ref 10.3–14.5)
SODIUM SERPL-SCNC: 138 MMOL/L — SIGNIFICANT CHANGE UP (ref 135–145)
TRIGL SERPL-MCNC: 71 MG/DL — SIGNIFICANT CHANGE UP
TROPONIN T, HIGH SENSITIVITY RESULT: 65 NG/L — HIGH (ref 0–51)
TROPONIN T, HIGH SENSITIVITY RESULT: 66 NG/L — HIGH (ref 0–51)
TROPONIN T, HIGH SENSITIVITY RESULT: 74 NG/L — HIGH (ref 0–51)
WBC # BLD: 6.58 K/UL — SIGNIFICANT CHANGE UP (ref 3.8–10.5)
WBC # FLD AUTO: 6.58 K/UL — SIGNIFICANT CHANGE UP (ref 3.8–10.5)

## 2025-04-01 PROCEDURE — 74174 CTA ABD&PLVS W/CONTRAST: CPT | Mod: 26

## 2025-04-01 PROCEDURE — 93010 ELECTROCARDIOGRAM REPORT: CPT

## 2025-04-01 PROCEDURE — 99222 1ST HOSP IP/OBS MODERATE 55: CPT

## 2025-04-01 PROCEDURE — 71275 CT ANGIOGRAPHY CHEST: CPT | Mod: 26

## 2025-04-01 PROCEDURE — 71046 X-RAY EXAM CHEST 2 VIEWS: CPT | Mod: 26

## 2025-04-01 PROCEDURE — 99223 1ST HOSP IP/OBS HIGH 75: CPT

## 2025-04-01 RX ORDER — ROSUVASTATIN CALCIUM 5 MG/1
40 TABLET, FILM COATED ORAL AT BEDTIME
Refills: 0 | Status: DISCONTINUED | OUTPATIENT
Start: 2025-04-01 | End: 2025-04-08

## 2025-04-01 RX ORDER — MELATONIN 5 MG
3 TABLET ORAL AT BEDTIME
Refills: 0 | Status: DISCONTINUED | OUTPATIENT
Start: 2025-04-01 | End: 2025-04-08

## 2025-04-01 RX ORDER — ISOSORBIDE MONONITRATE 60 MG/1
120 TABLET, EXTENDED RELEASE ORAL DAILY
Refills: 0 | Status: DISCONTINUED | OUTPATIENT
Start: 2025-04-01 | End: 2025-04-08

## 2025-04-01 RX ORDER — ACETAMINOPHEN 500 MG/5ML
1000 LIQUID (ML) ORAL ONCE
Refills: 0 | Status: COMPLETED | OUTPATIENT
Start: 2025-04-01 | End: 2025-04-01

## 2025-04-01 RX ORDER — SODIUM CHLORIDE 9 G/1000ML
1000 INJECTION, SOLUTION INTRAVENOUS
Refills: 0 | Status: DISCONTINUED | OUTPATIENT
Start: 2025-04-01 | End: 2025-04-08

## 2025-04-01 RX ORDER — LOSARTAN POTASSIUM 100 MG/1
25 TABLET, FILM COATED ORAL DAILY
Refills: 0 | Status: DISCONTINUED | OUTPATIENT
Start: 2025-04-01 | End: 2025-04-08

## 2025-04-01 RX ORDER — CLOPIDOGREL BISULFATE 75 MG/1
75 TABLET, FILM COATED ORAL DAILY
Refills: 0 | Status: DISCONTINUED | OUTPATIENT
Start: 2025-04-01 | End: 2025-04-08

## 2025-04-01 RX ORDER — DEXTROSE 50 % IN WATER 50 %
15 SYRINGE (ML) INTRAVENOUS ONCE
Refills: 0 | Status: DISCONTINUED | OUTPATIENT
Start: 2025-04-01 | End: 2025-04-08

## 2025-04-01 RX ORDER — ONDANSETRON HCL/PF 4 MG/2 ML
4 VIAL (ML) INJECTION EVERY 8 HOURS
Refills: 0 | Status: DISCONTINUED | OUTPATIENT
Start: 2025-04-01 | End: 2025-04-08

## 2025-04-01 RX ORDER — INSULIN LISPRO 100 U/ML
INJECTION, SOLUTION INTRAVENOUS; SUBCUTANEOUS
Refills: 0 | Status: DISCONTINUED | OUTPATIENT
Start: 2025-04-01 | End: 2025-04-08

## 2025-04-01 RX ORDER — CARVEDILOL 3.12 MG/1
1 TABLET, FILM COATED ORAL
Refills: 0 | DISCHARGE

## 2025-04-01 RX ORDER — EZETIMIBE 10 MG/1
1 TABLET ORAL
Refills: 0 | DISCHARGE

## 2025-04-01 RX ORDER — ISOSORBIDE MONONITRATE 60 MG/1
1 TABLET, EXTENDED RELEASE ORAL
Refills: 0 | DISCHARGE

## 2025-04-01 RX ORDER — DEXTROSE 50 % IN WATER 50 %
25 SYRINGE (ML) INTRAVENOUS ONCE
Refills: 0 | Status: DISCONTINUED | OUTPATIENT
Start: 2025-04-01 | End: 2025-04-08

## 2025-04-01 RX ORDER — ASPIRIN 325 MG
162 TABLET ORAL ONCE
Refills: 0 | Status: COMPLETED | OUTPATIENT
Start: 2025-04-01 | End: 2025-04-01

## 2025-04-01 RX ORDER — RANOLAZINE 1000 MG/1
500 TABLET, FILM COATED, EXTENDED RELEASE ORAL
Refills: 0 | Status: DISCONTINUED | OUTPATIENT
Start: 2025-04-01 | End: 2025-04-08

## 2025-04-01 RX ORDER — MAGNESIUM, ALUMINUM HYDROXIDE 200-200 MG
30 TABLET,CHEWABLE ORAL EVERY 4 HOURS
Refills: 0 | Status: DISCONTINUED | OUTPATIENT
Start: 2025-04-01 | End: 2025-04-08

## 2025-04-01 RX ORDER — CARVEDILOL 3.12 MG/1
25 TABLET, FILM COATED ORAL EVERY 12 HOURS
Refills: 0 | Status: DISCONTINUED | OUTPATIENT
Start: 2025-04-01 | End: 2025-04-08

## 2025-04-01 RX ORDER — LABETALOL HYDROCHLORIDE 200 MG/1
10 TABLET, FILM COATED ORAL ONCE
Refills: 0 | Status: COMPLETED | OUTPATIENT
Start: 2025-04-01 | End: 2025-04-01

## 2025-04-01 RX ORDER — GLUCAGON 3 MG/1
1 POWDER NASAL ONCE
Refills: 0 | Status: DISCONTINUED | OUTPATIENT
Start: 2025-04-01 | End: 2025-04-08

## 2025-04-01 RX ORDER — APIXABAN 2.5 MG/1
5 TABLET, FILM COATED ORAL
Refills: 0 | Status: DISCONTINUED | OUTPATIENT
Start: 2025-04-01 | End: 2025-04-03

## 2025-04-01 RX ORDER — ACETAMINOPHEN 500 MG/5ML
650 LIQUID (ML) ORAL EVERY 6 HOURS
Refills: 0 | Status: DISCONTINUED | OUTPATIENT
Start: 2025-04-01 | End: 2025-04-08

## 2025-04-01 RX ADMIN — ISOSORBIDE MONONITRATE 120 MILLIGRAM(S): 60 TABLET, EXTENDED RELEASE ORAL at 11:41

## 2025-04-01 RX ADMIN — LOSARTAN POTASSIUM 25 MILLIGRAM(S): 100 TABLET, FILM COATED ORAL at 17:26

## 2025-04-01 RX ADMIN — LABETALOL HYDROCHLORIDE 10 MILLIGRAM(S): 200 TABLET, FILM COATED ORAL at 05:36

## 2025-04-01 RX ADMIN — Medication 4 MILLIGRAM(S): at 18:29

## 2025-04-01 RX ADMIN — CARVEDILOL 25 MILLIGRAM(S): 3.12 TABLET, FILM COATED ORAL at 05:03

## 2025-04-01 RX ADMIN — APIXABAN 5 MILLIGRAM(S): 2.5 TABLET, FILM COATED ORAL at 17:26

## 2025-04-01 RX ADMIN — CARVEDILOL 25 MILLIGRAM(S): 3.12 TABLET, FILM COATED ORAL at 17:26

## 2025-04-01 RX ADMIN — RANOLAZINE 500 MILLIGRAM(S): 1000 TABLET, FILM COATED, EXTENDED RELEASE ORAL at 17:26

## 2025-04-01 RX ADMIN — CLOPIDOGREL BISULFATE 75 MILLIGRAM(S): 75 TABLET, FILM COATED ORAL at 11:41

## 2025-04-01 RX ADMIN — INSULIN LISPRO 6: 100 INJECTION, SOLUTION INTRAVENOUS; SUBCUTANEOUS at 17:37

## 2025-04-01 RX ADMIN — Medication 162 MILLIGRAM(S): at 02:11

## 2025-04-01 RX ADMIN — Medication 650 MILLIGRAM(S): at 18:31

## 2025-04-01 RX ADMIN — ROSUVASTATIN CALCIUM 40 MILLIGRAM(S): 5 TABLET, FILM COATED ORAL at 22:31

## 2025-04-01 NOTE — ED PROVIDER NOTE - CHIEF COMPLAINT
Laparoscopic Cholecystectomy    WHAT YOU SHOULD KNOW:    Cholecystitis is inflammation of your gallbladder  Your gallbladder stores bile, which helps break down the fat that you eat  It also helps remove certain chemicals from your body  You may have a sudden, severe attack (acute cholecystitis) or several mild attacks (chronic cholecystitis)  Laparoscopic cholecystectomy is surgery to remove your gallbladder  During this surgery, small incisions are made in your abdomen  A small scope and special tools are inserted through these incisions  Your gallbladder is removed from it normal location and taken out of your abdomen  The incisions are closed with sutures and a small amount of glue is applied over top incisions to help reinforce the incisions to optimize healing          AFTER YOU LEAVE:  Following discharge from the hospital, you may have some questions about your procedure, your activities or your general condition  These instructions may answer some of your questions and help you adjust during the first few days following your operation  You can expect to be sore and tender mostly around the incisions  This pain should last approximally 5 days and gradually improve daily  Incisions: Your doctor may have chosen to use a type of adhesive glue, to close your incision  The glue is used to cover the incision, assist in closure, and prevent contamination so to optimize healing  This adhesive glue will darken and may appear as a purple film over the incision  Do not pick at the glue, it will peel away on its own within one to two weeks  You may apply ice to the incisions to help with pain  Avoid heat as this my make the glue tacky   It is normal to have some bruising, swelling or mild discoloration around the incision  If increasing redness or pain develops, call our office immediately  You may wash the incision gently with soap and water then pat dry  Do not apply any creams or ointments  Dressings: You do not usually need to keep incisions covered with a dressing  A dressing is only required if you had a drain following your surgery and the drain was removed prior to discharge  If a dressing is required the doctor will discuss the dressing with prior to leaving  You may remove the dressing for showering, but reapply when dry  Bathing: You may shower daily with soap and water the day after the procedure  It is OK to GENTLY wash the incision with soap and water then pat dry  Do NOT soak incision in a tub, pool, or hot tub for 2 weeks  Diet: Eat low-fat foods for 4 to 6 weeks while your body learns to digest fat without a gallbladder  Slowly increase the amount of fat that you eat  We recommend you slowly advance your diet  Try to start with softer bland foods and gradually advance as tolerated  Be sure to consume plenty of water  Avoid alcohol  Activity/Restrictions: The evening following the procedure you should rest as much as possible, sitting, lying or reclining  you should be sure someone remains with you until the next morning  Gradually increase your activity daily  Walking 3-4 daily is good and  stairs are ok  Listen to your body  If you start to get tired or sore then rest    No strenuous activity or exercise for 3-4 weeks  No driving for 5 days or while taking narcotics for pain  Return or work: You may return to work or other activities as soon as your pain is controlled and you feel comfortable  For many people, this is 5 to 7 days after surgery  If your job requires heavy lifting you will need to be on light duty for 2-3 weeks  Follow up appointment: following discharge from the hospital call the office in 1-2 days to set up a post operative appointment to be seen in 2-3 weeks  The phone number and address should be provided in your discharge paper work  Medication: Please take all medications as prescribed   Call your healthcare provider if you think your medicine is not helping or if you have side effects  If you were given a prescription for Percocet, Norco, or Vicodin for pain be sure to eat prior to taking as these medications as they may cause nausea and vomiting on an empty stomach  DO NOT take Tylenol with these medication for a fever or for further pain control as these medications already contain Tylenol in them       Contact your healthcare provider if:   · You have a fever over 101°F (38°C) or chills  · You have pain or nausea that is not relieved by medicine  · You have redness and swelling around your incisions, or blood or pus is leaking from your incisions  · You are constipated or have diarrhea  · Your skin or eyes are yellow, or your bowel movements are pale  · You have questions or concerns about your surgery, condition, or care  Seek care immediately or call 911 if:   · You cannot stop vomiting  · Your bowel movements are black or bloody  · You have pain in your abdomen and it is swollen or hard  · Your arm or leg feels warm, tender, and painful  It may look swollen and red  · You feel lightheaded, short of breath, and have chest pain  · You cough up blood  The patient is a 75y Female complaining of

## 2025-04-01 NOTE — ED ADULT NURSE NOTE - OBJECTIVE STATEMENT
74 y/o female PMH DM, HTN, CAD s/p multiple cardiac stents  (most recently 11/2024), pacemaker, on Eliquis presents to ED from home c/o chest pain x 3 days. Pt states she feels tightness across chest. Denying SOB, fever, chills, n/v/d. Pt is A&O x 4. Breathing even and unlabored. Gross motor and neuro intact. NSR on CM. 20G IV placed in RAC. Safety and comfort provided. Family at bedside.

## 2025-04-01 NOTE — H&P ADULT - HISTORY OF PRESENT ILLNESS
76 yo F with CAD (sp ERI x4), HTN, HLD, T2DM, GERD, CHB (s/p [employer] bi-v ppm 11/2024), pAF, presents with chest pain x 3 days. Pain is tearing-like and radiates to her jaw and is different than her other cp when she had the stents. Pain started at 10pm at rest and lasted for about 20 minutes. denies diaphoresis or palpitations. took aspirin when the chest pain started. denies any chest pain or palpitations currently. no sob, cough, abdominal pain, headachess, LOC, lightheadedness, dizziness,

## 2025-04-01 NOTE — CONSULT NOTE ADULT - SUBJECTIVE AND OBJECTIVE BOX
Cardiology Consult Note   [Please check [email or web address] password: "[email or web address]" for cardiology service schedule and contact information]    HPI:    Ms. Vania Flores is a 76 yo F with CAD (sp ERI x4), HTN, HLD, T2DM, GERD, CHB (s/p Medtronic bi-v ppm 11/2024) presents with cp radiating to jaw x 3 days    pain worsened today, started 4 hour prior to ED, lasting 1 hour. occasional nausea. took asa 162 mg when pain started. currently no in chest pain or nausea    pts cardiologist is Dr. Beltran, last seen 9/2024, at the time doing well asx, had imdur 60 mg qd increased to 120 mg qd    in ed, afebrile, p , bp 180/88 to 177/84, rr 18, 96% on RA  cbc wnl, k 4.7, cr 1.1, lft wnl, trop 65, lipase 60  ekg a-s, v-p  pt given asa 162 mg    home meds:  asa 81 mg qd  plavix 75 mg qd  imdur 120 mg qd  jardiance 25 mg qd  losartan 100 mg qd  ranolazine 500 mg q12h  rosuva 40 mg qd  metop tar 100 mg bid  losartan 100?25 mg?    PAST MEDICAL & SURGICAL HISTORY:  HTN (hypertension)      HLD (hyperlipidemia)      DM (diabetes mellitus)      CAD (coronary artery disease)      Stented coronary artery      COVID-19      No significant past surgical history        FAMILY HISTORY:  Family history of heart attack (Father, Mother)    Family history of CVA (Mother)    FH: diabetes mellitus (Sibling)      SOCIAL HISTORY:  unchanged    MEDICATIONS:                    -------------------------------------------------------------------------------------------  PHYSICAL EXAM:  T(C): 36.7 (04-01-25 @ 01:58), Max: 36.7 (03-31-25 @ 23:14)  HR: 93 (04-01-25 @ 02:55) (89 - 101)  BP: 177/84 (04-01-25 @ 02:55) (177/84 - 184/88)  RR: 17 (04-01-25 @ 02:55) (17 - 18)  SpO2: 99% (04-01-25 @ 02:55) (96% - 99%)  Wt(kg): --  I&O's Summary      GENERAL: NAD  HEAD: Atraumatic, Normocephalic.  ENT: Moist mucous membranes.  NECK: Supple, No JVD.  CHEST/LUNG: Clear to auscultation bilaterally; No rales, rhonchi, wheezing, or rubs. Unlabored respirations.  HEART: Regular rate and rhythm; No murmurs, rubs, or gallops.  ABDOMEN: Bowel sounds present; Soft, Nontender, Nondistended.   EXTREMITIES:  2+ Peripheral Pulses, brisk capillary refill. No clubbing, cyanosis, or edema.    -------------------------------------------------------------------------------------------  LABS:                          13.1   6.58  )-----------( 172      ( 01 Apr 2025 02:03 )             38.9     04-01    138  |  102  |  24[H]  ----------------------------<  227[H]  4.7   |  23  |  1.19    Ca    10.2      01 Apr 2025 02:03    TPro  7.3  /  Alb  4.2  /  TBili  0.8  /  DBili  x   /  AST  26  /  ALT  25  /  AlkPhos  76  04-01    PT/INR - ( 01 Apr 2025 02:03 )   PT: 19.4 sec;   INR: 1.70 ratio         PTT - ( 01 Apr 2025 02:03 )  PTT:34.2 sec  CARDIAC MARKERS ( 01 Apr 2025 02:03 )  65 ng/L / x     / x     / x     / x     / x                                   Cardiology Consult Note   [Please check [email or web address] password: [email or web address]" for cardiology service schedule and contact information]    HPI:    Ms. Vania Flores is a 74 yo F with CAD (sp ERI x4), HTN, HLD, T2DM, GERD, CHB (s/p Medtronic bi-v ppm 11/2024), pAF, presents with cp radiating to jaw x 3 days    pain worsened today, started 4 hour prior to ED, lasting 1 hour. occasional nausea. took asa 162 mg when pain started. currently no in chest pain or nausea    pts cardiologist is Dr. Beltran, last seen 9/2024, at the time doing well asx, had imdur 60 mg qd increased to 120 mg qd    in ed, afebrile, p , bp 180/88 to 177/84, rr 18, 96% on RA  cbc wnl, k 4.7, cr 1.1, lft wnl, trop 65, lipase 60  ekg a-s, v-p  pt given asa 162 mg    home meds:  asa 81 mg qd  plavix 75 mg qd  imdur 120 mg qd  jardiance 25 mg qd  losartan 100 mg qd  ranolazine 500 mg q12h  rosuva 40 mg qd  metop tar 100 mg bid  losartan 100?25 mg?    PAST MEDICAL & SURGICAL HISTORY:  HTN (hypertension)      HLD (hyperlipidemia)      DM (diabetes mellitus)      CAD (coronary artery disease)      Stented coronary artery      COVID-19      No significant past surgical history        FAMILY HISTORY:  Family history of heart attack (Father, Mother)    Family history of CVA (Mother)    FH: diabetes mellitus (Sibling)      SOCIAL HISTORY:  unchanged    MEDICATIONS:                    -------------------------------------------------------------------------------------------  PHYSICAL EXAM:  T(C): 36.7 (04-01-25 @ 01:58), Max: 36.7 (03-31-25 @ 23:14)  HR: 93 (04-01-25 @ 02:55) (89 - 101)  BP: 177/84 (04-01-25 @ 02:55) (177/84 - 184/88)  RR: 17 (04-01-25 @ 02:55) (17 - 18)  SpO2: 99% (04-01-25 @ 02:55) (96% - 99%)  Wt(kg): --  I&O's Summary      GENERAL: NAD  HEAD: Atraumatic, Normocephalic.  ENT: Moist mucous membranes.  NECK: Supple, No JVD.  CHEST/LUNG: Clear to auscultation bilaterally; No rales, rhonchi, wheezing, or rubs. Unlabored respirations.  HEART: Regular rate and rhythm; No murmurs, rubs, or gallops.  ABDOMEN: Bowel sounds present; Soft, Nontender, Nondistended.   EXTREMITIES:  2+ Peripheral Pulses, brisk capillary refill. No clubbing, cyanosis, or edema.    -------------------------------------------------------------------------------------------  LABS:                          13.1   6.58  )-----------( 172      ( 01 Apr 2025 02:03 )             38.9     04-01    138  |  102  |  24[H]  ----------------------------<  227[H]  4.7   |  23  |  1.19    Ca    10.2      01 Apr 2025 02:03    TPro  7.3  /  Alb  4.2  /  TBili  0.8  /  DBili  x   /  AST  26  /  ALT  25  /  AlkPhos  76  04-01    PT/INR - ( 01 Apr 2025 02:03 )   PT: 19.4 sec;   INR: 1.70 ratio         PTT - ( 01 Apr 2025 02:03 )  PTT:34.2 sec  CARDIAC MARKERS ( 01 Apr 2025 02:03 )  65 ng/L / x     / x     / x     / x     / x                                   Cardiology Consult Note   [Please check [email or web address] password: [email or web address]" for cardiology service schedule and contact information]    HPI:    Ms. Vania Flores is a 76 yo F with CAD (sp ERI x4), HTN, HLD, T2DM, GERD, CHB (s/p Medtronic bi-v ppm 11/2024), pAF, presents with cp radiating to jaw x 3 days    pain worsened today, started 4 hour prior to ED, lasting 1 hour. pt described cp as tearing. occasional nausea. took asa 162 mg when pain started. currently no in chest pain or nausea    pts cardiologist is Dr. Beltran, last seen 9/2024, at the time doing well asx, had imdur 60 mg qd increased to 120 mg qd    in ed, afebrile, p , bp 180/88 to 177/84, rr 18, 96% on RA  cbc wnl, k 4.7, cr 1.1, lft wnl, trop 65, lipase 60  ekg a-s, v-p  pt given asa 162 mg    home meds:  eliquis 5 mg bid  plavix 75 mg qd  imdur 120 mg qd  jardiance 25 mg qd  ranolazine 500 mg q12h  rosuva 40 mg qd  losartan 25 mg qd    PAST MEDICAL & SURGICAL HISTORY:  HTN (hypertension)      HLD (hyperlipidemia)      DM (diabetes mellitus)      CAD (coronary artery disease)      Stented coronary artery      COVID-19      No significant past surgical history        FAMILY HISTORY:  Family history of heart attack (Father, Mother)    Family history of CVA (Mother)    FH: diabetes mellitus (Sibling)      SOCIAL HISTORY:  unchanged    MEDICATIONS:                    -------------------------------------------------------------------------------------------  PHYSICAL EXAM:  T(C): 36.7 (04-01-25 @ 01:58), Max: 36.7 (03-31-25 @ 23:14)  HR: 93 (04-01-25 @ 02:55) (89 - 101)  BP: 177/84 (04-01-25 @ 02:55) (177/84 - 184/88)  RR: 17 (04-01-25 @ 02:55) (17 - 18)  SpO2: 99% (04-01-25 @ 02:55) (96% - 99%)  Wt(kg): --  I&O's Summary      GENERAL: NAD  HEAD: Atraumatic, Normocephalic.  ENT: Moist mucous membranes.  NECK: Supple, No JVD.  CHEST/LUNG: Clear to auscultation bilaterally; No rales, rhonchi, wheezing, or rubs. Unlabored respirations.  HEART: Regular rate and rhythm; No murmurs, rubs, or gallops.  ABDOMEN: Bowel sounds present; Soft, Nontender, Nondistended.   EXTREMITIES:  2+ Peripheral Pulses, brisk capillary refill. No clubbing, cyanosis, or edema.    -------------------------------------------------------------------------------------------  LABS:                          13.1   6.58  )-----------( 172      ( 01 Apr 2025 02:03 )             38.9     04-01    138  |  102  |  24[H]  ----------------------------<  227[H]  4.7   |  23  |  1.19    Ca    10.2      01 Apr 2025 02:03    TPro  7.3  /  Alb  4.2  /  TBili  0.8  /  DBili  x   /  AST  26  /  ALT  25  /  AlkPhos  76  04-01    PT/INR - ( 01 Apr 2025 02:03 )   PT: 19.4 sec;   INR: 1.70 ratio         PTT - ( 01 Apr 2025 02:03 )  PTT:34.2 sec  CARDIAC MARKERS ( 01 Apr 2025 02:03 )  65 ng/L / x     / x     / x     / x     / x                                   Cardiology Consult Note   [Please check [email or web address] password: [email or web address]" for cardiology service schedule and contact information]    HPI:    Ms. Vania Flores is a 76 yo F with CAD (sp ERI x4), HTN, HLD, T2DM, GERD, CHB (s/p Medtronic bi-v ppm 11/2024), pAF, presents with cp radiating to jaw x 3 days    pain worsened today, started 4 hour prior to ED, lasting 1 hour. pt described cp as tearing. feels different than prior to her other stents. occasional nausea. took asa 162 mg when pain started. currently no in chest pain or nausea    pts cardiologist is Dr. Beltran, last seen 9/2024, at the time doing well asx, had imdur 60 mg qd increased to 120 mg qd    in ed, afebrile, p , bp 180/88 to 177/84, rr 18, 96% on RA  cbc wnl, k 4.7, cr 1.1, lft wnl, trop 65, lipase 60  ekg a-s, v-p  pt given asa 162 mg    home meds:  eliquis 5 mg bid  plavix 75 mg qd  imdur 120 mg qd  jardiance 25 mg qd  ranolazine 500 mg q12h  rosuva 40 mg qd  losartan 25 mg qd    PAST MEDICAL & SURGICAL HISTORY:  HTN (hypertension)      HLD (hyperlipidemia)      DM (diabetes mellitus)      CAD (coronary artery disease)      Stented coronary artery      COVID-19      No significant past surgical history        FAMILY HISTORY:  Family history of heart attack (Father, Mother)    Family history of CVA (Mother)    FH: diabetes mellitus (Sibling)      SOCIAL HISTORY:  unchanged    MEDICATIONS:                    -------------------------------------------------------------------------------------------  PHYSICAL EXAM:  T(C): 36.7 (04-01-25 @ 01:58), Max: 36.7 (03-31-25 @ 23:14)  HR: 93 (04-01-25 @ 02:55) (89 - 101)  BP: 177/84 (04-01-25 @ 02:55) (177/84 - 184/88)  RR: 17 (04-01-25 @ 02:55) (17 - 18)  SpO2: 99% (04-01-25 @ 02:55) (96% - 99%)  Wt(kg): --  I&O's Summary      GENERAL: NAD  HEAD: Atraumatic, Normocephalic.  ENT: Moist mucous membranes.  NECK: Supple, No JVD.  CHEST/LUNG: Clear to auscultation bilaterally; No rales, rhonchi, wheezing, or rubs. Unlabored respirations.  HEART: Regular rate and rhythm; No murmurs, rubs, or gallops.  ABDOMEN: Bowel sounds present; Soft, Nontender, Nondistended.   EXTREMITIES:  2+ Peripheral Pulses, brisk capillary refill. No clubbing, cyanosis, or edema.    -------------------------------------------------------------------------------------------  LABS:                          13.1   6.58  )-----------( 172      ( 01 Apr 2025 02:03 )             38.9     04-01    138  |  102  |  24[H]  ----------------------------<  227[H]  4.7   |  23  |  1.19    Ca    10.2      01 Apr 2025 02:03    TPro  7.3  /  Alb  4.2  /  TBili  0.8  /  DBili  x   /  AST  26  /  ALT  25  /  AlkPhos  76  04-01    PT/INR - ( 01 Apr 2025 02:03 )   PT: 19.4 sec;   INR: 1.70 ratio         PTT - ( 01 Apr 2025 02:03 )  PTT:34.2 sec  CARDIAC MARKERS ( 01 Apr 2025 02:03 )  65 ng/L / x     / x     / x     / x     / x                                   Cardiology Consult Note   [Please check [email or web address] password: "[email or web address]" for cardiology service schedule and contact information]    HPI:    Ms. Vania Flores is a 76 yo F with CAD (sp ERI x4), HTN, HLD, T2DM, GERD, CHB (s/p Medtronic bi-v ppm 11/2024), pAF, presents with cp radiating to jaw x 3 days    pain worsened today, started 4 hour prior to ED, lasting 1 hour. pt described cp as tearing. feels different than prior to her other stents. happened while at rest. spreads to her jaw bilaterally. occasional nausea. took asa 162 mg when pain started. currently no in chest pain or nausea    pts cardiologist is Dr. Beltran, last seen 9/2024, at the time doing well asx, had imdur 60 mg qd increased to 120 mg qd    in ed, afebrile, p , bp 180/88 to 177/84, rr 18, 96% on RA  cbc wnl, k 4.7, cr 1.1, lft wnl, trop 65, lipase 60  ekg a-s, v-p  pt given asa 162 mg    home meds:  eliquis 5 mg bid  plavix 75 mg qd  imdur 120 mg qd  jardiance 25 mg qd  ranolazine 500 mg q12h  rosuva 40 mg qd  losartan 25 mg qd    PAST MEDICAL & SURGICAL HISTORY:  HTN (hypertension)      HLD (hyperlipidemia)      DM (diabetes mellitus)      CAD (coronary artery disease)      Stented coronary artery      COVID-19      No significant past surgical history        FAMILY HISTORY:  Family history of heart attack (Father, Mother)    Family history of CVA (Mother)    FH: diabetes mellitus (Sibling)      SOCIAL HISTORY:  unchanged    MEDICATIONS:                    -------------------------------------------------------------------------------------------  PHYSICAL EXAM:  T(C): 36.7 (04-01-25 @ 01:58), Max: 36.7 (03-31-25 @ 23:14)  HR: 93 (04-01-25 @ 02:55) (89 - 101)  BP: 177/84 (04-01-25 @ 02:55) (177/84 - 184/88)  RR: 17 (04-01-25 @ 02:55) (17 - 18)  SpO2: 99% (04-01-25 @ 02:55) (96% - 99%)  Wt(kg): --  I&O's Summary      GENERAL: NAD  HEAD: Atraumatic, Normocephalic.  ENT: Moist mucous membranes.  NECK: Supple, No JVD.  CHEST/LUNG: Clear to auscultation bilaterally; No rales, rhonchi, wheezing, or rubs. Unlabored respirations.  HEART: Regular rate and rhythm; No murmurs, rubs, or gallops.  ABDOMEN: Bowel sounds present; Soft, Nontender, Nondistended.   EXTREMITIES:  2+ Peripheral Pulses, brisk capillary refill. No clubbing, cyanosis, or edema.    -------------------------------------------------------------------------------------------  LABS:                          13.1   6.58  )-----------( 172      ( 01 Apr 2025 02:03 )             38.9     04-01    138  |  102  |  24[H]  ----------------------------<  227[H]  4.7   |  23  |  1.19    Ca    10.2      01 Apr 2025 02:03    TPro  7.3  /  Alb  4.2  /  TBili  0.8  /  DBili  x   /  AST  26  /  ALT  25  /  AlkPhos  76  04-01    PT/INR - ( 01 Apr 2025 02:03 )   PT: 19.4 sec;   INR: 1.70 ratio         PTT - ( 01 Apr 2025 02:03 )  PTT:34.2 sec  CARDIAC MARKERS ( 01 Apr 2025 02:03 )  65 ng/L / x     / x     / x     / x     / x

## 2025-04-01 NOTE — ED PROVIDER NOTE - CLINICAL SUMMARY MEDICAL DECISION MAKING FREE TEXT BOX
75-year-old female past medical history of CAD, PCI x 3, HTN, HLD, T2DM, GERD, pacemaker presents to ED with complaints of chest pain that radiates to the jaw for the past 3 days.  Notes that the pain worsened severely today, started 4 hours ago and lasted about an hour.  Also notes occasional nausea.  Reports taking 162 of aspirin when the pain started.  Denies any vomiting, diaphoresis, abdominal pain, shortness of breath.  Currently states that she is not in chest pain and does not have nausea.    Vitals reveal hypertension 184/88, otherwise within normal limits, patient is non tachycardic and afebrile.  Physical exam reveals well-appearing female, not in acute distress, not in active chest pain.  Lung sounds clear to auscultation, heart sounds regular, no abdominal tenderness.  EKG shows paced rhythm.    Differential includes ACS versus GERD versus gastritis.  Given cardiac history, higher concern for cardiac origin.  Will obtain troponins, chest x-ray.  Will treat with aspirin.

## 2025-04-01 NOTE — ED ADULT NURSE NOTE - NSFALLHARMRISKINTERV_ED_ALL_ED
Communicate risk of Fall with Harm to all staff, patient, and family/Reinforce activity limits and safety measures with patient and family/Bed in lowest position, wheels locked, appropriate side rails in place/Call bell, personal items and telephone in reach/Instruct patient to call for assistance before getting out of bed/chair/stretcher/Physically safe environment - no spills, clutter or unnecessary equipment/Purposeful Proactive Rounding

## 2025-04-01 NOTE — ED ADULT NURSE NOTE - ED STAT RN HANDOFF TIME
Armani Rodriguez is a 4 year old male who presents for his well evaluation.    ROS: Concerns include the following; see nurses note.  Review of all other systems is negative.    ..... Eating - Good balance.    ..... Milk intake  - 8 oz per day. Yogurt only.   ..... Vitamin intake - none  ..... Sleeping - from 830 pm to 5-7 am.  ..... School reports - K3 last year.  Online.   ..... Wart to foot.  Seems to have resolved.     Past Medical History:   Diagnosis Date   • Embudo screening tests negative        No past surgical history on file.    Patient Active Problem List   Diagnosis   • Atopic dermatitis        IMMUNIZATION HX:  There has been no reactions to past immunizations.    GROWTH & DEVELOPMENT:      C/o difficulty seeing or hearing - no      Tells a story - yes      Recognizes 3 colors - yes      Counts > 10 - to 20.       Speech intelligible to parents - yes         Draws circles/ crosses - yes, circles.       Hops/jumps/alternates feet on stairs - yes. Balance bike.         EDUCATION:         Diet -     Food pyramid - discussed                   PHYSICAL EXAM    .....GENERAL: alert in no acute distress  .....SKIN: no rash or eruption.    .....HEAD: atraumatic, normocephalic, symmetric  .....EYES: normal conjunctivae, no discharge or erythema, normal red reflex bilaterally  .....EARS: RIGHT TM - normal             LEFT TM - normal  .....NOSE: normal  .....CHRIS: moist mucous membranes, no erythema, no lesions  .....NECK: normal, no tender or swollen lymph nodes  .....TRUNK AND THORAX: symmetrical and no chest wall deformities or tenderness  .....LUNGS: clear to auscultation, no rales, rhonchi or wheezes.  .....HEART: quiet precordium, regular rate and rhythm without murmurs, clicks, or gallops and Capillary refill test <2 secs.  .....ABDOMEN: normal bowel sounds, no masses, HSM  or tenderness  .....GENITALIA: normal circumcised penis, testes in the scrotal sac bilaterally, no hernia detected and within  normal limits  .....EXTREMITIES: Symmetric extremities, normal feet  .....NEUROLOGIC: normal symmetric deep tendon reflexes, +4 muscular tone and strength throughout    A/P:   WELL 4 YEAR OLD.  ..... Vaccine components discussed including risks, benefits and side effects.   ..... DTaP/IPV and MMR/Varivax are given today.  ..... Armani can take Vitamin D3 1000 IU once daily with a fatty food.   [employer] makes a concentrated Vitamin D3 drop that contains anywhere from 400-4000 IU per drop (available online or at Vitamin shop or health food stores).    The bottle is inverted for 5-10 seconds to allow the first drop to come out.    Drop into formula, milk or other fat containing food (fat is needed to absorb Vitamin D3).     ..... [place] Complete hard chewable with iron.  Take 1 tablet once daily.      02:30

## 2025-04-01 NOTE — H&P ADULT - NSHPLABSRESULTS_GEN_ALL_CORE
13.1   6.58  )-----------( 172      ( 01 Apr 2025 02:03 )             38.9     04-01    138  |  102  |  24[H]  ----------------------------<  227[H]  4.7   |  23  |  1.19    Ca    10.2      01 Apr 2025 02:03  Mg     2.2     04-01    TPro  7.3  /  Alb  4.2  /  TBili  0.8  /  DBili  x   /  AST  26  /  ALT  25  /  AlkPhos  76  04-01      trop 65-->74-->66  proBNP 377    ekg v paced    < from: CT Angio Abdomen and Pelvis w/ IV Cont (04.01.25 @ 05:30) >      IMPRESSION:  No aortic dissection or aneurysm. No intramural hematoma.  No acute finding in the chest, abdomen or pelvis.    < end of copied text >

## 2025-04-01 NOTE — ED ADULT NURSE REASSESSMENT NOTE - NS ED NURSE REASSESS COMMENT FT1
Patient returned from CT c/o chest tightness. States she feels as if there is fluid in her throat. Tachypneic and tachycardic. Hypertensive to 200s systolic. MD Nix made aware. Patient given 10mg Labetalol IV per verbal orders. Repeat ekg in progress.

## 2025-04-01 NOTE — H&P ADULT - PROBLEM SELECTOR PLAN 1
CTA negative for dissection  check TTE, NST  c/w home Plavix 75mg daily, imdur 120mg daily, ranolazine 500mg q12hr, losartan 25mg daily  cards follow up appreciated

## 2025-04-01 NOTE — CONSULT NOTE ADULT - ATTENDING COMMENTS
Here with chest pain that is chronic. Was recently uptitrated on her imdur by primary cardiologist and is on ranexa. Troponin mildly elevated and flat. Was significantly elevated at the time of her recent admission for POBA to [facility] in 11/2024. EKG is without ischemic findings. She has been compliant with her medications. Check pharm nuclear stress test and TTE    Davi Regalado MD  Cardiologist, [facility] Cardiology and Cardiovascular Surgery on-service contact/call information, go to [email or web address] and use "[email or web address]" to login.    60 minutes were spent on this encounter for extensive review of medical record details including labs and/or imaging studies and/or adjacent care team and consultant records, as well as review and/or reconciliation of current medications. Time was spent on obtaining a history, performing physical examination of patient, and answering patient and/or family questions regarding plan of care. Time was also spent discussing plan of care with patient’s other care team members including primary and/or consulting teams. Time also was spent on documentation of this encounter into the EHR. Here with chest pain that is chronic. Was recently uptitrated on her imdur by primary cardiologist and is on ranexa. Troponin mildly elevated and flat.  Was significantly elevated at the time of her recent admission for POBA to [facility] in 11/2024. EKG is without ischemic findings. BP was significantly elevated on admission as well and may be contributing. She has been compliant with her medications. Check pharm nuclear stress test and TTE    Davi Regalado MD  Cardiologist, [facility] Cardiology and Cardiovascular Surgery on-service contact/call information, go to [email or web address] and use "[email or web address]" to login.    60 minutes were spent on this encounter for extensive review of medical record details including labs and/or imaging studies and/or adjacent care team and consultant records, as well as review and/or reconciliation of current medications. Time was spent on obtaining a history, performing physical examination of patient, and answering patient and/or family questions regarding plan of care. Time was also spent discussing plan of care with patient’s other care team members including primary and/or consulting teams. Time also was spent on documentation of this encounter into the EHR. Here with chest pain that is chronic. Was recently uptitrated on her imdur by primary cardiologist and is on ranexa. Troponin mildly elevated and flat.  Was significantly elevated at the time of her recent admission for POBA to [facility] in 11/2024. EKG is without ischemic findings. BP was significantly elevated on admission as well and may be contributing. She has been compliant with her medications. Would not treat for ACS. Check pharm nuclear stress test and TTE    Davi Regalado MD  Cardiologist, [facility] Cardiology and Cardiovascular Surgery on-service contact/call information, go to [email or web address] and use "[email or web address]" to login.    60 minutes were spent on this encounter for extensive review of medical record details including labs and/or imaging studies and/or adjacent care team and consultant records, as well as review and/or reconciliation of current medications. Time was spent on obtaining a history, performing physical examination of patient, and answering patient and/or family questions regarding plan of care. Time was also spent discussing plan of care with patient’s other care team members including primary and/or consulting teams. Time also was spent on documentation of this encounter into the EHR.

## 2025-04-01 NOTE — ED PROVIDER NOTE - PROGRESS NOTE DETAILS
Cards recommends CTangio chest to eval for dissection, as well as better BP control with coreg 25mg bid.    Yolis Kendrick DO (PGY1) Patient had an episode of chest pain that resolved. BP elevated to the 210s systolic. Labatelol given. Noted to desaturate to the 80s, however pulse ox is on the same arm as BP cuff. Patient saturating mid-high 90s on RA. Repeat EKG similar rhythm as prior.   Yolis Kendrick DO (PGY1) Patient had an episode of chest pain that resolved. BP elevated to the 210s systolic. Labetalol given. Noted to desaturate to the 80s, however pulse ox is on the same arm as BP cuff. Patient saturating mid-high 90s on RA. Repeat EKG similar rhythm as prior.   Yolis Kendrick DO (PGY1)

## 2025-04-01 NOTE — H&P ADULT - PROBLEM SELECTOR PLAN 2
[facility] 11/20/2024  severe prox LAD ISR s/p balloon angioplasty  LM: minor dz, LAD 99% ISR, LCx patent stents with mild ISR, RCA   [facility] 8/2024  ERI x1 to pLAD with lithotripsy  c/w plavix, statin

## 2025-04-01 NOTE — ED ADULT NURSE NOTE - NSICDXFAMILYHX_GEN_ALL_CORE_FT
FAMILY HISTORY:  Father  Still living? Unknown  Family history of heart attack, Age at diagnosis: Age Unknown    Mother  Still living? Unknown  Family history of CVA, Age at diagnosis: Age Unknown  Family history of heart attack, Age at diagnosis: Age Unknown    Sibling  Still living? Unknown  FH: diabetes mellitus, Age at diagnosis: Age Unknown

## 2025-04-01 NOTE — H&P ADULT - NSICDXPASTMEDICALHX_GEN_ALL_CORE_FT
Emily Porter is a 70 y.o. male    Chief Complaint   Patient presents with    Medicare AW    Hyperlipidemia       HPI:    Hyperlipidemia  This is a chronic problem. The current episode started more than 1 year ago. The problem is controlled. Recent lipid tests were reviewed and are normal. He has no history of diabetes. Pertinent negatives include no myalgias. Current antihyperlipidemic treatment includes statins. The current treatment provides significant improvement of lipids. There are no compliance problems. Risk factors for coronary artery disease include male sex. ROS:    Review of Systems   Musculoskeletal: Negative for myalgias. /76   Pulse 70   Wt 250 lb (113.4 kg)   SpO2 97%   BMI 36.92 kg/m²     Physical Exam:    Physical Exam  Constitutional:       General: He is not in acute distress. Appearance: Normal appearance. He is obese. He is not ill-appearing or toxic-appearing. HENT:      Head: Normocephalic. Neurological:      Mental Status: He is alert. Psychiatric:         Mood and Affect: Mood normal.         Behavior: Behavior normal.         Thought Content: Thought content normal.         Current Outpatient Medications   Medication Sig Dispense Refill    simvastatin (ZOCOR) 40 MG tablet TAKE 1 TABLET BY MOUTH EVERY EVENING 90 tablet 3    triamcinolone (NASACORT) 55 MCG/ACT nasal inhaler 2 sprays by Each Nostril route daily 1 Inhaler 0     No current facility-administered medications for this visit. Assessment:    1. Routine general medical examination at a health care facility    2. Pure hypercholesterolemia    3. Vitamin D deficiency        Plan:    1. Pure hypercholesterolemia   Stable. Continue current medications. - CBC Auto Differential  - Comprehensive Metabolic Panel  - Lipid Panel  - simvastatin (ZOCOR) 40 MG tablet; TAKE 1 TABLET BY MOUTH EVERY EVENING  Dispense: 90 tablet;  Refill: 3      Return in 1 year (on 12/10/2022) for [place] Visit in 1 year. Medicare Annual Wellness Visit  Name: Brianna Noe Date: 12/10/2021   MRN: [id number] Sex: Male   Age: 70 y.o. Ethnicity: Non- / Non    : 1950 Race: White (non-)      Sarah Dukes is here for Medicare AWV and Hyperlipidemia    Screenings for behavioral, psychosocial and functional/safety risks, and cognitive dysfunction are all negative except as indicated below. These results, as well as other patient data from the [address] [facility] form, are documented in Flowsheets linked to this Encounter. No Known Allergies      Prior to Visit Medications    Medication Sig Taking? Authorizing Provider   simvastatin (ZOCOR) 40 MG tablet TAKE 1 TABLET BY MOUTH EVERY EVENING Yes Sandra Ludwig DO   triamcinolone (NASACORT) 55 MCG/ACT nasal inhaler 2 sprays by Each Nostril route daily  Peck Car, APRN - CNP         Past Medical History:   Diagnosis Date    Amblyopia     OS    Cataract     Hyperlipidemia        Past Surgical History:   Procedure Laterality Date    COLONOSCOPY N/A 2019    COLONOSCOPY performed by Kana Gomez MD at [address]         Family History   Problem Relation Age of Onset    Cancer Father        CareTeam (Including outside providers/suppliers regularly involved in providing care):   Patient Care Team:  Ry Larsen DO as PCP - General (Family Medicine)  Ry Larsen DO as PCP - [facility] Empaneled Provider  Herminio Marin as Consulting Physician (Ophthalmology)    Wt Readings from Last 3 Encounters:   12/10/21 250 lb (113.4 kg)   06/10/21 235 lb 6.4 oz (106.8 kg)   20 248 lb 9.6 oz (112.8 kg)     Vitals:    12/10/21 0804   BP: 130/76   Pulse: 70   SpO2: 97%   Weight: 250 lb (113.4 kg)     Body mass index is 36.92 kg/m². Based upon direct observation of the patient, evaluation of cognition reveals recent and remote memory intact.       Patient's complete Health Risk Assessment and screening values have been reviewed and are found in Flowsheets. The following problems were reviewed today and where indicated follow up appointments were made and/or referrals ordered. Positive Risk Factor Screenings with Interventions:            General Health and ACP:  General  In general, how would you say your health is?: Excellent  In the past 7 days, have you experienced any of the following?  New or Increased Pain, New or Increased Fatigue, Loneliness, Social Isolation, Stress or Anger?: None of These  Do you get the social and emotional support that you need?: Yes  Do you have a Living Will?: Yes  Advance Directives     Power of  Living Will ACP-Advance Directive ACP-Power of     Not on File Not on File Not on File Not on File      General Health Risk Interventions:  · n/a    Health Habits/Nutrition:  Health Habits/Nutrition  Do you exercise for at least 20 minutes 2-3 times per week?: (!) No  Have you lost any weight without trying in the past 3 months?: No  Do you eat only one meal per day?: No  Have you seen the dentist within the past year?: Yes     Health Habits/Nutrition Interventions:  · Inadequate physical activity:  [facility]        Personalized Preventive Plan   Current Health Maintenance Status  Immunization History   Administered Date(s) Administered    COVID-19, Carey Peter, PF, 30mcg/0.3mL 02/25/2021, 03/18/2021, 09/27/2021    Hepatitis A/Hepatitis B (Twinrix) 09/20/2018    Influenza, High Dose (Fluzone 65 yrs and older) 12/14/2018, 11/04/2020, 09/23/2021    Influenza, High-dose, Quadv, 65 yrs +, IM (Fluzone) 11/04/2020    Influenza, Quadv, IM, PF (6 mo and older Fluzone, Flulaval, Fluarix, and 3 yrs and older Afluria) 10/10/2019    Pneumococcal Conjugate 13-valent ([id number]) 12/14/2018    Pneumococcal Polysaccharide ([id number]) 06/30/2020    Td (Adult), 5 Lf Tetanus Toxoid, Pf (Tenivac, Decavac) 09/20/2018    Tdap (Boostrix, Adacel) 09/09/2016    Zoster Recombinant (Shingrix) 10/23/2021        Health Maintenance   Topic Date Due    Annual Wellness Visit (AWV)  07/01/2021    Lipid screen  12/08/2021    Shingles Vaccine (2 of 2) 12/18/2021    PSA counseling  08/31/2022    DTaP/Tdap/Td vaccine (3 - Td or Tdap) 09/20/2028    Colon cancer screen colonoscopy  01/21/2029    Flu vaccine  Completed    Pneumococcal 65+ years Vaccine  Completed    COVID-19 Vaccine  Completed    Hepatitis C screen  Completed    Hepatitis A vaccine  Aged Out    Hepatitis B vaccine  Aged Out    Hib vaccine  Aged Out    Meningococcal (ACWY) vaccine  Aged Out     Recommendations for [employer] Due: see orders and patient instructions/AVS.  . Recommended screening schedule for the next 5-10 years is provided to the patient in written form: see Patient True Cunningham was seen today for medicare awv and hyperlipidemia. Diagnoses and all orders for this visit:    Routine general medical examination at a health care facility    Pure hypercholesterolemia  -     CBC Auto Differential; Future  -     Comprehensive Metabolic Panel; Future  -     Lipid Panel; Future  -     Vitamin B12 & Folate; Future  -     TSH with Reflex;  Future  -     simvastatin (ZOCOR) 40 MG tablet; TAKE 1 TABLET BY MOUTH EVERY EVENING    Vitamin D deficiency  -     Vitamin D 25 Hydroxy PAST MEDICAL HISTORY:  CAD (coronary artery disease)     COVID-19     DM (diabetes mellitus)     HLD (hyperlipidemia)     HTN (hypertension)     Stented coronary artery

## 2025-04-01 NOTE — H&P ADULT - NSHPPHYSICALEXAM_GEN_ALL_CORE
GENERAL: NAD  HEAD: Atraumatic, Normocephalic.  ENT: Moist mucous membranes.  NECK: Supple, No JVD.  CHEST/LUNG: Clear to auscultation bilaterally; No rales, rhonchi, wheezing, or rubs. Unlabored respirations.  HEART: Regular rate and rhythm; No murmurs, rubs, or gallops.  ABDOMEN: Bowel sounds present; Soft, Nontender, Nondistended.   EXTREMITIES:  2+ Peripheral Pulses, brisk capillary refill. No clubbing, cyanosis, or edema.

## 2025-04-01 NOTE — ED ADULT NURSE NOTE - NS ED PATIENT SAFETY CONCERN
Clinical Pharmacy Note                                               Warfarin Discharge Recommendations      Pt discharged from [facility] today after admission for headache    INR today:  Recent Labs      02/08/18   0558   INR  2.32*     Interacting medications at discharge: azithromycin and sertraline    INR goal during admission: 2-3    Doses prior to discharge:  Date INR Warfarin Dose   2/4/2018 2.51 2.5 mg   2/5/2018 3.04 0.5 mg   2/6/2018 3.01 1 mg   2/7/2018 2.81 2.5 mg   2/8/2018 2.32 2.5 mg     Recommendations for discharge:   Date Warfarin Dose   2/9/2018 INR       Provider dosing warfarin: [facility] physician    Recheck INR:  2/9/2018 with results to [facility] physician No

## 2025-04-01 NOTE — ED PROVIDER NOTE - ATTENDING CONTRIBUTION TO CARE
Niko Nix MD (Attending Physician):    I performed a history and physical exam of the patient and discussed their management with the resident/fellow/ACP/student. I have reviewed the resident/fellow/ACP/student note and agree with the documented findings and plan of care, except as noted. I have personally performed a substantive portion of the visit including all aspects of the medical decision making. My medical decision making and observations are found below. Please refer to any progress notes for updates on clinical course.    HPI:  75-year-old female with past medical history of CAD, PCI x 3, HTN, HLD, T2DM, GERD, pacemaker presents to ED with complaints of tearing chest pain that radiates to the jaw for the past 3 days.  Notes that the pain worsened severely today, started 4 hours ago and lasted about an hour.  Also notes occasional nausea.  Reports taking 162mg of aspirin when the pain started.  Denies any vomiting, diaphoresis, abdominal pain, shortness of breath.  Currently states that she is not in chest pain and does not have nausea. Had some associated SOB. Denies leg swelling.    PE:  Vitals noted  GEN - NAD, well appearing, A&Ox3  HEAD - NC/AT  EYES - PERRL, EOMI  ENT - Airway patent, mucous membranes moist  PULMONARY - Normal wob, CTA b/l, symmetric breath sounds, no W/R/R, satting 98% on RA  CARDIAC - +S1S2, RRR, no M/G/R, no JVD  CHEST - B/l chest wall NT  ABDOMEN - +BS, ND, NT, soft, no guarding, no rebound, no masses, no rigidity   - No CVA TTP b/l  EXTREMITIES - FROM, symmetric pulses, no edema. B/l calves NT.  SKIN - No rash or bruising  NEUROLOGIC - Alert, speech clear, moving all extremities spontaneously, CN II-XII grossly intact, no pronator drift, normal gait, strength and sensation grossly intact, FTN negative b/l  PSYCH - Normal mood/affect, normal insight    MDM:  DDx includes, but not limited to: ACS, aortic dissection, PTX, PNA, viral syndrome, gastritis, pancreatitis. ekg, cxr, CTA chest/abd/pelvis, labs, BP control with labetalol/hydralazine, keep on cardiac monitor, additional 162mg of ASA if CTs are negative for dissection, possible cardiology consult. Will most likely require admission for echo and stress test.

## 2025-04-01 NOTE — H&P ADULT - ASSESSMENT
76 yo F with CAD (sp ERI x4), HTN, HLD, T2DM, GERD, CHB (s/p Medtronic bi-v ppm 11/2024), pAF presents with chest pain

## 2025-04-01 NOTE — ED PROVIDER NOTE - PHYSICAL EXAMINATION
Yolis Kendrick DO (PGY1)   Physical Exam:    Gen: NAD, AOx3  Head: NCAT  HEENT: EOMI, PEERLA  Lung: CTAB, no respiratory distress, no wheezes/rhonchi/rales B/L  CV: RRR, no murmurs, rubs or gallops  Abd: soft, NT, ND, no guarding, no rigidity, no rebound tenderness, no CVA tenderness   MSK: no visible deformities, ROM normal in UE/LE, no back pain  Neuro: No focal sensory or motor deficits. Sensation intact to light touch all extremities.  Skin: Warm, well perfused, no rash, no leg swelling  Psych: normal affect, calm

## 2025-04-02 ENCOUNTER — RESULT REVIEW (OUTPATIENT)
Age: 75
End: 2025-04-02

## 2025-04-02 LAB
A1C WITH ESTIMATED AVERAGE GLUCOSE RESULT: 7.3 % — HIGH (ref 4–5.6)
ESTIMATED AVERAGE GLUCOSE: 163 MG/DL — HIGH (ref 68–114)
GLUCOSE BLDC GLUCOMTR-MCNC: 151 MG/DL — HIGH (ref 70–99)
GLUCOSE BLDC GLUCOMTR-MCNC: 195 MG/DL — HIGH (ref 70–99)
GLUCOSE BLDC GLUCOMTR-MCNC: 316 MG/DL — HIGH (ref 70–99)
GLUCOSE BLDC GLUCOMTR-MCNC: 328 MG/DL — HIGH (ref 70–99)

## 2025-04-02 PROCEDURE — 93306 TTE W/DOPPLER COMPLETE: CPT | Mod: 26

## 2025-04-02 PROCEDURE — 99233 SBSQ HOSP IP/OBS HIGH 50: CPT

## 2025-04-02 PROCEDURE — 93016 CV STRESS TEST SUPVJ ONLY: CPT

## 2025-04-02 PROCEDURE — 93018 CV STRESS TEST I&R ONLY: CPT

## 2025-04-02 PROCEDURE — 78452 HT MUSCLE IMAGE SPECT MULT: CPT | Mod: 26

## 2025-04-02 RX ORDER — INSULIN GLARGINE-YFGN 100 [IU]/ML
8 INJECTION, SOLUTION SUBCUTANEOUS AT BEDTIME
Refills: 0 | Status: DISCONTINUED | OUTPATIENT
Start: 2025-04-02 | End: 2025-04-07

## 2025-04-02 RX ADMIN — ROSUVASTATIN CALCIUM 40 MILLIGRAM(S): 5 TABLET, FILM COATED ORAL at 21:04

## 2025-04-02 RX ADMIN — INSULIN LISPRO 2: 100 INJECTION, SOLUTION INTRAVENOUS; SUBCUTANEOUS at 10:10

## 2025-04-02 RX ADMIN — INSULIN GLARGINE-YFGN 8 UNIT(S): 100 INJECTION, SOLUTION SUBCUTANEOUS at 22:35

## 2025-04-02 RX ADMIN — APIXABAN 5 MILLIGRAM(S): 2.5 TABLET, FILM COATED ORAL at 05:32

## 2025-04-02 RX ADMIN — INSULIN LISPRO 8: 100 INJECTION, SOLUTION INTRAVENOUS; SUBCUTANEOUS at 14:42

## 2025-04-02 RX ADMIN — CARVEDILOL 25 MILLIGRAM(S): 3.12 TABLET, FILM COATED ORAL at 05:31

## 2025-04-02 RX ADMIN — APIXABAN 5 MILLIGRAM(S): 2.5 TABLET, FILM COATED ORAL at 17:40

## 2025-04-02 RX ADMIN — INSULIN LISPRO 8: 100 INJECTION, SOLUTION INTRAVENOUS; SUBCUTANEOUS at 17:39

## 2025-04-02 RX ADMIN — RANOLAZINE 500 MILLIGRAM(S): 1000 TABLET, FILM COATED, EXTENDED RELEASE ORAL at 17:41

## 2025-04-02 RX ADMIN — LOSARTAN POTASSIUM 25 MILLIGRAM(S): 100 TABLET, FILM COATED ORAL at 05:31

## 2025-04-02 RX ADMIN — RANOLAZINE 500 MILLIGRAM(S): 1000 TABLET, FILM COATED, EXTENDED RELEASE ORAL at 05:31

## 2025-04-02 RX ADMIN — CLOPIDOGREL BISULFATE 75 MILLIGRAM(S): 75 TABLET, FILM COATED ORAL at 10:11

## 2025-04-02 RX ADMIN — CARVEDILOL 25 MILLIGRAM(S): 3.12 TABLET, FILM COATED ORAL at 17:40

## 2025-04-02 RX ADMIN — ISOSORBIDE MONONITRATE 120 MILLIGRAM(S): 60 TABLET, EXTENDED RELEASE ORAL at 10:11

## 2025-04-02 NOTE — PATIENT PROFILE ADULT - FALL HARM RISK - HARM RISK INTERVENTIONS

## 2025-04-02 NOTE — PROGRESS NOTE ADULT - SUBJECTIVE AND OBJECTIVE BOX
[facility] Division of Hospital Medicine  Luis E Lopez DO  Pager (M-F, 8A-5P):  MS Teams PREFERRED        SUBJECTIVE / OVERNIGHT EVENTS:  Pt seen at bedside in no acute distress  States that CP has resolved  Denies any shortness of breath  Endorses difficulty sleeping last night, discussed options for medication at night if necessary, would like to hold off.     MEDICATIONS  (STANDING):  apixaban 5 milliGRAM(s) Oral two times a day  carvedilol 25 milliGRAM(s) Oral every 12 hours  clopidogrel Tablet 75 milliGRAM(s) Oral daily  dextrose 5%. 1000 milliLiter(s) (50 mL/Hr) IV Continuous <Continuous>  dextrose 50% Injectable 25 Gram(s) IV Push once  glucagon  Injectable 1 milliGRAM(s) IntraMuscular once  insulin glargine Injectable (LANTUS) 8 Unit(s) SubCutaneous at bedtime  insulin lispro (ADMELOG) corrective regimen sliding scale   SubCutaneous three times a day before meals  isosorbide   mononitrate ER Tablet (IMDUR) 120 milliGRAM(s) Oral daily  losartan 25 milliGRAM(s) Oral daily  ranolazine 500 milliGRAM(s) Oral two times a day  rosuvastatin 40 milliGRAM(s) Oral at bedtime    MEDICATIONS  (PRN):  acetaminophen     Tablet .. 650 milliGRAM(s) Oral every 6 hours PRN Temp greater or equal to 38C (100.4F), Mild Pain (1 - 3)  aluminum hydroxide/magnesium hydroxide/simethicone Suspension 30 milliLiter(s) Oral every 4 hours PRN Dyspepsia  dextrose Oral Gel 15 Gram(s) Oral once PRN Blood Glucose LESS THAN 70 milliGRAM(s)/deciliter  melatonin 3 milliGRAM(s) Oral at bedtime PRN Insomnia  ondansetron Injectable 4 milliGRAM(s) IV Push every 8 hours PRN Nausea and/or Vomiting      I&O's Summary      PHYSICAL EXAM:  Vital Signs Last 24 Hrs  T(C): 36.4 (02 Apr 2025 11:07), Max: 36.7 (01 Apr 2025 14:43)  T(F): 97.5 (02 Apr 2025 11:07), Max: 98 (01 Apr 2025 14:43)  HR: 87 (02 Apr 2025 11:07) (79 - 95)  BP: 108/67 (02 Apr 2025 11:07) (108/67 - 143/78)  BP(mean): --  RR: 18 (02 Apr 2025 11:07) (18 - 20)  SpO2: 97% (02 Apr 2025 11:07) (96% - 100%)    Parameters below as of 02 Apr 2025 11:07  Patient On (Oxygen Delivery Method): room air      GENERAL: NAD  HEAD: Atraumatic, Normocephalic.  ENT: Moist mucous membranes.  NECK: Supple, No JVD.  CHEST/LUNG: Clear to auscultation bilaterally; No rales, rhonchi, wheezing, or rubs. Unlabored respirations.  HEART: Regular rate and rhythm; No murmurs, rubs, or gallops.  ABDOMEN: Bowel sounds present; Soft, Nontender, Nondistended.   EXTREMITIES:  2+ Peripheral Pulses, brisk capillary refill. No clubbing, cyanosis, or edema.    LABS:                        13.1   6.58  )-----------( 172      ( 01 Apr 2025 02:03 )             38.9     04-01    138  |  102  |  24[H]  ----------------------------<  227[H]  4.7   |  23  |  1.19    Ca    10.2      01 Apr 2025 02:03  Mg     2.2     04-01    TPro  7.3  /  Alb  4.2  /  TBili  0.8  /  DBili  x   /  AST  26  /  ALT  25  /  AlkPhos  76  04-01    PT/INR - ( 01 Apr 2025 02:03 )   PT: 19.4 sec;   INR: 1.70 ratio         PTT - ( 01 Apr 2025 02:03 )  PTT:34.2 sec      Urinalysis Basic - ( 01 Apr 2025 02:03 )    Color: x / Appearance: x / SG: x / pH: x  Gluc: 227 mg/dL / Ketone: x  / Bili: x / Urobili: x   Blood: x / Protein: x / Nitrite: x   Leuk Esterase: x / RBC: x / WBC x   Sq Epi: x / Non Sq Epi: x / Bacteria: x          RADIOLOGY & ADDITIONAL TESTS:  Results Reviewed:   Imaging Personally Reviewed:  Electrocardiogram Personally Reviewed:    COORDINATION OF CARE:  Care Discussed with Consultants/Other Providers [Y/N]:  Prior or Outpatient Records Reviewed [Y/N]:

## 2025-04-02 NOTE — PROGRESS NOTE ADULT - ASSESSMENT
74 yo F with CAD (sp ERI x4), HTN, HLD, T2DM, GERD, CHB (s/p Medtronic bi-v ppm 11/2024), pAF presents with chest pain.

## 2025-04-03 LAB
GLUCOSE BLDC GLUCOMTR-MCNC: 189 MG/DL — HIGH (ref 70–99)
GLUCOSE BLDC GLUCOMTR-MCNC: 269 MG/DL — HIGH (ref 70–99)
GLUCOSE BLDC GLUCOMTR-MCNC: 334 MG/DL — HIGH (ref 70–99)
GLUCOSE BLDC GLUCOMTR-MCNC: 382 MG/DL — HIGH (ref 70–99)

## 2025-04-03 PROCEDURE — 99152 MOD SED SAME PHYS/QHP 5/>YRS: CPT

## 2025-04-03 PROCEDURE — 93454 CORONARY ARTERY ANGIO S&I: CPT | Mod: 26

## 2025-04-03 PROCEDURE — 99233 SBSQ HOSP IP/OBS HIGH 50: CPT

## 2025-04-03 PROCEDURE — 99232 SBSQ HOSP IP/OBS MODERATE 35: CPT

## 2025-04-03 RX ORDER — HEPARIN SODIUM 1000 [USP'U]/ML
INJECTION INTRAVENOUS; SUBCUTANEOUS
Qty: 25000 | Refills: 0 | Status: DISCONTINUED | OUTPATIENT
Start: 2025-04-03 | End: 2025-04-04

## 2025-04-03 RX ORDER — EZETIMIBE 10 MG/1
10 TABLET ORAL AT BEDTIME
Refills: 0 | Status: DISCONTINUED | OUTPATIENT
Start: 2025-04-03 | End: 2025-04-08

## 2025-04-03 RX ORDER — HEPARIN SODIUM 1000 [USP'U]/ML
2000 INJECTION INTRAVENOUS; SUBCUTANEOUS EVERY 6 HOURS
Refills: 0 | Status: DISCONTINUED | OUTPATIENT
Start: 2025-04-03 | End: 2025-04-04

## 2025-04-03 RX ORDER — INSULIN LISPRO 100 U/ML
INJECTION, SOLUTION INTRAVENOUS; SUBCUTANEOUS AT BEDTIME
Refills: 0 | Status: DISCONTINUED | OUTPATIENT
Start: 2025-04-03 | End: 2025-04-08

## 2025-04-03 RX ORDER — ASPIRIN 325 MG
324 TABLET ORAL ONCE
Refills: 0 | Status: COMPLETED | OUTPATIENT
Start: 2025-04-03 | End: 2025-04-03

## 2025-04-03 RX ORDER — HEPARIN SODIUM 1000 [USP'U]/ML
4500 INJECTION INTRAVENOUS; SUBCUTANEOUS EVERY 6 HOURS
Refills: 0 | Status: DISCONTINUED | OUTPATIENT
Start: 2025-04-03 | End: 2025-04-04

## 2025-04-03 RX ORDER — ASPIRIN 325 MG
81 TABLET ORAL DAILY
Refills: 0 | Status: DISCONTINUED | OUTPATIENT
Start: 2025-04-04 | End: 2025-04-08

## 2025-04-03 RX ADMIN — CARVEDILOL 25 MILLIGRAM(S): 3.12 TABLET, FILM COATED ORAL at 05:22

## 2025-04-03 RX ADMIN — HEPARIN SODIUM 1100 UNIT(S)/HR: 1000 INJECTION INTRAVENOUS; SUBCUTANEOUS at 22:44

## 2025-04-03 RX ADMIN — CARVEDILOL 25 MILLIGRAM(S): 3.12 TABLET, FILM COATED ORAL at 18:21

## 2025-04-03 RX ADMIN — LOSARTAN POTASSIUM 25 MILLIGRAM(S): 100 TABLET, FILM COATED ORAL at 05:22

## 2025-04-03 RX ADMIN — CLOPIDOGREL BISULFATE 75 MILLIGRAM(S): 75 TABLET, FILM COATED ORAL at 11:53

## 2025-04-03 RX ADMIN — INSULIN LISPRO 3: 100 INJECTION, SOLUTION INTRAVENOUS; SUBCUTANEOUS at 23:02

## 2025-04-03 RX ADMIN — RANOLAZINE 500 MILLIGRAM(S): 1000 TABLET, FILM COATED, EXTENDED RELEASE ORAL at 05:22

## 2025-04-03 RX ADMIN — RANOLAZINE 500 MILLIGRAM(S): 1000 TABLET, FILM COATED, EXTENDED RELEASE ORAL at 18:21

## 2025-04-03 RX ADMIN — INSULIN LISPRO 6: 100 INJECTION, SOLUTION INTRAVENOUS; SUBCUTANEOUS at 13:32

## 2025-04-03 RX ADMIN — Medication 30 MILLILITER(S): at 18:34

## 2025-04-03 RX ADMIN — Medication 3 MILLIGRAM(S): at 22:57

## 2025-04-03 RX ADMIN — ROSUVASTATIN CALCIUM 40 MILLIGRAM(S): 5 TABLET, FILM COATED ORAL at 22:43

## 2025-04-03 RX ADMIN — APIXABAN 5 MILLIGRAM(S): 2.5 TABLET, FILM COATED ORAL at 05:22

## 2025-04-03 RX ADMIN — ISOSORBIDE MONONITRATE 120 MILLIGRAM(S): 60 TABLET, EXTENDED RELEASE ORAL at 11:54

## 2025-04-03 RX ADMIN — INSULIN GLARGINE-YFGN 8 UNIT(S): 100 INJECTION, SOLUTION SUBCUTANEOUS at 22:55

## 2025-04-03 RX ADMIN — Medication 324 MILLIGRAM(S): at 15:21

## 2025-04-03 RX ADMIN — EZETIMIBE 10 MILLIGRAM(S): 10 TABLET ORAL at 22:43

## 2025-04-03 RX ADMIN — INSULIN LISPRO 8: 100 INJECTION, SOLUTION INTRAVENOUS; SUBCUTANEOUS at 18:22

## 2025-04-03 RX ADMIN — INSULIN LISPRO 2: 100 INJECTION, SOLUTION INTRAVENOUS; SUBCUTANEOUS at 09:09

## 2025-04-03 NOTE — PROGRESS NOTE ADULT - SUBJECTIVE AND OBJECTIVE BOX
INTERVAL EVENTS/SUBJ:  No events. No chest pain or dyspnea.     Home Medications:  carvedilol 25 mg oral tablet: 1 tab(s) orally 2 times a day (01 Apr 2025 10:05)  ezetimibe 10 mg oral tablet: 1 tab(s) orally once a day (01 Apr 2025 10:05)  isosorbide mononitrate 120 mg oral tablet, extended release: 1 tab(s) orally once a day (in the morning) (01 Apr 2025 10:05)  losartan 25 mg oral tablet: 1 tab(s) orally 2 times a day (01 Apr 2025 10:05)  NovoLOG FlexPen 100 units/mL injectable solution: 35 to 40 units injectable 3 times a day(10 mins before meals) (01 Apr 2025 10:08)  rosuvastatin 40 mg oral tablet: 1 tab(s) orally once a day (at bedtime) (01 Apr 2025 10:05)  senna leaf extract oral tablet: 2 tab(s) orally once a day as needed (01 Apr 2025 10:06)  Tresiba FlexTouch 100 units/mL subcutaneous solution: 10 unit(s) subcutaneous once a day (in the morning) (01 Apr 2025 10:05)      MEDICATIONS  (STANDING):  carvedilol 25 milliGRAM(s) Oral every 12 hours  clopidogrel Tablet 75 milliGRAM(s) Oral daily  dextrose 5%. 1000 milliLiter(s) (50 mL/Hr) IV Continuous <Continuous>  dextrose 50% Injectable 25 Gram(s) IV Push once  glucagon  Injectable 1 milliGRAM(s) IntraMuscular once  insulin glargine Injectable (LANTUS) 8 Unit(s) SubCutaneous at bedtime  insulin lispro (ADMELOG) corrective regimen sliding scale   SubCutaneous three times a day before meals  isosorbide   mononitrate ER Tablet (IMDUR) 120 milliGRAM(s) Oral daily  losartan 25 milliGRAM(s) Oral daily  ranolazine 500 milliGRAM(s) Oral two times a day  rosuvastatin 40 milliGRAM(s) Oral at bedtime    MEDICATIONS  (PRN):  acetaminophen     Tablet .. 650 milliGRAM(s) Oral every 6 hours PRN Temp greater or equal to 38C (100.4F), Mild Pain (1 - 3)  aluminum hydroxide/magnesium hydroxide/simethicone Suspension 30 milliLiter(s) Oral every 4 hours PRN Dyspepsia  dextrose Oral Gel 15 Gram(s) Oral once PRN Blood Glucose LESS THAN 70 milliGRAM(s)/deciliter  melatonin 3 milliGRAM(s) Oral at bedtime PRN Insomnia  ondansetron Injectable 4 milliGRAM(s) IV Push every 8 hours PRN Nausea and/or Vomiting      Vital Signs Last 24 Hrs  T(C): 36.7 (03 Apr 2025 11:35), Max: 36.8 (03 Apr 2025 04:46)  T(F): 98.1 (03 Apr 2025 11:35), Max: 98.3 (03 Apr 2025 04:46)  HR: 80 (03 Apr 2025 11:45) (77 - 84)  BP: 130/77 (03 Apr 2025 11:45) (100/60 - 130/77)  BP(mean): --  RR: 18 (03 Apr 2025 11:35) (18 - 18)  SpO2: 96% (03 Apr 2025 11:35) (96% - 99%)    Parameters below as of 03 Apr 2025 11:35  Patient On (Oxygen Delivery Method): room air        PHYSICAL EXAM:  Constitutional/Appearance: Normal  Cardiovascular: Normal S1 S2, RRR, no mrg. No edema  Respiratory: Lungs clear to auscultation, respirations non-labored  Psychiatry: appropriate mood and affect.   Neurologic: Non-focal  Vascular: warm, well perfused    LABS:              Vital Signs Last 24 Hrs  T(C): 36.7 (03 Apr 2025 11:35), Max: 36.8 (03 Apr 2025 04:46)  T(F): 98.1 (03 Apr 2025 11:35), Max: 98.3 (03 Apr 2025 04:46)  HR: 80 (03 Apr 2025 11:45) (77 - 84)  BP: 130/77 (03 Apr 2025 11:45) (100/60 - 130/77)  BP(mean): --  RR: 18 (03 Apr 2025 11:35) (18 - 18)  SpO2: 96% (03 Apr 2025 11:35) (96% - 99%)    Parameters below as of 03 Apr 2025 11:35  Patient On (Oxygen Delivery Method): room air        Tele: sr, artifact    IMPRESSION AND PLAN:  74 yo F with CAD (sp ERI x4 to Cx and LAD), HTN, HLD, T2DM, GERD, CHB (s/p Medtronic bi-v ppm 11/2024), pAF presents with cp radiating to jaw x 3 days with recent uptitration of her antianginals by outpatient cardiologist. She had recent POBA to LAD in 11/2024 and ERI x  with lithotripsy in 8/2024. Troponin mildly elevated but flat. EKG A-sensed, V-paced. TTE with LVEF 50% and no rwma. Pharm SPECT with large sized, severe, partially reversible defects in the mid to distal septum, anteroseptum, and inferoseptum consistent with infarction and severe bolivar-infarct ischemia. CTA negative for dissection. Will plan for cardiac cath this admission but had eliquis this morning. Will discuss with interventional tomorrow after 24h washout.     # Chest Pain  # CAD s/p multiple PCI  # HTN  # HLD  - ASA 324mg x 1 today and start 81mg qd tomorrow AM  - c/w plavix 75mg qd  - c/w coreg 25mg BID  - c/w ranexa BID  - c/w imdur 120mg qd   - c/w crestor 40mg qd   - start zetia 10mg qd  - check lipoprotein A   - check CBC and BMP in tomorrow AM    # Paroxsymal Atrial Fibrillation  - hold eliquis (last dose 4/3 AM)  - start heparin gtt tonight (without bolus) given [id number] of 5  - not on rate control PTA    Davi Regalado MD  Cardiologist, [facility] and Cardiovascular Surgery on-service contact/call information, go to [email or web address] and use "[email or web address]" to login.    62 minutes were spent on this encounter for extensive review of medical record details including labs and/or imaging studies and/or adjacent care team and consultant records, as well as review and/or reconciliation of current medications. Time was spent on obtaining a history, performing physical examination of patient, and answering patient and/or family questions regarding plan of care. Time was also spent discussing plan of care with patient’s other care team members including primary and/or consulting teams. Time also was spent on documentation of this encounter into the EHR.

## 2025-04-03 NOTE — PROGRESS NOTE ADULT - SUBJECTIVE AND OBJECTIVE BOX
[facility] Division of Hospital Medicine  Luis E Lopez DO  Pager (M-F, 8A-5P):  MS Teams PREFERRED        SUBJECTIVE / OVERNIGHT EVENTS:  Pt seen at bedside in no acute distress  States she spoke to cardiology team this AM and states that that she was told they may need to do cath  Denies chest pain or shortness of breath at this time.     MEDICATIONS  (STANDING):  apixaban 5 milliGRAM(s) Oral two times a day  carvedilol 25 milliGRAM(s) Oral every 12 hours  clopidogrel Tablet 75 milliGRAM(s) Oral daily  dextrose 5%. 1000 milliLiter(s) (50 mL/Hr) IV Continuous <Continuous>  dextrose 50% Injectable 25 Gram(s) IV Push once  glucagon  Injectable 1 milliGRAM(s) IntraMuscular once  insulin glargine Injectable (LANTUS) 8 Unit(s) SubCutaneous at bedtime  insulin lispro (ADMELOG) corrective regimen sliding scale   SubCutaneous three times a day before meals  isosorbide   mononitrate ER Tablet (IMDUR) 120 milliGRAM(s) Oral daily  losartan 25 milliGRAM(s) Oral daily  ranolazine 500 milliGRAM(s) Oral two times a day  rosuvastatin 40 milliGRAM(s) Oral at bedtime    MEDICATIONS  (PRN):  acetaminophen     Tablet .. 650 milliGRAM(s) Oral every 6 hours PRN Temp greater or equal to 38C (100.4F), Mild Pain (1 - 3)  aluminum hydroxide/magnesium hydroxide/simethicone Suspension 30 milliLiter(s) Oral every 4 hours PRN Dyspepsia  dextrose Oral Gel 15 Gram(s) Oral once PRN Blood Glucose LESS THAN 70 milliGRAM(s)/deciliter  melatonin 3 milliGRAM(s) Oral at bedtime PRN Insomnia  ondansetron Injectable 4 milliGRAM(s) IV Push every 8 hours PRN Nausea and/or Vomiting      I&O's Summary    02 Apr 2025 07:01  -  03 Apr 2025 07:00  --------------------------------------------------------  IN: 440 mL / OUT: 0 mL / NET: 440 mL    03 Apr 2025 07:01  -  03 Apr 2025 12:48  --------------------------------------------------------  IN: 220 mL / OUT: 0 mL / NET: 220 mL        PHYSICAL EXAM:  Vital Signs Last 24 Hrs  T(C): 36.7 (03 Apr 2025 11:35), Max: 36.8 (03 Apr 2025 04:46)  T(F): 98.1 (03 Apr 2025 11:35), Max: 98.3 (03 Apr 2025 04:46)  HR: 80 (03 Apr 2025 11:45) (77 - 84)  BP: 130/77 (03 Apr 2025 11:45) (100/60 - 130/77)  BP(mean): --  RR: 18 (03 Apr 2025 11:35) (18 - 18)  SpO2: 96% (03 Apr 2025 11:35) (96% - 99%)    Parameters below as of 03 Apr 2025 11:35  Patient On (Oxygen Delivery Method): room air      GENERAL: NAD  HEAD: Atraumatic, Normocephalic.  ENT: Moist mucous membranes.  NECK: Supple, No JVD.  CHEST/LUNG: Clear to auscultation bilaterally; No rales, rhonchi, wheezing, or rubs. Unlabored respirations.  HEART: Regular rate and rhythm; No murmurs, rubs, or gallops.  ABDOMEN: Bowel sounds present; Soft, Nontender, Nondistended.   EXTREMITIES:  2+ Peripheral Pulses, brisk capillary refill. No clubbing, cyanosis, or edema.    LABS:                      RADIOLOGY & ADDITIONAL TESTS:  Results Reviewed:   Imaging Personally Reviewed:  Electrocardiogram Personally Reviewed:    COORDINATION OF CARE:  Care Discussed with Consultants/Other Providers [Y/N]:  Prior or Outpatient Records Reviewed [Y/N]:

## 2025-04-04 LAB
ALBUMIN SERPL ELPH-MCNC: 4.2 G/DL — SIGNIFICANT CHANGE UP (ref 3.3–5)
ALP SERPL-CCNC: 79 U/L — SIGNIFICANT CHANGE UP (ref 40–120)
ALT FLD-CCNC: 22 U/L — SIGNIFICANT CHANGE UP (ref 10–45)
ANION GAP SERPL CALC-SCNC: 14 MMOL/L — SIGNIFICANT CHANGE UP (ref 5–17)
APTT BLD: 90.6 SEC — HIGH (ref 24.5–35.6)
AST SERPL-CCNC: 20 U/L — SIGNIFICANT CHANGE UP (ref 10–40)
BILIRUB SERPL-MCNC: 1 MG/DL — SIGNIFICANT CHANGE UP (ref 0.2–1.2)
BUN SERPL-MCNC: 20 MG/DL — SIGNIFICANT CHANGE UP (ref 7–23)
CALCIUM SERPL-MCNC: 9.8 MG/DL — SIGNIFICANT CHANGE UP (ref 8.4–10.5)
CHLORIDE SERPL-SCNC: 101 MMOL/L — SIGNIFICANT CHANGE UP (ref 96–108)
CO2 SERPL-SCNC: 22 MMOL/L — SIGNIFICANT CHANGE UP (ref 22–31)
CREAT SERPL-MCNC: 1.07 MG/DL — SIGNIFICANT CHANGE UP (ref 0.5–1.3)
EGFR: 54 ML/MIN/1.73M2 — LOW
EGFR: 54 ML/MIN/1.73M2 — LOW
GLUCOSE BLDC GLUCOMTR-MCNC: 155 MG/DL — HIGH (ref 70–99)
GLUCOSE BLDC GLUCOMTR-MCNC: 216 MG/DL — HIGH (ref 70–99)
GLUCOSE BLDC GLUCOMTR-MCNC: 245 MG/DL — HIGH (ref 70–99)
GLUCOSE BLDC GLUCOMTR-MCNC: 277 MG/DL — HIGH (ref 70–99)
GLUCOSE SERPL-MCNC: 241 MG/DL — HIGH (ref 70–99)
HCT VFR BLD CALC: 37.2 % — SIGNIFICANT CHANGE UP (ref 34.5–45)
HCT VFR BLD CALC: 38.9 % — SIGNIFICANT CHANGE UP (ref 34.5–45)
HGB BLD-MCNC: 12.7 G/DL — SIGNIFICANT CHANGE UP (ref 11.5–15.5)
HGB BLD-MCNC: 13.4 G/DL — SIGNIFICANT CHANGE UP (ref 11.5–15.5)
MCHC RBC-ENTMCNC: 29.3 PG — SIGNIFICANT CHANGE UP (ref 27–34)
MCHC RBC-ENTMCNC: 29.3 PG — SIGNIFICANT CHANGE UP (ref 27–34)
MCHC RBC-ENTMCNC: 34.1 G/DL — SIGNIFICANT CHANGE UP (ref 32–36)
MCHC RBC-ENTMCNC: 34.4 G/DL — SIGNIFICANT CHANGE UP (ref 32–36)
MCV RBC AUTO: 84.9 FL — SIGNIFICANT CHANGE UP (ref 80–100)
MCV RBC AUTO: 85.9 FL — SIGNIFICANT CHANGE UP (ref 80–100)
NRBC BLD AUTO-RTO: 0 /100 WBCS — SIGNIFICANT CHANGE UP (ref 0–0)
NRBC BLD AUTO-RTO: 0 /100 WBCS — SIGNIFICANT CHANGE UP (ref 0–0)
PLATELET # BLD AUTO: 144 K/UL — LOW (ref 150–400)
PLATELET # BLD AUTO: 152 K/UL — SIGNIFICANT CHANGE UP (ref 150–400)
POTASSIUM SERPL-MCNC: 3.8 MMOL/L — SIGNIFICANT CHANGE UP (ref 3.5–5.3)
POTASSIUM SERPL-SCNC: 3.8 MMOL/L — SIGNIFICANT CHANGE UP (ref 3.5–5.3)
PROT SERPL-MCNC: 7.5 G/DL — SIGNIFICANT CHANGE UP (ref 6–8.3)
RBC # BLD: 4.33 M/UL — SIGNIFICANT CHANGE UP (ref 3.8–5.2)
RBC # BLD: 4.58 M/UL — SIGNIFICANT CHANGE UP (ref 3.8–5.2)
RBC # FLD: 13.2 % — SIGNIFICANT CHANGE UP (ref 10.3–14.5)
RBC # FLD: 13.3 % — SIGNIFICANT CHANGE UP (ref 10.3–14.5)
SODIUM SERPL-SCNC: 137 MMOL/L — SIGNIFICANT CHANGE UP (ref 135–145)
WBC # BLD: 5.91 K/UL — SIGNIFICANT CHANGE UP (ref 3.8–10.5)
WBC # BLD: 6.07 K/UL — SIGNIFICANT CHANGE UP (ref 3.8–10.5)
WBC # FLD AUTO: 5.91 K/UL — SIGNIFICANT CHANGE UP (ref 3.8–10.5)
WBC # FLD AUTO: 6.07 K/UL — SIGNIFICANT CHANGE UP (ref 3.8–10.5)

## 2025-04-04 PROCEDURE — 99232 SBSQ HOSP IP/OBS MODERATE 35: CPT

## 2025-04-04 PROCEDURE — 99233 SBSQ HOSP IP/OBS HIGH 50: CPT

## 2025-04-04 RX ORDER — HEPARIN SODIUM 1000 [USP'U]/ML
INJECTION INTRAVENOUS; SUBCUTANEOUS
Qty: 25000 | Refills: 0 | Status: DISCONTINUED | OUTPATIENT
Start: 2025-04-04 | End: 2025-04-07

## 2025-04-04 RX ORDER — HEPARIN SODIUM 1000 [USP'U]/ML
4500 INJECTION INTRAVENOUS; SUBCUTANEOUS EVERY 6 HOURS
Refills: 0 | Status: DISCONTINUED | OUTPATIENT
Start: 2025-04-04 | End: 2025-04-07

## 2025-04-04 RX ORDER — HEPARIN SODIUM 1000 [USP'U]/ML
2000 INJECTION INTRAVENOUS; SUBCUTANEOUS EVERY 6 HOURS
Refills: 0 | Status: DISCONTINUED | OUTPATIENT
Start: 2025-04-04 | End: 2025-04-07

## 2025-04-04 RX ADMIN — CARVEDILOL 25 MILLIGRAM(S): 3.12 TABLET, FILM COATED ORAL at 05:09

## 2025-04-04 RX ADMIN — Medication 75 MILLILITER(S): at 14:01

## 2025-04-04 RX ADMIN — EZETIMIBE 10 MILLIGRAM(S): 10 TABLET ORAL at 22:53

## 2025-04-04 RX ADMIN — Medication 81 MILLIGRAM(S): at 11:15

## 2025-04-04 RX ADMIN — RANOLAZINE 500 MILLIGRAM(S): 1000 TABLET, FILM COATED, EXTENDED RELEASE ORAL at 05:10

## 2025-04-04 RX ADMIN — CARVEDILOL 25 MILLIGRAM(S): 3.12 TABLET, FILM COATED ORAL at 17:48

## 2025-04-04 RX ADMIN — LOSARTAN POTASSIUM 25 MILLIGRAM(S): 100 TABLET, FILM COATED ORAL at 05:10

## 2025-04-04 RX ADMIN — ROSUVASTATIN CALCIUM 40 MILLIGRAM(S): 5 TABLET, FILM COATED ORAL at 22:55

## 2025-04-04 RX ADMIN — INSULIN LISPRO 4: 100 INJECTION, SOLUTION INTRAVENOUS; SUBCUTANEOUS at 12:35

## 2025-04-04 RX ADMIN — INSULIN LISPRO 1: 100 INJECTION, SOLUTION INTRAVENOUS; SUBCUTANEOUS at 22:52

## 2025-04-04 RX ADMIN — CLOPIDOGREL BISULFATE 75 MILLIGRAM(S): 75 TABLET, FILM COATED ORAL at 11:15

## 2025-04-04 RX ADMIN — HEPARIN SODIUM 1100 UNIT(S)/HR: 1000 INJECTION INTRAVENOUS; SUBCUTANEOUS at 22:56

## 2025-04-04 RX ADMIN — INSULIN LISPRO 4: 100 INJECTION, SOLUTION INTRAVENOUS; SUBCUTANEOUS at 08:39

## 2025-04-04 RX ADMIN — HEPARIN SODIUM 1100 UNIT(S)/HR: 1000 INJECTION INTRAVENOUS; SUBCUTANEOUS at 08:38

## 2025-04-04 RX ADMIN — ISOSORBIDE MONONITRATE 120 MILLIGRAM(S): 60 TABLET, EXTENDED RELEASE ORAL at 11:16

## 2025-04-04 RX ADMIN — INSULIN LISPRO 2: 100 INJECTION, SOLUTION INTRAVENOUS; SUBCUTANEOUS at 17:49

## 2025-04-04 RX ADMIN — Medication 3 MILLIGRAM(S): at 23:03

## 2025-04-04 RX ADMIN — RANOLAZINE 500 MILLIGRAM(S): 1000 TABLET, FILM COATED, EXTENDED RELEASE ORAL at 17:48

## 2025-04-04 RX ADMIN — INSULIN GLARGINE-YFGN 8 UNIT(S): 100 INJECTION, SOLUTION SUBCUTANEOUS at 22:52

## 2025-04-04 NOTE — PROGRESS NOTE ADULT - SUBJECTIVE AND OBJECTIVE BOX
[facility] Division of Hospital Medicine  Luis E Lopez DO  Pager (M-F, 8A-5P):  MS Teams PREFERRED  Other Times:  [phone]      SUBJECTIVE / OVERNIGHT EVENTS:  Patient seen at bedside in no acute distress  Awaiting cath  Denies chest pain  Labs drawn this AM, pending results.     MEDICATIONS  (STANDING):  aspirin  chewable 81 milliGRAM(s) Oral daily  carvedilol 25 milliGRAM(s) Oral every 12 hours  clopidogrel Tablet 75 milliGRAM(s) Oral daily  dextrose 5%. 1000 milliLiter(s) (50 mL/Hr) IV Continuous <Continuous>  dextrose 50% Injectable 25 Gram(s) IV Push once  ezetimibe 10 milliGRAM(s) Oral at bedtime  glucagon  Injectable 1 milliGRAM(s) IntraMuscular once  heparin  Infusion.  Unit(s)/Hr (11 mL/Hr) IV Continuous <Continuous>  insulin glargine Injectable (LANTUS) 8 Unit(s) SubCutaneous at bedtime  insulin lispro (ADMELOG) corrective regimen sliding scale   SubCutaneous at bedtime  insulin lispro (ADMELOG) corrective regimen sliding scale   SubCutaneous three times a day before meals  isosorbide   mononitrate ER Tablet (IMDUR) 120 milliGRAM(s) Oral daily  losartan 25 milliGRAM(s) Oral daily  ranolazine 500 milliGRAM(s) Oral two times a day  rosuvastatin 40 milliGRAM(s) Oral at bedtime    MEDICATIONS  (PRN):  acetaminophen     Tablet .. 650 milliGRAM(s) Oral every 6 hours PRN Temp greater or equal to 38C (100.4F), Mild Pain (1 - 3)  aluminum hydroxide/magnesium hydroxide/simethicone Suspension 30 milliLiter(s) Oral every 4 hours PRN Dyspepsia  dextrose Oral Gel 15 Gram(s) Oral once PRN Blood Glucose LESS THAN 70 milliGRAM(s)/deciliter  heparin   Injectable 4500 Unit(s) IV Push every 6 hours PRN For aPTT less than 40  heparin   Injectable 2000 Unit(s) IV Push every 6 hours PRN For aPTT between 40 - 57  melatonin 3 milliGRAM(s) Oral at bedtime PRN Insomnia  ondansetron Injectable 4 milliGRAM(s) IV Push every 8 hours PRN Nausea and/or Vomiting      I&O's Summary    03 Apr 2025 07:01  -  04 Apr 2025 07:00  --------------------------------------------------------  IN: 880 mL / OUT: 0 mL / NET: 880 mL        PHYSICAL EXAM:  Vital Signs Last 24 Hrs  T(C): 36.7 (04 Apr 2025 11:18), Max: 37.2 (03 Apr 2025 21:26)  T(F): 98 (04 Apr 2025 11:18), Max: 98.9 (03 Apr 2025 21:26)  HR: 75 (04 Apr 2025 11:18) (75 - 86)  BP: 131/71 (04 Apr 2025 11:18) (131/71 - 168/84)  BP(mean): --  RR: 18 (04 Apr 2025 11:18) (17 - 18)  SpO2: 96% (04 Apr 2025 11:18) (96% - 99%)    Parameters below as of 04 Apr 2025 11:18  Patient On (Oxygen Delivery Method): room air      Vital Signs Last 24 Hrs  T(C): 36.7 (04 Apr 2025 11:18), Max: 37.2 (03 Apr 2025 21:26)  T(F): 98 (04 Apr 2025 11:18), Max: 98.9 (03 Apr 2025 21:26)  HR: 75 (04 Apr 2025 11:18) (75 - 86)  BP: 131/71 (04 Apr 2025 11:18) (131/71 - 168/84)  BP(mean): --  RR: 18 (04 Apr 2025 11:18) (17 - 18)  SpO2: 96% (04 Apr 2025 11:18) (96% - 99%)    Parameters below as of 04 Apr 2025 11:18  Patient On (Oxygen Delivery Method): room air        CONSTITUTIONAL:   RESPIRATORY: lungs are clear to auscultation bilaterally  CARDIOVASCULAR: normal S1 and S2, no murmur; No lower extremity edema  ABDOMEN: Nontender to palpation, normoactive bowel sounds  MUSCULOSKELETAL: no joint swelling or tenderness to palpation  PSYCH: A+O to person, place, and time; affect appropriate    LABS:                        13.4   5.91  )-----------( 144      ( 04 Apr 2025 10:32 )             38.9     04-04    137  |  101  |  20  ----------------------------<  241[H]  3.8   |  22  |  1.07    Ca    9.8      04 Apr 2025 10:32    TPro  7.5  /  Alb  4.2  /  TBili  1.0  /  DBili  x   /  AST  20  /  ALT  22  /  AlkPhos  79  04-04    PTT - ( 04 Apr 2025 07:03 )  PTT:90.6 sec      Urinalysis Basic - ( 04 Apr 2025 10:32 )    Color: x / Appearance: x / SG: x / pH: x  Gluc: 241 mg/dL / Ketone: x  / Bili: x / Urobili: x   Blood: x / Protein: x / Nitrite: x   Leuk Esterase: x / RBC: x / WBC x   Sq Epi: x / Non Sq Epi: x / Bacteria: x          RADIOLOGY & ADDITIONAL TESTS:  Results Reviewed: CBC/CMP personally reviewed by me Hgb 13.4 Cr 1.07  Imaging Personally Reviewed:  Electrocardiogram Personally Reviewed:    COORDINATION OF CARE:  Care Discussed with Consultants/Other Providers [Y/N]:  Prior or Outpatient Records Reviewed [Y/N]:

## 2025-04-04 NOTE — DISCHARGE NOTE PROVIDER - NSDCMRMEDTOKEN_GEN_ALL_CORE_FT
carvedilol 25 mg oral tablet: 1 tab(s) orally 2 times a day  clopidogrel 75 mg oral tablet: 1 tab(s) orally once a day  Eliquis 5 mg oral tablet: 1 tab(s) orally 2 times a day  ezetimibe 10 mg oral tablet: 1 tab(s) orally once a day  isosorbide mononitrate 120 mg oral tablet, extended release: 1 tab(s) orally once a day (in the morning)  losartan 25 mg oral tablet: 1 tab(s) orally 2 times a day  NovoLOG FlexPen 100 units/mL injectable solution: 35 to 40 units injectable 3 times a day(10 mins before meals)  rosuvastatin 40 mg oral tablet: 1 tab(s) orally once a day (at bedtime)  senna leaf extract oral tablet: 2 tab(s) orally once a day as needed  Tresiba FlexTouch 100 units/mL subcutaneous solution: 10 unit(s) subcutaneous once a day (in the morning)   acetaminophen 325 mg oral tablet: 2 tab(s) orally every 6 hours As needed Temp greater or equal to 38C (100.4F), Mild Pain (1 - 3)  Cardiac rehab: 2-3 times a week, for 12 weeks- diagnosis Post PCI, **please note in addition to this, you will also require a formal referral and /or prescription form from your outpatient cardiologist in order to enroll in rehab  1  1 MDD: 1  carvedilol 25 mg oral tablet: 1 tab(s) orally 2 times a day  clopidogrel 75 mg oral tablet: 1 tab(s) orally once a day  Eliquis 5 mg oral tablet: 1 tab(s) orally 2 times a day  ezetimibe 10 mg oral tablet: 1 tab(s) orally once a day  isosorbide mononitrate 120 mg oral tablet, extended release: 1 tab(s) orally once a day (in the morning)  losartan 25 mg oral tablet: 1 tab(s) orally once a day  NovoLOG FlexPen 100 units/mL injectable solution: 35 to 40 units injectable 3 times a day(10 mins before meals)  rosuvastatin 40 mg oral tablet: 1 tab(s) orally once a day (at bedtime)  senna leaf extract oral tablet: 2 tab(s) orally once a day as needed  Tresiba FlexTouch 100 units/mL subcutaneous solution: 10 unit(s) subcutaneous once a day (in the morning)   acetaminophen 325 mg oral tablet: 2 tab(s) orally every 6 hours As needed Temp greater or equal to 38C (100.4F), Mild Pain (1 - 3)  apixaban 5 mg oral tablet: 1 tab(s) orally 2 times a day  aspirin 81 mg oral tablet, chewable: 1 tab(s) orally once a day  carvedilol 25 mg oral tablet: 1 tab(s) orally 2 times a day  clopidogrel 75 mg oral tablet: 1 tab(s) orally once a day  ezetimibe 10 mg oral tablet: 1 tab(s) orally once a day  isosorbide mononitrate 120 mg oral tablet, extended release: 1 tab(s) orally once a day (in the morning)  losartan 25 mg oral tablet: 1 tab(s) orally once a day  melatonin 3 mg oral tablet: 1 tab(s) orally once a day (at bedtime) As needed Insomnia  NovoLOG FlexPen 100 units/mL injectable solution: 35 to 40 units injectable 3 times a day(10 mins before meals)  ranolazine 500 mg oral tablet, extended release: 1 tab(s) orally 2 times a day  rosuvastatin 40 mg oral tablet: 1 tab(s) orally once a day (at bedtime)  senna leaf extract oral tablet: 2 tab(s) orally once a day as needed  Tresiba FlexTouch 100 units/mL subcutaneous solution: 10 unit(s) subcutaneous once a day (in the morning)   acetaminophen 325 mg oral tablet: 2 tab(s) orally every 6 hours As needed Temp greater or equal to 38C (100.4F), Mild Pain (1 - 3)  apixaban 5 mg oral tablet: 1 tab(s) orally 2 times a day  aspirin 81 mg oral tablet, chewable: 1 tab(s) orally once a day  carvedilol 25 mg oral tablet: 1 tab(s) orally 2 times a day  clopidogrel 75 mg oral tablet: 1 tab(s) orally once a day  ezetimibe 10 mg oral tablet: 1 tab(s) orally once a day  isosorbide mononitrate 120 mg oral tablet, extended release: 1 tab(s) orally once a day (in the morning)  losartan 25 mg oral tablet: 1 tab(s) orally once a day  melatonin 3 mg oral tablet: 1 tab(s) orally once a day (at bedtime) As needed Insomnia  NovoLOG FlexPen 100 units/mL injectable solution: 12 unit(s) injectable 3 times a day (before meals) 12 units injectable 3 times a day(10 mins before meals)  ranolazine 500 mg oral tablet, extended release: 1 tab(s) orally 2 times a day  rosuvastatin 40 mg oral tablet: 1 tab(s) orally once a day (at bedtime)  senna leaf extract oral tablet: 2 tab(s) orally once a day as needed  Tresiba FlexTouch 100 units/mL subcutaneous solution: 10 unit(s) subcutaneous once a day (at bedtime)

## 2025-04-04 NOTE — DISCHARGE NOTE PROVIDER - CARE PROVIDER_API CALL
Malu Beltran)  Interventional Cardiology  [address] [place] [address], [address]  [place] [address]  Phone: [phone]  Fax: [phone]  Follow Up Time: 1 week    Verónica Montalvo  Internal Medicine  [address] [place] [address]  Phone: [phone]  Fax: [phone]  Follow Up Time: 1-3 days   Malu Beltran)  Interventional Cardiology  [address] [place] [address], [address]  [place] [address]  Phone: [phone]  Fax: [phone]  Follow Up Time: 1 week    Verónica Montalvo  Internal Medicine  [address] [place] [address]  Phone: [phone]  Fax: [phone]  Follow Up Time: 1-3 days    Paul Garcia  Cardiovascular Disease  [address] [place] [address]  Phone: [phone]  Fax: [phone]  Follow Up Time: 2 weeks

## 2025-04-04 NOTE — DISCHARGE NOTE PROVIDER - NSDCQMAMI_CARD_ALL_CORE
Spoke with REYNALDO Edgar from Ivan Soto MD's office @ [facility] No

## 2025-04-04 NOTE — DISCHARGE NOTE PROVIDER - NSDCFUSCHEDAPPT_GEN_ALL_CORE_FT
[facility] Physician [facility]  ELECTROPH [address]  Scheduled Appointment: 04/16/2025

## 2025-04-04 NOTE — DISCHARGE NOTE PROVIDER - NSDCCPCAREPLAN_GEN_ALL_CORE_FT
PRINCIPAL DISCHARGE DIAGNOSIS  Diagnosis: Chest pain  Assessment and Plan of Treatment: You had a left heart catheterization which showed restensosis of LAD stent.  You then had a percutaneous intervention with drug coated ballon. ( s/p diagnostic LHC 95% ISR pLAD, mild ISR LCx, 100%  RCA via RRA and RFA 4/7, s/p PCI with DCB x 1 to ISR LAD via LFA 4/8)  Continue with Eliquis, Plavix, Aspirin.  After 1 month, STOP aspirin and CONTINUE Eliquis and Plavix.  Continue Rosuvastatin and Ezetimibe.  Recommend a heart healthy diet which includes a variety of fruits and vegetables, whole grains, low fat dairy products, legumes and skinless poultry and fish; food prepared with little or no salt and minimize processed foods  Avoid using NSAIDs  (Aleve, Motrin, ibuprofen, naproxen) while on aspirin and plavix, please utilize Tylenol for pain control (not to exceed 4gm in 24 hours)       PRINCIPAL DISCHARGE DIAGNOSIS  Diagnosis: Chest pain  Assessment and Plan of Treatment: You had a left heart catheterization which showed restensosis of LAD stent.  You then had a percutaneous intervention with drug coated ballon. ( s/p diagnostic LHC 95% ISR pLAD, mild ISR LCx, 100%  RCA via RRA and RFA 4/7, s/p PCI with DCB x 1 to ISR LAD via LFA 4/8)  Continue with Eliquis, Plavix, Aspirin.  After 1 month, STOP aspirin and CONTINUE Eliquis and Plavix.  Continue Rosuvastatin and Ezetimibe.  Recommend a heart healthy diet which includes a variety of fruits and vegetables, whole grains, low fat dairy products, legumes and skinless poultry and fish; food prepared with little or no salt and minimize processed foods  Avoid using NSAIDs  (Aleve, Motrin, ibuprofen, naproxen) while on aspirin and plavix, please utilize Tylenol for pain control (not to exceed 4gm in 24 hours)  Follow up with your cardiologist in 1 week

## 2025-04-04 NOTE — DISCHARGE NOTE PROVIDER - NSDCFUADDAPPT_GEN_ALL_CORE_FT
APPTS ARE READY TO BE MADE: [X] YES    Best Family or Patient Contact (if needed):    Additional Information about above appointments (if needed):    1:   2:   3:     Other comments or requests:    APPTS ARE READY TO BE MADE: [X] YES    Best Family or Patient Contact (if needed):    Additional Information about above appointments (if needed):    1: Interventional cardiology   2:   3:     Other comments or requests:

## 2025-04-04 NOTE — DISCHARGE NOTE PROVIDER - CARE PROVIDERS DIRECT ADDRESSES
[email or web address]

## 2025-04-04 NOTE — DISCHARGE NOTE PROVIDER - HOSPITAL COURSE
HPI:  75-year-old female with a complex medical history including coronary artery disease (CAD) post multiple percutaneous coronary interventions (PCI) to the circumflex (Cx) and left anterior descending (LAD) arteries, hypertension, hyperlipidemia, type 2 diabetes mellitus, gastroesophageal reflux disease, chronic heart block with a Medtronic biventricular pacemaker implanted in November 2024, and paroxysmal atrial fibrillation, presented with chest pain radiating to the jaw, persisting for three days. This episode follows a recent medication uptitration by her outpatient cardiologist. Her cardiological interventions include a plain old balloon angioplasty (POBA) to the LAD in November 2024 and drug-eluting stenting with lithotripsy in August 2024.    Hospital Course:  During this admission, her troponin levels were mildly elevated but stable, and electrocardiogram (EKG) showed atrial sensed, ventricular-paced rhythm. Transthoracic echocardiogram (TTE) indicated a left ventricular ejection fraction (LVEF) of 50% with no regional wall motion abnormalities. However, pharmacological SPECT imaging revealed large-sized, severe and partially reversible defects in the mid to distal septum, anteroseptum, and inferoseptum, consistent with infarction and severe bolivar-infarct ischemia. Computed tomography angiography (CTA) ruled out aortic dissection.    The patient was managed with a comprehensive cardiac medication regimen including aspirin 81mg daily, plavix 75mg daily, coreg 25mg twice daily, losartan 25mg daily, ranexa twice daily, imdur 120mg daily, crestor 40mg daily, and zetia 10mg daily.    S/p LHC-      Important Medication Changes and Reason: see med rec    Active or Pending Issues Requiring Follow-up: PCP, cardiology    Advanced Directives:   [x] Full code  [ ] DNR  [ ] Hospice    Discharge Diagnoses:  chest pain  CAD           HPI:  75-year-old female with a complex medical history including coronary artery disease (CAD) post multiple percutaneous coronary interventions (PCI) to the circumflex (Cx) and left anterior descending (LAD) arteries, hypertension, hyperlipidemia, type 2 diabetes mellitus, gastroesophageal reflux disease, chronic heart block with a Medtronic biventricular pacemaker implanted in November 2024, and paroxysmal atrial fibrillation, presented with chest pain radiating to the jaw, persisting for three days. This episode follows a recent medication uptitration by her outpatient cardiologist. Her cardiological interventions include a plain old balloon angioplasty (POBA) to the LAD in November 2024 and drug-eluting stenting with lithotripsy in August 2024.    Hospital Course:  During this admission, her troponin levels were mildly elevated but stable, and electrocardiogram (EKG) showed atrial sensed, ventricular-paced rhythm. Transthoracic echocardiogram (TTE) indicated a left ventricular ejection fraction (LVEF) of 50% with no regional wall motion abnormalities. However, pharmacological SPECT imaging revealed large-sized, severe and partially reversible defects in the mid to distal septum, anteroseptum, and inferoseptum, consistent with infarction and severe bolivar-infarct ischemia. Computed tomography angiography (CTA) ruled out aortic dissection.    S/p LHC-  s/p diagnostic LHC 95% ISR pLAD, mild ISR LCx, 100%  RCA via RRA and RFA 4/7, s/p PCI with DCB x 1 to ISR LAD via LFA 4/8  Continue DAPT (aspirin 81mg and clopidogrel 75mg) w/ heparin gtt- triple therapy while admitted and for ONE month post discharge (Eliquis, ASA, Plavix) then patient can continue Eliquis and Plavix as per interventionalist Dr. Dimas  Continue rosuvastatin 40mg, ezetimibe 10mg qhs  Avoid using NSAIDs  (Aleve, Motrin, ibuprofen, naproxen) while on DAPT, please utilize Tylenol for pain control (not to exceed 4gm in 24 hours)  Outpatient follow up with cardiology from [facility] Dr. Paul Garcia in 2 weeks upon discharge from the hospital       Important Medication Changes and Reason: see med rec    Active or Pending Issues Requiring Follow-up: PCP, cardiology    Advanced Directives:   [x] Full code  [ ] DNR  [ ] Hospice    Discharge Diagnoses:  chest pain  CAD

## 2025-04-04 NOTE — PRE-OP CHECKLIST - AICD PRESENT
PPM present Niacinamide Pregnancy And Lactation Text: These medications are considered safe during pregnancy.

## 2025-04-04 NOTE — PROGRESS NOTE ADULT - SUBJECTIVE AND OBJECTIVE BOX
INTERVAL EVENTS/SUBJ:  No events. No chest pain or dyspnea.     Home Medications:  carvedilol 25 mg oral tablet: 1 tab(s) orally 2 times a day (01 Apr 2025 10:05)  ezetimibe 10 mg oral tablet: 1 tab(s) orally once a day (01 Apr 2025 10:05)  isosorbide mononitrate 120 mg oral tablet, extended release: 1 tab(s) orally once a day (in the morning) (01 Apr 2025 10:05)  losartan 25 mg oral tablet: 1 tab(s) orally 2 times a day (01 Apr 2025 10:05)  NovoLOG FlexPen 100 units/mL injectable solution: 35 to 40 units injectable 3 times a day(10 mins before meals) (01 Apr 2025 10:08)  rosuvastatin 40 mg oral tablet: 1 tab(s) orally once a day (at bedtime) (01 Apr 2025 10:05)  senna leaf extract oral tablet: 2 tab(s) orally once a day as needed (01 Apr 2025 10:06)  Tresiba FlexTouch 100 units/mL subcutaneous solution: 10 unit(s) subcutaneous once a day (in the morning) (01 Apr 2025 10:05)      MEDICATIONS  (STANDING):  aspirin  chewable 81 milliGRAM(s) Oral daily  carvedilol 25 milliGRAM(s) Oral every 12 hours  clopidogrel Tablet 75 milliGRAM(s) Oral daily  dextrose 5%. 1000 milliLiter(s) (50 mL/Hr) IV Continuous <Continuous>  dextrose 50% Injectable 25 Gram(s) IV Push once  ezetimibe 10 milliGRAM(s) Oral at bedtime  glucagon  Injectable 1 milliGRAM(s) IntraMuscular once  heparin  Infusion.  Unit(s)/Hr (11 mL/Hr) IV Continuous <Continuous>  insulin glargine Injectable (LANTUS) 8 Unit(s) SubCutaneous at bedtime  insulin lispro (ADMELOG) corrective regimen sliding scale   SubCutaneous at bedtime  insulin lispro (ADMELOG) corrective regimen sliding scale   SubCutaneous three times a day before meals  isosorbide   mononitrate ER Tablet (IMDUR) 120 milliGRAM(s) Oral daily  losartan 25 milliGRAM(s) Oral daily  ranolazine 500 milliGRAM(s) Oral two times a day  rosuvastatin 40 milliGRAM(s) Oral at bedtime    MEDICATIONS  (PRN):  acetaminophen     Tablet .. 650 milliGRAM(s) Oral every 6 hours PRN Temp greater or equal to 38C (100.4F), Mild Pain (1 - 3)  aluminum hydroxide/magnesium hydroxide/simethicone Suspension 30 milliLiter(s) Oral every 4 hours PRN Dyspepsia  dextrose Oral Gel 15 Gram(s) Oral once PRN Blood Glucose LESS THAN 70 milliGRAM(s)/deciliter  heparin   Injectable 4500 Unit(s) IV Push every 6 hours PRN For aPTT less than 40  heparin   Injectable 2000 Unit(s) IV Push every 6 hours PRN For aPTT between 40 - 57  melatonin 3 milliGRAM(s) Oral at bedtime PRN Insomnia  ondansetron Injectable 4 milliGRAM(s) IV Push every 8 hours PRN Nausea and/or Vomiting      Vital Signs Last 24 Hrs  T(C): 36.7 (04 Apr 2025 11:18), Max: 37.2 (03 Apr 2025 21:26)  T(F): 98 (04 Apr 2025 11:18), Max: 98.9 (03 Apr 2025 21:26)  HR: 75 (04 Apr 2025 11:18) (75 - 86)  BP: 131/71 (04 Apr 2025 11:18) (131/71 - 168/84)  BP(mean): --  RR: 18 (04 Apr 2025 11:18) (17 - 18)  SpO2: 96% (04 Apr 2025 11:18) (96% - 99%)    Parameters below as of 04 Apr 2025 11:18  Patient On (Oxygen Delivery Method): room air        PHYSICAL EXAM:  Constitutional/Appearance: Normal  Cardiovascular: Normal S1 S2, RRR, no mrg. No edema  Respiratory: Lungs clear to auscultation, respirations non-labored  Psychiatry: appropriate mood and affect.   Neurologic: Non-focal  Vascular: warm, well perfused    LABS:  CBC Full  -  ( 04 Apr 2025 10:32 )  WBC Count : 5.91 K/uL  RBC Count : 4.58 M/uL  Hemoglobin : 13.4 g/dL  Hematocrit : 38.9 %  Platelet Count - Automated : 144 K/uL  Mean Cell Volume : 84.9 fl  Mean Cell Hemoglobin : 29.3 pg  Mean Cell Hemoglobin Concentration : 34.4 g/dL  Auto Neutrophil # : x  Auto Lymphocyte # : x  Auto Monocyte # : x  Auto Eosinophil # : x  Auto Basophil # : x  Auto Neutrophil % : x  Auto Lymphocyte % : x  Auto Monocyte % : x  Auto Eosinophil % : x  Auto Basophil % : x      04-04    137  |  101  |  20  ----------------------------<  241[H]  3.8   |  22  |  1.07    Ca    9.8      04 Apr 2025 10:32    TPro  7.5  /  Alb  4.2  /  TBili  1.0  /  DBili  x   /  AST  20  /  ALT  22  /  AlkPhos  79  04-04      Vital Signs Last 24 Hrs  T(C): 36.7 (04 Apr 2025 11:18), Max: 37.2 (03 Apr 2025 21:26)  T(F): 98 (04 Apr 2025 11:18), Max: 98.9 (03 Apr 2025 21:26)  HR: 75 (04 Apr 2025 11:18) (75 - 86)  BP: 131/71 (04 Apr 2025 11:18) (131/71 - 168/84)  BP(mean): --  RR: 18 (04 Apr 2025 11:18) (17 - 18)  SpO2: 96% (04 Apr 2025 11:18) (96% - 99%)    Parameters below as of 04 Apr 2025 11:18  Patient On (Oxygen Delivery Method): room air        Tele: paced    IMPRESSION AND PLAN:    76 yo F with CAD (sp ERI x4 to Cx and LAD), HTN, HLD, T2DM, GERD, CHB (s/p Medtronic bi-v ppm 11/2024), pAF presents with cp radiating to jaw x 3 days with recent uptitration of her antianginals by outpatient cardiologist. She had recent POBA to LAD in 11/2024 and ERI x  with lithotripsy in 8/2024. Troponin mildly elevated but flat. EKG A-sensed, V-paced. TTE with LVEF 50% and no rwma. Pharm SPECT with large sized, severe, partially reversible defects in the mid to distal septum, anteroseptum, and inferoseptum consistent with infarction and severe bolivar-infarct ischemia. CTA negative for dissection. Will plan for cardiac cath today, last dose of eliquis was 4/3 at 5am.     # Chest Pain  # CAD s/p multiple PCI  # HTN  # HLD  - c/w ASA 81mg qd  - c/w plavix 75mg qd  - c/w coreg 25mg BID  - c/w losartan 25mg qd  - c/w ranexa BID  - c/w imdur 120mg qd   - c/w crestor 40mg qd   - c/w zetia 10mg qd    # Paroxsymal Atrial Fibrillation  - hold eliquis (last dose 4/3 AM)  - c/w heparin gtt for now given [id number] of 5  - not on rate control PTA      Davi Regalado MD  Cardiologist, [facility] Cardiology and Cardiovascular Surgery on-service contact/call information, go to [email or web address] and use "[email or web address]" to login.    36 minutes were spent on this encounter for extensive review of medical record details including labs and/or imaging studies and/or adjacent care team and consultant records, as well as review and/or reconciliation of current medications. Time was spent on obtaining a history, performing physical examination of patient, and answering patient and/or family questions regarding plan of care. Time was also spent discussing plan of care with patient’s other care team members including primary and/or consulting teams. Time also was spent on documentation of this encounter into the EHR.

## 2025-04-04 NOTE — PROGRESS NOTE ADULT - ASSESSMENT
76 yo F with CAD (sp REI x4), HTN, HLD, T2DM, GERD, CHB (s/p Medtronic bi-v ppm 11/2024), pAF presents with chest pain.

## 2025-04-04 NOTE — DISCHARGE NOTE PROVIDER - PROVIDER TOKENS
PROVIDER:[TOKEN:[[id number]],FOLLOWUP:[1 week]],PROVIDER:[TOKEN:[[id number]],FOLLOWUP:[1-3 days]] PROVIDER:[TOKEN:[[id number]],FOLLOWUP:[1 week]],PROVIDER:[TOKEN:[[id number]],FOLLOWUP:[1-3 days]],PROVIDER:[TOKEN:[[id number]],FOLLOWUP:[2 weeks]]

## 2025-04-04 NOTE — DISCHARGE NOTE PROVIDER - ATTENDING DISCHARGE PHYSICAL EXAMINATION:
PHYSICAL EXAM:    Vital Signs Last 24 Hrs  T(C): 36.4 (08 Apr 2025 11:30), Max: 36.9 (08 Apr 2025 04:30)  T(F): 97.6 (08 Apr 2025 11:30), Max: 98.4 (08 Apr 2025 04:30)  HR: 89 (08 Apr 2025 11:30) (78 - 104)  BP: 147/72 (08 Apr 2025 11:30) (69/48 - 209/110)  BP(mean): 76 (07 Apr 2025 17:15) (54 - 147)  RR: 18 (08 Apr 2025 11:30) (16 - 18)  SpO2: 96% (08 Apr 2025 11:30) (96% - 100%)    General: No acute distress.  HEENT: No scleral icterus or injection  Neck: Supple.  Full ROM.  Heart: RRR.  Normal S1 and S2.  Lungs: CTAB. No wheezes, crackles, or rhonchi.    Abdomen: BS+, soft, NT/ND.  Skin: Warm and dry.  No rashes.  Extremities: No edema, clubbing, or cyanosis. L groin with some ecchymosis, no hematoma. Mild tenderness   Musculoskeletal: No deformities.   Neuro: A&Ox3.  Moving all extremities

## 2025-04-05 LAB
APTT BLD: 119.8 SEC — HIGH (ref 24.5–35.6)
APTT BLD: 178.6 SEC — CRITICAL HIGH (ref 24.5–35.6)
APTT BLD: >200 SEC — CRITICAL HIGH (ref 24.5–35.6)
GLUCOSE BLDC GLUCOMTR-MCNC: 190 MG/DL — HIGH (ref 70–99)
GLUCOSE BLDC GLUCOMTR-MCNC: 251 MG/DL — HIGH (ref 70–99)
GLUCOSE BLDC GLUCOMTR-MCNC: 305 MG/DL — HIGH (ref 70–99)
GLUCOSE BLDC GLUCOMTR-MCNC: 307 MG/DL — HIGH (ref 70–99)
HCT VFR BLD CALC: 37.4 % — SIGNIFICANT CHANGE UP (ref 34.5–45)
HGB BLD-MCNC: 12.7 G/DL — SIGNIFICANT CHANGE UP (ref 11.5–15.5)
MCHC RBC-ENTMCNC: 29.3 PG — SIGNIFICANT CHANGE UP (ref 27–34)
MCHC RBC-ENTMCNC: 34 G/DL — SIGNIFICANT CHANGE UP (ref 32–36)
MCV RBC AUTO: 86.4 FL — SIGNIFICANT CHANGE UP (ref 80–100)
NRBC BLD AUTO-RTO: 0 /100 WBCS — SIGNIFICANT CHANGE UP (ref 0–0)
PLATELET # BLD AUTO: 156 K/UL — SIGNIFICANT CHANGE UP (ref 150–400)
RBC # BLD: 4.33 M/UL — SIGNIFICANT CHANGE UP (ref 3.8–5.2)
RBC # FLD: 13.3 % — SIGNIFICANT CHANGE UP (ref 10.3–14.5)
WBC # BLD: 6.21 K/UL — SIGNIFICANT CHANGE UP (ref 3.8–10.5)
WBC # FLD AUTO: 6.21 K/UL — SIGNIFICANT CHANGE UP (ref 3.8–10.5)

## 2025-04-05 PROCEDURE — 99233 SBSQ HOSP IP/OBS HIGH 50: CPT

## 2025-04-05 RX ADMIN — INSULIN LISPRO 2: 100 INJECTION, SOLUTION INTRAVENOUS; SUBCUTANEOUS at 08:29

## 2025-04-05 RX ADMIN — CARVEDILOL 25 MILLIGRAM(S): 3.12 TABLET, FILM COATED ORAL at 06:16

## 2025-04-05 RX ADMIN — HEPARIN SODIUM 900 UNIT(S)/HR: 1000 INJECTION INTRAVENOUS; SUBCUTANEOUS at 11:46

## 2025-04-05 RX ADMIN — CLOPIDOGREL BISULFATE 75 MILLIGRAM(S): 75 TABLET, FILM COATED ORAL at 11:16

## 2025-04-05 RX ADMIN — Medication 81 MILLIGRAM(S): at 11:16

## 2025-04-05 RX ADMIN — ROSUVASTATIN CALCIUM 40 MILLIGRAM(S): 5 TABLET, FILM COATED ORAL at 22:15

## 2025-04-05 RX ADMIN — RANOLAZINE 500 MILLIGRAM(S): 1000 TABLET, FILM COATED, EXTENDED RELEASE ORAL at 06:16

## 2025-04-05 RX ADMIN — CARVEDILOL 25 MILLIGRAM(S): 3.12 TABLET, FILM COATED ORAL at 17:02

## 2025-04-05 RX ADMIN — HEPARIN SODIUM 0 UNIT(S)/HR: 1000 INJECTION INTRAVENOUS; SUBCUTANEOUS at 10:44

## 2025-04-05 RX ADMIN — RANOLAZINE 500 MILLIGRAM(S): 1000 TABLET, FILM COATED, EXTENDED RELEASE ORAL at 17:02

## 2025-04-05 RX ADMIN — INSULIN GLARGINE-YFGN 8 UNIT(S): 100 INJECTION, SOLUTION SUBCUTANEOUS at 22:15

## 2025-04-05 RX ADMIN — HEPARIN SODIUM 800 UNIT(S)/HR: 1000 INJECTION INTRAVENOUS; SUBCUTANEOUS at 18:51

## 2025-04-05 RX ADMIN — LOSARTAN POTASSIUM 25 MILLIGRAM(S): 100 TABLET, FILM COATED ORAL at 06:16

## 2025-04-05 RX ADMIN — INSULIN LISPRO 8: 100 INJECTION, SOLUTION INTRAVENOUS; SUBCUTANEOUS at 12:33

## 2025-04-05 RX ADMIN — EZETIMIBE 10 MILLIGRAM(S): 10 TABLET ORAL at 22:15

## 2025-04-05 RX ADMIN — INSULIN LISPRO 2: 100 INJECTION, SOLUTION INTRAVENOUS; SUBCUTANEOUS at 22:10

## 2025-04-05 RX ADMIN — INSULIN LISPRO 6: 100 INJECTION, SOLUTION INTRAVENOUS; SUBCUTANEOUS at 18:06

## 2025-04-05 RX ADMIN — ISOSORBIDE MONONITRATE 120 MILLIGRAM(S): 60 TABLET, EXTENDED RELEASE ORAL at 11:16

## 2025-04-05 NOTE — PROGRESS NOTE ADULT - SUBJECTIVE AND OBJECTIVE BOX
[facility] Division of Hospital Medicine  Luis E Lopez DO  Pager (M-F, 8A-5P):  MS Teams PREFERRED        SUBJECTIVE / OVERNIGHT EVENTS:  Pt seen at bedside in no acute distress  Aware of cards recs for Planned PCI on 4/7       MEDICATIONS  (STANDING):  aspirin  chewable 81 milliGRAM(s) Oral daily  carvedilol 25 milliGRAM(s) Oral every 12 hours  clopidogrel Tablet 75 milliGRAM(s) Oral daily  dextrose 5%. 1000 milliLiter(s) (50 mL/Hr) IV Continuous <Continuous>  dextrose 50% Injectable 25 Gram(s) IV Push once  ezetimibe 10 milliGRAM(s) Oral at bedtime  glucagon  Injectable 1 milliGRAM(s) IntraMuscular once  heparin  Infusion.  Unit(s)/Hr (11 mL/Hr) IV Continuous <Continuous>  insulin glargine Injectable (LANTUS) 8 Unit(s) SubCutaneous at bedtime  insulin lispro (ADMELOG) corrective regimen sliding scale   SubCutaneous at bedtime  insulin lispro (ADMELOG) corrective regimen sliding scale   SubCutaneous three times a day before meals  isosorbide   mononitrate ER Tablet (IMDUR) 120 milliGRAM(s) Oral daily  losartan 25 milliGRAM(s) Oral daily  ranolazine 500 milliGRAM(s) Oral two times a day  rosuvastatin 40 milliGRAM(s) Oral at bedtime  sodium chloride 0.9%. 1000 milliLiter(s) (75 mL/Hr) IV Continuous <Continuous>    MEDICATIONS  (PRN):  acetaminophen     Tablet .. 650 milliGRAM(s) Oral every 6 hours PRN Temp greater or equal to 38C (100.4F), Mild Pain (1 - 3)  aluminum hydroxide/magnesium hydroxide/simethicone Suspension 30 milliLiter(s) Oral every 4 hours PRN Dyspepsia  dextrose Oral Gel 15 Gram(s) Oral once PRN Blood Glucose LESS THAN 70 milliGRAM(s)/deciliter  heparin   Injectable 4500 Unit(s) IV Push every 6 hours PRN For aPTT less than 40  heparin   Injectable 2000 Unit(s) IV Push every 6 hours PRN For aPTT between 40 - 57  melatonin 3 milliGRAM(s) Oral at bedtime PRN Insomnia  ondansetron Injectable 4 milliGRAM(s) IV Push every 8 hours PRN Nausea and/or Vomiting      I&O's Summary    04 Apr 2025 07:01  -  05 Apr 2025 07:00  --------------------------------------------------------  IN: 240 mL / OUT: 0 mL / NET: 240 mL        PHYSICAL EXAM:  Vital Signs Last 24 Hrs  T(C): 36.7 (05 Apr 2025 04:41), Max: 36.7 (04 Apr 2025 13:00)  T(F): 98 (05 Apr 2025 04:41), Max: 98.1 (04 Apr 2025 13:00)  HR: 82 (05 Apr 2025 04:41) (70 - 86)  BP: 147/82 (05 Apr 2025 04:41) (115/68 - 173/83)  BP(mean): --  RR: 18 (05 Apr 2025 04:41) (16 - 19)  SpO2: 97% (05 Apr 2025 04:41) (97% - 100%)    Parameters below as of 05 Apr 2025 04:41  Patient On (Oxygen Delivery Method): room air          CONSTITUTIONAL:   RESPIRATORY: lungs are clear to auscultation bilaterally  CARDIOVASCULAR: normal S1 and S2, no murmur; No lower extremity edema  ABDOMEN: Nontender to palpation, normoactive bowel sounds  MUSCULOSKELETAL: no joint swelling or tenderness to palpation  PSYCH: A+O to person, place, and time; affect appropriate    LABS:                        12.7   6.21  )-----------( 156      ( 05 Apr 2025 06:58 )             37.4     04-04    137  |  101  |  20  ----------------------------<  241[H]  3.8   |  22  |  1.07    Ca    9.8      04 Apr 2025 10:32    TPro  7.5  /  Alb  4.2  /  TBili  1.0  /  DBili  x   /  AST  20  /  ALT  22  /  AlkPhos  79  04-04    PTT - ( 05 Apr 2025 09:57 )  PTT:178.6 sec      Urinalysis Basic - ( 04 Apr 2025 10:32 )    Color: x / Appearance: x / SG: x / pH: x  Gluc: 241 mg/dL / Ketone: x  / Bili: x / Urobili: x   Blood: x / Protein: x / Nitrite: x   Leuk Esterase: x / RBC: x / WBC x   Sq Epi: x / Non Sq Epi: x / Bacteria: x          RADIOLOGY & ADDITIONAL TESTS:  Results Reviewed:   Imaging Personally Reviewed:  Electrocardiogram Personally Reviewed:    COORDINATION OF CARE:  Care Discussed with Consultants/Other Providers [Y/N]:  Prior or Outpatient Records Reviewed [Y/N]:

## 2025-04-05 NOTE — PROGRESS NOTE ADULT - PROBLEM SELECTOR PLAN 6
check a1c  on home trsiba 10u   lantus 8uqhs  ISS FS TID AC.
on home tresiba 10u   lantus 8uqhs  ISS FS TID AC.
on home tresiba 10u   lantus 8uqhs  ISS FS TID AC
71
on home tresiba 10u   lantus 8uqhs  ISS FS TID AC

## 2025-04-06 DIAGNOSIS — Z75.8 OTHER PROBLEMS RELATED TO MEDICAL FACILITIES AND OTHER HEALTH CARE: ICD-10-CM

## 2025-04-06 LAB
ANION GAP SERPL CALC-SCNC: 11 MMOL/L — SIGNIFICANT CHANGE UP (ref 5–17)
APTT BLD: 74.6 SEC — HIGH (ref 24.5–35.6)
APTT BLD: 87.5 SEC — HIGH (ref 24.5–35.6)
BUN SERPL-MCNC: 20 MG/DL — SIGNIFICANT CHANGE UP (ref 7–23)
CALCIUM SERPL-MCNC: 9.3 MG/DL — SIGNIFICANT CHANGE UP (ref 8.4–10.5)
CHLORIDE SERPL-SCNC: 103 MMOL/L — SIGNIFICANT CHANGE UP (ref 96–108)
CK MB BLD-MCNC: 2.2 % — SIGNIFICANT CHANGE UP (ref 0–3.5)
CK MB CFR SERPL CALC: 1.3 NG/ML — SIGNIFICANT CHANGE UP (ref 0–3.8)
CK SERPL-CCNC: 60 U/L — SIGNIFICANT CHANGE UP (ref 25–170)
CO2 SERPL-SCNC: 22 MMOL/L — SIGNIFICANT CHANGE UP (ref 22–31)
CREAT SERPL-MCNC: 1.21 MG/DL — SIGNIFICANT CHANGE UP (ref 0.5–1.3)
EGFR: 47 ML/MIN/1.73M2 — LOW
EGFR: 47 ML/MIN/1.73M2 — LOW
GLUCOSE BLDC GLUCOMTR-MCNC: 229 MG/DL — HIGH (ref 70–99)
GLUCOSE BLDC GLUCOMTR-MCNC: 255 MG/DL — HIGH (ref 70–99)
GLUCOSE BLDC GLUCOMTR-MCNC: 266 MG/DL — HIGH (ref 70–99)
GLUCOSE BLDC GLUCOMTR-MCNC: 362 MG/DL — HIGH (ref 70–99)
GLUCOSE SERPL-MCNC: 201 MG/DL — HIGH (ref 70–99)
HCT VFR BLD CALC: 36 % — SIGNIFICANT CHANGE UP (ref 34.5–45)
HGB BLD-MCNC: 12.4 G/DL — SIGNIFICANT CHANGE UP (ref 11.5–15.5)
MCHC RBC-ENTMCNC: 29.3 PG — SIGNIFICANT CHANGE UP (ref 27–34)
MCHC RBC-ENTMCNC: 34.4 G/DL — SIGNIFICANT CHANGE UP (ref 32–36)
MCV RBC AUTO: 85.1 FL — SIGNIFICANT CHANGE UP (ref 80–100)
NRBC BLD AUTO-RTO: 0 /100 WBCS — SIGNIFICANT CHANGE UP (ref 0–0)
PLATELET # BLD AUTO: 134 K/UL — LOW (ref 150–400)
POTASSIUM SERPL-MCNC: 3.9 MMOL/L — SIGNIFICANT CHANGE UP (ref 3.5–5.3)
POTASSIUM SERPL-SCNC: 3.9 MMOL/L — SIGNIFICANT CHANGE UP (ref 3.5–5.3)
RBC # BLD: 4.23 M/UL — SIGNIFICANT CHANGE UP (ref 3.8–5.2)
RBC # FLD: 13.3 % — SIGNIFICANT CHANGE UP (ref 10.3–14.5)
SODIUM SERPL-SCNC: 136 MMOL/L — SIGNIFICANT CHANGE UP (ref 135–145)
TROPONIN T, HIGH SENSITIVITY RESULT: 24 NG/L — SIGNIFICANT CHANGE UP (ref 0–51)
WBC # BLD: 6.46 K/UL — SIGNIFICANT CHANGE UP (ref 3.8–10.5)
WBC # FLD AUTO: 6.46 K/UL — SIGNIFICANT CHANGE UP (ref 3.8–10.5)

## 2025-04-06 PROCEDURE — 99233 SBSQ HOSP IP/OBS HIGH 50: CPT

## 2025-04-06 PROCEDURE — 93010 ELECTROCARDIOGRAM REPORT: CPT

## 2025-04-06 RX ORDER — NITROGLYCERIN 20 MG/G
0.4 OINTMENT TOPICAL ONCE
Refills: 0 | Status: COMPLETED | OUTPATIENT
Start: 2025-04-06 | End: 2025-04-06

## 2025-04-06 RX ADMIN — Medication 81 MILLIGRAM(S): at 11:33

## 2025-04-06 RX ADMIN — HEPARIN SODIUM 800 UNIT(S)/HR: 1000 INJECTION INTRAVENOUS; SUBCUTANEOUS at 09:07

## 2025-04-06 RX ADMIN — INSULIN GLARGINE-YFGN 8 UNIT(S): 100 INJECTION, SOLUTION SUBCUTANEOUS at 21:10

## 2025-04-06 RX ADMIN — ROSUVASTATIN CALCIUM 40 MILLIGRAM(S): 5 TABLET, FILM COATED ORAL at 21:03

## 2025-04-06 RX ADMIN — LOSARTAN POTASSIUM 25 MILLIGRAM(S): 100 TABLET, FILM COATED ORAL at 05:35

## 2025-04-06 RX ADMIN — Medication 3 MILLIGRAM(S): at 21:03

## 2025-04-06 RX ADMIN — INSULIN LISPRO 4: 100 INJECTION, SOLUTION INTRAVENOUS; SUBCUTANEOUS at 09:12

## 2025-04-06 RX ADMIN — CARVEDILOL 25 MILLIGRAM(S): 3.12 TABLET, FILM COATED ORAL at 05:35

## 2025-04-06 RX ADMIN — HEPARIN SODIUM 800 UNIT(S)/HR: 1000 INJECTION INTRAVENOUS; SUBCUTANEOUS at 21:05

## 2025-04-06 RX ADMIN — INSULIN LISPRO 6: 100 INJECTION, SOLUTION INTRAVENOUS; SUBCUTANEOUS at 17:46

## 2025-04-06 RX ADMIN — RANOLAZINE 500 MILLIGRAM(S): 1000 TABLET, FILM COATED, EXTENDED RELEASE ORAL at 17:05

## 2025-04-06 RX ADMIN — HEPARIN SODIUM 800 UNIT(S)/HR: 1000 INJECTION INTRAVENOUS; SUBCUTANEOUS at 02:00

## 2025-04-06 RX ADMIN — NITROGLYCERIN 0.4 MILLIGRAM(S): 20 OINTMENT TOPICAL at 21:03

## 2025-04-06 RX ADMIN — CLOPIDOGREL BISULFATE 75 MILLIGRAM(S): 75 TABLET, FILM COATED ORAL at 11:36

## 2025-04-06 RX ADMIN — INSULIN LISPRO 6: 100 INJECTION, SOLUTION INTRAVENOUS; SUBCUTANEOUS at 12:49

## 2025-04-06 RX ADMIN — CARVEDILOL 25 MILLIGRAM(S): 3.12 TABLET, FILM COATED ORAL at 17:06

## 2025-04-06 RX ADMIN — ISOSORBIDE MONONITRATE 120 MILLIGRAM(S): 60 TABLET, EXTENDED RELEASE ORAL at 11:33

## 2025-04-06 RX ADMIN — RANOLAZINE 500 MILLIGRAM(S): 1000 TABLET, FILM COATED, EXTENDED RELEASE ORAL at 05:35

## 2025-04-06 RX ADMIN — EZETIMIBE 10 MILLIGRAM(S): 10 TABLET ORAL at 21:03

## 2025-04-06 RX ADMIN — INSULIN LISPRO 3: 100 INJECTION, SOLUTION INTRAVENOUS; SUBCUTANEOUS at 21:12

## 2025-04-06 NOTE — PROGRESS NOTE ADULT - ASSESSMENT
75F hx CAD s/p ERI x4, HTN, HLD, T2DM, GERD, CHB s/p PPM, PAF p/w CP and abnormal stress test- found to have restenosis on LAD on LHC, for staged PCI 4/7

## 2025-04-06 NOTE — PROGRESS NOTE ADULT - SUBJECTIVE AND OBJECTIVE BOX
Salvador Bernal MD  Available on [employer] TEAMS    Patient is a 75y old  Female who presents with a chief complaint of chest pain (05 Apr 2025 12:14)        SUBJECTIVE / OVERNIGHT EVENTS: Patient seen and examined.   TELEMETRY: NSR 70-90's     MEDICATIONS  (STANDING):  aspirin  chewable 81 milliGRAM(s) Oral daily  carvedilol 25 milliGRAM(s) Oral every 12 hours  clopidogrel Tablet 75 milliGRAM(s) Oral daily  dextrose 5%. 1000 milliLiter(s) (50 mL/Hr) IV Continuous <Continuous>  dextrose 50% Injectable 25 Gram(s) IV Push once  ezetimibe 10 milliGRAM(s) Oral at bedtime  glucagon  Injectable 1 milliGRAM(s) IntraMuscular once  heparin  Infusion.  Unit(s)/Hr (11 mL/Hr) IV Continuous <Continuous>  insulin glargine Injectable (LANTUS) 8 Unit(s) SubCutaneous at bedtime  insulin lispro (ADMELOG) corrective regimen sliding scale   SubCutaneous at bedtime  insulin lispro (ADMELOG) corrective regimen sliding scale   SubCutaneous three times a day before meals  isosorbide   mononitrate ER Tablet (IMDUR) 120 milliGRAM(s) Oral daily  losartan 25 milliGRAM(s) Oral daily  ranolazine 500 milliGRAM(s) Oral two times a day  rosuvastatin 40 milliGRAM(s) Oral at bedtime  sodium chloride 0.9%. 1000 milliLiter(s) (75 mL/Hr) IV Continuous <Continuous>    MEDICATIONS  (PRN):  acetaminophen     Tablet .. 650 milliGRAM(s) Oral every 6 hours PRN Temp greater or equal to 38C (100.4F), Mild Pain (1 - 3)  aluminum hydroxide/magnesium hydroxide/simethicone Suspension 30 milliLiter(s) Oral every 4 hours PRN Dyspepsia  dextrose Oral Gel 15 Gram(s) Oral once PRN Blood Glucose LESS THAN 70 milliGRAM(s)/deciliter  heparin   Injectable 4500 Unit(s) IV Push every 6 hours PRN For aPTT less than 40  heparin   Injectable 2000 Unit(s) IV Push every 6 hours PRN For aPTT between 40 - 57  melatonin 3 milliGRAM(s) Oral at bedtime PRN Insomnia  ondansetron Injectable 4 milliGRAM(s) IV Push every 8 hours PRN Nausea and/or Vomiting      Vital Signs Last 24 Hrs  T(C): 36.9 (06 Apr 2025 04:07), Max: 36.9 (06 Apr 2025 04:07)  T(F): 98.5 (06 Apr 2025 04:07), Max: 98.5 (06 Apr 2025 04:07)  HR: 74 (06 Apr 2025 04:07) (74 - 86)  BP: 128/78 (06 Apr 2025 04:07) (108/69 - 128/78)  BP(mean): --  RR: 18 (06 Apr 2025 04:07) (18 - 18)  SpO2: 98% (06 Apr 2025 04:07) (98% - 100%)    Parameters below as of 06 Apr 2025 04:07  Patient On (Oxygen Delivery Method): room air      CAPILLARY BLOOD GLUCOSE      POCT Blood Glucose.: 229 mg/dL (06 Apr 2025 09:11)  POCT Blood Glucose.: 307 mg/dL (05 Apr 2025 21:12)  POCT Blood Glucose.: 251 mg/dL (05 Apr 2025 17:52)  POCT Blood Glucose.: 305 mg/dL (05 Apr 2025 12:27)    I&O's Summary    05 Apr 2025 07:01  -  06 Apr 2025 07:00  --------------------------------------------------------  IN: 600 mL / OUT: 0 mL / NET: 600 mL          PHYSICAL EXAM          LABS:                        12.4   6.46  )-----------( 134      ( 06 Apr 2025 07:58 )             36.0     04-06    136  |  103  |  20  ----------------------------<  201[H]  3.9   |  22  |  1.21    Ca    9.3      06 Apr 2025 07:58    TPro  7.5  /  Alb  4.2  /  TBili  1.0  /  DBili  x   /  AST  20  /  ALT  22  /  AlkPhos  79  04-04    PTT - ( 06 Apr 2025 07:58 )  PTT:87.5 sec      Urinalysis Basic - ( 06 Apr 2025 07:58 )    Color: x / Appearance: x / SG: x / pH: x  Gluc: 201 mg/dL / Ketone: x  / Bili: x / Urobili: x   Blood: x / Protein: x / Nitrite: x   Leuk Esterase: x / RBC: x / WBC x   Sq Epi: x / Non Sq Epi: x / Bacteria: x          RADIOLOGY & ADDITIONAL TESTS:    Imaging Personally Reviewed:  Consultant(s) Notes Reviewed:    Care Discussed with Consultants/Other Providers:   Salvador Bernal MD  Available on [employer] TEAMS    Patient is a 75y old  Female who presents with a chief complaint of chest pain (05 Apr 2025 12:14)        SUBJECTIVE / OVERNIGHT EVENTS: Patient seen and examined.   TELEMETRY: NSR 70-90's     MEDICATIONS  (STANDING):  aspirin  chewable 81 milliGRAM(s) Oral daily  carvedilol 25 milliGRAM(s) Oral every 12 hours  clopidogrel Tablet 75 milliGRAM(s) Oral daily  dextrose 5%. 1000 milliLiter(s) (50 mL/Hr) IV Continuous <Continuous>  dextrose 50% Injectable 25 Gram(s) IV Push once  ezetimibe 10 milliGRAM(s) Oral at bedtime  glucagon  Injectable 1 milliGRAM(s) IntraMuscular once  heparin  Infusion.  Unit(s)/Hr (11 mL/Hr) IV Continuous <Continuous>  insulin glargine Injectable (LANTUS) 8 Unit(s) SubCutaneous at bedtime  insulin lispro (ADMELOG) corrective regimen sliding scale   SubCutaneous at bedtime  insulin lispro (ADMELOG) corrective regimen sliding scale   SubCutaneous three times a day before meals  isosorbide   mononitrate ER Tablet (IMDUR) 120 milliGRAM(s) Oral daily  losartan 25 milliGRAM(s) Oral daily  ranolazine 500 milliGRAM(s) Oral two times a day  rosuvastatin 40 milliGRAM(s) Oral at bedtime  sodium chloride 0.9%. 1000 milliLiter(s) (75 mL/Hr) IV Continuous <Continuous>    MEDICATIONS  (PRN):  acetaminophen     Tablet .. 650 milliGRAM(s) Oral every 6 hours PRN Temp greater or equal to 38C (100.4F), Mild Pain (1 - 3)  aluminum hydroxide/magnesium hydroxide/simethicone Suspension 30 milliLiter(s) Oral every 4 hours PRN Dyspepsia  dextrose Oral Gel 15 Gram(s) Oral once PRN Blood Glucose LESS THAN 70 milliGRAM(s)/deciliter  heparin   Injectable 4500 Unit(s) IV Push every 6 hours PRN For aPTT less than 40  heparin   Injectable 2000 Unit(s) IV Push every 6 hours PRN For aPTT between 40 - 57  melatonin 3 milliGRAM(s) Oral at bedtime PRN Insomnia  ondansetron Injectable 4 milliGRAM(s) IV Push every 8 hours PRN Nausea and/or Vomiting      Vital Signs Last 24 Hrs  T(C): 36.9 (06 Apr 2025 04:07), Max: 36.9 (06 Apr 2025 04:07)  T(F): 98.5 (06 Apr 2025 04:07), Max: 98.5 (06 Apr 2025 04:07)  HR: 74 (06 Apr 2025 04:07) (74 - 86)  BP: 128/78 (06 Apr 2025 04:07) (108/69 - 128/78)  BP(mean): --  RR: 18 (06 Apr 2025 04:07) (18 - 18)  SpO2: 98% (06 Apr 2025 04:07) (98% - 100%)    Parameters below as of 06 Apr 2025 04:07  Patient On (Oxygen Delivery Method): room air      CAPILLARY BLOOD GLUCOSE      POCT Blood Glucose.: 229 mg/dL (06 Apr 2025 09:11)  POCT Blood Glucose.: 307 mg/dL (05 Apr 2025 21:12)  POCT Blood Glucose.: 251 mg/dL (05 Apr 2025 17:52)  POCT Blood Glucose.: 305 mg/dL (05 Apr 2025 12:27)    I&O's Summary    05 Apr 2025 07:01  -  06 Apr 2025 07:00  --------------------------------------------------------  IN: 600 mL / OUT: 0 mL / NET: 600 mL          PHYSICAL EXAM  GENERAL: NAD  HEAD:  Atraumatic, normocephalic  EYES: EOMI, PERRLA, conjunctiva and sclera clear  CHEST/LUNG: Clear to auscultation bilaterally; no wheezes  HEART: +S1+S2, regular rate and rhythm  ABDOMEN: Soft, nontender, nondistended; bowel sounds present  EXTREMITIES:  2+ peripheral pulses; no clubbing, cyanosis, edema  PSYCH: AAOx3        LABS:                        12.4   6.46  )-----------( 134      ( 06 Apr 2025 07:58 )             36.0     04-06    136  |  103  |  20  ----------------------------<  201[H]  3.9   |  22  |  1.21    Ca    9.3      06 Apr 2025 07:58    TPro  7.5  /  Alb  4.2  /  TBili  1.0  /  DBili  x   /  AST  20  /  ALT  22  /  AlkPhos  79  04-04    PTT - ( 06 Apr 2025 07:58 )  PTT:87.5 sec      Urinalysis Basic - ( 06 Apr 2025 07:58 )    Color: x / Appearance: x / SG: x / pH: x  Gluc: 201 mg/dL / Ketone: x  / Bili: x / Urobili: x   Blood: x / Protein: x / Nitrite: x   Leuk Esterase: x / RBC: x / WBC x   Sq Epi: x / Non Sq Epi: x / Bacteria: x          RADIOLOGY & ADDITIONAL TESTS:    Imaging Personally Reviewed:  Consultant(s) Notes Reviewed:    Care Discussed with Consultants/Other Providers:

## 2025-04-06 NOTE — PROGRESS NOTE ADULT - TIME BILLING
Time-based billing (NON-critical care).     The necessity of the time spent during the encounter on this date of service was due to:     - Ordering, reviewing, and interpreting labs, testing, and imaging.  - Independently obtaining a review of systems and performing a physical exam  - Reviewing prior hospitalization and where necessary, outpatient records.  - Counselling and educating patient and family regarding interpretation of aforementioned items and plan of care.\  -I have spent 52 minutes of time on the encounter which excludes teaching and separately reported services.
Time-based billing (NON-critical care).     The necessity of the time spent during the encounter on this date of service was due to:     - Ordering, reviewing, and interpreting labs, testing, and imaging.  - Independently obtaining a review of systems and performing a physical exam  - Reviewing prior hospitalization and where necessary, outpatient records.  - Counselling and educating patient and/or family regarding interpretation of aforementioned items and plan of care.     Time spent on the encounter excludes teaching and separately reported services.
Time-based billing (NON-critical care).     The necessity of the time spent during the encounter on this date of service was due to:     - Ordering, reviewing, and interpreting labs, testing, and imaging.  - Independently obtaining a review of systems and performing a physical exam  - Reviewing prior hospitalization and where necessary, outpatient records.  - Counselling and educating patient and family regarding interpretation of aforementioned items and plan of care.\  -I have spent 52 minutes of time on the encounter which excludes teaching and separately reported services.
Time-based billing (NON-critical care).     The necessity of the time spent during the encounter on this date of service was due to:     - Ordering, reviewing, and interpreting labs, testing, and imaging.  - Independently obtaining a review of systems and performing a physical exam  - Reviewing prior hospitalization and where necessary, outpatient records.  - Counselling and educating patient  regarding interpretation of aforementioned items and plan of care.
Time-based billing (NON-critical care).     The necessity of the time spent during the encounter on this date of service was due to:     - Ordering, reviewing, and interpreting labs, testing, and imaging.  - Independently obtaining a review of systems and performing a physical exam  - Reviewing prior hospitalization and where necessary, outpatient records.  - Counselling and educating patient  regarding interpretation of aforementioned items and plan of care.

## 2025-04-06 NOTE — CHART NOTE - NSCHARTNOTEFT_GEN_A_CORE
Notified by RN pt with worsening chest pain. Pt seen and examined at bedside. Reporting chest pain she described as pressure. Pt was lying in bed when chest pain occurred. Blood pressure mildly elevated SBP 180s. Other vital signs stable. States has had similar chest pain in past and has taken nitroglycerin with improvement.  Of note, pt admitted with chest pain, s/p [facility] 4/6 found to have restenosis of LAD, planned for staged PCI tomorrow 4/7. She is on heparin gtt and DAPT.     Plan:  Stat EKG done and reviewed, no ST or T wave changes compared to prior EKG.   Stat Cardiac enzymes done, all negative  Sublingual nitrogen given with improvement in chest pain  Repeat vital signs stable Notified by RN pt with worsening chest pain. Pt seen and examined at bedside. Reporting chest pain she described as pressure. Pt was lying in bed when chest pain occurred. Blood pressure mildly elevated SBP 180s. Other vital signs stable. States has had similar chest pain in past and has taken nitroglycerin with improvement.  Of note, pt admitted with chest pain, s/p [facility] 4/6 found to have restenosis of LAD, planned for staged PCI tomorrow 4/7. She is on heparin gtt and DAPT.     Plan:  Stat EKG done and reviewed, no ST or T wave changes compared to prior EKG.   Stat Cardiac enzymes done, all negative  Sublingual nitrogen x1 given with improvement in chest pain  Repeat vital signs stable      Lorin Whitlock PA-C  Department of Medicine  [employer] teams

## 2025-04-07 LAB
ANION GAP SERPL CALC-SCNC: 14 MMOL/L — SIGNIFICANT CHANGE UP (ref 5–17)
APTT BLD: 82 SEC — HIGH (ref 24.5–35.6)
BUN SERPL-MCNC: 18 MG/DL — SIGNIFICANT CHANGE UP (ref 7–23)
CALCIUM SERPL-MCNC: 9.2 MG/DL — SIGNIFICANT CHANGE UP (ref 8.4–10.5)
CHLORIDE SERPL-SCNC: 102 MMOL/L — SIGNIFICANT CHANGE UP (ref 96–108)
CO2 SERPL-SCNC: 20 MMOL/L — LOW (ref 22–31)
CREAT SERPL-MCNC: 1.23 MG/DL — SIGNIFICANT CHANGE UP (ref 0.5–1.3)
EGFR: 46 ML/MIN/1.73M2 — LOW
EGFR: 46 ML/MIN/1.73M2 — LOW
FLUAV AG NPH QL: SIGNIFICANT CHANGE UP
FLUBV AG NPH QL: SIGNIFICANT CHANGE UP
GLUCOSE BLDC GLUCOMTR-MCNC: 222 MG/DL — HIGH (ref 70–99)
GLUCOSE BLDC GLUCOMTR-MCNC: 244 MG/DL — HIGH (ref 70–99)
GLUCOSE BLDC GLUCOMTR-MCNC: 278 MG/DL — HIGH (ref 70–99)
GLUCOSE BLDC GLUCOMTR-MCNC: 333 MG/DL — HIGH (ref 70–99)
GLUCOSE BLDC GLUCOMTR-MCNC: 343 MG/DL — HIGH (ref 70–99)
GLUCOSE SERPL-MCNC: 237 MG/DL — HIGH (ref 70–99)
HCT VFR BLD CALC: 37.2 % — SIGNIFICANT CHANGE UP (ref 34.5–45)
HGB BLD-MCNC: 12.8 G/DL — SIGNIFICANT CHANGE UP (ref 11.5–15.5)
LIDOCAIN SERPL-MCNC: 98.5 NMOL/L — HIGH
MAGNESIUM SERPL-MCNC: 2 MG/DL — SIGNIFICANT CHANGE UP (ref 1.6–2.6)
MCHC RBC-ENTMCNC: 29.7 PG — SIGNIFICANT CHANGE UP (ref 27–34)
MCHC RBC-ENTMCNC: 34.4 G/DL — SIGNIFICANT CHANGE UP (ref 32–36)
MCV RBC AUTO: 86.3 FL — SIGNIFICANT CHANGE UP (ref 80–100)
NRBC BLD AUTO-RTO: 0 /100 WBCS — SIGNIFICANT CHANGE UP (ref 0–0)
PLATELET # BLD AUTO: 127 K/UL — LOW (ref 150–400)
POTASSIUM SERPL-MCNC: 4.2 MMOL/L — SIGNIFICANT CHANGE UP (ref 3.5–5.3)
POTASSIUM SERPL-SCNC: 4.2 MMOL/L — SIGNIFICANT CHANGE UP (ref 3.5–5.3)
RBC # BLD: 4.31 M/UL — SIGNIFICANT CHANGE UP (ref 3.8–5.2)
RBC # FLD: 13.3 % — SIGNIFICANT CHANGE UP (ref 10.3–14.5)
RSV RNA NPH QL NAA+NON-PROBE: SIGNIFICANT CHANGE UP
SARS-COV-2 RNA SPEC QL NAA+PROBE: SIGNIFICANT CHANGE UP
SODIUM SERPL-SCNC: 136 MMOL/L — SIGNIFICANT CHANGE UP (ref 135–145)
SOURCE RESPIRATORY: SIGNIFICANT CHANGE UP
TROPONIN T, HIGH SENSITIVITY RESULT: 103 NG/L — HIGH (ref 0–51)
TROPONIN T, HIGH SENSITIVITY RESULT: 25 NG/L — SIGNIFICANT CHANGE UP (ref 0–51)
WBC # BLD: 5.24 K/UL — SIGNIFICANT CHANGE UP (ref 3.8–10.5)
WBC # FLD AUTO: 5.24 K/UL — SIGNIFICANT CHANGE UP (ref 3.8–10.5)

## 2025-04-07 PROCEDURE — 0913T PRQ TCAT THER RX NTRAC BALO1: CPT

## 2025-04-07 PROCEDURE — 99152 MOD SED SAME PHYS/QHP 5/>YRS: CPT

## 2025-04-07 PROCEDURE — 99233 SBSQ HOSP IP/OBS HIGH 50: CPT

## 2025-04-07 PROCEDURE — 93010 ELECTROCARDIOGRAM REPORT: CPT

## 2025-04-07 RX ORDER — ACETAMINOPHEN 500 MG/5ML
1000 LIQUID (ML) ORAL ONCE
Refills: 0 | Status: COMPLETED | OUTPATIENT
Start: 2025-04-07 | End: 2025-04-07

## 2025-04-07 RX ORDER — ONDANSETRON HCL/PF 4 MG/2 ML
4 VIAL (ML) INJECTION ONCE
Refills: 0 | Status: COMPLETED | OUTPATIENT
Start: 2025-04-07 | End: 2025-04-07

## 2025-04-07 RX ORDER — INSULIN GLARGINE-YFGN 100 [IU]/ML
12 INJECTION, SOLUTION SUBCUTANEOUS AT BEDTIME
Refills: 0 | Status: DISCONTINUED | OUTPATIENT
Start: 2025-04-07 | End: 2025-04-08

## 2025-04-07 RX ORDER — HEPARIN SODIUM 1000 [USP'U]/ML
4500 INJECTION INTRAVENOUS; SUBCUTANEOUS EVERY 6 HOURS
Refills: 0 | Status: DISCONTINUED | OUTPATIENT
Start: 2025-04-07 | End: 2025-04-08

## 2025-04-07 RX ORDER — HEPARIN SODIUM 1000 [USP'U]/ML
2000 INJECTION INTRAVENOUS; SUBCUTANEOUS EVERY 6 HOURS
Refills: 0 | Status: DISCONTINUED | OUTPATIENT
Start: 2025-04-07 | End: 2025-04-08

## 2025-04-07 RX ORDER — LABETALOL HYDROCHLORIDE 200 MG/1
20 TABLET, FILM COATED ORAL ONCE
Refills: 0 | Status: COMPLETED | OUTPATIENT
Start: 2025-04-07 | End: 2025-04-07

## 2025-04-07 RX ORDER — NITROGLYCERIN 20 MG/G
0.5 OINTMENT TOPICAL ONCE
Refills: 0 | Status: COMPLETED | OUTPATIENT
Start: 2025-04-07 | End: 2025-04-07

## 2025-04-07 RX ORDER — HEPARIN SODIUM 1000 [USP'U]/ML
800 INJECTION INTRAVENOUS; SUBCUTANEOUS
Qty: 25000 | Refills: 0 | Status: DISCONTINUED | OUTPATIENT
Start: 2025-04-07 | End: 2025-04-08

## 2025-04-07 RX ADMIN — INSULIN LISPRO 6: 100 INJECTION, SOLUTION INTRAVENOUS; SUBCUTANEOUS at 13:06

## 2025-04-07 RX ADMIN — HEPARIN SODIUM 800 UNIT(S)/HR: 1000 INJECTION INTRAVENOUS; SUBCUTANEOUS at 22:36

## 2025-04-07 RX ADMIN — Medication 400 MILLIGRAM(S): at 13:27

## 2025-04-07 RX ADMIN — Medication 81 MILLIGRAM(S): at 09:09

## 2025-04-07 RX ADMIN — CARVEDILOL 25 MILLIGRAM(S): 3.12 TABLET, FILM COATED ORAL at 18:00

## 2025-04-07 RX ADMIN — LOSARTAN POTASSIUM 25 MILLIGRAM(S): 100 TABLET, FILM COATED ORAL at 05:44

## 2025-04-07 RX ADMIN — NITROGLYCERIN 0.5 INCH(S): 20 OINTMENT TOPICAL at 14:25

## 2025-04-07 RX ADMIN — ISOSORBIDE MONONITRATE 120 MILLIGRAM(S): 60 TABLET, EXTENDED RELEASE ORAL at 13:06

## 2025-04-07 RX ADMIN — INSULIN LISPRO 4: 100 INJECTION, SOLUTION INTRAVENOUS; SUBCUTANEOUS at 16:45

## 2025-04-07 RX ADMIN — LABETALOL HYDROCHLORIDE 20 MILLIGRAM(S): 200 TABLET, FILM COATED ORAL at 15:53

## 2025-04-07 RX ADMIN — CARVEDILOL 25 MILLIGRAM(S): 3.12 TABLET, FILM COATED ORAL at 05:43

## 2025-04-07 RX ADMIN — INSULIN GLARGINE-YFGN 12 UNIT(S): 100 INJECTION, SOLUTION SUBCUTANEOUS at 22:43

## 2025-04-07 RX ADMIN — EZETIMIBE 10 MILLIGRAM(S): 10 TABLET ORAL at 22:35

## 2025-04-07 RX ADMIN — Medication 75 MILLILITER(S): at 14:18

## 2025-04-07 RX ADMIN — INSULIN LISPRO 4: 100 INJECTION, SOLUTION INTRAVENOUS; SUBCUTANEOUS at 09:09

## 2025-04-07 RX ADMIN — ROSUVASTATIN CALCIUM 40 MILLIGRAM(S): 5 TABLET, FILM COATED ORAL at 22:35

## 2025-04-07 RX ADMIN — HEPARIN SODIUM 800 UNIT(S)/HR: 1000 INJECTION INTRAVENOUS; SUBCUTANEOUS at 08:49

## 2025-04-07 RX ADMIN — Medication 1000 MILLIGRAM(S): at 14:10

## 2025-04-07 RX ADMIN — RANOLAZINE 500 MILLIGRAM(S): 1000 TABLET, FILM COATED, EXTENDED RELEASE ORAL at 18:00

## 2025-04-07 RX ADMIN — RANOLAZINE 500 MILLIGRAM(S): 1000 TABLET, FILM COATED, EXTENDED RELEASE ORAL at 05:43

## 2025-04-07 RX ADMIN — Medication 1 MILLIGRAM(S): at 15:53

## 2025-04-07 RX ADMIN — Medication 4 MILLIGRAM(S): at 14:17

## 2025-04-07 RX ADMIN — CLOPIDOGREL BISULFATE 75 MILLIGRAM(S): 75 TABLET, FILM COATED ORAL at 09:09

## 2025-04-07 RX ADMIN — Medication 3 MILLIGRAM(S): at 22:43

## 2025-04-07 RX ADMIN — INSULIN LISPRO 2: 100 INJECTION, SOLUTION INTRAVENOUS; SUBCUTANEOUS at 22:43

## 2025-04-07 NOTE — DIETITIAN INITIAL EVALUATION ADULT - REASON
Nutrition Focused Physical Assessment deferred at this time; Pt with adequate PO intake and stable wt hx.

## 2025-04-07 NOTE — DIETITIAN INITIAL EVALUATION ADULT - PERTINENT MEDS FT
MEDICATIONS  (STANDING):  aspirin  chewable 81 milliGRAM(s) Oral daily  carvedilol 25 milliGRAM(s) Oral every 12 hours  clopidogrel Tablet 75 milliGRAM(s) Oral daily  dextrose 5%. 1000 milliLiter(s) (50 mL/Hr) IV Continuous <Continuous>  dextrose 50% Injectable 25 Gram(s) IV Push once  ezetimibe 10 milliGRAM(s) Oral at bedtime  glucagon  Injectable 1 milliGRAM(s) IntraMuscular once  heparin  Infusion.  Unit(s)/Hr (11 mL/Hr) IV Continuous <Continuous>  insulin glargine Injectable (LANTUS) 8 Unit(s) SubCutaneous at bedtime  insulin lispro (ADMELOG) corrective regimen sliding scale   SubCutaneous three times a day before meals  insulin lispro (ADMELOG) corrective regimen sliding scale   SubCutaneous at bedtime  isosorbide   mononitrate ER Tablet (IMDUR) 120 milliGRAM(s) Oral daily  losartan 25 milliGRAM(s) Oral daily  ranolazine 500 milliGRAM(s) Oral two times a day  rosuvastatin 40 milliGRAM(s) Oral at bedtime  sodium chloride 0.9%. 1000 milliLiter(s) (75 mL/Hr) IV Continuous <Continuous>    MEDICATIONS  (PRN):  acetaminophen     Tablet .. 650 milliGRAM(s) Oral every 6 hours PRN Temp greater or equal to 38C (100.4F), Mild Pain (1 - 3)  aluminum hydroxide/magnesium hydroxide/simethicone Suspension 30 milliLiter(s) Oral every 4 hours PRN Dyspepsia  dextrose Oral Gel 15 Gram(s) Oral once PRN Blood Glucose LESS THAN 70 milliGRAM(s)/deciliter  heparin   Injectable 4500 Unit(s) IV Push every 6 hours PRN For aPTT less than 40  heparin   Injectable 2000 Unit(s) IV Push every 6 hours PRN For aPTT between 40 - 57  melatonin 3 milliGRAM(s) Oral at bedtime PRN Insomnia  ondansetron Injectable 4 milliGRAM(s) IV Push every 8 hours PRN Nausea and/or Vomiting

## 2025-04-07 NOTE — CHART NOTE - NSCHARTNOTEFT_GEN_A_CORE
Removal of Femoral Sheath    Pulses in the left lower extremity are palpable. The patient was placed in the supine position. The insertion site was identified and the sutures were removed per protocol.  The 6 Frisian femoral sheath was then removed. Direct pressure was applied for 20 minutes by ACP Roxanne Codon    Monitoring of the left groin and both lower extremities including neuro-vascular checks and vital signs every 15 minutes x 4, then every 30 minutes x 2, every 1 hour x 2, and then every 4 hours x 72 hours were ordered.    Complications: None/Other    Comments:   Good hemostasis achieved post sheath removal   Patient educated on post sheath removal precautions and importance of DAPT, verbalized understanding

## 2025-04-07 NOTE — PROGRESS NOTE ADULT - PROBLEM SELECTOR PROBLEM 3
Hypertension
Paroxysmal atrial fibrillation
Hypertension
Paroxysmal atrial fibrillation
Hypertension
Hypertension

## 2025-04-07 NOTE — PROGRESS NOTE ADULT - PROBLEM SELECTOR PROBLEM 5
Discharge planning issues
Paroxysmal atrial fibrillation
Paroxysmal atrial fibrillation
Discharge planning issues
Paroxysmal atrial fibrillation
Paroxysmal atrial fibrillation

## 2025-04-07 NOTE — DIETITIAN INITIAL EVALUATION ADULT - PERTINENT LABORATORY DATA
04-07    136  |  102  |  18  ----------------------------<  237[H]  4.2   |  20[L]  |  1.23    Ca    9.2      07 Apr 2025 07:05  Mg     2.0     04-07    POCT Blood Glucose.: 244 mg/dL (04-07-25 @ 08:59)  A1C with Estimated Average Glucose Result: 7.3 % (04-02-25 @ 06:46)  A1C with Estimated Average Glucose Result: 7.6 % (04-01-25 @ 08:23)  A1C with Estimated Average Glucose Result: 7.2 % (11-21-24 @ 01:56)

## 2025-04-07 NOTE — DIETITIAN INITIAL EVALUATION ADULT - ADD RECOMMEND
1) Continue DASH, Consistent Carbohydrate diet as tolerated.   	- Defer texture/consistency to SLP/team.   2) Recommend Multivitamin daily pending no medical contraindications.  3) Continue to monitor PO intake, weight, labs, skin, GI status, and diet.

## 2025-04-07 NOTE — CHART NOTE - NSCHARTNOTEFT_GEN_A_CORE
4/7/2025   PCI with DCB x 1 to ISR LAD 90% via LFA  Reports had CP in lab during procedure with residual 5/10 upon arrival to [facility] with elevated BP.   NTG paste applied with subsequent drop in BP   NTG paste removed. Fluid bolus of 200cc given stat and atropine 0.5mg IVP x 2 doses  Now /73 and nausea resolved   Chest discomfort now improved 2/10  EKG V Paced   Dr Dimas at bedside   Plan as follows -  Continue ASA, Plavix. Statin  Resume heparin gtt 6 hours post sheath pull - NO initial bolus  Can transition from Heparin gtt to Eliquis in 24 hours.   Per Dr Dimas continue triple therapy while in hospital.  Decision about triple therapy (asa/plavix/eliqiuis) versus (plavix/eliquis) on discharge will be made tomorrow  Pls discuss w Dr Dimas on 4/9 *  Cardiac rehab (x)

## 2025-04-07 NOTE — DIETITIAN INITIAL EVALUATION ADULT - OTHER INFO
- Wt hx:         - UBW: ~117 pounds per pt         - Dosing wt: 125 pounds (3/31)         - Wt hx per chart in pounds: 117.5 (4/3, standing), 117.2 (4/7, standing); Wt hx per [facility] HIE in pounds: 114 (12/2/24), 119 (11/27/24), 114 (9/3/24).          - RD to continue to monitor weight trends as able.   - Nutritionally Pertinent Meds in-house: IVF, zofran.     - Endo:  	- T2DM; regimen PTA: Novolog, Trulicity (per pt).   	- A1c 7.3% (4/2) - indicates slightly suboptimal glycemic control; advanced age considered.   	- SSI, Lantus for coverage in-house.      - Cardio:  	- CAD s/p multiple PCI.

## 2025-04-07 NOTE — PROGRESS NOTE ADULT - PROBLEM SELECTOR PLAN 1
CTA negative for dissection  c/w home Plavix 75mg daily, Imdur 120mg daily, ranolazine 500mg q12hr, losartan 25mg daily  cards follow up appreciated  TTE--> EF50%, grade 2 diastolic dysfunction.   Stress test abnormal  Cath today
CTA negative for dissection  c/w home Plavix 75mg daily, Imdur 120mg daily, ranolazine 500mg q12hr, losartan 25mg daily  cards follow up appreciated  TTE--> EF50%, grade 2 diastolic dysfunction.   Stress test abnormal  Cath--> 95% restenosis of LAD.   Plan for  PCI pLAD ISR on 4/7/2025 with possible drug coated balloon   Continue DAPT and Heparin gtt
CTA negative for dissection  Pending TTE result  Scheduled for NST.   c/w home Plavix 75mg daily, Imdur 120mg daily, ranolazine 500mg q12hr, losartan 25mg daily  cards follow up appreciated.
Continue heparin gtt, DAPT  Staged PCI on 4/7 with PCI to LAD  Continue triple therapy when cleared by cath team  Continue ranolazine, Imdur
CTA negative for dissection  Scheduled for NST.   c/w home Plavix 75mg daily, Imdur 120mg daily, ranolazine 500mg q12hr, losartan 25mg daily  cards follow up appreciated  TTE--> EF50%, grade 2 diastolic dysfunction.   Stress test abnormal  Cards to discuss cath with patient.
Continue heparin gtt, DAPT  Staged PCI on 4/7  Continue ranolazine, Imdur

## 2025-04-07 NOTE — PROGRESS NOTE ADULT - PROBLEM SELECTOR PLAN 3
, imdur 120mg daily, ranolazine 500mg q12hr, losartan 25mg daily.
Continue Eliquis  Currently in SR
, imdur 120mg daily, ranolazine 500mg q12hr, losartan 25mg daily.
, imdur 120mg daily, ranolazine 500mg q12hr, losartan 25mg daily
Continue heparin gtt   Currently in SR
, imdur 120mg daily, ranolazine 500mg q12hr, losartan 25mg daily

## 2025-04-07 NOTE — DIETITIAN INITIAL EVALUATION ADULT - PROBLEM SELECTOR PLAN 2
[facility] 11/20/2024  severe prox LAD ISR s/p balloon angioplasty  LM: minor dz, LAD 99% ISR, LCx patent stents with mild ISR, RCA   [facility] 8/2024  ERI x1 to pLAD with lithotripsy  c/w plavix, statin

## 2025-04-07 NOTE — PROGRESS NOTE ADULT - PROBLEM SELECTOR PLAN 4
s/p Medtronic bi-v ppm 11/2024.
s/p Medtronic bi-v ppm 11/2024
s/p Medtronic bi-v ppm 11/2024
A1c 7.3  Increase Lantus 12U qhs  Monitor closely
s/p Medtronic bi-v ppm 11/2024.
A1c 7.3  Continue basal Lantus with corrective sliding scale

## 2025-04-07 NOTE — DIETITIAN INITIAL EVALUATION ADULT - ORAL INTAKE PTA/DIET HISTORY
Pt reports having a good appetite and PO intake PTA. Follows Halal diet (denies offer of change to Halal diet in-house) and limits sugar for DM. Pt does not consume pork. Pt denies any known food allergies or intolerances. No micronutrient supplementation at home. Denies any difficulty chewing/swallowing at this time.

## 2025-04-07 NOTE — PROGRESS NOTE ADULT - PROBLEM SELECTOR PLAN 5
c/w Eliquis 5mg BID.
Pending staged PCI  Independent
Pending cardiac clearance   Independent
c/w Eliquis 5mg BID
c/w eliquis 5mg BID
c/w eliquis 5mg BID.

## 2025-04-07 NOTE — PROGRESS NOTE ADULT - PROBLEM SELECTOR PLAN 2
Continue DAPT  Continue Coreg, Losartan, Crestor/Zetia
[facility] 11/20/2024  severe prox LAD ISR s/p balloon angioplasty  LM: minor dz, LAD 99% ISR, LCx patent stents with mild ISR, RCA   [facility] 8/2024  ERI x1 to pLAD with lithotripsy  c/w plavix, statin.
[facility] 11/20/2024  severe prox LAD ISR s/p balloon angioplasty  LM: minor dz, LAD 99% ISR, LCx patent stents with mild ISR, RCA   [facility] 8/2024  ERI x1 to pLAD with lithotripsy  c/w plavix, statin.
Continue DAPT  Continue Coreg, Losartan, Crestor/Zetia
[facility] 11/20/2024  severe prox LAD ISR s/p balloon angioplasty  LM: minor dz, LAD 99% ISR, LCx patent stents with mild ISR, RCA   [facility] 8/2024  ERI x1 to pLAD with lithotripsy  c/w plavix, statin
[facility] 11/20/2024  severe prox LAD ISR s/p balloon angioplasty  LM: minor dz, LAD 99% ISR, LCx patent stents with mild ISR, RCA   [facility] 8/2024  ERI x1 to pLAD with lithotripsy  c/w plavix, statin

## 2025-04-07 NOTE — PROGRESS NOTE ADULT - SUBJECTIVE AND OBJECTIVE BOX
PROGRESS NOTE:   Authored by Dr. Sal Curiel MD, Available on [employer] Teams    Patient is a 75y old  Female who presents with a chief complaint of chest pain (07 Apr 2025 10:48)      SUBJECTIVE / OVERNIGHT EVENTS: Patient has intermittent severe chest pain, s/p LHC today     ADDITIONAL REVIEW OF SYSTEMS:    MEDICATIONS  (STANDING):  aspirin  chewable 81 milliGRAM(s) Oral daily  carvedilol 25 milliGRAM(s) Oral every 12 hours  clopidogrel Tablet 75 milliGRAM(s) Oral daily  dextrose 5%. 1000 milliLiter(s) (50 mL/Hr) IV Continuous <Continuous>  dextrose 50% Injectable 25 Gram(s) IV Push once  ezetimibe 10 milliGRAM(s) Oral at bedtime  glucagon  Injectable 1 milliGRAM(s) IntraMuscular once  heparin  Infusion.  Unit(s)/Hr (11 mL/Hr) IV Continuous <Continuous>  insulin glargine Injectable (LANTUS) 12 Unit(s) SubCutaneous at bedtime  insulin lispro (ADMELOG) corrective regimen sliding scale   SubCutaneous three times a day before meals  insulin lispro (ADMELOG) corrective regimen sliding scale   SubCutaneous at bedtime  isosorbide   mononitrate ER Tablet (IMDUR) 120 milliGRAM(s) Oral daily  losartan 25 milliGRAM(s) Oral daily  ranolazine 500 milliGRAM(s) Oral two times a day  rosuvastatin 40 milliGRAM(s) Oral at bedtime  sodium chloride 0.9% Bolus 250 milliLiter(s) IV Bolus once  sodium chloride 0.9%. 1000 milliLiter(s) (75 mL/Hr) IV Continuous <Continuous>  sodium chloride 0.9%. 1000 milliLiter(s) (75 mL/Hr) IV Continuous <Continuous>    MEDICATIONS  (PRN):  acetaminophen     Tablet .. 650 milliGRAM(s) Oral every 6 hours PRN Temp greater or equal to 38C (100.4F), Mild Pain (1 - 3)  aluminum hydroxide/magnesium hydroxide/simethicone Suspension 30 milliLiter(s) Oral every 4 hours PRN Dyspepsia  dextrose Oral Gel 15 Gram(s) Oral once PRN Blood Glucose LESS THAN 70 milliGRAM(s)/deciliter  heparin   Injectable 4500 Unit(s) IV Push every 6 hours PRN For aPTT less than 40  heparin   Injectable 2000 Unit(s) IV Push every 6 hours PRN For aPTT between 40 - 57  melatonin 3 milliGRAM(s) Oral at bedtime PRN Insomnia  ondansetron Injectable 4 milliGRAM(s) IV Push every 8 hours PRN Nausea and/or Vomiting      CAPILLARY BLOOD GLUCOSE      POCT Blood Glucose.: 222 mg/dL (07 Apr 2025 16:39)  POCT Blood Glucose.: 278 mg/dL (07 Apr 2025 12:58)  POCT Blood Glucose.: 244 mg/dL (07 Apr 2025 08:59)  POCT Blood Glucose.: 362 mg/dL (06 Apr 2025 20:42)  POCT Blood Glucose.: 255 mg/dL (06 Apr 2025 17:17)    I&O's Summary    06 Apr 2025 07:01  -  07 Apr 2025 07:00  --------------------------------------------------------  IN: 700 mL / OUT: 0 mL / NET: 700 mL    07 Apr 2025 07:01  -  07 Apr 2025 16:56  --------------------------------------------------------  IN: 360 mL / OUT: 0 mL / NET: 360 mL        PHYSICAL EXAM:  Vital Signs Last 24 Hrs  T(C): 36.4 (07 Apr 2025 12:45), Max: 37 (07 Apr 2025 11:53)  T(F): 97.6 (07 Apr 2025 12:45), Max: 98.6 (07 Apr 2025 11:53)  HR: 81 (07 Apr 2025 16:45) (70 - 104)  BP: 114/60 (07 Apr 2025 16:45) (69/48 - 216/114)  BP(mean): 82 (07 Apr 2025 16:45) (54 - 147)  RR: 18 (07 Apr 2025 16:45) (16 - 18)  SpO2: 100% (07 Apr 2025 16:45) (96% - 100%)    Parameters below as of 07 Apr 2025 16:45  Patient On (Oxygen Delivery Method): room air        CONSTITUTIONAL: NAD  RESPIRATORY: Normal respiratory effort; lungs are clear to auscultation bilaterally  CARDIOVASCULAR: Regular rate and rhythm, normal S1 and S2, no murmur/rub/gallop; No lower extremity edema  ABDOMEN: Nontender to palpation, normoactive bowel sounds, no rebound/guarding  MUSCLOSKELETAL: no clubbing or cyanosis of digits; no joint swelling or tenderness to palpation  PSYCH: A+O to person, place, and time; affect appropriate    LABS:                        12.8   5.24  )-----------( 127      ( 07 Apr 2025 07:05 )             37.2     04-07    136  |  102  |  18  ----------------------------<  237[H]  4.2   |  20[L]  |  1.23    Ca    9.2      07 Apr 2025 07:05  Mg     2.0     04-07      PTT - ( 07 Apr 2025 07:05 )  PTT:82.0 sec  CARDIAC MARKERS ( 06 Apr 2025 21:12 )  x     / x     / x     / x     / 1.3 ng/mL      Urinalysis Basic - ( 07 Apr 2025 07:05 )    Color: x / Appearance: x / SG: x / pH: x  Gluc: 237 mg/dL / Ketone: x  / Bili: x / Urobili: x   Blood: x / Protein: x / Nitrite: x   Leuk Esterase: x / RBC: x / WBC x   Sq Epi: x / Non Sq Epi: x / Bacteria: x

## 2025-04-07 NOTE — PROGRESS NOTE ADULT - PROBLEM SELECTOR PROBLEM 4
Complete heart block
Complete heart block
Type 2 diabetes mellitus
Complete heart block
Type 2 diabetes mellitus
Complete heart block

## 2025-04-07 NOTE — DIETITIAN INITIAL EVALUATION ADULT - NS FNS DIET ORDER
Diet, DASH/TLC:   Sodium & Cholesterol Restricted  Consistent Carbohydrate {No Snacks} (CSTCHO)  No Caffeine  No Chocolate (04-01-25 @ 12:57) [Active]

## 2025-04-08 ENCOUNTER — TRANSCRIPTION ENCOUNTER (OUTPATIENT)
Age: 75
End: 2025-04-08

## 2025-04-08 VITALS — DIASTOLIC BLOOD PRESSURE: 84 MMHG | SYSTOLIC BLOOD PRESSURE: 107 MMHG

## 2025-04-08 LAB
A1C WITH ESTIMATED AVERAGE GLUCOSE RESULT: 7.5 % — HIGH (ref 4–5.6)
APTT BLD: 67.1 SEC — HIGH (ref 24.5–35.6)
CHOLEST SERPL-MCNC: 158 MG/DL — SIGNIFICANT CHANGE UP
ESTIMATED AVERAGE GLUCOSE: 169 MG/DL — HIGH (ref 68–114)
GLUCOSE BLDC GLUCOMTR-MCNC: 290 MG/DL — HIGH (ref 70–99)
GLUCOSE BLDC GLUCOMTR-MCNC: 291 MG/DL — HIGH (ref 70–99)
GLUCOSE BLDC GLUCOMTR-MCNC: 362 MG/DL — HIGH (ref 70–99)
GLUCOSE BLDC GLUCOMTR-MCNC: 382 MG/DL — HIGH (ref 70–99)
GLUCOSE BLDC GLUCOMTR-MCNC: 411 MG/DL — HIGH (ref 70–99)
HCT VFR BLD CALC: 32.6 % — LOW (ref 34.5–45)
HCT VFR BLD CALC: 33.4 % — LOW (ref 34.5–45)
HDLC SERPL-MCNC: 59 MG/DL — SIGNIFICANT CHANGE UP
HGB BLD-MCNC: 11.5 G/DL — SIGNIFICANT CHANGE UP (ref 11.5–15.5)
HGB BLD-MCNC: 11.5 G/DL — SIGNIFICANT CHANGE UP (ref 11.5–15.5)
LDLC SERPL-MCNC: 77 MG/DL — SIGNIFICANT CHANGE UP
LIPID PNL WITH DIRECT LDL SERPL: 77 MG/DL — SIGNIFICANT CHANGE UP
MCHC RBC-ENTMCNC: 29.2 PG — SIGNIFICANT CHANGE UP (ref 27–34)
MCHC RBC-ENTMCNC: 29.8 PG — SIGNIFICANT CHANGE UP (ref 27–34)
MCHC RBC-ENTMCNC: 34.4 G/DL — SIGNIFICANT CHANGE UP (ref 32–36)
MCHC RBC-ENTMCNC: 35.3 G/DL — SIGNIFICANT CHANGE UP (ref 32–36)
MCV RBC AUTO: 84.5 FL — SIGNIFICANT CHANGE UP (ref 80–100)
MCV RBC AUTO: 84.8 FL — SIGNIFICANT CHANGE UP (ref 80–100)
NONHDLC SERPL-MCNC: 99 MG/DL — SIGNIFICANT CHANGE UP
NRBC BLD AUTO-RTO: 0 /100 WBCS — SIGNIFICANT CHANGE UP (ref 0–0)
NRBC BLD AUTO-RTO: 0 /100 WBCS — SIGNIFICANT CHANGE UP (ref 0–0)
PLATELET # BLD AUTO: 116 K/UL — LOW (ref 150–400)
PLATELET # BLD AUTO: 131 K/UL — LOW (ref 150–400)
RBC # BLD: 3.86 M/UL — SIGNIFICANT CHANGE UP (ref 3.8–5.2)
RBC # BLD: 3.94 M/UL — SIGNIFICANT CHANGE UP (ref 3.8–5.2)
RBC # FLD: 13.3 % — SIGNIFICANT CHANGE UP (ref 10.3–14.5)
RBC # FLD: 13.4 % — SIGNIFICANT CHANGE UP (ref 10.3–14.5)
TRIGL SERPL-MCNC: 127 MG/DL — SIGNIFICANT CHANGE UP
WBC # BLD: 6.02 K/UL — SIGNIFICANT CHANGE UP (ref 3.8–10.5)
WBC # BLD: 6.17 K/UL — SIGNIFICANT CHANGE UP (ref 3.8–10.5)
WBC # FLD AUTO: 6.02 K/UL — SIGNIFICANT CHANGE UP (ref 3.8–10.5)
WBC # FLD AUTO: 6.17 K/UL — SIGNIFICANT CHANGE UP (ref 3.8–10.5)

## 2025-04-08 PROCEDURE — 82550 ASSAY OF CK (CPK): CPT

## 2025-04-08 PROCEDURE — 85025 COMPLETE CBC W/AUTO DIFF WBC: CPT

## 2025-04-08 PROCEDURE — 93005 ELECTROCARDIOGRAM TRACING: CPT

## 2025-04-08 PROCEDURE — 80048 BASIC METABOLIC PNL TOTAL CA: CPT

## 2025-04-08 PROCEDURE — 99285 EMERGENCY DEPT VISIT HI MDM: CPT

## 2025-04-08 PROCEDURE — 80061 LIPID PANEL: CPT

## 2025-04-08 PROCEDURE — 93017 CV STRESS TEST TRACING ONLY: CPT

## 2025-04-08 PROCEDURE — 96374 THER/PROPH/DIAG INJ IV PUSH: CPT

## 2025-04-08 PROCEDURE — 36415 COLL VENOUS BLD VENIPUNCTURE: CPT

## 2025-04-08 PROCEDURE — 85027 COMPLETE CBC AUTOMATED: CPT

## 2025-04-08 PROCEDURE — 71046 X-RAY EXAM CHEST 2 VIEWS: CPT

## 2025-04-08 PROCEDURE — C8929: CPT

## 2025-04-08 PROCEDURE — C1725: CPT

## 2025-04-08 PROCEDURE — 83735 ASSAY OF MAGNESIUM: CPT

## 2025-04-08 PROCEDURE — 83695 ASSAY OF LIPOPROTEIN(A): CPT

## 2025-04-08 PROCEDURE — A9500: CPT

## 2025-04-08 PROCEDURE — 80053 COMPREHEN METABOLIC PANEL: CPT

## 2025-04-08 PROCEDURE — 83690 ASSAY OF LIPASE: CPT

## 2025-04-08 PROCEDURE — 92920 PRQ TRLUML C ANGIOP 1ART&/BR: CPT | Mod: LD

## 2025-04-08 PROCEDURE — 74174 CTA ABD&PLVS W/CONTRAST: CPT | Mod: MC

## 2025-04-08 PROCEDURE — 82553 CREATINE MB FRACTION: CPT

## 2025-04-08 PROCEDURE — 71275 CT ANGIOGRAPHY CHEST: CPT | Mod: MC

## 2025-04-08 PROCEDURE — 83880 ASSAY OF NATRIURETIC PEPTIDE: CPT

## 2025-04-08 PROCEDURE — 85610 PROTHROMBIN TIME: CPT

## 2025-04-08 PROCEDURE — 87637 SARSCOV2&INF A&B&RSV AMP PRB: CPT

## 2025-04-08 PROCEDURE — 84484 ASSAY OF TROPONIN QUANT: CPT

## 2025-04-08 PROCEDURE — 85730 THROMBOPLASTIN TIME PARTIAL: CPT

## 2025-04-08 PROCEDURE — C1887: CPT

## 2025-04-08 PROCEDURE — C1769: CPT

## 2025-04-08 PROCEDURE — C1894: CPT

## 2025-04-08 PROCEDURE — 78452 HT MUSCLE IMAGE SPECT MULT: CPT | Mod: MC

## 2025-04-08 PROCEDURE — 93454 CORONARY ARTERY ANGIO S&I: CPT

## 2025-04-08 PROCEDURE — 99232 SBSQ HOSP IP/OBS MODERATE 35: CPT

## 2025-04-08 PROCEDURE — 82962 GLUCOSE BLOOD TEST: CPT

## 2025-04-08 PROCEDURE — C1889: CPT

## 2025-04-08 PROCEDURE — 99239 HOSP IP/OBS DSCHRG MGMT >30: CPT

## 2025-04-08 PROCEDURE — 83036 HEMOGLOBIN GLYCOSYLATED A1C: CPT

## 2025-04-08 RX ORDER — INSULIN ASPART 100 [IU]/ML
12 INJECTION, SOLUTION INTRAVENOUS; SUBCUTANEOUS
Qty: 0 | Refills: 0 | DISCHARGE

## 2025-04-08 RX ORDER — ASPIRIN 325 MG
1 TABLET ORAL
Qty: 30 | Refills: 0
Start: 2025-04-08 | End: 2025-05-07

## 2025-04-08 RX ORDER — LOSARTAN POTASSIUM 100 MG/1
1 TABLET, FILM COATED ORAL
Refills: 0 | DISCHARGE

## 2025-04-08 RX ORDER — APIXABAN 2.5 MG/1
5 TABLET, FILM COATED ORAL
Refills: 0 | Status: DISCONTINUED | OUTPATIENT
Start: 2025-04-08 | End: 2025-04-08

## 2025-04-08 RX ORDER — LOSARTAN POTASSIUM 100 MG/1
1 TABLET, FILM COATED ORAL
Qty: 0 | Refills: 0 | DISCHARGE
Start: 2025-04-08

## 2025-04-08 RX ORDER — CLOPIDOGREL BISULFATE 75 MG/1
1 TABLET, FILM COATED ORAL
Qty: 30 | Refills: 0
Start: 2025-04-08 | End: 2025-05-07

## 2025-04-08 RX ORDER — INSULIN DEGLUDEC 100 U/ML
10 INJECTION, SOLUTION SUBCUTANEOUS
Qty: 0 | Refills: 0 | DISCHARGE
Start: 2025-04-08

## 2025-04-08 RX ORDER — ACETAMINOPHEN 500 MG/5ML
2 LIQUID (ML) ORAL
Qty: 0 | Refills: 0 | DISCHARGE
Start: 2025-04-08

## 2025-04-08 RX ORDER — RANOLAZINE 1000 MG/1
1 TABLET, FILM COATED, EXTENDED RELEASE ORAL
Qty: 60 | Refills: 0
Start: 2025-04-08 | End: 2025-05-07

## 2025-04-08 RX ORDER — MELATONIN 5 MG
1 TABLET ORAL
Qty: 0 | Refills: 0 | DISCHARGE
Start: 2025-04-08

## 2025-04-08 RX ORDER — APIXABAN 2.5 MG/1
1 TABLET, FILM COATED ORAL
Qty: 60 | Refills: 0
Start: 2025-04-08 | End: 2025-05-07

## 2025-04-08 RX ADMIN — CLOPIDOGREL BISULFATE 75 MILLIGRAM(S): 75 TABLET, FILM COATED ORAL at 11:36

## 2025-04-08 RX ADMIN — INSULIN LISPRO 10: 100 INJECTION, SOLUTION INTRAVENOUS; SUBCUTANEOUS at 09:48

## 2025-04-08 RX ADMIN — ISOSORBIDE MONONITRATE 120 MILLIGRAM(S): 60 TABLET, EXTENDED RELEASE ORAL at 11:36

## 2025-04-08 RX ADMIN — APIXABAN 5 MILLIGRAM(S): 2.5 TABLET, FILM COATED ORAL at 16:54

## 2025-04-08 RX ADMIN — CARVEDILOL 25 MILLIGRAM(S): 3.12 TABLET, FILM COATED ORAL at 04:33

## 2025-04-08 RX ADMIN — LOSARTAN POTASSIUM 25 MILLIGRAM(S): 100 TABLET, FILM COATED ORAL at 04:33

## 2025-04-08 RX ADMIN — HEPARIN SODIUM 800 UNIT(S)/HR: 1000 INJECTION INTRAVENOUS; SUBCUTANEOUS at 06:01

## 2025-04-08 RX ADMIN — RANOLAZINE 500 MILLIGRAM(S): 1000 TABLET, FILM COATED, EXTENDED RELEASE ORAL at 16:52

## 2025-04-08 RX ADMIN — CARVEDILOL 25 MILLIGRAM(S): 3.12 TABLET, FILM COATED ORAL at 16:52

## 2025-04-08 RX ADMIN — RANOLAZINE 500 MILLIGRAM(S): 1000 TABLET, FILM COATED, EXTENDED RELEASE ORAL at 04:33

## 2025-04-08 RX ADMIN — INSULIN LISPRO 10: 100 INJECTION, SOLUTION INTRAVENOUS; SUBCUTANEOUS at 17:02

## 2025-04-08 RX ADMIN — NITROGLYCERIN 0.5 INCH(S): 20 OINTMENT TOPICAL at 03:00

## 2025-04-08 RX ADMIN — Medication 81 MILLIGRAM(S): at 11:36

## 2025-04-08 RX ADMIN — INSULIN LISPRO 6: 100 INJECTION, SOLUTION INTRAVENOUS; SUBCUTANEOUS at 11:40

## 2025-04-08 NOTE — PHARMACOTHERAPY INTERVENTION NOTE - COMMENTS
Counseled patient on the following discharge medications names (brand/generic), indication, and possible side effects:    Medications Discussed:  acetaminophen 325 mg oral tablet: 2 tab(s) orally every 6 hours As needed Temp greater or equal to 38C (100.4F), Mild Pain (1 - 3)  apixaban 5 mg oral tablet: 1 tab(s) orally 2 times a day  aspirin 81 mg oral tablet, chewable: 1 tab(s) orally once a day  carvedilol 25 mg oral tablet: 1 tab(s) orally 2 times a day  clopidogrel 75 mg oral tablet: 1 tab(s) orally once a day  ezetimibe 10 mg oral tablet: 1 tab(s) orally once a day  isosorbide mononitrate 120 mg oral tablet, extended release: 1 tab(s) orally once a day (in the morning)  losartan 25 mg oral tablet: 1 tab(s) orally once a day  melatonin 3 mg oral tablet: 1 tab(s) orally once a day (at bedtime) As needed Insomnia  NovoLOG FlexPen 100 units/mL injectable solution: 35 to 40 units injectable 3 times a day(10 mins before meals)  ranolazine 500 mg oral tablet, extended release: 1 tab(s) orally 2 times a day  rosuvastatin 40 mg oral tablet: 1 tab(s) orally once a day (at bedtime)  senna leaf extract oral tablet: 2 tab(s) orally once a day as needed  Tresiba FlexTouch 100 units/mL subcutaneous solution: 10 unit(s) subcutaneous once a day (in the morning)    Discussed in depth the importance of adherence to DAPT (aspirin 81mg once a day + clopidogrel 75mg once a day) to prevent stent thrombosis. Patient is also on Eliquis 5mg 2 times a day for stroke ppx in afib. Patient educated to continue on all 3 (aspirin 81mg once a day + clopidogrel 75mg once a day + Eliquis 5mg 2 times a day) for 1 month, then stop aspirin and only continue clopidogrel 75mg once a day + Eliquis 5mg 2 times a day. Educated patient on the increased risk of bleeding and to avoid OTC medications such as ibuprofen (Advil, Motrin) and naproxen (Aleve) and to take acetaminophen (Tylenol) instead if needed. Provided medication cards. Patient questions and concerns were answered and addressed. Patient demonstrated understanding and to follow up with cardiologist.    Provided medication cards. Patient questions and concerns were answered and addressed. Patient demonstrated understanding.    Porter AntoineD, [facility]  Clinical Pharmacy Specialist  Available on [employer]  Cell: [phone]

## 2025-04-08 NOTE — CHART NOTE - NSCHARTNOTEFT_GEN_A_CORE
Patient seen and examined. S/P [facility] on 4/7 with stent placed.   L groin with ecchymosis and mild tenderness but no hematoma  Hgb stable, VSS  Okay for discharge with outpatient cardiology follow up  D/w patient and daughter

## 2025-04-08 NOTE — PROGRESS NOTE ADULT - ASSESSMENT
75y Female PMH CAD (sp ERI x4 to Cx and LAD), HTN, HLD, T2DM, GERD, CHB (s/p Medtronic bi-v ppm 11/2024), pAF on Eliquis presented with chest pain, abnormal pharm NST s/p diagnostic LHC 95% ISR pLAD, mild ISR LCx, 100%  RCA via RRA and RFA 4/7, s/p PCI with DCB x 1 to ISR LAD via LFA 4/8    - R and L groin site stable- mild ecchymosis, no hematoma- site care discussed with patient and instructions given  - R wrist site stable- no hematoma- site care discussed with patient and instructions given  - Continue DAPT (aspirin 81mg and clopidogrel 75mg) w/ heparin gtt- triple therapy while admitted and for ONE month post discharge (Eliquis, ASA, Plavix) as per interventionalist Dr. Dimas  - Continue rosuvastatin 40mg, ezetimibe 10mg qhs  - TTE 4/2- mildly reduced EF 50% mod DD- GDMT per primary cardiology  - Medication adherence stressed to patient with discussion of risks of non-compliance including stent thrombosis  - Recommend a heart healthy diet which includes a variety of fruits and vegetables, whole grains, low fat dairy products, legumes and skinless poultry and fish; food prepared with little or no salt and minimize processed foods  - Avoid using NSAIDs  (Aleve, Motrin, ibuprofen, naproxen) while on DAPT, please utilize Tylenol for pain control (not to exceed 4gm in 24 hours)  - continue telemetry  - cardiac rehab referral provided to patient, instructed to follow up with outpatient cardiologist  - Care per primary team  - outpatient follow up with cardiology from [facility] Dr. Paul Garcia in 2 weeks upon discharge from the hospital      75y Female PMH CAD (sp ERI x4 to Cx and LAD), HTN, HLD, T2DM, GERD, CHB (s/p Medtronic bi-v ppm 11/2024), pAF on Eliquis presented with chest pain, abnormal pharm NST s/p diagnostic LHC 95% ISR pLAD, mild ISR LCx, 100%  RCA via RRA and RFA 4/7, s/p PCI with DCB x 1 to ISR LAD via LFA 4/8    - R and L groin site stable- mild ecchymosis, no hematoma- site care discussed with patient and instructions given  - R wrist site stable- no hematoma- site care discussed with patient and instructions given  - Continue DAPT (aspirin 81mg and clopidogrel 75mg) w/ heparin gtt- triple therapy while admitted and for ONE month post discharge (Eliquis, ASA, Plavix) then patient can continue Eliquis and Plavix as per interventionalist Dr. Dimsa  - Transition of heparin gtt to Eliquis per primary team  - Continue rosuvastatin 40mg, ezetimibe 10mg qhs  - TTE 4/2- mildly reduced EF 50% mod DD- GDMT per primary cardiology  - Medication adherence stressed to patient with discussion of risks of non-compliance including stent thrombosis  - Recommend a heart healthy diet which includes a variety of fruits and vegetables, whole grains, low fat dairy products, legumes and skinless poultry and fish; food prepared with little or no salt and minimize processed foods  - Avoid using NSAIDs  (Aleve, Motrin, ibuprofen, naproxen) while on DAPT, please utilize Tylenol for pain control (not to exceed 4gm in 24 hours)  - continue telemetry  - cardiac rehab referral provided to patient, instructed to follow up with outpatient cardiologist  - Care per primary team  - outpatient follow up with cardiology from [facility] Dr. Paul Garcia in 2 weeks upon discharge from the hospital

## 2025-04-08 NOTE — CHART NOTE - NSCHARTNOTEFT_GEN_A_CORE
MEDICINE PA NOTE    ALEJANDRAVIKTORIA SENIOR    Pt with bruising around L femoral cath site. Pt seen and examined at bedside, alert and oriented and hemodynamically stable. Upon assessment of Cath site, noticeable bruising noted, however no hematoma appreciated. Pulses present in both extremities. CBC ordered, results WNL. Discussed with Cardiology, who assessed patient at bedside. recommended to continue heparin drip and check CBC in 4 hours. Discussed with RN. Will monitor site closely. Will endorse to AM team.     ICU Vital Signs Last 24 Hrs  T(C): 36.9 (08 Apr 2025 04:30), Max: 37 (07 Apr 2025 11:53)  T(F): 98.4 (08 Apr 2025 04:30), Max: 98.6 (07 Apr 2025 11:53)  HR: 86 (08 Apr 2025 04:30) (78 - 104)  BP: 147/80 (08 Apr 2025 04:30) (69/48 - 216/114)  BP(mean): 76 (07 Apr 2025 17:15) (54 - 147)  ABP: --  ABP(mean): --  RR: 18 (08 Apr 2025 04:30) (16 - 18)  SpO2: 98% (08 Apr 2025 04:30) (96% - 100%)    O2 Parameters below as of 08 Apr 2025 04:30  Patient On (Oxygen Delivery Method): room air      Zabrina García PA-C  Dept of Medicine

## 2025-04-08 NOTE — PROGRESS NOTE ADULT - PROVIDER SPECIALTY LIST ADULT
Cardiology
Cardiology
Hospitalist
Hospitalist
Internal Medicine
Intervent Cardiology
Hospitalist
Internal Medicine
Hospitalist

## 2025-04-08 NOTE — DISCHARGE NOTE NURSING/CASE MANAGEMENT/SOCIAL WORK - NSDCPEELIQUISDIET_GEN_ALL_CORE
[FreeTextEntry1] : I, Rohini Daley, acted solely as a scribe for Dr. Nick Ni on this date 01/26/2022.\par \par \par \par \par \par \par 
Eat healthy foods you enjoy. Apixaban/Eliquis DOES NOT have a special diet. Limit your alcohol intake.

## 2025-04-08 NOTE — DISCHARGE NOTE NURSING/CASE MANAGEMENT/SOCIAL WORK - PATIENT PORTAL LINK FT
You can access the FollowMyHealth Patient Portal offered by [facility] by registering at the following website: [email or web address]. By joining [facility]’s FollowMyHealth portal, you will also be able to view your health information using other applications (apps) compatible with our system.

## 2025-04-08 NOTE — DISCHARGE NOTE NURSING/CASE MANAGEMENT/SOCIAL WORK - NSDCFUADDAPPT_GEN_ALL_CORE_FT
APPTS ARE READY TO BE MADE: [X] YES    Best Family or Patient Contact (if needed):    Additional Information about above appointments (if needed):    1: Interventional cardiology   2:   3:     Other comments or requests:

## 2025-04-08 NOTE — PROVIDER CONTACT NOTE (OTHER) - ASSESSMENT
Pt A&O x4,pt chest pain radiates to chin 10/10 on pain scale lasted for 10 minutes then slowly relieves as per patient. /105, HR 88, pox 100% on RA
AOx4, pt able to verbalize needs.  Pt denies and headache, dizziness, shortness of breath, headache and chest pain. Pt verbalizes no pain, no acute distress noted. Ecchymosis noted in surrounding area of left Groin cath insertion site. No ecchymosis at insertion site, non tender, site

## 2025-04-08 NOTE — PROGRESS NOTE ADULT - SUBJECTIVE AND OBJECTIVE BOX
Interventional Cardiology Post Cath Progress Note:                Subjective:   Patient feels well- no current complaints- Denies chest pain, shortness of breath. Denies numbness, tingling or swelling around access sites- mild discomfort around groin access sites    Tele 24hrs: V Paced      MEDICATIONS  (STANDING):  aspirin  chewable 81 milliGRAM(s) Oral daily  carvedilol 25 milliGRAM(s) Oral every 12 hours  clopidogrel Tablet 75 milliGRAM(s) Oral daily  dextrose 5%. 1000 milliLiter(s) (50 mL/Hr) IV Continuous <Continuous>  dextrose 50% Injectable 25 Gram(s) IV Push once  ezetimibe 10 milliGRAM(s) Oral at bedtime  glucagon  Injectable 1 milliGRAM(s) IntraMuscular once  heparin  Infusion. 800 Unit(s)/Hr (8 mL/Hr) IV Continuous <Continuous>  insulin glargine Injectable (LANTUS) 12 Unit(s) SubCutaneous at bedtime  insulin lispro (ADMELOG) corrective regimen sliding scale   SubCutaneous at bedtime  insulin lispro (ADMELOG) corrective regimen sliding scale   SubCutaneous three times a day before meals  isosorbide   mononitrate ER Tablet (IMDUR) 120 milliGRAM(s) Oral daily  losartan 25 milliGRAM(s) Oral daily  ranolazine 500 milliGRAM(s) Oral two times a day  rosuvastatin 40 milliGRAM(s) Oral at bedtime  sodium chloride 0.9% Bolus 250 milliLiter(s) IV Bolus once  sodium chloride 0.9%. 1000 milliLiter(s) (75 mL/Hr) IV Continuous <Continuous>  sodium chloride 0.9%. 1000 milliLiter(s) (75 mL/Hr) IV Continuous <Continuous>    MEDICATIONS  (PRN):  acetaminophen     Tablet .. 650 milliGRAM(s) Oral every 6 hours PRN Temp greater or equal to 38C (100.4F), Mild Pain (1 - 3)  aluminum hydroxide/magnesium hydroxide/simethicone Suspension 30 milliLiter(s) Oral every 4 hours PRN Dyspepsia  dextrose Oral Gel 15 Gram(s) Oral once PRN Blood Glucose LESS THAN 70 milliGRAM(s)/deciliter  heparin   Injectable 4500 Unit(s) IV Push every 6 hours PRN For aPTT less than 40  heparin   Injectable 2000 Unit(s) IV Push every 6 hours PRN For aPTT between 40 - 57  melatonin 3 milliGRAM(s) Oral at bedtime PRN Insomnia  ondansetron Injectable 4 milliGRAM(s) IV Push every 8 hours PRN Nausea and/or Vomiting      Objective:  Vital Signs Last 24 Hrs  T(C): 36.9 (08 Apr 2025 04:30), Max: 37 (07 Apr 2025 11:53)  T(F): 98.4 (08 Apr 2025 04:30), Max: 98.6 (07 Apr 2025 11:53)  HR: 86 (08 Apr 2025 04:30) (78 - 104)  BP: 147/80 (08 Apr 2025 04:30) (69/48 - 216/114)  BP(mean): 76 (07 Apr 2025 17:15) (54 - 147)  RR: 18 (08 Apr 2025 04:30) (16 - 18)  SpO2: 98% (08 Apr 2025 04:30) (96% - 100%)    Parameters below as of 08 Apr 2025 04:30  Patient On (Oxygen Delivery Method): room air        04-07-25 @ 07:01  -  04-08-25 @ 07:00  --------------------------------------------------------  IN: 780 mL / OUT: 125 mL / NET: 655 mL                              11.5   6.02  )-----------( 131      ( 08 Apr 2025 05:36 )             33.4     04-07    136  |  102  |  18  ----------------------------<  237[H]  4.2   |  20[L]  |  1.23    Ca    9.2      07 Apr 2025 07:05  Mg     2.0     04-07      PTT - ( 08 Apr 2025 05:32 )  PTT:67.1 sec  Urinalysis Basic - ( 07 Apr 2025 07:05 )    Color: x / Appearance: x / SG: x / pH: x  Gluc: 237 mg/dL / Ketone: x  / Bili: x / Urobili: x   Blood: x / Protein: x / Nitrite: x   Leuk Esterase: x / RBC: x / WBC x   Sq Epi: x / Non Sq Epi: x / Bacteria: x    Cardiac Catheterization (04.04.25 @ 15:06)   Cath Lab Report    Diagnostic Cardiologist:       Jeremy Kaur MD   Referring Physician:           Davi Regalado MD     Procedures Performed   Procedures:               1.    Arterial Access - Right Radial   2.    Diagnostic Coronary Angiography   3.    Ultrasound Guided Access   4.  Arterial Access - Right Femoral     Indications:               CCS Class IV   Suspected coronary artery disease     Diagnostic Conclusions:     - 95% pLAD ISR or previosuly placed stent   - Mild ISR LCx stent, mild disease in ramus, 100%  RCA     Recommendations:     - Planned PCI pLAD ISR on 4/7/2025 with possible drug coated balloon   - Continue DAPT   - Heparin gtt        Diagnostic Findings:     Coronary Angiography   The coronary circulation is right dominant.      LM   Left main artery: Angiography shows mild atherosclerosis.      LAD   Left anterior descending artery: There is a 95 % in-stent restenosis.      CX   Circumflex: There is a 30 % in-stent restenosis.      RCA   Right coronary artery: There is a 100 % stenosis. This lesionis a  chronic total occlusion.    Ramus   Ramus intermedius: Angiography shows mild atherosclerosis.      TTE W or WO Ultrasound Enhancing Agent (04.02.25 @ 08:39)   CONCLUSIONS:      1. Left ventricular cavity is normal in size. Left ventricular wall thickness is normal. Left ventricularsystolic function is mildly decreased with an ejection fraction of 50 % by Davila's method of disks. There are no regional wall motion abnormalities seen.   2. Multiple segmental abnormalities exist. See findings.   3. There is moderate (grade 2) left ventricular diastolic dysfunction, with elevated left ventricular filling pressure.   4. Normal right ventricular cavity size, with normal wall thickness, and normal right ventricular systolic function.   5. Device lead is visualized in the right heart.   6. Left atrium is mildly dilated.   7. No significant valvular disease.   8. No pericardial effusion seen.   9. Compared to the transthoracic echocardiogram performed on 11/23/2024, there have been no significant interval changes.    < end of copied text >      Physical Exam:  No apparent distress, alert and oriented times three, appropriate affect  JVD is not elevated, supple  Clear to auscultation with no wheezing, rhonchi or crackles  Regular rate and rhythm with no murmur, rub or gallop  Soft, non-tender, non-distended, no masses/guarding or rebound tenderness  Right Upper Extremity: Soft, non tender, no bleeding or hematoma, clean/dry/intact- RUE +2 palpable radial pulse  L groin: Soft, mildly tender, mild ecchymosis no bleeding or hematoma, clean/dry/intact- RLE/LLE +2 palpable DP/PT pulse, no clubbing, cyanosis or edema  R groin: Soft, mildly tender, mild ecchymosis no bleeding or hematoma, clean/dry/intact- RLE/LLE +2 palpable DP/PT pulse, no clubbing, cyanosis or edema                                                          Interventional Cardiology Post Cath Progress Note:                Subjective:   Patient feels well- Denies chest pain, shortness of breath. Denies numbness, tingling or swelling around access sites- reports mild discomfort around groin access sites    Tele 24hrs: V Paced      MEDICATIONS  (STANDING):  aspirin  chewable 81 milliGRAM(s) Oral daily  carvedilol 25 milliGRAM(s) Oral every 12 hours  clopidogrel Tablet 75 milliGRAM(s) Oral daily  dextrose 5%. 1000 milliLiter(s) (50 mL/Hr) IV Continuous <Continuous>  dextrose 50% Injectable 25 Gram(s) IV Push once  ezetimibe 10 milliGRAM(s) Oral at bedtime  glucagon  Injectable 1 milliGRAM(s) IntraMuscular once  heparin  Infusion. 800 Unit(s)/Hr (8 mL/Hr) IV Continuous <Continuous>  insulin glargine Injectable (LANTUS) 12 Unit(s) SubCutaneous at bedtime  insulin lispro (ADMELOG) corrective regimen sliding scale   SubCutaneous at bedtime  insulin lispro (ADMELOG) corrective regimen sliding scale   SubCutaneous three times a day before meals  isosorbide   mononitrate ER Tablet (IMDUR) 120 milliGRAM(s) Oral daily  losartan 25 milliGRAM(s) Oral daily  ranolazine 500 milliGRAM(s) Oral two times a day  rosuvastatin 40 milliGRAM(s) Oral at bedtime  sodium chloride 0.9% Bolus 250 milliLiter(s) IV Bolus once  sodium chloride 0.9%. 1000 milliLiter(s) (75 mL/Hr) IV Continuous <Continuous>  sodium chloride 0.9%. 1000 milliLiter(s) (75 mL/Hr) IV Continuous <Continuous>    MEDICATIONS  (PRN):  acetaminophen     Tablet .. 650 milliGRAM(s) Oral every 6 hours PRN Temp greater or equal to 38C (100.4F), Mild Pain (1 - 3)  aluminum hydroxide/magnesium hydroxide/simethicone Suspension 30 milliLiter(s) Oral every 4 hours PRN Dyspepsia  dextrose Oral Gel 15 Gram(s) Oral once PRN Blood Glucose LESS THAN 70 milliGRAM(s)/deciliter  heparin   Injectable 4500 Unit(s) IV Push every 6 hours PRN For aPTT less than 40  heparin   Injectable 2000 Unit(s) IV Push every 6 hours PRN For aPTT between 40 - 57  melatonin 3 milliGRAM(s) Oral at bedtime PRN Insomnia  ondansetron Injectable 4 milliGRAM(s) IV Push every 8 hours PRN Nausea and/or Vomiting      Objective:  Vital Signs Last 24 Hrs  T(C): 36.9 (08 Apr 2025 04:30), Max: 37 (07 Apr 2025 11:53)  T(F): 98.4 (08 Apr 2025 04:30), Max: 98.6 (07 Apr 2025 11:53)  HR: 86 (08 Apr 2025 04:30) (78 - 104)  BP: 147/80 (08 Apr 2025 04:30) (69/48 - 216/114)  BP(mean): 76 (07 Apr 2025 17:15) (54 - 147)  RR: 18 (08 Apr 2025 04:30) (16 - 18)  SpO2: 98% (08 Apr 2025 04:30) (96% - 100%)    Parameters below as of 08 Apr 2025 04:30  Patient On (Oxygen Delivery Method): room air        04-07-25 @ 07:01  -  04-08-25 @ 07:00  --------------------------------------------------------  IN: 780 mL / OUT: 125 mL / NET: 655 mL                              11.5   6.02  )-----------( 131      ( 08 Apr 2025 05:36 )             33.4     04-07    136  |  102  |  18  ----------------------------<  237[H]  4.2   |  20[L]  |  1.23    Ca    9.2      07 Apr 2025 07:05  Mg     2.0     04-07      PTT - ( 08 Apr 2025 05:32 )  PTT:67.1 sec  Urinalysis Basic - ( 07 Apr 2025 07:05 )    Color: x / Appearance: x / SG: x / pH: x  Gluc: 237 mg/dL / Ketone: x  / Bili: x / Urobili: x   Blood: x / Protein: x / Nitrite: x   Leuk Esterase: x / RBC: x / WBC x   Sq Epi: x / Non Sq Epi: x / Bacteria: x    Cardiac Catheterization (04.04.25 @ 15:06)   Cath Lab Report    Diagnostic Cardiologist:       Jeremy Kaur MD   Referring Physician:           Davi Regalado MD     Procedures Performed   Procedures:               1.    Arterial Access - Right Radial   2.    Diagnostic Coronary Angiography   3.    Ultrasound Guided Access   4.  Arterial Access - Right Femoral     Indications:               CCS Class IV   Suspected coronary artery disease     Diagnostic Conclusions:     - 95% pLAD ISR or previosuly placed stent   - Mild ISR LCx stent, mild disease in ramus, 100%  RCA     Recommendations:     - Planned PCI pLAD ISR on 4/7/2025 with possible drug coated balloon   - Continue DAPT   - Heparin gtt        Diagnostic Findings:     Coronary Angiography   The coronary circulation is right dominant.      LM   Left main artery: Angiography shows mild atherosclerosis.      LAD   Left anterior descending artery: There is a 95 % in-stent restenosis.      CX   Circumflex: There is a 30 % in-stent restenosis.      RCA   Right coronary artery: There is a 100 % stenosis. This lesionis a  chronic total occlusion.    Ramus   Ramus intermedius: Angiography shows mild atherosclerosis.      TTE W or WO Ultrasound Enhancing Agent (04.02.25 @ 08:39)   CONCLUSIONS:      1. Left ventricular cavity is normal in size. Left ventricular wall thickness is normal. Left ventricularsystolic function is mildly decreased with an ejection fraction of 50 % by Davila's method of disks. There are no regional wall motion abnormalities seen.   2. Multiple segmental abnormalities exist. See findings.   3. There is moderate (grade 2) left ventricular diastolic dysfunction, with elevated left ventricular filling pressure.   4. Normal right ventricular cavity size, with normal wall thickness, and normal right ventricular systolic function.   5. Device lead is visualized in the right heart.   6. Left atrium is mildly dilated.   7. No significant valvular disease.   8. No pericardial effusion seen.   9. Compared to the transthoracic echocardiogram performed on 11/23/2024, there have been no significant interval changes.    < end of copied text >      Physical Exam:  No apparent distress, alert and oriented times three, appropriate affect  JVD is not elevated, supple  Clear to auscultation with no wheezing, rhonchi or crackles  Regular rate and rhythm with no murmur, rub or gallop  Soft, non-tender, non-distended, no masses/guarding or rebound tenderness  Right Upper Extremity: Soft, non tender, no bleeding or hematoma, clean/dry/intact- RUE +2 palpable radial pulse  L groin: Soft, mildly tender, mild ecchymosis no bleeding or hematoma, clean/dry/intact- RLE/LLE +2 palpable DP/PT pulse, no clubbing, cyanosis or edema  R groin: Soft, mildly tender, mild ecchymosis no bleeding or hematoma, clean/dry/intact- RLE/LLE +2 palpable DP/PT pulse, no clubbing, cyanosis or edema                                                          Interventional Cardiology Post Cath Progress Note:                Subjective:   Patient feels well- Denies chest pain, shortness of breath. Denies numbness, tingling or swelling around access sites- reports mild discomfort around groin access sites    Tele 24hrs: V Paced      MEDICATIONS  (STANDING):  aspirin  chewable 81 milliGRAM(s) Oral daily  carvedilol 25 milliGRAM(s) Oral every 12 hours  clopidogrel Tablet 75 milliGRAM(s) Oral daily  dextrose 5%. 1000 milliLiter(s) (50 mL/Hr) IV Continuous <Continuous>  dextrose 50% Injectable 25 Gram(s) IV Push once  ezetimibe 10 milliGRAM(s) Oral at bedtime  glucagon  Injectable 1 milliGRAM(s) IntraMuscular once  heparin  Infusion. 800 Unit(s)/Hr (8 mL/Hr) IV Continuous <Continuous>  insulin glargine Injectable (LANTUS) 12 Unit(s) SubCutaneous at bedtime  insulin lispro (ADMELOG) corrective regimen sliding scale   SubCutaneous at bedtime  insulin lispro (ADMELOG) corrective regimen sliding scale   SubCutaneous three times a day before meals  isosorbide   mononitrate ER Tablet (IMDUR) 120 milliGRAM(s) Oral daily  losartan 25 milliGRAM(s) Oral daily  ranolazine 500 milliGRAM(s) Oral two times a day  rosuvastatin 40 milliGRAM(s) Oral at bedtime  sodium chloride 0.9% Bolus 250 milliLiter(s) IV Bolus once  sodium chloride 0.9%. 1000 milliLiter(s) (75 mL/Hr) IV Continuous <Continuous>  sodium chloride 0.9%. 1000 milliLiter(s) (75 mL/Hr) IV Continuous <Continuous>    MEDICATIONS  (PRN):  acetaminophen     Tablet .. 650 milliGRAM(s) Oral every 6 hours PRN Temp greater or equal to 38C (100.4F), Mild Pain (1 - 3)  aluminum hydroxide/magnesium hydroxide/simethicone Suspension 30 milliLiter(s) Oral every 4 hours PRN Dyspepsia  dextrose Oral Gel 15 Gram(s) Oral once PRN Blood Glucose LESS THAN 70 milliGRAM(s)/deciliter  heparin   Injectable 4500 Unit(s) IV Push every 6 hours PRN For aPTT less than 40  heparin   Injectable 2000 Unit(s) IV Push every 6 hours PRN For aPTT between 40 - 57  melatonin 3 milliGRAM(s) Oral at bedtime PRN Insomnia  ondansetron Injectable 4 milliGRAM(s) IV Push every 8 hours PRN Nausea and/or Vomiting      Objective:  Vital Signs Last 24 Hrs  T(C): 36.9 (08 Apr 2025 04:30), Max: 37 (07 Apr 2025 11:53)  T(F): 98.4 (08 Apr 2025 04:30), Max: 98.6 (07 Apr 2025 11:53)  HR: 86 (08 Apr 2025 04:30) (78 - 104)  BP: 147/80 (08 Apr 2025 04:30) (69/48 - 216/114)  BP(mean): 76 (07 Apr 2025 17:15) (54 - 147)  RR: 18 (08 Apr 2025 04:30) (16 - 18)  SpO2: 98% (08 Apr 2025 04:30) (96% - 100%)    Parameters below as of 08 Apr 2025 04:30  Patient On (Oxygen Delivery Method): room air        04-07-25 @ 07:01  -  04-08-25 @ 07:00  --------------------------------------------------------  IN: 780 mL / OUT: 125 mL / NET: 655 mL                              11.5   6.02  )-----------( 131      ( 08 Apr 2025 05:36 )             33.4     04-07    136  |  102  |  18  ----------------------------<  237[H]  4.2   |  20[L]  |  1.23    Ca    9.2      07 Apr 2025 07:05  Mg     2.0     04-07      PTT - ( 08 Apr 2025 05:32 )  PTT:67.1 sec  Urinalysis Basic - ( 07 Apr 2025 07:05 )    Color: x / Appearance: x / SG: x / pH: x  Gluc: 237 mg/dL / Ketone: x  / Bili: x / Urobili: x   Blood: x / Protein: x / Nitrite: x   Leuk Esterase: x / RBC: x / WBC x   Sq Epi: x / Non Sq Epi: x / Bacteria: x    Cardiac Catheterization (04.04.25 @ 15:06)   Cath Lab Report    Diagnostic Cardiologist:       Jeremy Kaur MD   Referring Physician:           Davi Regalado MD     Procedures Performed   Procedures:               1.    Arterial Access - Right Radial   2.    Diagnostic Coronary Angiography   3.    Ultrasound Guided Access   4.  Arterial Access - Right Femoral     Indications:               CCS Class IV   Suspected coronary artery disease     Diagnostic Conclusions:     - 95% pLAD ISR or previosuly placed stent   - Mild ISR LCx stent, mild disease in ramus, 100%  RCA     Recommendations:     - Planned PCI pLAD ISR on 4/7/2025 with possible drug coated balloon   - Continue DAPT   - Heparin gtt        Diagnostic Findings:     Coronary Angiography   The coronary circulation is right dominant.      LM   Left main artery: Angiography shows mild atherosclerosis.      LAD   Left anterior descending artery: There is a 95 % in-stent restenosis.      CX   Circumflex: There is a 30 % in-stent restenosis.      RCA   Right coronary artery: There is a 100 % stenosis. This lesionis a  chronic total occlusion.    Ramus   Ramus intermedius: Angiography shows mild atherosclerosis.      TTE W or WO Ultrasound Enhancing Agent (04.02.25 @ 08:39)   CONCLUSIONS:      1. Left ventricular cavity is normal in size. Left ventricular wall thickness is normal. Left ventricularsystolic function is mildly decreased with an ejection fraction of 50 % by Davila's method of disks. There are no regional wall motion abnormalities seen.   2. Multiple segmental abnormalities exist. See findings.   3. There is moderate (grade 2) left ventricular diastolic dysfunction, with elevated left ventricular filling pressure.   4. Normal right ventricular cavity size, with normal wall thickness, and normal right ventricular systolic function.   5. Device lead is visualized in the right heart.   6. Left atrium is mildly dilated.   7. No significant valvular disease.   8. No pericardial effusion seen.   9. Compared to the transthoracic echocardiogram performed on 11/23/2024, there have been no significant interval changes.        Physical Exam:  No apparent distress, alert and oriented times three, appropriate affect  JVD is not elevated, supple  Clear to auscultation with no wheezing, rhonchi or crackles  Regular rate and rhythm with no murmur, rub or gallop  Soft, non-tender, non-distended, no masses/guarding or rebound tenderness  Right Upper Extremity: Soft, non tender, no bleeding or hematoma, clean/dry/intact- RUE +2 palpable radial pulse  L groin: Soft, mildly tender, mild ecchymosis no bleeding or hematoma, clean/dry/intact- RLE/LLE +2 palpable DP/PT pulse, no clubbing, cyanosis or edema  R groin: Soft, mildly tender, mild ecchymosis no bleeding or hematoma, clean/dry/intact- RLE/LLE +2 palpable DP/PT pulse, no clubbing, cyanosis or edema

## 2025-04-08 NOTE — DISCHARGE NOTE NURSING/CASE MANAGEMENT/SOCIAL WORK - FINANCIAL ASSISTANCE
[facility] provides services at a reduced cost to those who are determined to be eligible through [facility]’s financial assistance program. Information regarding [facility]’s financial assistance program can be found by going to [email or web address] or by calling [phone].

## 2025-04-08 NOTE — PROVIDER CONTACT NOTE (OTHER) - ACTION/TREATMENT ORDERED:
EKG done and reviewed by the Lorin MANUEL. CE ordered. Nitro SL given with relief
Provider arrived at bedside to assess pt, heparin gtt continued. no new orders

## 2025-04-16 ENCOUNTER — NON-APPOINTMENT (OUTPATIENT)
Age: 75
End: 2025-04-16

## 2025-04-16 ENCOUNTER — APPOINTMENT (OUTPATIENT)
Dept: ELECTROPHYSIOLOGY | Facility: CLINIC | Age: 75
End: 2025-04-16

## 2025-04-16 ENCOUNTER — RESULT CHARGE (OUTPATIENT)
Age: 75
End: 2025-04-16

## 2025-04-16 VITALS
SYSTOLIC BLOOD PRESSURE: 144 MMHG | DIASTOLIC BLOOD PRESSURE: 85 MMHG | OXYGEN SATURATION: 99 % | WEIGHT: 114 LBS | BODY MASS INDEX: 22.26 KG/M2 | HEART RATE: 86 BPM

## 2025-04-16 PROCEDURE — 93281 PM DEVICE PROGR EVAL MULTI: CPT

## 2025-04-16 PROCEDURE — 93000 ELECTROCARDIOGRAM COMPLETE: CPT | Mod: XU

## 2025-04-28 ENCOUNTER — APPOINTMENT (OUTPATIENT)
Dept: HOME HEALTH SERVICES | Facility: HOME HEALTH | Age: 75
End: 2025-04-28
Payer: MEDICARE

## 2025-04-28 VITALS
OXYGEN SATURATION: 99 % | DIASTOLIC BLOOD PRESSURE: 80 MMHG | HEART RATE: 73 BPM | SYSTOLIC BLOOD PRESSURE: 130 MMHG | RESPIRATION RATE: 19 BRPM

## 2025-04-28 DIAGNOSIS — I20.9 ANGINA PECTORIS, UNSPECIFIED: ICD-10-CM

## 2025-04-28 DIAGNOSIS — I44.2 ATRIOVENTRICULAR BLOCK, COMPLETE: ICD-10-CM

## 2025-04-28 DIAGNOSIS — E11.9 TYPE 2 DIABETES MELLITUS W/OUT COMPLICATIONS: ICD-10-CM

## 2025-04-28 DIAGNOSIS — Z86.79 PERSONAL HISTORY OF OTHER DISEASES OF THE CIRCULATORY SYSTEM: ICD-10-CM

## 2025-04-28 DIAGNOSIS — E61.8 DEFICIENCY OF OTHER SPECIFIED NUTRIENT ELEMENTS: ICD-10-CM

## 2025-04-28 DIAGNOSIS — H40.9 UNSPECIFIED GLAUCOMA: ICD-10-CM

## 2025-04-28 DIAGNOSIS — I48.0 PAROXYSMAL ATRIAL FIBRILLATION: ICD-10-CM

## 2025-04-28 DIAGNOSIS — E78.5 HYPERLIPIDEMIA, UNSPECIFIED: ICD-10-CM

## 2025-04-28 DIAGNOSIS — K59.00 CONSTIPATION, UNSPECIFIED: ICD-10-CM

## 2025-04-28 DIAGNOSIS — I10 ESSENTIAL (PRIMARY) HYPERTENSION: ICD-10-CM

## 2025-04-28 DIAGNOSIS — I25.10 ATHEROSCLEROTIC HEART DISEASE OF NATIVE CORONARY ARTERY W/OUT ANGINA PECTORIS: ICD-10-CM

## 2025-04-28 PROCEDURE — 99345 HOME/RES VST NEW HIGH MDM 75: CPT | Mod: 25

## 2025-04-28 PROCEDURE — G0506: CPT

## 2025-04-28 RX ORDER — ISOSORBIDE MONONITRATE 120 MG/1
120 TABLET, EXTENDED RELEASE ORAL DAILY
Qty: 60 | Refills: 0 | Status: ACTIVE | COMMUNITY
Start: 2025-04-28

## 2025-04-28 RX ORDER — FLASH GLUCOSE SENSOR
KIT MISCELLANEOUS
Qty: 6 | Refills: 0 | Status: ACTIVE | COMMUNITY
Start: 2025-04-09

## 2025-04-28 RX ORDER — SEMAGLUTIDE 1.34 MG/ML
4 INJECTION, SOLUTION SUBCUTANEOUS
Qty: 9 | Refills: 0 | Status: DISCONTINUED | COMMUNITY
Start: 2024-11-01

## 2025-04-28 RX ORDER — RANOLAZINE 500 MG/1
500 TABLET, FILM COATED, EXTENDED RELEASE ORAL
Refills: 0 | Status: ACTIVE | COMMUNITY
Start: 2025-04-28

## 2025-04-28 RX ORDER — PEN NEEDLE, DIABETIC 29 G X1/2"
32G X 4 MM NEEDLE, DISPOSABLE MISCELLANEOUS
Qty: 100 | Refills: 0 | Status: ACTIVE | COMMUNITY
Start: 2024-12-27

## 2025-04-28 RX ORDER — ISOSORBIDE MONONITRATE 60 MG/1
60 TABLET, EXTENDED RELEASE ORAL
Qty: 90 | Refills: 0 | Status: DISCONTINUED | COMMUNITY
Start: 2024-12-27

## 2025-04-28 RX ORDER — BLOOD-GLUCOSE,RECEIVER,CONT
EACH MISCELLANEOUS
Qty: 1 | Refills: 0 | Status: ACTIVE | COMMUNITY
Start: 2024-12-02

## 2025-04-28 RX ORDER — INSULIN DEGLUDEC INJECTION 100 U/ML
100 INJECTION, SOLUTION SUBCUTANEOUS
Qty: 9 | Refills: 0 | Status: DISCONTINUED | COMMUNITY
Start: 2025-03-19

## 2025-04-28 RX ORDER — INSULIN DEGLUDEC INJECTION 100 U/ML
100 INJECTION, SOLUTION SUBCUTANEOUS
Refills: 0 | Status: ACTIVE | COMMUNITY
Start: 2025-04-28

## 2025-04-28 RX ORDER — INSULIN ASPART 100 [IU]/ML
100 INJECTION, SOLUTION INTRAVENOUS; SUBCUTANEOUS
Qty: 117 | Refills: 0 | Status: DISCONTINUED | COMMUNITY
Start: 2025-04-22

## 2025-04-29 PROBLEM — E61.8 MINERAL DEFICIENCY: Status: ACTIVE | Noted: 2025-04-29

## 2025-04-29 RX ORDER — CARVEDILOL 25 MG/1
25 TABLET, FILM COATED ORAL TWICE DAILY
Qty: 60 | Refills: 0 | Status: ACTIVE | COMMUNITY
Start: 2025-03-19

## 2025-04-29 RX ORDER — APIXABAN 5 MG/1
5 TABLET, FILM COATED ORAL TWICE DAILY
Qty: 60 | Refills: 0 | Status: ACTIVE | COMMUNITY
Start: 2025-04-28

## 2025-05-08 ENCOUNTER — NON-APPOINTMENT (OUTPATIENT)
Age: 75
End: 2025-05-08

## 2025-06-09 ENCOUNTER — APPOINTMENT (OUTPATIENT)
Dept: HOME HEALTH SERVICES | Facility: HOME HEALTH | Age: 75
End: 2025-06-09

## 2025-06-27 ENCOUNTER — INPATIENT (INPATIENT)
Facility: HOSPITAL | Age: 75
LOS: 0 days | Discharge: HOME HEALTH | DRG: 866 | End: 2025-06-28
Attending: STUDENT IN AN ORGANIZED HEALTH CARE EDUCATION/TRAINING PROGRAM | Admitting: STUDENT IN AN ORGANIZED HEALTH CARE EDUCATION/TRAINING PROGRAM
Payer: MEDICARE

## 2025-06-27 ENCOUNTER — TRANSCRIPTION ENCOUNTER (OUTPATIENT)
Age: 75
End: 2025-06-27

## 2025-06-27 VITALS
WEIGHT: 145.06 LBS | TEMPERATURE: 98 F | RESPIRATION RATE: 20 BRPM | SYSTOLIC BLOOD PRESSURE: 177 MMHG | HEART RATE: 79 BPM | OXYGEN SATURATION: 94 % | DIASTOLIC BLOOD PRESSURE: 93 MMHG

## 2025-06-27 DIAGNOSIS — Z29.9 ENCOUNTER FOR PROPHYLACTIC MEASURES, UNSPECIFIED: ICD-10-CM

## 2025-06-27 DIAGNOSIS — I10 ESSENTIAL (PRIMARY) HYPERTENSION: ICD-10-CM

## 2025-06-27 DIAGNOSIS — E11.9 TYPE 2 DIABETES MELLITUS WITHOUT COMPLICATIONS: ICD-10-CM

## 2025-06-27 DIAGNOSIS — Z95.0 PRESENCE OF CARDIAC PACEMAKER: Chronic | ICD-10-CM
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
## 2025-06-27 DIAGNOSIS — R91.8 OTHER NONSPECIFIC ABNORMAL FINDING OF LUNG FIELD: ICD-10-CM

## 2025-06-27 DIAGNOSIS — R09.02 HYPOXEMIA: ICD-10-CM

## 2025-06-27 DIAGNOSIS — Z95.5 PRESENCE OF CORONARY ANGIOPLASTY IMPLANT AND GRAFT: Chronic | ICD-10-CM

## 2025-06-27 DIAGNOSIS — E78.5 HYPERLIPIDEMIA, UNSPECIFIED: ICD-10-CM

## 2025-06-27 DIAGNOSIS — I25.10 ATHEROSCLEROTIC HEART DISEASE OF NATIVE CORONARY ARTERY WITHOUT ANGINA PECTORIS: ICD-10-CM

## 2025-06-27 LAB
ADD ON TEST-SPECIMEN IN LAB: SIGNIFICANT CHANGE UP
ALBUMIN SERPL ELPH-MCNC: 4.4 G/DL — SIGNIFICANT CHANGE UP (ref 3.3–5)
ALP SERPL-CCNC: 78 U/L — SIGNIFICANT CHANGE UP (ref 40–120)
ALT FLD-CCNC: 15 U/L — SIGNIFICANT CHANGE UP (ref 10–45)
ANION GAP SERPL CALC-SCNC: 14 MMOL/L — SIGNIFICANT CHANGE UP (ref 5–17)
APTT BLD: 27.2 SEC — SIGNIFICANT CHANGE UP (ref 26.1–36.8)
AST SERPL-CCNC: 19 U/L — SIGNIFICANT CHANGE UP (ref 10–40)
BASOPHILS # BLD AUTO: 0.03 K/UL — SIGNIFICANT CHANGE UP (ref 0–0.2)
BASOPHILS NFR BLD AUTO: 0.3 % — SIGNIFICANT CHANGE UP (ref 0–2)
BILIRUB SERPL-MCNC: 0.9 MG/DL — SIGNIFICANT CHANGE UP (ref 0.2–1.2)
BUN SERPL-MCNC: 17 MG/DL — SIGNIFICANT CHANGE UP (ref 7–23)
CALCIUM SERPL-MCNC: 9.3 MG/DL — SIGNIFICANT CHANGE UP (ref 8.4–10.5)
CHLORIDE SERPL-SCNC: 103 MMOL/L — SIGNIFICANT CHANGE UP (ref 96–108)
CO2 SERPL-SCNC: 21 MMOL/L — LOW (ref 22–31)
CREAT SERPL-MCNC: 1.14 MG/DL — SIGNIFICANT CHANGE UP (ref 0.5–1.3)
EGFR: 50 ML/MIN/1.73M2 — LOW
EGFR: 50 ML/MIN/1.73M2 — LOW
EOSINOPHIL # BLD AUTO: 0.26 K/UL — SIGNIFICANT CHANGE UP (ref 0–0.5)
EOSINOPHIL NFR BLD AUTO: 2.9 % — SIGNIFICANT CHANGE UP (ref 0–6)
FLUAV AG NPH QL: SIGNIFICANT CHANGE UP
FLUBV AG NPH QL: SIGNIFICANT CHANGE UP
GLUCOSE BLDC GLUCOMTR-MCNC: 398 MG/DL — HIGH (ref 70–99)
GLUCOSE BLDC GLUCOMTR-MCNC: 422 MG/DL — HIGH (ref 70–99)
GLUCOSE BLDC GLUCOMTR-MCNC: 452 MG/DL — CRITICAL HIGH (ref 70–99)
GLUCOSE BLDC GLUCOMTR-MCNC: 454 MG/DL — CRITICAL HIGH (ref 70–99)
GLUCOSE SERPL-MCNC: 295 MG/DL — HIGH (ref 70–99)
HCT VFR BLD CALC: 40.2 % — SIGNIFICANT CHANGE UP (ref 34.5–45)
HGB BLD-MCNC: 13.3 G/DL — SIGNIFICANT CHANGE UP (ref 11.5–15.5)
IMM GRANULOCYTES # BLD AUTO: 0.04 K/UL — SIGNIFICANT CHANGE UP (ref 0–0.07)
IMM GRANULOCYTES NFR BLD AUTO: 0.4 % — SIGNIFICANT CHANGE UP (ref 0–0.9)
INR BLD: 1.4 RATIO — HIGH (ref 0.85–1.16)
LYMPHOCYTES # BLD AUTO: 1.27 K/UL — SIGNIFICANT CHANGE UP (ref 1–3.3)
LYMPHOCYTES NFR BLD AUTO: 14.2 % — SIGNIFICANT CHANGE UP (ref 13–44)
MAGNESIUM SERPL-MCNC: 2.1 MG/DL — SIGNIFICANT CHANGE UP (ref 1.6–2.6)
MCHC RBC-ENTMCNC: 28.7 PG — SIGNIFICANT CHANGE UP (ref 27–34)
MCHC RBC-ENTMCNC: 33.1 G/DL — SIGNIFICANT CHANGE UP (ref 32–36)
MCV RBC AUTO: 86.8 FL — SIGNIFICANT CHANGE UP (ref 80–100)
MONOCYTES # BLD AUTO: 0.38 K/UL — SIGNIFICANT CHANGE UP (ref 0–0.9)
MONOCYTES NFR BLD AUTO: 4.2 % — SIGNIFICANT CHANGE UP (ref 2–14)
NEUTROPHILS # BLD AUTO: 6.98 K/UL — SIGNIFICANT CHANGE UP (ref 1.8–7.4)
NEUTROPHILS NFR BLD AUTO: 78 % — HIGH (ref 43–77)
NRBC # BLD AUTO: 0 K/UL — SIGNIFICANT CHANGE UP (ref 0–0)
NRBC # FLD: 0 K/UL — SIGNIFICANT CHANGE UP (ref 0–0)
NRBC BLD AUTO-RTO: 0 /100 WBCS — SIGNIFICANT CHANGE UP (ref 0–0)
PLATELET # BLD AUTO: 164 K/UL — SIGNIFICANT CHANGE UP (ref 150–400)
PMV BLD: 11.8 FL — SIGNIFICANT CHANGE UP (ref 7–13)
POTASSIUM SERPL-MCNC: 4.3 MMOL/L — SIGNIFICANT CHANGE UP (ref 3.5–5.3)
POTASSIUM SERPL-SCNC: 4.3 MMOL/L — SIGNIFICANT CHANGE UP (ref 3.5–5.3)
PROCALCITONIN SERPL-MCNC: 0.05 NG/ML — SIGNIFICANT CHANGE UP (ref 0.02–0.1)
PROT SERPL-MCNC: 7.7 G/DL — SIGNIFICANT CHANGE UP (ref 6–8.3)
PROTHROM AB SERPL-ACNC: 16.1 SEC — HIGH (ref 9.9–13.4)
RAPID RVP RESULT: DETECTED
RBC # BLD: 4.63 M/UL — SIGNIFICANT CHANGE UP (ref 3.8–5.2)
RBC # FLD: 14.1 % — SIGNIFICANT CHANGE UP (ref 10.3–14.5)
RSV RNA NPH QL NAA+NON-PROBE: SIGNIFICANT CHANGE UP
RV+EV RNA SPEC QL NAA+PROBE: DETECTED
SARS-COV-2 RNA SPEC QL NAA+PROBE: SIGNIFICANT CHANGE UP
SARS-COV-2 RNA SPEC QL NAA+PROBE: SIGNIFICANT CHANGE UP
SODIUM SERPL-SCNC: 138 MMOL/L — SIGNIFICANT CHANGE UP (ref 135–145)
SOURCE RESPIRATORY: SIGNIFICANT CHANGE UP
WBC # BLD: 8.96 K/UL — SIGNIFICANT CHANGE UP (ref 3.8–10.5)
WBC # FLD AUTO: 8.96 K/UL — SIGNIFICANT CHANGE UP (ref 3.8–10.5)

## 2025-06-27 PROCEDURE — 85610 PROTHROMBIN TIME: CPT

## 2025-06-27 PROCEDURE — 71250 CT THORAX DX C-: CPT

## 2025-06-27 PROCEDURE — 99285 EMERGENCY DEPT VISIT HI MDM: CPT

## 2025-06-27 PROCEDURE — 72128 CT CHEST SPINE W/O DYE: CPT | Mod: 26

## 2025-06-27 PROCEDURE — 83880 ASSAY OF NATRIURETIC PEPTIDE: CPT

## 2025-06-27 PROCEDURE — 94640 AIRWAY INHALATION TREATMENT: CPT

## 2025-06-27 PROCEDURE — 99223 1ST HOSP IP/OBS HIGH 75: CPT | Mod: GC

## 2025-06-27 PROCEDURE — 85730 THROMBOPLASTIN TIME PARTIAL: CPT

## 2025-06-27 PROCEDURE — 82962 GLUCOSE BLOOD TEST: CPT

## 2025-06-27 PROCEDURE — 72125 CT NECK SPINE W/O DYE: CPT | Mod: 26

## 2025-06-27 PROCEDURE — 85025 COMPLETE CBC W/AUTO DIFF WBC: CPT

## 2025-06-27 PROCEDURE — 71045 X-RAY EXAM CHEST 1 VIEW: CPT

## 2025-06-27 PROCEDURE — 99053 MED SERV 10PM-8AM 24 HR FAC: CPT

## 2025-06-27 PROCEDURE — 83735 ASSAY OF MAGNESIUM: CPT

## 2025-06-27 PROCEDURE — 72125 CT NECK SPINE W/O DYE: CPT

## 2025-06-27 PROCEDURE — 84484 ASSAY OF TROPONIN QUANT: CPT

## 2025-06-27 PROCEDURE — 84145 PROCALCITONIN (PCT): CPT

## 2025-06-27 PROCEDURE — 93010 ELECTROCARDIOGRAM REPORT: CPT

## 2025-06-27 PROCEDURE — 70450 CT HEAD/BRAIN W/O DYE: CPT | Mod: 26

## 2025-06-27 PROCEDURE — 0225U NFCT DS DNA&RNA 21 SARSCOV2: CPT

## 2025-06-27 PROCEDURE — 80053 COMPREHEN METABOLIC PANEL: CPT

## 2025-06-27 PROCEDURE — 0241U: CPT

## 2025-06-27 PROCEDURE — 72170 X-RAY EXAM OF PELVIS: CPT

## 2025-06-27 PROCEDURE — 71045 X-RAY EXAM CHEST 1 VIEW: CPT | Mod: 26

## 2025-06-27 PROCEDURE — 70450 CT HEAD/BRAIN W/O DYE: CPT

## 2025-06-27 PROCEDURE — 71250 CT THORAX DX C-: CPT | Mod: 26

## 2025-06-27 PROCEDURE — 72170 X-RAY EXAM OF PELVIS: CPT | Mod: 26

## 2025-06-27 RX ORDER — INSULIN LISPRO 100 U/ML
INJECTION, SOLUTION INTRAVENOUS; SUBCUTANEOUS
Refills: 0 | Status: DISCONTINUED | OUTPATIENT
Start: 2025-06-27 | End: 2025-06-28

## 2025-06-27 RX ORDER — PREDNISONE 20 MG/1
60 TABLET ORAL ONCE
Refills: 0 | Status: COMPLETED | OUTPATIENT
Start: 2025-06-27 | End: 2025-06-27

## 2025-06-27 RX ORDER — CEFTRIAXONE 500 MG/1
1000 INJECTION, POWDER, FOR SOLUTION INTRAMUSCULAR; INTRAVENOUS ONCE
Refills: 0 | Status: COMPLETED | OUTPATIENT
Start: 2025-06-27 | End: 2025-06-27

## 2025-06-27 RX ORDER — DULAGLUTIDE 4.5 MG/.5ML
0.75 INJECTION, SOLUTION SUBCUTANEOUS
Refills: 0 | DISCHARGE

## 2025-06-27 RX ORDER — FUROSEMIDE 10 MG/ML
20 INJECTION INTRAMUSCULAR; INTRAVENOUS ONCE
Refills: 0 | Status: COMPLETED | OUTPATIENT
Start: 2025-06-27 | End: 2025-06-27

## 2025-06-27 RX ORDER — ISOSORBIDE MONONITRATE 60 MG/1
120 TABLET, EXTENDED RELEASE ORAL ONCE
Refills: 0 | Status: COMPLETED | OUTPATIENT
Start: 2025-06-27 | End: 2025-06-27

## 2025-06-27 RX ORDER — CEFTRIAXONE 500 MG/1
1000 INJECTION, POWDER, FOR SOLUTION INTRAMUSCULAR; INTRAVENOUS EVERY 24 HOURS
Refills: 0 | Status: DISCONTINUED | OUTPATIENT
Start: 2025-06-27 | End: 2025-06-27

## 2025-06-27 RX ORDER — ISOSORBIDE MONONITRATE 60 MG/1
120 TABLET, EXTENDED RELEASE ORAL DAILY
Refills: 0 | Status: DISCONTINUED | OUTPATIENT
Start: 2025-06-27 | End: 2025-06-28

## 2025-06-27 RX ORDER — MELATONIN 5 MG
3 TABLET ORAL AT BEDTIME
Refills: 0 | Status: DISCONTINUED | OUTPATIENT
Start: 2025-06-27 | End: 2025-06-28

## 2025-06-27 RX ORDER — CARVEDILOL 3.12 MG/1
25 TABLET, FILM COATED ORAL EVERY 12 HOURS
Refills: 0 | Status: DISCONTINUED | OUTPATIENT
Start: 2025-06-27 | End: 2025-06-27

## 2025-06-27 RX ORDER — INSULIN LISPRO 100 U/ML
2 INJECTION, SOLUTION INTRAVENOUS; SUBCUTANEOUS ONCE
Refills: 0 | Status: DISCONTINUED | OUTPATIENT
Start: 2025-06-27 | End: 2025-06-27

## 2025-06-27 RX ORDER — DEXTROSE 50 % IN WATER 50 %
25 SYRINGE (ML) INTRAVENOUS ONCE
Refills: 0 | Status: DISCONTINUED | OUTPATIENT
Start: 2025-06-27 | End: 2025-06-28

## 2025-06-27 RX ORDER — INSULIN LISPRO 100 U/ML
INJECTION, SOLUTION INTRAVENOUS; SUBCUTANEOUS AT BEDTIME
Refills: 0 | Status: DISCONTINUED | OUTPATIENT
Start: 2025-06-27 | End: 2025-06-27

## 2025-06-27 RX ORDER — LOSARTAN POTASSIUM 100 MG/1
1 TABLET, FILM COATED ORAL
Refills: 0 | DISCHARGE

## 2025-06-27 RX ORDER — ONDANSETRON HCL/PF 4 MG/2 ML
4 VIAL (ML) INJECTION EVERY 8 HOURS
Refills: 0 | Status: DISCONTINUED | OUTPATIENT
Start: 2025-06-27 | End: 2025-06-27

## 2025-06-27 RX ORDER — IPRATROPIUM BROMIDE AND ALBUTEROL SULFATE .5; 2.5 MG/3ML; MG/3ML
3 SOLUTION RESPIRATORY (INHALATION)
Refills: 0 | Status: COMPLETED | OUTPATIENT
Start: 2025-06-27 | End: 2025-06-27

## 2025-06-27 RX ORDER — INSULIN LISPRO 100 U/ML
5 INJECTION, SOLUTION INTRAVENOUS; SUBCUTANEOUS ONCE
Refills: 0 | Status: COMPLETED | OUTPATIENT
Start: 2025-06-27 | End: 2025-06-27

## 2025-06-27 RX ORDER — DEXTROSE 50 % IN WATER 50 %
12.5 SYRINGE (ML) INTRAVENOUS ONCE
Refills: 0 | Status: DISCONTINUED | OUTPATIENT
Start: 2025-06-27 | End: 2025-06-28

## 2025-06-27 RX ORDER — CARVEDILOL 3.12 MG/1
25 TABLET, FILM COATED ORAL ONCE
Refills: 0 | Status: COMPLETED | OUTPATIENT
Start: 2025-06-27 | End: 2025-06-27

## 2025-06-27 RX ORDER — ACETAMINOPHEN 500 MG/5ML
650 LIQUID (ML) ORAL EVERY 6 HOURS
Refills: 0 | Status: DISCONTINUED | OUTPATIENT
Start: 2025-06-27 | End: 2025-06-28

## 2025-06-27 RX ORDER — SODIUM CHLORIDE 9 G/1000ML
1000 INJECTION, SOLUTION INTRAVENOUS
Refills: 0 | Status: DISCONTINUED | OUTPATIENT
Start: 2025-06-27 | End: 2025-06-28

## 2025-06-27 RX ORDER — MAGNESIUM, ALUMINUM HYDROXIDE 200-200 MG
30 TABLET,CHEWABLE ORAL EVERY 4 HOURS
Refills: 0 | Status: DISCONTINUED | OUTPATIENT
Start: 2025-06-27 | End: 2025-06-28

## 2025-06-27 RX ORDER — ROSUVASTATIN CALCIUM 20 MG/1
40 TABLET, FILM COATED ORAL AT BEDTIME
Refills: 0 | Status: DISCONTINUED | OUTPATIENT
Start: 2025-06-27 | End: 2025-06-28

## 2025-06-27 RX ORDER — AZITHROMYCIN 250 MG
500 CAPSULE ORAL EVERY 24 HOURS
Refills: 0 | Status: DISCONTINUED | OUTPATIENT
Start: 2025-06-27 | End: 2025-06-27

## 2025-06-27 RX ORDER — LOSARTAN POTASSIUM 100 MG/1
25 TABLET, FILM COATED ORAL ONCE
Refills: 0 | Status: COMPLETED | OUTPATIENT
Start: 2025-06-27 | End: 2025-06-27

## 2025-06-27 RX ORDER — AZITHROMYCIN 250 MG
500 CAPSULE ORAL ONCE
Refills: 0 | Status: COMPLETED | OUTPATIENT
Start: 2025-06-27 | End: 2025-06-27

## 2025-06-27 RX ORDER — ACETAMINOPHEN 500 MG/5ML
1000 LIQUID (ML) ORAL ONCE
Refills: 0 | Status: COMPLETED | OUTPATIENT
Start: 2025-06-27 | End: 2025-06-27

## 2025-06-27 RX ORDER — CEFTRIAXONE 500 MG/1
1000 INJECTION, POWDER, FOR SOLUTION INTRAMUSCULAR; INTRAVENOUS EVERY 24 HOURS
Refills: 0 | Status: DISCONTINUED | OUTPATIENT
Start: 2025-06-27 | End: 2025-06-28

## 2025-06-27 RX ORDER — EZETIMIBE 10 MG/1
10 TABLET ORAL DAILY
Refills: 0 | Status: DISCONTINUED | OUTPATIENT
Start: 2025-06-27 | End: 2025-06-28

## 2025-06-27 RX ORDER — GLUCAGON 3 MG/1
1 POWDER NASAL ONCE
Refills: 0 | Status: DISCONTINUED | OUTPATIENT
Start: 2025-06-27 | End: 2025-06-28

## 2025-06-27 RX ORDER — AZITHROMYCIN 250 MG
500 CAPSULE ORAL EVERY 24 HOURS
Refills: 0 | Status: DISCONTINUED | OUTPATIENT
Start: 2025-06-28 | End: 2025-06-28

## 2025-06-27 RX ORDER — SULFAMETHOXAZOLE/TRIMETHOPRIM 800-160 MG
1 TABLET ORAL DAILY
Refills: 0 | Status: DISCONTINUED | OUTPATIENT
Start: 2025-06-27 | End: 2025-06-27

## 2025-06-27 RX ORDER — INSULIN LISPRO 100 U/ML
8 INJECTION, SOLUTION INTRAVENOUS; SUBCUTANEOUS
Refills: 0 | Status: DISCONTINUED | OUTPATIENT
Start: 2025-06-27 | End: 2025-06-28

## 2025-06-27 RX ORDER — CLOPIDOGREL BISULFATE 75 MG/1
75 TABLET, FILM COATED ORAL DAILY
Refills: 0 | Status: DISCONTINUED | OUTPATIENT
Start: 2025-06-27 | End: 2025-06-28

## 2025-06-27 RX ORDER — DEXTROSE 50 % IN WATER 50 %
15 SYRINGE (ML) INTRAVENOUS ONCE
Refills: 0 | Status: DISCONTINUED | OUTPATIENT
Start: 2025-06-27 | End: 2025-06-28

## 2025-06-27 RX ORDER — INSULIN LISPRO 100 U/ML
INJECTION, SOLUTION INTRAVENOUS; SUBCUTANEOUS AT BEDTIME
Refills: 0 | Status: DISCONTINUED | OUTPATIENT
Start: 2025-06-27 | End: 2025-06-28

## 2025-06-27 RX ORDER — RANOLAZINE 1000 MG/1
500 TABLET, FILM COATED, EXTENDED RELEASE ORAL
Refills: 0 | Status: DISCONTINUED | OUTPATIENT
Start: 2025-06-27 | End: 2025-06-28

## 2025-06-27 RX ORDER — LOSARTAN POTASSIUM 100 MG/1
25 TABLET, FILM COATED ORAL DAILY
Refills: 0 | Status: DISCONTINUED | OUTPATIENT
Start: 2025-06-27 | End: 2025-06-28

## 2025-06-27 RX ORDER — INSULIN GLARGINE-YFGN 100 [IU]/ML
10 INJECTION, SOLUTION SUBCUTANEOUS AT BEDTIME
Refills: 0 | Status: DISCONTINUED | OUTPATIENT
Start: 2025-06-27 | End: 2025-06-28

## 2025-06-27 RX ORDER — APIXABAN 2.5 MG/1
5 TABLET, FILM COATED ORAL EVERY 12 HOURS
Refills: 0 | Status: DISCONTINUED | OUTPATIENT
Start: 2025-06-27 | End: 2025-06-28

## 2025-06-27 RX ORDER — CARVEDILOL 3.12 MG/1
25 TABLET, FILM COATED ORAL EVERY 12 HOURS
Refills: 0 | Status: DISCONTINUED | OUTPATIENT
Start: 2025-06-27 | End: 2025-06-28

## 2025-06-27 RX ADMIN — Medication 250 MILLIGRAM(S): at 10:47

## 2025-06-27 RX ADMIN — CARVEDILOL 25 MILLIGRAM(S): 3.12 TABLET, FILM COATED ORAL at 19:57

## 2025-06-27 RX ADMIN — CARVEDILOL 25 MILLIGRAM(S): 3.12 TABLET, FILM COATED ORAL at 07:54

## 2025-06-27 RX ADMIN — Medication 400 MILLIGRAM(S): at 10:47

## 2025-06-27 RX ADMIN — PREDNISONE 60 MILLIGRAM(S): 20 TABLET ORAL at 07:54

## 2025-06-27 RX ADMIN — INSULIN LISPRO 3: 100 INJECTION, SOLUTION INTRAVENOUS; SUBCUTANEOUS at 21:15

## 2025-06-27 RX ADMIN — INSULIN GLARGINE-YFGN 10 UNIT(S): 100 INJECTION, SOLUTION SUBCUTANEOUS at 21:14

## 2025-06-27 RX ADMIN — LOSARTAN POTASSIUM 25 MILLIGRAM(S): 100 TABLET, FILM COATED ORAL at 07:54

## 2025-06-27 RX ADMIN — IPRATROPIUM BROMIDE AND ALBUTEROL SULFATE 3 MILLILITER(S): .5; 2.5 SOLUTION RESPIRATORY (INHALATION) at 07:55

## 2025-06-27 RX ADMIN — FUROSEMIDE 20 MILLIGRAM(S): 10 INJECTION INTRAMUSCULAR; INTRAVENOUS at 21:16

## 2025-06-27 RX ADMIN — INSULIN LISPRO 5 UNIT(S): 100 INJECTION, SOLUTION INTRAVENOUS; SUBCUTANEOUS at 23:14

## 2025-06-27 RX ADMIN — IPRATROPIUM BROMIDE AND ALBUTEROL SULFATE 3 MILLILITER(S): .5; 2.5 SOLUTION RESPIRATORY (INHALATION) at 07:48

## 2025-06-27 RX ADMIN — ISOSORBIDE MONONITRATE 120 MILLIGRAM(S): 60 TABLET, EXTENDED RELEASE ORAL at 10:47

## 2025-06-27 RX ADMIN — CEFTRIAXONE 100 MILLIGRAM(S): 500 INJECTION, POWDER, FOR SOLUTION INTRAMUSCULAR; INTRAVENOUS at 15:48

## 2025-06-27 RX ADMIN — ROSUVASTATIN CALCIUM 40 MILLIGRAM(S): 20 TABLET, FILM COATED ORAL at 21:15

## 2025-06-27 RX ADMIN — IPRATROPIUM BROMIDE AND ALBUTEROL SULFATE 3 MILLILITER(S): .5; 2.5 SOLUTION RESPIRATORY (INHALATION) at 08:09

## 2025-06-28 ENCOUNTER — TRANSCRIPTION ENCOUNTER (OUTPATIENT)
Age: 75
End: 2025-06-28

## 2025-06-28 VITALS
HEART RATE: 73 BPM | OXYGEN SATURATION: 98 % | TEMPERATURE: 98 F | SYSTOLIC BLOOD PRESSURE: 122 MMHG | RESPIRATION RATE: 18 BRPM | DIASTOLIC BLOOD PRESSURE: 75 MMHG

## 2025-06-28 LAB
A1C WITH ESTIMATED AVERAGE GLUCOSE RESULT: 8.4 % — HIGH (ref 4–5.6)
A1C WITH ESTIMATED AVERAGE GLUCOSE RESULT: 8.8 % — HIGH (ref 4–5.6)
ALBUMIN SERPL ELPH-MCNC: 4.1 G/DL — SIGNIFICANT CHANGE UP (ref 3.3–5)
ALP SERPL-CCNC: 74 U/L — SIGNIFICANT CHANGE UP (ref 40–120)
ALT FLD-CCNC: 14 U/L — SIGNIFICANT CHANGE UP (ref 10–45)
ANION GAP SERPL CALC-SCNC: 15 MMOL/L — SIGNIFICANT CHANGE UP (ref 5–17)
APTT BLD: 27.6 SEC — SIGNIFICANT CHANGE UP (ref 26.1–36.8)
AST SERPL-CCNC: 16 U/L — SIGNIFICANT CHANGE UP (ref 10–40)
B-OH-BUTYR SERPL-SCNC: 0.2 MMOL/L — SIGNIFICANT CHANGE UP
BASOPHILS # BLD AUTO: 0.02 K/UL — SIGNIFICANT CHANGE UP (ref 0–0.2)
BASOPHILS NFR BLD AUTO: 0.2 % — SIGNIFICANT CHANGE UP (ref 0–2)
BILIRUB SERPL-MCNC: 0.7 MG/DL — SIGNIFICANT CHANGE UP (ref 0.2–1.2)
BUN SERPL-MCNC: 34 MG/DL — HIGH (ref 7–23)
CALCIUM SERPL-MCNC: 9.1 MG/DL — SIGNIFICANT CHANGE UP (ref 8.4–10.5)
CHLORIDE SERPL-SCNC: 98 MMOL/L — SIGNIFICANT CHANGE UP (ref 96–108)
CO2 SERPL-SCNC: 19 MMOL/L — LOW (ref 22–31)
CREAT SERPL-MCNC: 1.21 MG/DL — SIGNIFICANT CHANGE UP (ref 0.5–1.3)
EGFR: 47 ML/MIN/1.73M2 — LOW
EGFR: 47 ML/MIN/1.73M2 — LOW
EOSINOPHIL # BLD AUTO: 0.01 K/UL — SIGNIFICANT CHANGE UP (ref 0–0.5)
EOSINOPHIL NFR BLD AUTO: 0.1 % — SIGNIFICANT CHANGE UP (ref 0–6)
ESTIMATED AVERAGE GLUCOSE: 194 MG/DL — HIGH (ref 68–114)
ESTIMATED AVERAGE GLUCOSE: 206 MG/DL — HIGH (ref 68–114)
GAS PNL BLDV: SIGNIFICANT CHANGE UP
GLUCOSE BLDC GLUCOMTR-MCNC: 285 MG/DL — HIGH (ref 70–99)
GLUCOSE BLDC GLUCOMTR-MCNC: 319 MG/DL — HIGH (ref 70–99)
GLUCOSE SERPL-MCNC: 313 MG/DL — HIGH (ref 70–99)
HCT VFR BLD CALC: 40 % — SIGNIFICANT CHANGE UP (ref 34.5–45)
HGB BLD-MCNC: 13.6 G/DL — SIGNIFICANT CHANGE UP (ref 11.5–15.5)
IMM GRANULOCYTES # BLD AUTO: 0.03 K/UL — SIGNIFICANT CHANGE UP (ref 0–0.07)
IMM GRANULOCYTES NFR BLD AUTO: 0.4 % — SIGNIFICANT CHANGE UP (ref 0–0.9)
INR BLD: 1.18 RATIO — HIGH (ref 0.85–1.16)
LIDOCAIN IGE QN: 525 U/L — HIGH (ref 7–60)
LYMPHOCYTES # BLD AUTO: 1.64 K/UL — SIGNIFICANT CHANGE UP (ref 1–3.3)
LYMPHOCYTES NFR BLD AUTO: 20.3 % — SIGNIFICANT CHANGE UP (ref 13–44)
MCHC RBC-ENTMCNC: 29.1 PG — SIGNIFICANT CHANGE UP (ref 27–34)
MCHC RBC-ENTMCNC: 34 G/DL — SIGNIFICANT CHANGE UP (ref 32–36)
MCV RBC AUTO: 85.5 FL — SIGNIFICANT CHANGE UP (ref 80–100)
MONOCYTES # BLD AUTO: 0.65 K/UL — SIGNIFICANT CHANGE UP (ref 0–0.9)
MONOCYTES NFR BLD AUTO: 8.1 % — SIGNIFICANT CHANGE UP (ref 2–14)
NEUTROPHILS # BLD AUTO: 5.72 K/UL — SIGNIFICANT CHANGE UP (ref 1.8–7.4)
NEUTROPHILS NFR BLD AUTO: 70.9 % — SIGNIFICANT CHANGE UP (ref 43–77)
NRBC # BLD AUTO: 0 K/UL — SIGNIFICANT CHANGE UP (ref 0–0)
NRBC # FLD: 0 K/UL — SIGNIFICANT CHANGE UP (ref 0–0)
NRBC BLD AUTO-RTO: 0 /100 WBCS — SIGNIFICANT CHANGE UP (ref 0–0)
PLATELET # BLD AUTO: 174 K/UL — SIGNIFICANT CHANGE UP (ref 150–400)
PMV BLD: 11.7 FL — SIGNIFICANT CHANGE UP (ref 7–13)
POTASSIUM SERPL-MCNC: 4.4 MMOL/L — SIGNIFICANT CHANGE UP (ref 3.5–5.3)
POTASSIUM SERPL-SCNC: 4.4 MMOL/L — SIGNIFICANT CHANGE UP (ref 3.5–5.3)
PROT SERPL-MCNC: 7.6 G/DL — SIGNIFICANT CHANGE UP (ref 6–8.3)
PROTHROM AB SERPL-ACNC: 13.4 SEC — SIGNIFICANT CHANGE UP (ref 9.9–13.4)
RBC # BLD: 4.68 M/UL — SIGNIFICANT CHANGE UP (ref 3.8–5.2)
RBC # FLD: 14.1 % — SIGNIFICANT CHANGE UP (ref 10.3–14.5)
SODIUM SERPL-SCNC: 132 MMOL/L — LOW (ref 135–145)
WBC # BLD: 8.07 K/UL — SIGNIFICANT CHANGE UP (ref 3.8–10.5)
WBC # FLD AUTO: 8.07 K/UL — SIGNIFICANT CHANGE UP (ref 3.8–10.5)

## 2025-06-28 PROCEDURE — 0225U NFCT DS DNA&RNA 21 SARSCOV2: CPT

## 2025-06-28 PROCEDURE — 70450 CT HEAD/BRAIN W/O DYE: CPT

## 2025-06-28 PROCEDURE — 85018 HEMOGLOBIN: CPT

## 2025-06-28 PROCEDURE — 82947 ASSAY GLUCOSE BLOOD QUANT: CPT

## 2025-06-28 PROCEDURE — 94640 AIRWAY INHALATION TREATMENT: CPT

## 2025-06-28 PROCEDURE — 83036 HEMOGLOBIN GLYCOSYLATED A1C: CPT

## 2025-06-28 PROCEDURE — 82962 GLUCOSE BLOOD TEST: CPT

## 2025-06-28 PROCEDURE — 85730 THROMBOPLASTIN TIME PARTIAL: CPT

## 2025-06-28 PROCEDURE — 84484 ASSAY OF TROPONIN QUANT: CPT

## 2025-06-28 PROCEDURE — 71250 CT THORAX DX C-: CPT

## 2025-06-28 PROCEDURE — 72125 CT NECK SPINE W/O DYE: CPT

## 2025-06-28 PROCEDURE — 84295 ASSAY OF SERUM SODIUM: CPT

## 2025-06-28 PROCEDURE — 99285 EMERGENCY DEPT VISIT HI MDM: CPT

## 2025-06-28 PROCEDURE — 84132 ASSAY OF SERUM POTASSIUM: CPT

## 2025-06-28 PROCEDURE — 72170 X-RAY EXAM OF PELVIS: CPT

## 2025-06-28 PROCEDURE — 93005 ELECTROCARDIOGRAM TRACING: CPT

## 2025-06-28 PROCEDURE — 85025 COMPLETE CBC W/AUTO DIFF WBC: CPT

## 2025-06-28 PROCEDURE — 84145 PROCALCITONIN (PCT): CPT

## 2025-06-28 PROCEDURE — 82803 BLOOD GASES ANY COMBINATION: CPT

## 2025-06-28 PROCEDURE — 87637 SARSCOV2&INF A&B&RSV AMP PRB: CPT

## 2025-06-28 PROCEDURE — 82010 KETONE BODYS QUAN: CPT

## 2025-06-28 PROCEDURE — 85014 HEMATOCRIT: CPT

## 2025-06-28 PROCEDURE — 83690 ASSAY OF LIPASE: CPT

## 2025-06-28 PROCEDURE — 99239 HOSP IP/OBS DSCHRG MGMT >30: CPT

## 2025-06-28 PROCEDURE — 82435 ASSAY OF BLOOD CHLORIDE: CPT

## 2025-06-28 PROCEDURE — 83880 ASSAY OF NATRIURETIC PEPTIDE: CPT

## 2025-06-28 PROCEDURE — 97161 PT EVAL LOW COMPLEX 20 MIN: CPT

## 2025-06-28 PROCEDURE — 85610 PROTHROMBIN TIME: CPT

## 2025-06-28 PROCEDURE — 96374 THER/PROPH/DIAG INJ IV PUSH: CPT

## 2025-06-28 PROCEDURE — 83735 ASSAY OF MAGNESIUM: CPT

## 2025-06-28 PROCEDURE — 83605 ASSAY OF LACTIC ACID: CPT

## 2025-06-28 PROCEDURE — 80053 COMPREHEN METABOLIC PANEL: CPT

## 2025-06-28 PROCEDURE — 93880 EXTRACRANIAL BILAT STUDY: CPT | Mod: 26

## 2025-06-28 PROCEDURE — 93880 EXTRACRANIAL BILAT STUDY: CPT

## 2025-06-28 PROCEDURE — 82330 ASSAY OF CALCIUM: CPT

## 2025-06-28 PROCEDURE — 71045 X-RAY EXAM CHEST 1 VIEW: CPT

## 2025-06-28 PROCEDURE — 0241U: CPT

## 2025-06-28 RX ORDER — INSULIN LISPRO 100 U/ML
12 INJECTION, SOLUTION INTRAVENOUS; SUBCUTANEOUS
Refills: 0 | Status: DISCONTINUED | OUTPATIENT
Start: 2025-06-28 | End: 2025-06-28

## 2025-06-28 RX ORDER — ACETAMINOPHEN 500 MG/5ML
2 LIQUID (ML) ORAL
Qty: 0 | Refills: 0 | DISCHARGE
Start: 2025-06-28

## 2025-06-28 RX ADMIN — Medication 1 APPLICATION(S): at 11:43

## 2025-06-28 RX ADMIN — APIXABAN 5 MILLIGRAM(S): 2.5 TABLET, FILM COATED ORAL at 05:28

## 2025-06-28 RX ADMIN — RANOLAZINE 500 MILLIGRAM(S): 1000 TABLET, FILM COATED, EXTENDED RELEASE ORAL at 05:28

## 2025-06-28 RX ADMIN — INSULIN LISPRO 12 UNIT(S): 100 INJECTION, SOLUTION INTRAVENOUS; SUBCUTANEOUS at 12:25

## 2025-06-28 RX ADMIN — EZETIMIBE 10 MILLIGRAM(S): 10 TABLET ORAL at 11:43

## 2025-06-28 RX ADMIN — CARVEDILOL 25 MILLIGRAM(S): 3.12 TABLET, FILM COATED ORAL at 05:28

## 2025-06-28 RX ADMIN — Medication 650 MILLIGRAM(S): at 11:42

## 2025-06-28 RX ADMIN — INSULIN LISPRO 3: 100 INJECTION, SOLUTION INTRAVENOUS; SUBCUTANEOUS at 08:17

## 2025-06-28 RX ADMIN — Medication 650 MILLIGRAM(S): at 13:00

## 2025-06-28 RX ADMIN — ISOSORBIDE MONONITRATE 120 MILLIGRAM(S): 60 TABLET, EXTENDED RELEASE ORAL at 11:43

## 2025-06-28 RX ADMIN — LOSARTAN POTASSIUM 25 MILLIGRAM(S): 100 TABLET, FILM COATED ORAL at 05:27

## 2025-06-28 RX ADMIN — INSULIN LISPRO 8 UNIT(S): 100 INJECTION, SOLUTION INTRAVENOUS; SUBCUTANEOUS at 08:17

## 2025-06-28 RX ADMIN — CLOPIDOGREL BISULFATE 75 MILLIGRAM(S): 75 TABLET, FILM COATED ORAL at 11:43

## 2025-06-28 RX ADMIN — INSULIN LISPRO 4: 100 INJECTION, SOLUTION INTRAVENOUS; SUBCUTANEOUS at 12:25

## 2025-06-30 ENCOUNTER — NON-APPOINTMENT (OUTPATIENT)
Age: 75
End: 2025-06-30

## 2025-06-30 RX ORDER — LOSARTAN POTASSIUM 25 MG/1
25 TABLET, FILM COATED ORAL DAILY
Refills: 0 | Status: ACTIVE | COMMUNITY
Start: 2025-06-30

## 2025-07-01 ENCOUNTER — APPOINTMENT (OUTPATIENT)
Dept: HOME HEALTH SERVICES | Facility: HOME HEALTH | Age: 75
End: 2025-07-01
Payer: MEDICARE

## 2025-07-01 VITALS
SYSTOLIC BLOOD PRESSURE: 104 MMHG | RESPIRATION RATE: 16 BRPM | DIASTOLIC BLOOD PRESSURE: 63 MMHG | HEART RATE: 78 BPM | OXYGEN SATURATION: 99 % | TEMPERATURE: 98 F

## 2025-07-01 PROBLEM — I25.119 ATHEROSCLEROTIC HEART DISEASE OF NATIVE CORONARY ARTERY WITH UNSPECIFIED ANGINA PECTORIS: Chronic | Status: ACTIVE | Noted: 2025-06-27

## 2025-07-01 PROCEDURE — 99495 TRANSJ CARE MGMT MOD F2F 14D: CPT

## 2025-07-06 PROBLEM — B34.1 ENTEROVIRUS INFECTION: Status: ACTIVE | Noted: 2025-07-01

## 2025-09-12 ENCOUNTER — APPOINTMENT (OUTPATIENT)
Dept: HOME HEALTH SERVICES | Facility: HOME HEALTH | Age: 75
End: 2025-09-12

## (undated) DEVICE — BASIN EMESIS 10IN GRADUATED MAUVE

## (undated) DEVICE — SUCTION YANKAUER NO CONTROL VENT

## (undated) DEVICE — TUBING SUCTION CONN 6FT STERILE

## (undated) DEVICE — DRSG 2X2

## (undated) DEVICE — CATH IV SAFE BC 22G X 1" (BLUE)

## (undated) DEVICE — GOWN LG

## (undated) DEVICE — ELCTR ECG CONDUCTIVE ADHESIVE

## (undated) DEVICE — SNARE POLYP HEXAGONAL MED 27X2.4X240

## (undated) DEVICE — TUBING IV SET GRAVITY 3Y 100" MACRO

## (undated) DEVICE — FOLEY HOLDER STATLOCK 2 WAY ADULT

## (undated) DEVICE — LINE BREATHE SAMPLNG

## (undated) DEVICE — BIOPSY FORCEP COLD DISP

## (undated) DEVICE — CONTAINER FORMALIN 80ML YELLOW

## (undated) DEVICE — DRSG BANDAID 0.75X3"

## (undated) DEVICE — DRSG CURITY GAUZE SPONGE 4 X 4" 12-PLY NON-STERILE

## (undated) DEVICE — PACK IV START WITH CHG

## (undated) DEVICE — MARKER ENDO SPOT EX

## (undated) DEVICE — ELCTR GROUNDING PAD ADULT COVIDIEN

## (undated) DEVICE — BIOPSY FORCEP RADIAL JAW 4 STANDARD WITH NEEDLE

## (undated) DEVICE — LUBRICATING JELLY HR ONE SHOT 3G

## (undated) DEVICE — TUBING MEDI-VAC W MAXIGRIP CONNECTORS 1/4"X6'

## (undated) DEVICE — SALIVA EJECTOR (BLUE)

## (undated) DEVICE — SYR LUER LOK 50CC

## (undated) DEVICE — POLY TRAP ETRAP

## (undated) DEVICE — TUBING SUCTION NONCONDUCTIVE 6MM X 12FT

## (undated) DEVICE — Device

## (undated) DEVICE — SYR LUER LOK 5CC

## (undated) DEVICE — SYR LUER LOK 20CC